# Patient Record
Sex: FEMALE | Race: OTHER | HISPANIC OR LATINO | URBAN - METROPOLITAN AREA
[De-identification: names, ages, dates, MRNs, and addresses within clinical notes are randomized per-mention and may not be internally consistent; named-entity substitution may affect disease eponyms.]

---

## 2018-08-31 RX ORDER — RITUXIMAB 10 MG/ML
1 INJECTION, SOLUTION INTRAVENOUS
Qty: 0 | Refills: 0 | COMMUNITY
Start: 2018-08-31

## 2018-12-11 ENCOUNTER — OUTPATIENT (OUTPATIENT)
Dept: OUTPATIENT SERVICES | Facility: HOSPITAL | Age: 53
LOS: 1 days | End: 2018-12-11
Payer: MEDICARE

## 2018-12-11 VITALS
TEMPERATURE: 98 F | SYSTOLIC BLOOD PRESSURE: 110 MMHG | OXYGEN SATURATION: 98 % | RESPIRATION RATE: 16 BRPM | WEIGHT: 139.99 LBS | HEART RATE: 63 BPM | HEIGHT: 59 IN | DIASTOLIC BLOOD PRESSURE: 70 MMHG

## 2018-12-11 DIAGNOSIS — Z01.818 ENCOUNTER FOR OTHER PREPROCEDURAL EXAMINATION: ICD-10-CM

## 2018-12-11 DIAGNOSIS — Z90.710 ACQUIRED ABSENCE OF BOTH CERVIX AND UTERUS: Chronic | ICD-10-CM

## 2018-12-11 DIAGNOSIS — Z98.890 OTHER SPECIFIED POSTPROCEDURAL STATES: Chronic | ICD-10-CM

## 2018-12-11 DIAGNOSIS — M20.12 HALLUX VALGUS (ACQUIRED), LEFT FOOT: ICD-10-CM

## 2018-12-11 DIAGNOSIS — M54.5 LOW BACK PAIN: Chronic | ICD-10-CM

## 2018-12-11 DIAGNOSIS — I67.1 CEREBRAL ANEURYSM, NONRUPTURED: Chronic | ICD-10-CM

## 2018-12-11 DIAGNOSIS — G47.33 OBSTRUCTIVE SLEEP APNEA (ADULT) (PEDIATRIC): ICD-10-CM

## 2018-12-11 PROCEDURE — 73630 X-RAY EXAM OF FOOT: CPT | Mod: 26,LT

## 2018-12-11 PROCEDURE — G0463: CPT

## 2018-12-11 PROCEDURE — 73630 X-RAY EXAM OF FOOT: CPT

## 2018-12-11 RX ORDER — SODIUM CHLORIDE 9 MG/ML
3 INJECTION INTRAMUSCULAR; INTRAVENOUS; SUBCUTANEOUS EVERY 8 HOURS
Qty: 0 | Refills: 0 | Status: DISCONTINUED | OUTPATIENT
Start: 2018-12-17 | End: 2018-12-25

## 2018-12-11 NOTE — H&P PST ADULT - MUSCULOSKELETAL COMMENTS
left foot, tenderness to palpation, pt has bunion left foot pain - hallux valgus acquired of left foot

## 2018-12-11 NOTE — H&P PST ADULT - RS GEN PE MLT RESP DETAILS PC
breath sounds equal/good air movement/airway patent/no wheezes/respirations non-labored/no rhonchi/normal/no rales/clear to auscultation bilaterally

## 2018-12-11 NOTE — H&P PST ADULT - HISTORY OF PRESENT ILLNESS
53 years old female presented to [facility] for evaluation before surgery, pt was diagnosed with hallux valgus left foot and is scheduled for Paulie Klaus Bunionectomy on 12/17/18.

## 2018-12-11 NOTE — H&P PST ADULT - NEGATIVE GENERAL GENITOURINARY SYMPTOMS
no dysuria/normal libido/no renal colic/no flank pain L/no hematuria/no flank pain R/no urinary hesitancy/no urine discoloration/no bladder infections/no nocturia/no gas in urine/normal urinary frequency/no incontinence

## 2018-12-11 NOTE — H&P PST ADULT - MUSCULOSKELETAL
detailed exam no joint erythema/no calf tenderness/no joint swelling/no joint warmth/decreased ROM due to pain details…

## 2018-12-11 NOTE — H&P PST ADULT - NEGATIVE NEUROLOGICAL SYMPTOMS
no focal seizures/no tremors/no hemiparesis/no facial palsy/no paresthesias/no transient paralysis/no weakness/no generalized seizures/no syncope/migraines/no confusion/no loss of sensation/no difficulty walking/no loss of consciousness

## 2018-12-11 NOTE — H&P PST ADULT - PMH
Asthma  last time used Ventolin in summer 2018, never intubated or hopsitalized, under [facility] , Dr Josh Parr care  Brain aneurysm  dx 2011, sx done  Depression    Fibroids    Fibromyalgia    GERD (gastroesophageal reflux disease)    Hallux valgus (acquired), left foot    HLD (hyperlipidemia)    HTN (hypertension)    Migraines    SHELIA (obstructive sleep apnea)  home test done by PCP in 2016, SHELIA pt was using mouth guard but it broken, not using it now, to notify Anesthesia  RA (rheumatoid arthritis)    Sarcoidosis, lung  dx 2016 with bx  Seasonal allergies    Vertigo

## 2018-12-11 NOTE — H&P PST ADULT - NEGATIVE ENMT SYMPTOMS
no ear pain/no nasal congestion/no throat pain/no dysphagia/no nasal obstruction/no post-nasal discharge/no abnormal taste sensation/no gum bleeding/no tinnitus/no vertigo/no nose bleeds/no recurrent cold sores/no dry mouth/no sinus symptoms/no nasal discharge/no hearing difficulty

## 2018-12-11 NOTE — H&P PST ADULT - NEGATIVE BREAST SYMPTOMS
no breast lump L/no nipple discharge R/no breast tenderness L/no breast tenderness R/no nipple discharge L/no breast lump R

## 2018-12-11 NOTE — H&P PST ADULT - GASTROINTESTINAL DETAILS
nontender/bowel sounds normal/normal/no distention/soft/no guarding/no masses palpable/no bruit/no rebound tenderness

## 2018-12-11 NOTE — H&P PST ADULT - NEGATIVE OPHTHALMOLOGIC SYMPTOMS
no lacrimation R/no photophobia/no diplopia/no lacrimation L/no blurred vision R/no discharge R/no blurred vision L/no pain L/no irritation R/no discharge L/no pain R/no irritation L

## 2018-12-11 NOTE — H&P PST ADULT - PSH
Brain aneurysm  coil sx - 2011  Chronic lower back pain  s/p lumbar discectomy -2013, s/p fusion with hardware- 2017  H/O: hysterectomy  2011  S/P arthroscopy  left knee and left foot=- 2016

## 2018-12-11 NOTE — H&P PST ADULT - NEGATIVE CARDIOVASCULAR SYMPTOMS
no chest pain/no dyspnea on exertion/no peripheral edema/no palpitations/no orthopnea/no paroxysmal nocturnal dyspnea/no claudication

## 2018-12-16 ENCOUNTER — TRANSCRIPTION ENCOUNTER (OUTPATIENT)
Age: 53
End: 2018-12-16

## 2018-12-17 ENCOUNTER — RESULT REVIEW (OUTPATIENT)
Age: 53
End: 2018-12-17

## 2018-12-17 ENCOUNTER — OUTPATIENT (OUTPATIENT)
Dept: OUTPATIENT SERVICES | Facility: HOSPITAL | Age: 53
LOS: 1 days | End: 2018-12-17
Payer: MEDICARE

## 2018-12-17 VITALS
SYSTOLIC BLOOD PRESSURE: 112 MMHG | WEIGHT: 139.99 LBS | OXYGEN SATURATION: 98 % | HEART RATE: 80 BPM | HEIGHT: 59 IN | RESPIRATION RATE: 16 BRPM | TEMPERATURE: 98 F | DIASTOLIC BLOOD PRESSURE: 84 MMHG

## 2018-12-17 VITALS
TEMPERATURE: 98 F | HEART RATE: 69 BPM | DIASTOLIC BLOOD PRESSURE: 70 MMHG | SYSTOLIC BLOOD PRESSURE: 110 MMHG | OXYGEN SATURATION: 99 % | RESPIRATION RATE: 16 BRPM

## 2018-12-17 DIAGNOSIS — M20.12 HALLUX VALGUS (ACQUIRED), LEFT FOOT: ICD-10-CM

## 2018-12-17 DIAGNOSIS — Z98.890 OTHER SPECIFIED POSTPROCEDURAL STATES: Chronic | ICD-10-CM

## 2018-12-17 DIAGNOSIS — Z90.710 ACQUIRED ABSENCE OF BOTH CERVIX AND UTERUS: Chronic | ICD-10-CM

## 2018-12-17 DIAGNOSIS — I67.1 CEREBRAL ANEURYSM, NONRUPTURED: Chronic | ICD-10-CM

## 2018-12-17 DIAGNOSIS — M54.5 LOW BACK PAIN: Chronic | ICD-10-CM

## 2018-12-17 PROCEDURE — 73630 X-RAY EXAM OF FOOT: CPT

## 2018-12-17 PROCEDURE — C1726: CPT

## 2018-12-17 PROCEDURE — C1713: CPT

## 2018-12-17 PROCEDURE — 73630 X-RAY EXAM OF FOOT: CPT | Mod: 26,LT

## 2018-12-17 PROCEDURE — 28299 COR HLX VLGS DOUBLE OSTEOT: CPT | Mod: LT

## 2018-12-17 PROCEDURE — 88300 SURGICAL PATH GROSS: CPT | Mod: 26

## 2018-12-17 PROCEDURE — 93005 ELECTROCARDIOGRAM TRACING: CPT

## 2018-12-17 PROCEDURE — C1769: CPT

## 2018-12-17 PROCEDURE — 88300 SURGICAL PATH GROSS: CPT

## 2018-12-17 PROCEDURE — C1889: CPT

## 2018-12-17 RX ORDER — IBUPROFEN 200 MG
1 TABLET ORAL
Qty: 21 | Refills: 0 | OUTPATIENT
Start: 2018-12-17 | End: 2018-12-23

## 2018-12-17 RX ORDER — SODIUM CHLORIDE 9 MG/ML
1000 INJECTION, SOLUTION INTRAVENOUS
Qty: 0 | Refills: 0 | Status: DISCONTINUED | OUTPATIENT
Start: 2018-12-17 | End: 2018-12-17

## 2018-12-17 RX ORDER — FENTANYL CITRATE 50 UG/ML
50 INJECTION INTRAVENOUS
Qty: 0 | Refills: 0 | Status: DISCONTINUED | OUTPATIENT
Start: 2018-12-17 | End: 2018-12-17

## 2018-12-17 RX ORDER — ONDANSETRON 8 MG/1
4 TABLET, FILM COATED ORAL ONCE
Qty: 0 | Refills: 0 | Status: DISCONTINUED | OUTPATIENT
Start: 2018-12-17 | End: 2018-12-17

## 2018-12-17 RX ORDER — AZITHROMYCIN 500 MG/1
1 TABLET, FILM COATED ORAL
Qty: 5 | Refills: 0 | OUTPATIENT
Start: 2018-12-17 | End: 2018-12-21

## 2018-12-17 RX ORDER — OXYCODONE AND ACETAMINOPHEN 5; 325 MG/1; MG/1
1 TABLET ORAL ONCE
Qty: 0 | Refills: 0 | Status: DISCONTINUED | OUTPATIENT
Start: 2018-12-17 | End: 2018-12-17

## 2018-12-17 RX ADMIN — FENTANYL CITRATE 50 MICROGRAM(S): 50 INJECTION INTRAVENOUS at 13:15

## 2018-12-17 RX ADMIN — SODIUM CHLORIDE 3 MILLILITER(S): 9 INJECTION INTRAMUSCULAR; INTRAVENOUS; SUBCUTANEOUS at 08:03

## 2018-12-17 RX ADMIN — FENTANYL CITRATE 50 MICROGRAM(S): 50 INJECTION INTRAVENOUS at 11:58

## 2018-12-17 NOTE — ASU DISCHARGE PLAN (ADULT/PEDIATRIC). - NOTIFY
Fever greater than 101/Persistent Nausea and Vomiting/Numbness, tingling/Bleeding that does not stop/Increased Irritability or Sluggishness/Pain not relieved by Medications/Excessive Diarrhea/Unable to Urinate/Numbness, color, or temperature change to extremity/Swelling that continues/Inability to Tolerate Liquids or Foods

## 2018-12-17 NOTE — ASU DISCHARGE PLAN (ADULT/PEDIATRIC). - MEDICATION SUMMARY - MEDICATIONS TO TAKE
I will START or STAY ON the medications listed below when I get home from the hospital:    sulfaSALAzine 500 mg oral delayed release tablet  -- 2 tab(s) by mouth once a day (at bedtime)  -- Indication: For per pcp    Percocet 5/325 oral tablet  -- 1 tab(s) by mouth 3 times a day MDD:4 per day  -- Caution federal law prohibits the transfer of this drug to any person other  than the person for whom it was prescribed.  May cause drowsiness.  Alcohol may intensify this effect.  Use care when operating dangerous machinery.  This prescription cannot be refilled.  This product contains acetaminophen.  Do not use  with any other product containing acetaminophen to prevent possible liver damage.  Using more of this medication than prescribed may cause serious breathing problems.    -- Indication: For for pain    ibuprofen 800 mg oral tablet  -- 1 tab(s) by mouth 3 times a day   -- Do not take this drug if you are pregnant.  It is very important that you take or use this exactly as directed.  Do not skip doses or discontinue unless directed by your doctor.  May cause drowsiness or dizziness.  Obtain medical advice before taking any non-prescription drugs as some may affect the action of this medication.  Take with food or milk.    -- Indication: For for pain    SUMAtriptan 100 mg oral tablet  -- 1 tab(s) by mouth once, As Needed  -- Indication: For per pcp    gabapentin 300 mg oral capsule  -- 1 cap(s) by mouth 3 times a day  -- Indication: For per pcp    DULoxetine 60 mg oral delayed release capsule  -- 1 cap(s) by mouth once a day (at bedtime)  -- Indication: For per pcp    mirtazapine 30 mg oral tablet  -- 1 tab(s) by mouth once a day (at bedtime)  -- Indication: For per pcp    meclizine 12.5 mg oral tablet  -- 1 tab(s) by mouth 3 times a day, As Needed  -- Indication: For per pcp    ondansetron 8 mg oral tablet  -- 1 tab(s) by mouth 3 times a day, As Needed  -- Indication: For per pcp    carvedilol 3.125 mg oral tablet  -- 1 tab(s) by mouth 2 times a day  -- Indication: For per pcp    Ventolin HFA 90 mcg/inh inhalation aerosol  -- 2 puff(s) inhaled 4 times a day, As Needed  -- Indication: For per pcp    Rituxan 10 mg/mL intravenous solution  -- 1 dose(s) intravenous every 4 months  -- Indication: For per pcp    raNITIdine 150 mg oral tablet  -- 1 tab(s) by mouth once a day (at bedtime)  -- Indication: For per pcp    Imuran 50 mg oral tablet  -- 1 tab(s) by mouth once a day (at bedtime)  -- Indication: For per pcp    Azithromycin 5 Day Dose Pack 250 mg oral tablet  -- 1 tab(s) by mouth once a day   -- Do not take dairy products, antacids, or iron preparations within one hour of this medication.  Finish all this medication unless otherwise directed by prescriber.    -- Indication: For Antibiotic    omeprazole 40 mg oral delayed release capsule  -- 1 cap(s) by mouth once a day  -- Indication: For per pcp

## 2018-12-17 NOTE — ASU PATIENT PROFILE, ADULT - PREOP PAIN SCORE
0 soft/nontender/bowel sounds normal/obese abdomen bowel sounds normal/nontender/no masses palpable/soft/no distention

## 2018-12-17 NOTE — BRIEF OPERATIVE NOTE - PROCEDURE
<<-----Click on this checkbox to enter Procedure Klaus bunionectomy of left great toe  12/17/2018    Active  RSTBRIONNA Apodaca bunionectomy of left great toe  12/17/2018    Active  RSTBRIONNA

## 2018-12-20 LAB — SURGICAL PATHOLOGY STUDY: SIGNIFICANT CHANGE UP

## 2019-01-23 ENCOUNTER — INPATIENT (INPATIENT)
Facility: HOSPITAL | Age: 54
LOS: 0 days | Discharge: HOME | DRG: 313 | End: 2019-01-24
Attending: INTERNAL MEDICINE | Admitting: INTERNAL MEDICINE
Payer: COMMERCIAL

## 2019-01-23 VITALS
TEMPERATURE: 98 F | WEIGHT: 164.91 LBS | DIASTOLIC BLOOD PRESSURE: 84 MMHG | OXYGEN SATURATION: 96 % | RESPIRATION RATE: 17 BRPM | HEART RATE: 116 BPM | SYSTOLIC BLOOD PRESSURE: 133 MMHG

## 2019-01-23 DIAGNOSIS — D86.0 SARCOIDOSIS OF LUNG: ICD-10-CM

## 2019-01-23 DIAGNOSIS — Z98.890 OTHER SPECIFIED POSTPROCEDURAL STATES: Chronic | ICD-10-CM

## 2019-01-23 DIAGNOSIS — R07.9 CHEST PAIN, UNSPECIFIED: ICD-10-CM

## 2019-01-23 DIAGNOSIS — Z29.9 ENCOUNTER FOR PROPHYLACTIC MEASURES, UNSPECIFIED: ICD-10-CM

## 2019-01-23 DIAGNOSIS — Z90.710 ACQUIRED ABSENCE OF BOTH CERVIX AND UTERUS: Chronic | ICD-10-CM

## 2019-01-23 DIAGNOSIS — M06.9 RHEUMATOID ARTHRITIS, UNSPECIFIED: ICD-10-CM

## 2019-01-23 DIAGNOSIS — I67.1 CEREBRAL ANEURYSM, NONRUPTURED: Chronic | ICD-10-CM

## 2019-01-23 DIAGNOSIS — F32.9 MAJOR DEPRESSIVE DISORDER, SINGLE EPISODE, UNSPECIFIED: ICD-10-CM

## 2019-01-23 DIAGNOSIS — M54.5 LOW BACK PAIN: Chronic | ICD-10-CM

## 2019-01-23 PROBLEM — G47.33 OBSTRUCTIVE SLEEP APNEA (ADULT) (PEDIATRIC): Chronic | Status: ACTIVE | Noted: 2018-12-11

## 2019-01-23 PROBLEM — D21.9 BENIGN NEOPLASM OF CONNECTIVE AND OTHER SOFT TISSUE, UNSPECIFIED: Chronic | Status: ACTIVE | Noted: 2018-12-11

## 2019-01-23 PROBLEM — J30.2 OTHER SEASONAL ALLERGIC RHINITIS: Chronic | Status: ACTIVE | Noted: 2018-12-11

## 2019-01-23 PROBLEM — I10 ESSENTIAL (PRIMARY) HYPERTENSION: Chronic | Status: ACTIVE | Noted: 2018-12-11

## 2019-01-23 PROBLEM — M20.12 HALLUX VALGUS (ACQUIRED), LEFT FOOT: Chronic | Status: ACTIVE | Noted: 2018-12-11

## 2019-01-23 PROBLEM — G43.909 MIGRAINE, UNSPECIFIED, NOT INTRACTABLE, WITHOUT STATUS MIGRAINOSUS: Chronic | Status: ACTIVE | Noted: 2018-12-11

## 2019-01-23 PROBLEM — E78.5 HYPERLIPIDEMIA, UNSPECIFIED: Chronic | Status: ACTIVE | Noted: 2018-12-11

## 2019-01-23 PROBLEM — J45.909 UNSPECIFIED ASTHMA, UNCOMPLICATED: Chronic | Status: ACTIVE | Noted: 2018-12-11

## 2019-01-23 PROBLEM — R42 DIZZINESS AND GIDDINESS: Chronic | Status: ACTIVE | Noted: 2018-12-11

## 2019-01-23 PROBLEM — K21.9 GASTRO-ESOPHAGEAL REFLUX DISEASE WITHOUT ESOPHAGITIS: Chronic | Status: ACTIVE | Noted: 2018-12-11

## 2019-01-23 PROBLEM — M79.7 FIBROMYALGIA: Chronic | Status: ACTIVE | Noted: 2018-12-11

## 2019-01-23 LAB
ALBUMIN SERPL ELPH-MCNC: 3.6 G/DL — SIGNIFICANT CHANGE UP (ref 3.5–5)
ALP SERPL-CCNC: 137 U/L — HIGH (ref 40–120)
ALT FLD-CCNC: 59 U/L DA — SIGNIFICANT CHANGE UP (ref 10–60)
ANION GAP SERPL CALC-SCNC: 9 MMOL/L — SIGNIFICANT CHANGE UP (ref 5–17)
APTT BLD: 29.4 SEC — SIGNIFICANT CHANGE UP (ref 27.5–36.3)
AST SERPL-CCNC: 44 U/L — HIGH (ref 10–40)
BILIRUB SERPL-MCNC: 0.5 MG/DL — SIGNIFICANT CHANGE UP (ref 0.2–1.2)
BUN SERPL-MCNC: 15 MG/DL — SIGNIFICANT CHANGE UP (ref 7–18)
CALCIUM SERPL-MCNC: 8.4 MG/DL — SIGNIFICANT CHANGE UP (ref 8.4–10.5)
CHLORIDE SERPL-SCNC: 109 MMOL/L — HIGH (ref 96–108)
CK MB BLD-MCNC: <1.7 % — SIGNIFICANT CHANGE UP (ref 0–3.5)
CK MB CFR SERPL CALC: <1 NG/ML — SIGNIFICANT CHANGE UP (ref 0–3.6)
CK SERPL-CCNC: 59 U/L — SIGNIFICANT CHANGE UP (ref 21–215)
CO2 SERPL-SCNC: 24 MMOL/L — SIGNIFICANT CHANGE UP (ref 22–31)
CREAT SERPL-MCNC: 0.69 MG/DL — SIGNIFICANT CHANGE UP (ref 0.5–1.3)
D DIMER BLD IA.RAPID-MCNC: <150 NG/ML DDU — SIGNIFICANT CHANGE UP
GLUCOSE SERPL-MCNC: 96 MG/DL — SIGNIFICANT CHANGE UP (ref 70–99)
HCG SERPL-ACNC: 3 MIU/ML — SIGNIFICANT CHANGE UP
HCT VFR BLD CALC: 38.4 % — SIGNIFICANT CHANGE UP (ref 34.5–45)
HGB BLD-MCNC: 12.6 G/DL — SIGNIFICANT CHANGE UP (ref 11.5–15.5)
INR BLD: 0.96 RATIO — SIGNIFICANT CHANGE UP (ref 0.88–1.16)
MCHC RBC-ENTMCNC: 30.4 PG — SIGNIFICANT CHANGE UP (ref 27–34)
MCHC RBC-ENTMCNC: 32.8 GM/DL — SIGNIFICANT CHANGE UP (ref 32–36)
MCV RBC AUTO: 92.7 FL — SIGNIFICANT CHANGE UP (ref 80–100)
PLATELET # BLD AUTO: 316 K/UL — SIGNIFICANT CHANGE UP (ref 150–400)
POTASSIUM SERPL-MCNC: 4.5 MMOL/L — SIGNIFICANT CHANGE UP (ref 3.5–5.3)
POTASSIUM SERPL-SCNC: 4.5 MMOL/L — SIGNIFICANT CHANGE UP (ref 3.5–5.3)
PROT SERPL-MCNC: 7.7 G/DL — SIGNIFICANT CHANGE UP (ref 6–8.3)
PROTHROM AB SERPL-ACNC: 10.6 SEC — SIGNIFICANT CHANGE UP (ref 10–12.9)
RBC # BLD: 4.15 M/UL — SIGNIFICANT CHANGE UP (ref 3.8–5.2)
RBC # FLD: 12.4 % — SIGNIFICANT CHANGE UP (ref 10.3–14.5)
SODIUM SERPL-SCNC: 142 MMOL/L — SIGNIFICANT CHANGE UP (ref 135–145)
TROPONIN I SERPL-MCNC: <0.015 NG/ML — SIGNIFICANT CHANGE UP (ref 0–0.04)
TROPONIN I SERPL-MCNC: <0.015 NG/ML — SIGNIFICANT CHANGE UP (ref 0–0.04)
WBC # BLD: 8 K/UL — SIGNIFICANT CHANGE UP (ref 3.8–10.5)
WBC # FLD AUTO: 8 K/UL — SIGNIFICANT CHANGE UP (ref 3.8–10.5)

## 2019-01-23 PROCEDURE — 71045 X-RAY EXAM CHEST 1 VIEW: CPT | Mod: 26

## 2019-01-23 PROCEDURE — 99285 EMERGENCY DEPT VISIT HI MDM: CPT

## 2019-01-23 RX ORDER — CARVEDILOL PHOSPHATE 80 MG/1
1 CAPSULE, EXTENDED RELEASE ORAL
Qty: 0 | Refills: 0 | COMMUNITY

## 2019-01-23 RX ORDER — PANTOPRAZOLE SODIUM 20 MG/1
40 TABLET, DELAYED RELEASE ORAL
Qty: 0 | Refills: 0 | Status: DISCONTINUED | OUTPATIENT
Start: 2019-01-23 | End: 2019-01-24

## 2019-01-23 RX ORDER — ASPIRIN/CALCIUM CARB/MAGNESIUM 324 MG
81 TABLET ORAL DAILY
Qty: 0 | Refills: 0 | Status: DISCONTINUED | OUTPATIENT
Start: 2019-01-23 | End: 2019-01-24

## 2019-01-23 RX ORDER — SULFASALAZINE 500 MG
1000 TABLET ORAL AT BEDTIME
Qty: 0 | Refills: 0 | Status: DISCONTINUED | OUTPATIENT
Start: 2019-01-23 | End: 2019-01-24

## 2019-01-23 RX ORDER — ATORVASTATIN CALCIUM 80 MG/1
40 TABLET, FILM COATED ORAL AT BEDTIME
Qty: 0 | Refills: 0 | Status: DISCONTINUED | OUTPATIENT
Start: 2019-01-23 | End: 2019-01-24

## 2019-01-23 RX ORDER — AZATHIOPRINE 100 MG/1
50 TABLET ORAL AT BEDTIME
Qty: 0 | Refills: 0 | Status: DISCONTINUED | OUTPATIENT
Start: 2019-01-23 | End: 2019-01-24

## 2019-01-23 RX ORDER — MECLIZINE HCL 12.5 MG
1 TABLET ORAL
Qty: 0 | Refills: 0 | COMMUNITY

## 2019-01-23 RX ORDER — FAMOTIDINE 10 MG/ML
20 INJECTION INTRAVENOUS EVERY 24 HOURS
Qty: 0 | Refills: 0 | Status: DISCONTINUED | OUTPATIENT
Start: 2019-01-23 | End: 2019-01-23

## 2019-01-23 RX ORDER — SODIUM CHLORIDE 9 MG/ML
1000 INJECTION INTRAMUSCULAR; INTRAVENOUS; SUBCUTANEOUS ONCE
Qty: 0 | Refills: 0 | Status: COMPLETED | OUTPATIENT
Start: 2019-01-23 | End: 2019-01-23

## 2019-01-23 RX ORDER — DULOXETINE HYDROCHLORIDE 30 MG/1
60 CAPSULE, DELAYED RELEASE ORAL DAILY
Qty: 0 | Refills: 0 | Status: DISCONTINUED | OUTPATIENT
Start: 2019-01-23 | End: 2019-01-24

## 2019-01-23 RX ORDER — GABAPENTIN 400 MG/1
300 CAPSULE ORAL THREE TIMES A DAY
Qty: 0 | Refills: 0 | Status: DISCONTINUED | OUTPATIENT
Start: 2019-01-23 | End: 2019-01-24

## 2019-01-23 RX ORDER — SUMATRIPTAN SUCCINATE 4 MG/.5ML
100 INJECTION, SOLUTION SUBCUTANEOUS ONCE
Qty: 0 | Refills: 0 | Status: COMPLETED | OUTPATIENT
Start: 2019-01-23 | End: 2019-01-23

## 2019-01-23 RX ORDER — MIRTAZAPINE 45 MG/1
15 TABLET, ORALLY DISINTEGRATING ORAL AT BEDTIME
Qty: 0 | Refills: 0 | Status: DISCONTINUED | OUTPATIENT
Start: 2019-01-23 | End: 2019-01-24

## 2019-01-23 RX ORDER — METOPROLOL TARTRATE 50 MG
25 TABLET ORAL
Qty: 0 | Refills: 0 | Status: DISCONTINUED | OUTPATIENT
Start: 2019-01-23 | End: 2019-01-24

## 2019-01-23 RX ORDER — MIRTAZAPINE 45 MG/1
1 TABLET, ORALLY DISINTEGRATING ORAL
Qty: 0 | Refills: 0 | COMMUNITY

## 2019-01-23 RX ORDER — ACETAMINOPHEN 500 MG
975 TABLET ORAL ONCE
Qty: 0 | Refills: 0 | Status: COMPLETED | OUTPATIENT
Start: 2019-01-23 | End: 2019-01-23

## 2019-01-23 RX ORDER — METOCLOPRAMIDE HCL 10 MG
10 TABLET ORAL ONCE
Qty: 0 | Refills: 0 | Status: COMPLETED | OUTPATIENT
Start: 2019-01-23 | End: 2019-01-23

## 2019-01-23 RX ADMIN — MIRTAZAPINE 15 MILLIGRAM(S): 45 TABLET, ORALLY DISINTEGRATING ORAL at 22:07

## 2019-01-23 RX ADMIN — SUMATRIPTAN SUCCINATE 100 MILLIGRAM(S): 4 INJECTION, SOLUTION SUBCUTANEOUS at 23:57

## 2019-01-23 RX ADMIN — Medication 25 MILLIGRAM(S): at 18:24

## 2019-01-23 RX ADMIN — GABAPENTIN 300 MILLIGRAM(S): 400 CAPSULE ORAL at 22:07

## 2019-01-23 RX ADMIN — Medication 10 MILLIGRAM(S): at 13:13

## 2019-01-23 RX ADMIN — AZATHIOPRINE 50 MILLIGRAM(S): 100 TABLET ORAL at 22:21

## 2019-01-23 RX ADMIN — Medication 975 MILLIGRAM(S): at 13:43

## 2019-01-23 RX ADMIN — Medication 975 MILLIGRAM(S): at 13:13

## 2019-01-23 RX ADMIN — ATORVASTATIN CALCIUM 40 MILLIGRAM(S): 80 TABLET, FILM COATED ORAL at 22:07

## 2019-01-23 RX ADMIN — SODIUM CHLORIDE 3000 MILLILITER(S): 9 INJECTION INTRAMUSCULAR; INTRAVENOUS; SUBCUTANEOUS at 16:25

## 2019-01-23 RX ADMIN — Medication 1000 MILLIGRAM(S): at 22:21

## 2019-01-23 NOTE — H&P ADULT - NSHPLABSRESULTS_GEN_ALL_CORE
Complete Blood Count (01.23.19 @ 12:56)    WBC Count: 8.0 K/uL    RBC Count: 4.15 M/uL    Hemoglobin: 12.6 g/dL    Hematocrit: 38.4 %    Mean Cell Volume: 92.7 fl    Mean Cell Hemoglobin: 30.4 pg    Mean Cell Hemoglobin Conc: 32.8 gm/dL    Red Cell Distrib Width: 12.4 %    Platelet Count - Automated: 316 K/uL  Comprehensive Metabolic Panel (01.23.19 @ 12:56)    Sodium, Serum: 142 mmol/L    Potassium, Serum: 4.5: Specimen slightly hemolyzed mmol/L    Chloride, Serum: 109 mmol/L    Carbon Dioxide, Serum: 24 mmol/L    Anion Gap, Serum: 9 mmol/L    Blood Urea Nitrogen, Serum: 15 mg/dL    Creatinine, Serum: 0.69 mg/dL    Glucose, Serum: 96 mg/dL    Calcium, Total Serum: 8.4 mg/dL    Protein Total, Serum: 7.7 g/dL    Albumin, Serum: 3.6 g/dL    Bilirubin Total, Serum: 0.5 mg/dL    Alkaline Phosphatase, Serum: 137 U/L    Aspartate Aminotransferase (AST/SGOT): 44: Specimen slightly hemolyzed U/L    Alanine Aminotransferase (ALT/SGPT): 59 U/L DA    eGFR if Non : 99: Interpretative comment  The units for eGFR are mL/min/1.73M2 (normalized body surface area). The  eGFR is calculated from a serum creatinine using the CKD-EPI equation.  Other variables required for calculation are race, age and sex. Among  patients with chronic kidney disease (CKD), the eGFR is useful in  determining the stage of disease according to KDOQI CKD classification.  All eGFR results are reported numerically with the following  interpretation.          GFR                    With                 Without     (ml/min/1.73 m2)    Kidney Damage       Kidney Damage        >= 90                    Stage 1                     Normal        60-89                    Stage 2                     Decreased GFR        30-59     Stage 3                     Stage 3        15-29                    Stage 4                     Stage 4        < 15                      Stage 5                     Stage 5  Each stage of CKD assumes that the associated GFR level has been in  effect for at least 3 months. Determination of stages one and two (with  eGFR > 59 ml/min/m2) requires estimation of kidney damage for at least 3  months as defined by structural or functional abnormalities.  Limitations: All estimates of GFR will be less accurate for patients at  extremes of muscle mass (including but not limited to frail elderly,  critically ill, or cancer patients), those with unusual diets, and those  with conditions associated with reduced secretion or extrarenal  elimination of creatinine. The eGFR equation is not recommended for use  in patients with unstable creatinine levels. mL/min/1.73M2    eGFR if African American: 114 mL/min/1.73M2      < from: Xray Chest 1 View AP/PA (01.23.19 @ 13:50) >    FINDINGS:        Lungs: The lungs are clear.  Heart: The heart is normal in size.  Mediastinum: The mediastinum is within normal limits.    IMPRESSION:    Clear lungs.    < end of copied text >

## 2019-01-23 NOTE — ED ADULT NURSE NOTE - OBJECTIVE STATEMENT
Patient c/o chest tightness and nausea x 4 days. EKG completed. No acute distress noted, no shortness of breath indicated. Safety maintained.

## 2019-01-23 NOTE — ED PROVIDER NOTE - PHYSICAL EXAMINATION
Gen: well appearing, of stated age, no acute distress; Head: NC, AT; ENT: MMM, no uvular deviation; Neck: supple with full ROM; Chest: CTAB, no retractions, rate normal, appears to breath comfortable; Heart: RRR S1S2 No JVD No peripheral edema b/l pulses 2+ in arms and legs; Abd: Soft non-tender, no rebound or guarding, no masses, no garcia sign, no mcburney tenderness; Back: No spinal deformity; Ext: Moving all 4 limbs without obvious impairment to ROM, no obvious weakness; Neuro: fluid speech, no focal deficits, oriented to person, place, situation; Psych: No anxiety, depression or pressured speech noted; Skin: no utricaria, no diffuse rash. -ncohen

## 2019-01-23 NOTE — H&P ADULT - ASSESSMENT
52 yo F with PMH of astham (never intubated), Migraine, sarcoidosis, HLD, HTN , RA and Chronic back pain, PSH of left Hallux valgus surgery ( bunionectomy on 12/17/18 ) presented to ED c/o sternal chest pain for 4 days, pressure likely  pain, slow onset, 5-6/10, non radiating, associated with GRUBBS and nausea, denies diaphoresis, palpitation, leg swelling. Pt denies fever, chills, abd pain, vomiting, diarrhea, constipation, dysuria or any other complains.     ED course, vitals wnl except , labs noted troponin negative x1 , D -dimer negative and EKG noted sinus tachycardia at 107, prior EKG noted 12/2018 noted sinus bradycardia at HR 59. Currently chest pain improved. 54 yo F with PMH of astham (never intubated), Migraine, depression,  sarcoidosis, HLD, HTN , RA and Chronic back pain, PSH of left Hallux valgus surgery ( bunionectomy on 12/17/18 ) presented to ED c/o sternal chest pain for 4 days, pressure likely  pain, slow onset, 5-6/10, non radiating, associated with GRUBBS and nausea, denies diaphoresis, palpitation, leg swelling, In ED, vitals wnl except , labs noted troponin negative x1 , D -dimer negative and EKG noted sinus tachycardia at 107. Will admit for ACS workup

## 2019-01-23 NOTE — H&P ADULT - PROBLEM SELECTOR PLAN 5
IMPROVE VTE Individual Risk Assessment    RISK                                                          Points  [] Previous VTE                                           3  [] Thrombophilia                                        2  [] Lower limb paralysis                              2   [] Current Cancer                                       2   [] Immobilization > 24 hrs                        1  [] ICU/CCU stay > 24 hours                       1  [] Age > 60                                                   1    IMPROVE VTE Score: 0  no need for dvt ppx  on protonix for gi ppx

## 2019-01-23 NOTE — H&P ADULT - PROBLEM SELECTOR PLAN 1
p/w substernal cp and GRUBBS   HEART score 3, will r/o ACS and SSS   EKG noted sinus tachycardia at 107, prior EKG noted 12/2018 noted sinus bradycardia at HR 59.   Troponin negative x1, d-dimer negative   on telemetry   started asa, lipitor and metoprolol   f/u cardiac enzyme and TTE   cardio DR. Monreal

## 2019-01-23 NOTE — ED PROVIDER NOTE - OBJECTIVE STATEMENT
53 female migraines, incidental finding of aneurism, treated, sarcoidosis on supression meds, 4 days sternal cp slow onset mod / exertional / sob. not pleuritic, no leg swelling, no hx blood clot. no diaphoresis. no vomiting. notes that she has a ha that is mod and sim to prior migraines but also has the nausea but not cw her migraines. no fevers.

## 2019-01-23 NOTE — H&P ADULT - NSHPPHYSICALEXAM_GEN_ALL_CORE
Vital Signs Last 24 Hrs  T(C): 36.7 (23 Jan 2019 15:54), Max: 36.7 (23 Jan 2019 15:54)  T(F): 98 (23 Jan 2019 15:54), Max: 98 (23 Jan 2019 15:54)  HR: 112 (23 Jan 2019 15:54) (112 - 116)  BP: 128/81 (23 Jan 2019 15:54) (128/81 - 133/84)  BP(mean): --  RR: 18 (23 Jan 2019 15:54) (17 - 18)  SpO2: 85% (23 Jan 2019 15:54) (85% - 96%)

## 2019-01-23 NOTE — ED PROVIDER NOTE - MEDICAL DECISION MAKING DETAILS
ro for pe, acs. dissection unlikely. do not suspect ich / ha likely benign. Symptomatic treatment. labs, ekg, xr chest.

## 2019-01-23 NOTE — H&P ADULT - HISTORY OF PRESENT ILLNESS
54 yo F with PMH of astham (never intubated), Migraine, sarcoidosis, HLD, HTN , RA and Chronic back pain, PSH of left Hallux valgus surgery ( bunionectomy on 12/17/18 ) presented to ED c/o sternal chest pain for 4 days, pressure likely  pain, slow onset, 5-6/10, non radiating, associated with GRUBBS and nausea, denies diaphoresis, palpitation, leg swelling. Pt denies fever, chills, abd pain, vomiting, diarrhea, constipation, dysuria or any other complains.     ED course, vitals wnl except , labs noted troponin negative x1 , D -dimer negative and EKG noted sinus tachycardia at 107, prior EKG noted 12/2018 noted sinus bradycardia at HR 59. Currently chest pain improved. 52 yo F with PMH of astham (never intubated), Migraine, depression, sarcoidosis, HLD, HTN , RA and Chronic back pain, PSH of left Hallux valgus surgery ( bunionectomy on 12/17/18 ) presented to ED c/o sternal chest pain for 4 days, pressure likely  pain, slow onset, 5-6/10, non radiating, associated with GRUBBS and nausea, denies diaphoresis, palpitation, leg swelling. Pt denies fever, chills, abd pain, vomiting, diarrhea, constipation, dysuria or any other complains.     ED course, vitals wnl except , labs noted troponin negative x1 , D -dimer negative and EKG noted sinus tachycardia at 107, prior EKG noted 12/2018 noted sinus bradycardia at HR 59. Currently chest pain improved.

## 2019-01-24 ENCOUNTER — TRANSCRIPTION ENCOUNTER (OUTPATIENT)
Age: 54
End: 2019-01-24

## 2019-01-24 VITALS
SYSTOLIC BLOOD PRESSURE: 103 MMHG | RESPIRATION RATE: 16 BRPM | OXYGEN SATURATION: 98 % | TEMPERATURE: 98 F | HEART RATE: 85 BPM | DIASTOLIC BLOOD PRESSURE: 63 MMHG

## 2019-01-24 LAB
ANION GAP SERPL CALC-SCNC: 8 MMOL/L — SIGNIFICANT CHANGE UP (ref 5–17)
BASOPHILS # BLD AUTO: 0 K/UL — SIGNIFICANT CHANGE UP (ref 0–0.2)
BASOPHILS NFR BLD AUTO: 0.5 % — SIGNIFICANT CHANGE UP (ref 0–2)
BUN SERPL-MCNC: 14 MG/DL — SIGNIFICANT CHANGE UP (ref 7–18)
CALCIUM SERPL-MCNC: 8.5 MG/DL — SIGNIFICANT CHANGE UP (ref 8.4–10.5)
CHLORIDE SERPL-SCNC: 108 MMOL/L — SIGNIFICANT CHANGE UP (ref 96–108)
CHOLEST SERPL-MCNC: 270 MG/DL — HIGH (ref 10–199)
CK MB BLD-MCNC: <1.6 % — SIGNIFICANT CHANGE UP (ref 0–3.5)
CK MB CFR SERPL CALC: <1 NG/ML — SIGNIFICANT CHANGE UP (ref 0–3.6)
CK SERPL-CCNC: 63 U/L — SIGNIFICANT CHANGE UP (ref 21–215)
CO2 SERPL-SCNC: 25 MMOL/L — SIGNIFICANT CHANGE UP (ref 22–31)
CREAT SERPL-MCNC: 0.66 MG/DL — SIGNIFICANT CHANGE UP (ref 0.5–1.3)
EOSINOPHIL # BLD AUTO: 0.1 K/UL — SIGNIFICANT CHANGE UP (ref 0–0.5)
EOSINOPHIL NFR BLD AUTO: 1.2 % — SIGNIFICANT CHANGE UP (ref 0–6)
FOLATE SERPL-MCNC: 11 NG/ML — SIGNIFICANT CHANGE UP
GLUCOSE SERPL-MCNC: 96 MG/DL — SIGNIFICANT CHANGE UP (ref 70–99)
HBA1C BLD-MCNC: 5.4 % — SIGNIFICANT CHANGE UP (ref 4–5.6)
HCT VFR BLD CALC: 37.6 % — SIGNIFICANT CHANGE UP (ref 34.5–45)
HCV AB S/CO SERPL IA: 0.07 S/CO — SIGNIFICANT CHANGE UP
HCV AB SERPL-IMP: SIGNIFICANT CHANGE UP
HDLC SERPL-MCNC: 47 MG/DL — LOW
HGB BLD-MCNC: 12.3 G/DL — SIGNIFICANT CHANGE UP (ref 11.5–15.5)
LIPID PNL WITH DIRECT LDL SERPL: 185 MG/DL — SIGNIFICANT CHANGE UP
LYMPHOCYTES # BLD AUTO: 1.9 K/UL — SIGNIFICANT CHANGE UP (ref 1–3.3)
LYMPHOCYTES # BLD AUTO: 32.5 % — SIGNIFICANT CHANGE UP (ref 13–44)
MCHC RBC-ENTMCNC: 30.5 PG — SIGNIFICANT CHANGE UP (ref 27–34)
MCHC RBC-ENTMCNC: 32.8 GM/DL — SIGNIFICANT CHANGE UP (ref 32–36)
MCV RBC AUTO: 93 FL — SIGNIFICANT CHANGE UP (ref 80–100)
MONOCYTES # BLD AUTO: 0.6 K/UL — SIGNIFICANT CHANGE UP (ref 0–0.9)
MONOCYTES NFR BLD AUTO: 9.8 % — SIGNIFICANT CHANGE UP (ref 2–14)
NEUTROPHILS # BLD AUTO: 3.3 K/UL — SIGNIFICANT CHANGE UP (ref 1.8–7.4)
NEUTROPHILS NFR BLD AUTO: 55.9 % — SIGNIFICANT CHANGE UP (ref 43–77)
PLATELET # BLD AUTO: 295 K/UL — SIGNIFICANT CHANGE UP (ref 150–400)
POTASSIUM SERPL-MCNC: 3.7 MMOL/L — SIGNIFICANT CHANGE UP (ref 3.5–5.3)
POTASSIUM SERPL-SCNC: 3.7 MMOL/L — SIGNIFICANT CHANGE UP (ref 3.5–5.3)
RBC # BLD: 4.04 M/UL — SIGNIFICANT CHANGE UP (ref 3.8–5.2)
RBC # FLD: 12.3 % — SIGNIFICANT CHANGE UP (ref 10.3–14.5)
SODIUM SERPL-SCNC: 141 MMOL/L — SIGNIFICANT CHANGE UP (ref 135–145)
TOTAL CHOLESTEROL/HDL RATIO MEASUREMENT: 5.7 RATIO — SIGNIFICANT CHANGE UP (ref 3.3–7.1)
TRIGL SERPL-MCNC: 192 MG/DL — HIGH (ref 10–149)
TROPONIN I SERPL-MCNC: <0.015 NG/ML — SIGNIFICANT CHANGE UP (ref 0–0.04)
TSH SERPL-MCNC: 1.62 UU/ML — SIGNIFICANT CHANGE UP (ref 0.34–4.82)
VIT B12 SERPL-MCNC: 476 PG/ML — SIGNIFICANT CHANGE UP (ref 232–1245)
WBC # BLD: 6 K/UL — SIGNIFICANT CHANGE UP (ref 3.8–10.5)
WBC # FLD AUTO: 6 K/UL — SIGNIFICANT CHANGE UP (ref 3.8–10.5)

## 2019-01-24 PROCEDURE — 36415 COLL VENOUS BLD VENIPUNCTURE: CPT

## 2019-01-24 PROCEDURE — 82607 VITAMIN B-12: CPT

## 2019-01-24 PROCEDURE — 85379 FIBRIN DEGRADATION QUANT: CPT

## 2019-01-24 PROCEDURE — 85730 THROMBOPLASTIN TIME PARTIAL: CPT

## 2019-01-24 PROCEDURE — 80061 LIPID PANEL: CPT

## 2019-01-24 PROCEDURE — 93306 TTE W/DOPPLER COMPLETE: CPT | Mod: 26

## 2019-01-24 PROCEDURE — 82746 ASSAY OF FOLIC ACID SERUM: CPT

## 2019-01-24 PROCEDURE — 85027 COMPLETE CBC AUTOMATED: CPT

## 2019-01-24 PROCEDURE — 99285 EMERGENCY DEPT VISIT HI MDM: CPT | Mod: 25

## 2019-01-24 PROCEDURE — 93306 TTE W/DOPPLER COMPLETE: CPT

## 2019-01-24 PROCEDURE — 83036 HEMOGLOBIN GLYCOSYLATED A1C: CPT

## 2019-01-24 PROCEDURE — 84443 ASSAY THYROID STIM HORMONE: CPT

## 2019-01-24 PROCEDURE — 93005 ELECTROCARDIOGRAM TRACING: CPT

## 2019-01-24 PROCEDURE — 80053 COMPREHEN METABOLIC PANEL: CPT

## 2019-01-24 PROCEDURE — 86803 HEPATITIS C AB TEST: CPT

## 2019-01-24 PROCEDURE — 85610 PROTHROMBIN TIME: CPT

## 2019-01-24 PROCEDURE — 84484 ASSAY OF TROPONIN QUANT: CPT

## 2019-01-24 PROCEDURE — 93010 ELECTROCARDIOGRAM REPORT: CPT

## 2019-01-24 PROCEDURE — 82553 CREATINE MB FRACTION: CPT

## 2019-01-24 PROCEDURE — 82550 ASSAY OF CK (CPK): CPT

## 2019-01-24 PROCEDURE — 96374 THER/PROPH/DIAG INJ IV PUSH: CPT

## 2019-01-24 PROCEDURE — A9502: CPT

## 2019-01-24 PROCEDURE — 93017 CV STRESS TEST TRACING ONLY: CPT

## 2019-01-24 PROCEDURE — 71045 X-RAY EXAM CHEST 1 VIEW: CPT

## 2019-01-24 PROCEDURE — 84702 CHORIONIC GONADOTROPIN TEST: CPT

## 2019-01-24 PROCEDURE — 78452 HT MUSCLE IMAGE SPECT MULT: CPT

## 2019-01-24 PROCEDURE — 80048 BASIC METABOLIC PNL TOTAL CA: CPT

## 2019-01-24 RX ORDER — MECLIZINE HCL 12.5 MG
12.5 TABLET ORAL ONCE
Qty: 0 | Refills: 0 | Status: COMPLETED | OUTPATIENT
Start: 2019-01-24 | End: 2019-01-24

## 2019-01-24 RX ORDER — ATORVASTATIN CALCIUM 80 MG/1
1 TABLET, FILM COATED ORAL
Qty: 30 | Refills: 0
Start: 2019-01-24 | End: 2019-02-22

## 2019-01-24 RX ADMIN — SUMATRIPTAN SUCCINATE 100 MILLIGRAM(S): 4 INJECTION, SOLUTION SUBCUTANEOUS at 02:48

## 2019-01-24 RX ADMIN — GABAPENTIN 300 MILLIGRAM(S): 400 CAPSULE ORAL at 13:00

## 2019-01-24 RX ADMIN — Medication 81 MILLIGRAM(S): at 13:00

## 2019-01-24 RX ADMIN — PANTOPRAZOLE SODIUM 40 MILLIGRAM(S): 20 TABLET, DELAYED RELEASE ORAL at 06:25

## 2019-01-24 RX ADMIN — Medication 25 MILLIGRAM(S): at 06:24

## 2019-01-24 RX ADMIN — GABAPENTIN 300 MILLIGRAM(S): 400 CAPSULE ORAL at 06:25

## 2019-01-24 RX ADMIN — Medication 12.5 MILLIGRAM(S): at 14:38

## 2019-01-24 RX ADMIN — DULOXETINE HYDROCHLORIDE 60 MILLIGRAM(S): 30 CAPSULE, DELAYED RELEASE ORAL at 13:00

## 2019-01-24 NOTE — DISCHARGE NOTE ADULT - PATIENT PORTAL LINK FT
You can access the [facility], offered by [facility], by registering with the following website: [email or web address]

## 2019-01-24 NOTE — DISCHARGE NOTE ADULT - CARE PLAN
Principal Discharge DX:	Chest pain, unspecified type  Goal:	To complete resolution of chest pain  Assessment and plan of treatment:	You were admitted to the hospital because of chest pain, you had cardiac work up which was negative for ischemia.  Secondary Diagnosis:	RA (rheumatoid arthritis)  Assessment and plan of treatment:	Continue with medication for Rheumatoid arthritis.  Secondary Diagnosis:	Sarcoidosis, lung  Assessment and plan of treatment:	Continue with your home medications for sarcoidosis. Please follow up with Dr. Parr for pulmonary function test as out patient.  Secondary Diagnosis:	Migraines  Assessment and plan of treatment:	Continue with your medication for migraines.  Secondary Diagnosis:	HTN (hypertension)  Assessment and plan of treatment:	Continue with blood pressure medication. Maintain a healthy diet that consist of low sugar, low fat, low sodium diet. Exercise frequently if possible.  Follow up with primary care physician in one week after discharge.  Secondary Diagnosis:	HLD (hyperlipidemia)  Assessment and plan of treatment:	Please take atorvastatin as advised and follow up with your primary care doctor in a week. Your total cholesterol was 270 and triglyceride was 192 on admission. Please follow up with your primary care doctor in a week.  Secondary Diagnosis:	Depression  Assessment and plan of treatment:	Continue with your home medication.

## 2019-01-24 NOTE — DISCHARGE NOTE ADULT - PLAN OF CARE
Continue with your medication for migraines. Continue with blood pressure medication. Maintain a healthy diet that consist of low sugar, low fat, low sodium diet. Exercise frequently if possible.  Follow up with primary care physician in one week after discharge. Please take atorvastatin as advised and follow up with your primary care doctor in a week. Your total cholesterol was 270 and triglyceride was 192 on admission. Please follow up with your primary care doctor in a week. Continue with your home medication. To complete resolution of chest pain You were admitted to the hospital because of chest pain, you had cardiac work up which was negative for ischemia. Continue with medication for Rheumatoid arthritis. Continue with your home medications for sarcoidosis. Please follow up with Dr. Parr for pulmonary function test as out patient.

## 2019-01-24 NOTE — PROVIDER CONTACT NOTE (OTHER) - ASSESSMENT
Vital signs were stable, pt denies SOB, palpitations and radiation.. pt also states that this is not a new pain she has felt it before. Pt also states that it may just be some reflux.

## 2019-01-24 NOTE — DISCHARGE NOTE ADULT - SECONDARY DIAGNOSIS.
HTN (hypertension) HLD (hyperlipidemia) Depression RA (rheumatoid arthritis) Sarcoidosis, lung Migraines

## 2019-01-24 NOTE — CONSULT NOTE ADULT - SUBJECTIVE AND OBJECTIVE BOX
CHIEF COMPLAINT:Patient is a 53y old  Female who presents with a chief complaint of CP.    HPI:  54 yo F with PMHX of asthma (never intubated), Migraine, depression, sarcoidosis, HLD, HTN , RA and Chronic back pain, PSH of left Hallux valgus surgery ( bunionectomy on 12/17/18 ) presented to ED c/o sternal chest pain for 4 days, pressure likely  pain, slow onset, 5-6/10, non radiating, associated with GRUBBS and nausea, denies diaphoresis, palpitation, leg swelling. Pt denies fever, chills, abd pain, vomiting, diarrhea, constipation, dysuria or any other complains.     ED course, vitals wnl except , labs noted troponin negative x1 , D -dimer negative and EKG noted sinus tachycardia at 107, prior EKG noted 12/2018 noted sinus bradycardia at HR 59. Currently chest pain improved. 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  Asthma: last time used Ventolin in summer 2018, never intubated or hopsitalized, under Pulmonolofy , Dr Josh Parr care  Chronic lower back pain: s/p lumbar discectomy -2013, s/p fusion with hardware- 2017  Brain aneurysm: coil sx - 2011  S/P arthroscopy: left knee and left foot=- 2016  H/O: hysterectomy: 2011      MEDICATIONS  (STANDING):  aspirin  chewable 81 milliGRAM(s) Oral daily  atorvastatin 40 milliGRAM(s) Oral at bedtime  azaTHIOprine 50 milliGRAM(s) Oral at bedtime  DULoxetine 60 milliGRAM(s) Oral daily  gabapentin 300 milliGRAM(s) Oral three times a day  metoprolol tartrate 25 milliGRAM(s) Oral two times a day  mirtazapine 15 milliGRAM(s) Oral at bedtime  pantoprazole    Tablet 40 milliGRAM(s) Oral before breakfast  sulfaSALAzine 1000 milliGRAM(s) Oral at bedtime    MEDICATIONS  (PRN):      FAMILY HISTORY:  Family history of hypertension (Mother)      SOCIAL HISTORY:    [x ] Non-smoker    [x ] Alcohol-denies    Allergies    Cipro (Vomiting)    Intolerances    	    REVIEW OF SYSTEMS:  CONSTITUTIONAL: No fever, weight loss, or fatigue  EYES: No eye pain, visual disturbances, or discharge  ENT:  No difficulty hearing, tinnitus, vertigo; No sinus or throat pain  NECK: No pain or stiffness  RESPIRATORY: No cough, wheezing, chills or hemoptysis; No Shortness of Breath  CARDIOVASCULAR: + chest pain, No palpitations, passing out, dizziness, or leg swelling  GASTROINTESTINAL: No abdominal or epigastric pain. No nausea, vomiting, or hematemesis; No diarrhea or constipation. No melena or hematochezia.  GENITOURINARY: No dysuria, frequency, hematuria, or incontinence  NEUROLOGICAL: No headaches, memory loss, loss of strength, numbness, or tremors  SKIN: No itching, burning, rashes, or lesions   LYMPH Nodes: No enlarged glands  ENDOCRINE: No heat or cold intolerance; No hair loss  MUSCULOSKELETAL: No joint pain or swelling; No muscle, back, or extremity pain  PSYCHIATRIC: No depression, anxiety, mood swings, or difficulty sleeping  HEME/LYMPH: No easy bruising, or bleeding gums  ALLERGY AND IMMUNOLOGIC: No hives or eczema	      PHYSICAL EXAM:  T(C): 36.6 (01-24-19 @ 05:31), Max: 36.8 (01-23-19 @ 22:05)  HR: 77 (01-24-19 @ 05:31) (72 - 116)  BP: 124/80 (01-24-19 @ 05:31) (117/73 - 135/78)  RR: 18 (01-24-19 @ 05:31) (16 - 18)  SpO2: 98% (01-24-19 @ 05:31) (85% - 98%)        Appearance: Normal	  HEENT:   Normal oral mucosa, PERRL, EOMI	  Lymphatic: No lymphadenopathy  Cardiovascular: Normal S1 S2, No JVD, No murmurs, No edema  Respiratory: Lungs clear to auscultation	  Psychiatry: A & O x 3, Mood & affect appropriate  Gastrointestinal:  Soft, Non-tender, + BS	  Skin: No rashes, No ecchymoses, No cyanosis	  Neurologic: Non-focal  Extremities: Normal range of motion, No clubbing, cyanosis or edema  Vascular: Peripheral pulses palpable 2+ bilaterally   	    ECG:  	nsr,nl axis    	  LABS:	 	    CARDIAC MARKERS:  CARDIAC MARKERS ( 24 Jan 2019 06:11 )  <0.015 ng/mL / x     / 63 U/L / x     / <1.0 ng/mL  CARDIAC MARKERS ( 23 Jan 2019 18:16 )  <0.015 ng/mL / x     / 59 U/L / x     / <1.0 ng/mL  CARDIAC MARKERS ( 23 Jan 2019 12:56 )  <0.015 ng/mL / x     / x     / x     / x                             12.3   6.0   )-----------( 295      ( 24 Jan 2019 06:11 )             37.6     01-24    141  |  108  |  14  ----------------------------<  96  3.7   |  25  |  0.66    Ca    8.5      24 Jan 2019 06:11    TPro  7.7  /  Alb  3.6  /  TBili  0.5  /  DBili  x   /  AST  44<H>  /  ALT  59  /  AlkPhos  137<H>  01-23      Lipid Profile: Cholesterol 270    HDL 47        TSH: Thyroid Stimulating Hormone, Serum: 1.62 uU/mL (01-24 @ 06:11)          EXAM:  XR CHEST AP OR PA 1V                            PROCEDURE DATE:  01/23/2019          INTERPRETATION:  PROCEDURE: AP view of the chest.    CLINICAL INFORMATION: Chest pain.    COMPARISON: 7/25/2016.    FINDINGS:        Lungs: The lungs are clear.  Heart: The heart is normal in size.  Mediastinum: The mediastinum is within normal limits.    IMPRESSION:    Clear lungs.

## 2019-01-24 NOTE — DISCHARGE NOTE ADULT - HOSPITAL COURSE
HPI:  52 yo F with PMH of astham (never intubated), Migraine, depression, sarcoidosis, HLD, HTN , RA and Chronic back pain, PSH of left Hallux valgus surgery ( bunionectomy on 12/17/18 ) presented to ED c/o sternal chest pain for 4 days, pressure likely  pain, slow onset, 5-6/10, non radiating, associated with GRUBBS and nausea, denies diaphoresis, palpitation, leg swelling. Pt denies fever, chills, abd pain, vomiting, diarrhea, constipation, dysuria or any other complains.     ED course, vitals wnl except , labs noted troponin negative x1 , D -dimer negative and EKG noted sinus tachycardia at 107, prior EKG noted 12/2018 noted sinus bradycardia at HR 59. Currently chest pain improved. (23 Jan 2019 17:16)    Pt had echo ef 69%, normal diastolic function and stress test which were negative for ischemia.   Troponins are neg, ekg sinus tacy to 107,   HLD cholesterol 270 with , pt was started on statin.   D/w Dr. Parr

## 2019-01-24 NOTE — CONSULT NOTE ADULT - PROBLEM SELECTOR RECOMMENDATION 9
r/o ACS vs SSS   telemetry   asa, lipitor and metoprolol   f/u cardiac enzyme and TTE r/o ACS vs SSS   telemetry   asa, lipitor and metoprolol   f/u cardiac enzyme and TTE  Cardio eval

## 2019-01-24 NOTE — CONSULT NOTE ADULT - PROBLEM SELECTOR PROBLEM 5
Fingertip Injuries and Amputations  Fingertip injuries are common and often get injured because they are last to escape when pulling your hand out of harm's way. You have amputated (cut off) part of your finger. How this turns out depends largely on how much was amputated. If just the tip is amputated, often the end of the finger will grow back and the finger may return to much the same as it was before the injury.   If more of the finger is missing, your caregiver has done the best with the tissue remaining to allow you to keep as much finger as is possible. Your caregiver after checking your injury has tried to leave you with a painless fingertip that has durable, feeling skin. If possible, your caregiver has tried to maintain the finger's length and appearance and preserve its fingernail.   Please read the instructions outlined below and refer to this sheet in the next few weeks. These instructions provide you with general information on caring for yourself. Your caregiver may also give you specific instructions. While your treatment has been done according to the most current medical practices available, unavoidable complications occasionally occur. If you have any problems or questions after discharge, please call your caregiver.  HOME CARE INSTRUCTIONS   · You may resume normal diet and activities as directed or allowed.  · Keep your hand elevated above the level of your heart. This helps decrease pain and swelling.  · Keep ice packs (or a bag of ice wrapped in a towel) on the injured area for 15-20 minutes, 3-4 times per day, for the first two days.  · Change dressings if necessary or as directed.  · Clean the wound daily or as directed.  · Only take over-the-counter or prescription medicines for pain, discomfort, or fever as directed by your caregiver.  · Keep appointments as directed.  SEEK IMMEDIATE MEDICAL CARE IF:  · You develop redness, swelling, numbness or increasing pain in the wound.  · There is pus  coming from the wound.  · You develop an unexplained oral temperature above 102° F (38.9° C) or as your caregiver suggests.  · There is a foul (bad) smell coming from the wound or dressing.  · There is a breaking open of the wound (edges not staying together) after sutures or staples have been removed.  MAKE SURE YOU:   · Understand these instructions.  · Will watch your condition.  · Will get help right away if you are not doing well or get worse.  Document Released: 11/08/2006 Document Revised: 03/11/2013 Document Reviewed: 10/07/2009  [employer]® Patient Information ©2014 [employer], [employer].     Need for prophylactic measure

## 2019-01-24 NOTE — DISCHARGE NOTE ADULT - CARE PROVIDER_API CALL
Josh Parr), Internal Medicine; Pulmonary Disease  [address] [place] [address]  Phone: [phone]  Fax: [phone]

## 2019-01-24 NOTE — CONSULT NOTE ADULT - ASSESSMENT
54 yo F with PMHX of asthma (never intubated), Migraine, depression, sarcoidosis, HLD, HTN , RA and Chronic back pain, PSH of left Hallux valgus surgery ( bunionectomy on 12/17/18 ) presented to ED c/o sternal chest pain for 4 days.  1.Tele monitoring.  2.Echocardiogram.  3.Stress test-r/o ischemia.  4.HTN-cont bp medication.  5.Lipid d/o-statin.  6.Sarcoidosis-cont current medication.  7.GI and DVT prophylaxis.

## 2019-01-24 NOTE — CONSULT NOTE ADULT - SUBJECTIVE AND OBJECTIVE BOX
Patient is a 53y old  Female who presents with a chief complaint of CP (24 Jan 2019 08:43)    Pt is alert, awake, from bed to chair, sitting comfortably, in NAD. She complains of grubbs. Also she has a h/o vertigo (for many years) and complains of dizziness. Denies cp, sob at this time.     History of Present Illness:  History of Present Illness: 	  52 yo F with PMH of astham (never intubated), Migraine, depression, sarcoidosis, HLD, HTN , RA and Chronic back pain, PSH of left Hallux valgus surgery ( bunionectomy on 12/17/18 ) presented to ED c/o sternal chest pain for 4 days, pressure likely  pain, slow onset, 5-6/10, non radiating, associated with GRUBBS and nausea, denies diaphoresis, palpitation, leg swelling. Pt denies fever, chills, abd pain, vomiting, diarrhea, constipation, dysuria or any other complains.     ED course, vitals wnl except , labs noted troponin negative x1 , D -dimer negative and EKG noted sinus tachycardia at 107, prior EKG noted 12/2018 noted sinus bradycardia at HR 59. Currently chest pain improved.        INTERVAL HPI/OVERNIGHT EVENTS:  T(C): 36.7 (01-24-19 @ 10:21), Max: 36.8 (01-23-19 @ 22:05)  HR: 82 (01-24-19 @ 10:21) (72 - 116)  BP: 109/69 (01-24-19 @ 10:21) (109/69 - 135/78)  RR: 16 (01-24-19 @ 10:21) (16 - 18)  SpO2: 98% (01-24-19 @ 10:21) (85% - 98%)  Wt(kg): --  I&O's Summary      PAST MEDICAL & SURGICAL HISTORY:  SHELIA (obstructive sleep apnea): home test done by PCP in 2016, SHELIA pt was using mouth guard but it broken, not using it now, to notify Anesthesia  Hallux valgus (acquired), left foot  Depression  Vertigo  Migraines  Brain aneurysm: dx 2011, sx done  Fibroids  Seasonal allergies  RA (rheumatoid arthritis)  Fibromyalgia  GERD (gastroesophageal reflux disease)  HTN (hypertension)  HLD (hyperlipidemia)  Sarcoidosis, lung: dx 2016 with bx  Asthma: last time used Ventolin in summer 2018, never intubated or hopsitalized, under Pulmonol[facility] , Dr Josh Parr care  Chronic lower back pain: s/p lumbar discectomy -2013, s/p fusion with hardware- 2017  Brain aneurysm: coil sx - 2011  S/P arthroscopy: left knee and left foot=- 2016  H/O: hysterectomy: 2011      SOCIAL HISTORY  Alcohol:  Tobacco:  Illicit substance use:      FAMILY HISTORY:      LABS:                        12.3   6.0   )-----------( 295      ( 24 Jan 2019 06:11 )             37.6     01-24    141  |  108  |  14  ----------------------------<  96  3.7   |  25  |  0.66    Ca    8.5      24 Jan 2019 06:11    TPro  7.7  /  Alb  3.6  /  TBili  0.5  /  DBili  x   /  AST  44<H>  /  ALT  59  /  AlkPhos  137<H>  01-23    PT/INR - ( 23 Jan 2019 12:56 )   PT: 10.6 sec;   INR: 0.96 ratio         PTT - ( 23 Jan 2019 12:56 )  PTT:29.4 sec    CAPILLARY BLOOD GLUCOSE                MEDICATIONS  (STANDING):  aspirin  chewable 81 milliGRAM(s) Oral daily  atorvastatin 40 milliGRAM(s) Oral at bedtime  azaTHIOprine 50 milliGRAM(s) Oral at bedtime  DULoxetine 60 milliGRAM(s) Oral daily  gabapentin 300 milliGRAM(s) Oral three times a day  metoprolol tartrate 25 milliGRAM(s) Oral two times a day  mirtazapine 15 milliGRAM(s) Oral at bedtime  pantoprazole    Tablet 40 milliGRAM(s) Oral before breakfast  sulfaSALAzine 1000 milliGRAM(s) Oral at bedtime    MEDICATIONS  (PRN):      REVIEW OF SYSTEMS:  CONSTITUTIONAL: No fever, weight loss, or fatigue  EYES: No eye pain, visual disturbances, or discharge  ENMT:  No difficulty hearing, tinnitus, vertigo; No sinus or throat pain  NECK: No pain or stiffness  RESPIRATORY: No cough, wheezing, chills or hemoptysis; No shortness of breath  CARDIOVASCULAR: No chest pain, palpitations + grubbs   GASTROINTESTINAL: No abdominal or epigastric pain. No nausea, vomiting, or hematemesis; No diarrhea or constipation. No melena or hematochezia.  GENITOURINARY: No dysuria, frequency, hematuria, or incontinence  NEUROLOGICAL: No headaches, memory loss, loss of strength, numbness, or tremors, + dizziness  SKIN: No itching, burning, rashes, or lesions   LYMPH NODES: No enlarged glands  ENDOCRINE: No heat or cold intolerance; No hair loss  MUSCULOSKELETAL: No joint pain or swelling; No muscle, back, or extremity pain  PSYCHIATRIC: No depression, anxiety, mood swings, or difficulty sleeping  HEME/LYMPH: No easy bruising, or bleeding gums  ALLERY AND IMMUNOLOGIC: No hives or eczema    PHYSICAL EXAM:  GENERAL: NAD, well-groomed, well-developed  HEAD:  Atraumatic, Normocephalic  EYES: EOMI, PERRLA, conjunctiva and sclera clear  ENMT: No tonsillar erythema, exudates, or enlargement; Moist mucous membranes, Good dentition, No lesions  NECK: Supple, No JVD, Normal thyroid  NERVOUS SYSTEM:  Alert & Oriented X3, Good concentration; Motor Strength 5/5 B/L upper and lower extremities; DTRs 2+ intact and symmetric  CHEST/LUNG: Clear to percussion bilaterally; No rales, rhonchi, wheezing, or rubs  HEART: Regular rate and rhythm; No murmurs, rubs, or gallops  ABDOMEN: Soft, Nontender, Nondistended; Bowel sounds present  EXTREMITIES:  2+ Peripheral Pulses, No clubbing, cyanosis, or edema  LYMPH: No lymphadenopathy noted  SKIN: No rashes or lesions    RADIOLOGY & ADDITIONAL TESTS:      < from: Xray Chest 1 View AP/PA (01.23.19 @ 13:50) >    IMPRESSION:  Clear lungs.  < end of copied text >          Imaging Personally Reviewed:  [x ] YES  [ ] NO    Consultant(s) Notes Reviewed:  [x ] YES  [ ] NO        Care Discussed with Consultants/Other Providers [ ] YES  [ ] NO Patient is a 53y old  Female who presents with a chief complaint of CP (24 Jan 2019 08:43)    Pt is alert, awake, from bed to chair, sitting comfortably, in NAD. She complains of grubbs. Also she has a h/o vertigo (for many years) and complains of dizziness. Denies cp, sob at this time.     History of Present Illness:  History of Present Illness: 	  54 yo F with PMH of astham (never intubated), Migraine, depression, sarcoidosis, HLD, HTN , RA and Chronic back pain, PSH of left Hallux valgus surgery ( bunionectomy on 12/17/18 ) presented to ED c/o sternal chest pain for 4 days, pressure likely  pain, slow onset, 5-6/10, non radiating, associated with GRUBBS and nausea, denies diaphoresis, palpitation, leg swelling. Pt denies fever, chills, abd pain, vomiting, diarrhea, constipation, dysuria or any other complains. Has long hx of dizziness as well.    ED course, vitals wnl except , labs noted troponin negative x1 , D -dimer negative and EKG noted sinus tachycardia at 107, prior EKG noted 12/2018 noted sinus bradycardia at HR 59. Currently chest pain improved. Awake, alert, comfortable in bed in NAD.      INTERVAL HPI/OVERNIGHT EVENTS:  T(C): 36.7 (01-24-19 @ 10:21), Max: 36.8 (01-23-19 @ 22:05)  HR: 82 (01-24-19 @ 10:21) (72 - 116)  BP: 109/69 (01-24-19 @ 10:21) (109/69 - 135/78)  RR: 16 (01-24-19 @ 10:21) (16 - 18)  SpO2: 98% (01-24-19 @ 10:21) (85% - 98%)  Wt(kg): --  I&O's Summary      PAST MEDICAL & SURGICAL HISTORY:  SHELIA (obstructive sleep apnea): home test done by PCP in 2016, SHELIA pt was using mouth guard but it broken, not using it now, to notify Anesthesia  Hallux valgus (acquired), left foot  Depression  Vertigo  Migraines  Brain aneurysm: dx 2011, sx done  Fibroids  Seasonal allergies  RA (rheumatoid arthritis)  Fibromyalgia  GERD (gastroesophageal reflux disease)  HTN (hypertension)  HLD (hyperlipidemia)  Sarcoidosis, lung: dx 2016 with bx  Asthma: last time used Ventolin in summer 2018, never intubated or hopsitalized, under Pulmonolofy , Dr Josh Parr care  Chronic lower back pain: s/p lumbar discectomy -2013, s/p fusion with hardware- 2017  Brain aneurysm: coil sx - 2011  S/P arthroscopy: left knee and left foot=- 2016  H/O: hysterectomy: 2011      SOCIAL HISTORY  Alcohol:  Tobacco:  Illicit substance use:      FAMILY HISTORY:      LABS:                        12.3   6.0   )-----------( 295      ( 24 Jan 2019 06:11 )             37.6     01-24    141  |  108  |  14  ----------------------------<  96  3.7   |  25  |  0.66    Ca    8.5      24 Jan 2019 06:11    TPro  7.7  /  Alb  3.6  /  TBili  0.5  /  DBili  x   /  AST  44<H>  /  ALT  59  /  AlkPhos  137<H>  01-23    PT/INR - ( 23 Jan 2019 12:56 )   PT: 10.6 sec;   INR: 0.96 ratio         PTT - ( 23 Jan 2019 12:56 )  PTT:29.4 sec    CAPILLARY BLOOD GLUCOSE                MEDICATIONS  (STANDING):  aspirin  chewable 81 milliGRAM(s) Oral daily  atorvastatin 40 milliGRAM(s) Oral at bedtime  azaTHIOprine 50 milliGRAM(s) Oral at bedtime  DULoxetine 60 milliGRAM(s) Oral daily  gabapentin 300 milliGRAM(s) Oral three times a day  metoprolol tartrate 25 milliGRAM(s) Oral two times a day  mirtazapine 15 milliGRAM(s) Oral at bedtime  pantoprazole    Tablet 40 milliGRAM(s) Oral before breakfast  sulfaSALAzine 1000 milliGRAM(s) Oral at bedtime    MEDICATIONS  (PRN):      REVIEW OF SYSTEMS:  CONSTITUTIONAL: No fever, weight loss, or fatigue  EYES: No eye pain, visual disturbances, or discharge  ENMT:  No difficulty hearing, tinnitus, vertigo; No sinus or throat pain  NECK: No pain or stiffness  RESPIRATORY: No cough, wheezing, chills or hemoptysis; No shortness of breath  CARDIOVASCULAR: No chest pain, palpitations + grubbs   GASTROINTESTINAL: No abdominal or epigastric pain. No nausea, vomiting, or hematemesis; No diarrhea or constipation. No melena or hematochezia.  GENITOURINARY: No dysuria, frequency, hematuria, or incontinence  NEUROLOGICAL: No headaches, memory loss, loss of strength, numbness, or tremors, + dizziness  SKIN: No itching, burning, rashes, or lesions   LYMPH NODES: No enlarged glands  ENDOCRINE: No heat or cold intolerance; No hair loss  MUSCULOSKELETAL: No joint pain or swelling; No muscle, back, or extremity pain  PSYCHIATRIC: No depression, anxiety, mood swings, or difficulty sleeping  HEME/LYMPH: No easy bruising, or bleeding gums  ALLERY AND IMMUNOLOGIC: No hives or eczema    PHYSICAL EXAM:  GENERAL: NAD, well-groomed, well-developed  HEAD:  Atraumatic, Normocephalic  EYES: EOMI, PERRLA, conjunctiva and sclera clear  ENMT: No tonsillar erythema, exudates, or enlargement; Moist mucous membranes, Good dentition, No lesions  NECK: Supple, No JVD, Normal thyroid  NERVOUS SYSTEM:  Alert & Oriented X3, Good concentration; Motor Strength 5/5 B/L upper and lower extremities; DTRs 2+ intact and symmetric  CHEST/LUNG: Clear to percussion bilaterally; No rales, rhonchi, wheezing, or rubs  HEART: Regular rate and rhythm; No murmurs, rubs, or gallops  ABDOMEN: Soft, Nontender, Nondistended; Bowel sounds present  EXTREMITIES:  2+ Peripheral Pulses, No clubbing, cyanosis, or edema  LYMPH: No lymphadenopathy noted  SKIN: No rashes or lesions    RADIOLOGY & ADDITIONAL TESTS:      < from: Xray Chest 1 View AP/PA (01.23.19 @ 13:50) >    IMPRESSION:  Clear lungs.  < end of copied text >          Imaging Personally Reviewed:  [x ] YES  [ ] NO    Consultant(s) Notes Reviewed:  [x ] YES  [ ] NO        Care Discussed with Consultants/Other Providers [x ] YES  [ ] NO

## 2019-01-24 NOTE — DISCHARGE NOTE ADULT - MEDICATION SUMMARY - MEDICATIONS TO TAKE
I will START or STAY ON the medications listed below when I get home from the hospital:    sulfaSALAzine 500 mg oral delayed release tablet  -- 2 tab(s) by mouth once a day (at bedtime)  -- Indication: For RA (rheumatoid arthritis)    SUMAtriptan 100 mg oral tablet  -- 1 tab(s) by mouth once, As Needed  -- Indication: For Migraine     gabapentin 300 mg oral capsule  -- 1 cap(s) by mouth 3 times a day  -- Indication: For Pain    DULoxetine 60 mg oral delayed release capsule  -- 1 cap(s) by mouth once a day (at bedtime)  -- Indication: For Depression    mirtazapine 15 mg oral tablet  -- 1 tab(s) by mouth once a day (at bedtime)  -- Indication: For Depression    meclizine 12.5 mg oral tablet  -- 1 tab(s) by mouth 3 times a day, As Needed for dizziness   -- Indication: For Dizziness     ondansetron 8 mg oral tablet  -- 1 tab(s) by mouth 3 times a day, As Needed  -- Indication: For Nausea     atorvastatin 40 mg oral tablet  -- 1 tab(s) by mouth once a day (at bedtime)  -- Indication: For HLD    Ventolin HFA 90 mcg/inh inhalation aerosol  -- 2 puff(s) inhaled 4 times a day, As Needed  -- Indication: For Asthma    Rituxan  -- 500 mg /50ml IV once every 4 month   -- Indication: For RA (rheumatoid arthritis)    raNITIdine 150 mg oral tablet  -- 1 tab(s) by mouth once a day (at bedtime)  -- Indication: For Antacid     Imuran 50 mg oral tablet  -- 1 tab(s) by mouth once a day (at bedtime)  -- Indication: For Sarcoidosis, lung    tiZANidine 4 mg oral tablet  -- 1 tab(s) by mouth once a day (at bedtime), As Needed  -- Indication: For Muscle relaxant     omeprazole 40 mg oral delayed release capsule  -- 1 cap(s) by mouth once a day  -- Indication: For Antacid

## 2019-02-14 ENCOUNTER — OUTPATIENT (OUTPATIENT)
Dept: OUTPATIENT SERVICES | Facility: HOSPITAL | Age: 54
LOS: 1 days | End: 2019-02-14
Payer: MEDICARE

## 2019-02-14 VITALS
RESPIRATION RATE: 16 BRPM | TEMPERATURE: 97 F | SYSTOLIC BLOOD PRESSURE: 118 MMHG | HEIGHT: 59 IN | DIASTOLIC BLOOD PRESSURE: 80 MMHG | OXYGEN SATURATION: 97 % | HEART RATE: 89 BPM | WEIGHT: 145.06 LBS

## 2019-02-14 DIAGNOSIS — K21.9 GASTRO-ESOPHAGEAL REFLUX DISEASE WITHOUT ESOPHAGITIS: ICD-10-CM

## 2019-02-14 DIAGNOSIS — M54.5 LOW BACK PAIN: Chronic | ICD-10-CM

## 2019-02-14 DIAGNOSIS — R06.09 OTHER FORMS OF DYSPNEA: ICD-10-CM

## 2019-02-14 DIAGNOSIS — M20.11 HALLUX VALGUS (ACQUIRED), RIGHT FOOT: ICD-10-CM

## 2019-02-14 DIAGNOSIS — I67.1 CEREBRAL ANEURYSM, NONRUPTURED: Chronic | ICD-10-CM

## 2019-02-14 DIAGNOSIS — Z01.818 ENCOUNTER FOR OTHER PREPROCEDURAL EXAMINATION: ICD-10-CM

## 2019-02-14 DIAGNOSIS — Z98.890 OTHER SPECIFIED POSTPROCEDURAL STATES: Chronic | ICD-10-CM

## 2019-02-14 DIAGNOSIS — D86.0 SARCOIDOSIS OF LUNG: ICD-10-CM

## 2019-02-14 DIAGNOSIS — I10 ESSENTIAL (PRIMARY) HYPERTENSION: ICD-10-CM

## 2019-02-14 DIAGNOSIS — Z90.710 ACQUIRED ABSENCE OF BOTH CERVIX AND UTERUS: Chronic | ICD-10-CM

## 2019-02-14 DIAGNOSIS — G47.33 OBSTRUCTIVE SLEEP APNEA (ADULT) (PEDIATRIC): ICD-10-CM

## 2019-02-14 PROCEDURE — 73630 X-RAY EXAM OF FOOT: CPT | Mod: 26,RT

## 2019-02-14 PROCEDURE — 73630 X-RAY EXAM OF FOOT: CPT

## 2019-02-14 PROCEDURE — G0463: CPT

## 2019-02-14 RX ORDER — ONDANSETRON 8 MG/1
1 TABLET, FILM COATED ORAL
Qty: 0 | Refills: 0 | COMMUNITY

## 2019-02-14 RX ORDER — SULFASALAZINE 500 MG
2 TABLET ORAL
Qty: 0 | Refills: 0 | COMMUNITY

## 2019-02-14 RX ORDER — RANITIDINE HYDROCHLORIDE 150 MG/1
1 TABLET, FILM COATED ORAL
Qty: 0 | Refills: 0 | COMMUNITY

## 2019-02-14 NOTE — H&P PST ADULT - RX
mouth guard , but it recently broken , so pt is not using it- to notify Anesthesia not using mouthpiece nor CPAP

## 2019-02-14 NOTE — H&P PST ADULT - HISTORY OF PRESENT ILLNESS
53 year old female with history of multiple chronic medical condition, reportedly controlled on medication presents to [facility] for evaluation before surgery. She was diagnosed with hallux valgus (acquired) right foot and is scheduled for right foot Paulie Bunionectomy on 2/28/2019.  She had left foot Paulie Bunionectomy 12/2018 53 year old female with history of multiple chronic medical conditions, reportedly controlled on medications presents to [facility] for evaluation before surgery. She was diagnosed with hallux valgus (acquired) right foot and is scheduled for right foot Paulie Bunionectomy on 2/28/2019.  She had left foot Paulie Bunionectomy 12/2018. Patient admitted on 5th Floor [facility] 1/23/2019 for 53 year old female with history of multiple chronic medical conditions, reportedly controlled on medications presents to [facility] for evaluation before surgery. She was diagnosed with hallux valgus (acquired) right foot and is scheduled for right foot Paulie Bunionectomy on 2/28/2019.  She had left foot Paulie Bunionectomy 12/2018. Patient discharged from [facility] 1/24/2019 where she was treated for chest pain. Patient has no further complaints this encounter

## 2019-02-14 NOTE — H&P PST ADULT - RS GEN PE MLT RESP DETAILS PC
normal/airway patent/breath sounds equal/clear to auscultation bilaterally/no rales/no chest wall tenderness/no subcutaneous emphysema/no rhonchi/no wheezes/no intercostal retractions/good air movement/respirations non-labored

## 2019-02-14 NOTE — H&P PST ADULT - CARDIOVASCULAR SYMPTOMS
chest pain/2 weeks ago - hospitalized then discharged from [facility] - see noted chest pain/2 weeks ago - hospitalized then discharged from [facility] - see noted/dyspnea on exertion

## 2019-02-14 NOTE — H&P PST ADULT - PROBLEM SELECTOR PLAN 1
Scheduled for right foot Paulie Bunionectomy on 2/28/2019. Preoperative instructions discussed and given to patient. Verbalized understanding of same

## 2019-02-14 NOTE — H&P PST ADULT - ASSESSMENT
53 year old female with history of SHELIA, sarcoidosis stable on medication (dx 2016), HTN, HLD, RA, fibromyalgia, vertigo, migraine headaches, asthma (controlled), GERD, depression, brain aneurysm (s/p coil 2011), diagnosed with hallux valgus (acquired) right foot scheduled for right foot Paulie Bunionectomy on 2/28/2019 53 year old female with history of SHELIA, sarcoidosis stable on medication (dx 2016), occasional GRUBBS, HTN, HLD, RA, fibromyalgia, vertigo, migraine headaches, asthma (controlled), GERD, depression, brain aneurysm (s/p coil 2011), diagnosed with hallux valgus (acquired) right foot scheduled for right foot Paulie Bunionectomy on 2/28/2019

## 2019-02-14 NOTE — H&P PST ADULT - RESPIRATORY AND THORAX COMMENTS
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

## 2019-02-14 NOTE — H&P PST ADULT - PSH
Brain aneurysm  coil sx - 2011  Chronic lower back pain  s/p lumbar discectomy -2013, s/p fusion with hardware- 2017  H/O: hysterectomy  2011  History of bunionectomy  left foot  S/P arthroscopy  left knee and left foot=- 2016

## 2019-02-14 NOTE — H&P PST ADULT - NEGATIVE GENERAL SYMPTOMS
no malaise/no weight loss/no fatigue/no fever/no chills/no sweating/no anorexia/no weight gain/no polyphagia/no polyuria/no polydipsia no polyuria/no malaise/no chills/no sweating/no anorexia/no fever/no weight loss/no weight gain/no polyphagia/no polydipsia

## 2019-02-27 ENCOUNTER — TRANSCRIPTION ENCOUNTER (OUTPATIENT)
Age: 54
End: 2019-02-27

## 2019-02-28 ENCOUNTER — OUTPATIENT (OUTPATIENT)
Dept: OUTPATIENT SERVICES | Facility: HOSPITAL | Age: 54
LOS: 1 days | End: 2019-02-28
Payer: MEDICARE

## 2019-02-28 ENCOUNTER — RESULT REVIEW (OUTPATIENT)
Age: 54
End: 2019-02-28

## 2019-02-28 VITALS
HEIGHT: 59 IN | DIASTOLIC BLOOD PRESSURE: 78 MMHG | HEART RATE: 81 BPM | TEMPERATURE: 99 F | RESPIRATION RATE: 16 BRPM | WEIGHT: 145.06 LBS | OXYGEN SATURATION: 98 % | SYSTOLIC BLOOD PRESSURE: 118 MMHG

## 2019-02-28 VITALS
DIASTOLIC BLOOD PRESSURE: 72 MMHG | TEMPERATURE: 97 F | RESPIRATION RATE: 15 BRPM | HEART RATE: 80 BPM | SYSTOLIC BLOOD PRESSURE: 118 MMHG | OXYGEN SATURATION: 97 %

## 2019-02-28 DIAGNOSIS — M20.11 HALLUX VALGUS (ACQUIRED), RIGHT FOOT: ICD-10-CM

## 2019-02-28 DIAGNOSIS — Z98.890 OTHER SPECIFIED POSTPROCEDURAL STATES: Chronic | ICD-10-CM

## 2019-02-28 DIAGNOSIS — M54.5 LOW BACK PAIN: Chronic | ICD-10-CM

## 2019-02-28 DIAGNOSIS — Z01.818 ENCOUNTER FOR OTHER PREPROCEDURAL EXAMINATION: ICD-10-CM

## 2019-02-28 DIAGNOSIS — I67.1 CEREBRAL ANEURYSM, NONRUPTURED: Chronic | ICD-10-CM

## 2019-02-28 DIAGNOSIS — Z90.710 ACQUIRED ABSENCE OF BOTH CERVIX AND UTERUS: Chronic | ICD-10-CM

## 2019-02-28 PROCEDURE — 88300 SURGICAL PATH GROSS: CPT

## 2019-02-28 PROCEDURE — 88300 SURGICAL PATH GROSS: CPT | Mod: 26

## 2019-02-28 PROCEDURE — 28296 COR HLX VLGS DSTL MTAR OSTEO: CPT | Mod: RT

## 2019-02-28 PROCEDURE — C1889: CPT

## 2019-02-28 PROCEDURE — 73620 X-RAY EXAM OF FOOT: CPT | Mod: 26,RT

## 2019-02-28 PROCEDURE — 73620 X-RAY EXAM OF FOOT: CPT

## 2019-02-28 PROCEDURE — C1713: CPT

## 2019-02-28 RX ORDER — FENTANYL CITRATE 50 UG/ML
25 INJECTION INTRAVENOUS
Qty: 0 | Refills: 0 | Status: DISCONTINUED | OUTPATIENT
Start: 2019-02-28 | End: 2019-03-01

## 2019-02-28 RX ORDER — SODIUM CHLORIDE 9 MG/ML
1000 INJECTION, SOLUTION INTRAVENOUS
Qty: 0 | Refills: 0 | Status: DISCONTINUED | OUTPATIENT
Start: 2019-02-28 | End: 2019-03-01

## 2019-02-28 RX ORDER — SODIUM CHLORIDE 9 MG/ML
3 INJECTION INTRAMUSCULAR; INTRAVENOUS; SUBCUTANEOUS EVERY 8 HOURS
Qty: 0 | Refills: 0 | Status: DISCONTINUED | OUTPATIENT
Start: 2019-02-28 | End: 2019-02-28

## 2019-02-28 RX ORDER — OXYCODONE AND ACETAMINOPHEN 5; 325 MG/1; MG/1
2 TABLET ORAL ONCE
Qty: 0 | Refills: 0 | Status: DISCONTINUED | OUTPATIENT
Start: 2019-02-28 | End: 2019-03-01

## 2019-02-28 RX ORDER — ONDANSETRON 8 MG/1
4 TABLET, FILM COATED ORAL ONCE
Qty: 0 | Refills: 0 | Status: DISCONTINUED | OUTPATIENT
Start: 2019-02-28 | End: 2019-03-01

## 2019-02-28 RX ORDER — AZITHROMYCIN 500 MG/1
1 TABLET, FILM COATED ORAL
Qty: 5 | Refills: 0
Start: 2019-02-28 | End: 2019-03-04

## 2019-02-28 RX ADMIN — SODIUM CHLORIDE 3 MILLILITER(S): 9 INJECTION INTRAMUSCULAR; INTRAVENOUS; SUBCUTANEOUS at 12:52

## 2019-02-28 NOTE — ASU DISCHARGE PLAN (ADULT/PEDIATRIC) - CALL YOUR DOCTOR IF YOU HAVE ANY OF THE FOLLOWING:
Fever greater than (need to indicate Fahrenheit or Celsius)/Wound/Surgical Site with redness, or foul smelling discharge or pus/Pain not relieved by Medications/Numbness, tingling, color or temperature change to extremity/Bleeding that does not stop/Swelling that gets worse

## 2019-02-28 NOTE — BRIEF OPERATIVE NOTE - OPERATION/FINDINGS
right bunionectomy with implantation of size 22 2.3 headless novastep screw. see dictated op report for more details.

## 2019-02-28 NOTE — BRIEF OPERATIVE NOTE - PROCEDURE
<<-----Click on this checkbox to enter Procedure Bunionectomy of right great toe  02/28/2019    Active  DROBLE

## 2019-03-04 LAB — SURGICAL PATHOLOGY STUDY: SIGNIFICANT CHANGE UP

## 2019-09-12 ENCOUNTER — INPATIENT (INPATIENT)
Facility: HOSPITAL | Age: 54
LOS: 2 days | Discharge: HOME | DRG: 603 | End: 2019-09-15
Attending: INTERNAL MEDICINE | Admitting: INTERNAL MEDICINE
Payer: COMMERCIAL

## 2019-09-12 VITALS
OXYGEN SATURATION: 98 % | SYSTOLIC BLOOD PRESSURE: 138 MMHG | DIASTOLIC BLOOD PRESSURE: 96 MMHG | HEART RATE: 116 BPM | RESPIRATION RATE: 16 BRPM | TEMPERATURE: 98 F | WEIGHT: 145.95 LBS

## 2019-09-12 DIAGNOSIS — Z98.890 OTHER SPECIFIED POSTPROCEDURAL STATES: Chronic | ICD-10-CM

## 2019-09-12 DIAGNOSIS — Z90.710 ACQUIRED ABSENCE OF BOTH CERVIX AND UTERUS: Chronic | ICD-10-CM

## 2019-09-12 DIAGNOSIS — I67.1 CEREBRAL ANEURYSM, NONRUPTURED: Chronic | ICD-10-CM

## 2019-09-12 DIAGNOSIS — M54.5 LOW BACK PAIN: Chronic | ICD-10-CM

## 2019-09-12 DIAGNOSIS — T14.8XXA OTHER INJURY OF UNSPECIFIED BODY REGION, INITIAL ENCOUNTER: ICD-10-CM

## 2019-09-12 LAB
ALBUMIN SERPL ELPH-MCNC: 3.4 G/DL — LOW (ref 3.5–5)
ALP SERPL-CCNC: 138 U/L — HIGH (ref 40–120)
ALT FLD-CCNC: 37 U/L DA — SIGNIFICANT CHANGE UP (ref 10–60)
ANION GAP SERPL CALC-SCNC: 6 MMOL/L — SIGNIFICANT CHANGE UP (ref 5–17)
AST SERPL-CCNC: 18 U/L — SIGNIFICANT CHANGE UP (ref 10–40)
BASOPHILS # BLD AUTO: 0.02 K/UL — SIGNIFICANT CHANGE UP (ref 0–0.2)
BASOPHILS NFR BLD AUTO: 0.2 % — SIGNIFICANT CHANGE UP (ref 0–2)
BILIRUB SERPL-MCNC: 0.2 MG/DL — SIGNIFICANT CHANGE UP (ref 0.2–1.2)
BUN SERPL-MCNC: 14 MG/DL — SIGNIFICANT CHANGE UP (ref 7–18)
CALCIUM SERPL-MCNC: 9 MG/DL — SIGNIFICANT CHANGE UP (ref 8.4–10.5)
CHLORIDE SERPL-SCNC: 108 MMOL/L — SIGNIFICANT CHANGE UP (ref 96–108)
CO2 SERPL-SCNC: 26 MMOL/L — SIGNIFICANT CHANGE UP (ref 22–31)
CREAT SERPL-MCNC: 0.63 MG/DL — SIGNIFICANT CHANGE UP (ref 0.5–1.3)
EOSINOPHIL # BLD AUTO: 0.06 K/UL — SIGNIFICANT CHANGE UP (ref 0–0.5)
EOSINOPHIL NFR BLD AUTO: 0.6 % — SIGNIFICANT CHANGE UP (ref 0–6)
ERYTHROCYTE [SEDIMENTATION RATE] IN BLOOD: 33 MM/HR — HIGH (ref 0–20)
GLUCOSE SERPL-MCNC: 112 MG/DL — HIGH (ref 70–99)
HCT VFR BLD CALC: 37.9 % — SIGNIFICANT CHANGE UP (ref 34.5–45)
HGB BLD-MCNC: 12.5 G/DL — SIGNIFICANT CHANGE UP (ref 11.5–15.5)
IMM GRANULOCYTES NFR BLD AUTO: 0.3 % — SIGNIFICANT CHANGE UP (ref 0–1.5)
LYMPHOCYTES # BLD AUTO: 1.7 K/UL — SIGNIFICANT CHANGE UP (ref 1–3.3)
LYMPHOCYTES # BLD AUTO: 17 % — SIGNIFICANT CHANGE UP (ref 13–44)
MCHC RBC-ENTMCNC: 30.7 PG — SIGNIFICANT CHANGE UP (ref 27–34)
MCHC RBC-ENTMCNC: 33 GM/DL — SIGNIFICANT CHANGE UP (ref 32–36)
MCV RBC AUTO: 93.1 FL — SIGNIFICANT CHANGE UP (ref 80–100)
MONOCYTES # BLD AUTO: 0.64 K/UL — SIGNIFICANT CHANGE UP (ref 0–0.9)
MONOCYTES NFR BLD AUTO: 6.4 % — SIGNIFICANT CHANGE UP (ref 2–14)
NEUTROPHILS # BLD AUTO: 7.55 K/UL — HIGH (ref 1.8–7.4)
NEUTROPHILS NFR BLD AUTO: 75.5 % — SIGNIFICANT CHANGE UP (ref 43–77)
NRBC # BLD: 0 /100 WBCS — SIGNIFICANT CHANGE UP (ref 0–0)
PLATELET # BLD AUTO: 372 K/UL — SIGNIFICANT CHANGE UP (ref 150–400)
POTASSIUM SERPL-MCNC: 3.2 MMOL/L — LOW (ref 3.5–5.3)
POTASSIUM SERPL-SCNC: 3.2 MMOL/L — LOW (ref 3.5–5.3)
PROT SERPL-MCNC: 7.3 G/DL — SIGNIFICANT CHANGE UP (ref 6–8.3)
RBC # BLD: 4.07 M/UL — SIGNIFICANT CHANGE UP (ref 3.8–5.2)
RBC # FLD: 13.4 % — SIGNIFICANT CHANGE UP (ref 10.3–14.5)
SODIUM SERPL-SCNC: 140 MMOL/L — SIGNIFICANT CHANGE UP (ref 135–145)
WBC # BLD: 10 K/UL — SIGNIFICANT CHANGE UP (ref 3.8–10.5)
WBC # FLD AUTO: 10 K/UL — SIGNIFICANT CHANGE UP (ref 3.8–10.5)

## 2019-09-12 PROCEDURE — 99285 EMERGENCY DEPT VISIT HI MDM: CPT

## 2019-09-12 RX ORDER — RABIES VACC, HUMAN DIPLOID/PF 2.5 UNIT
1 VIAL (EA) INTRAMUSCULAR ONCE
Refills: 0 | Status: COMPLETED | OUTPATIENT
Start: 2019-09-12 | End: 2019-09-12

## 2019-09-12 RX ORDER — KETOROLAC TROMETHAMINE 30 MG/ML
30 SYRINGE (ML) INJECTION ONCE
Refills: 0 | Status: DISCONTINUED | OUTPATIENT
Start: 2019-09-12 | End: 2019-09-12

## 2019-09-12 RX ORDER — HUMAN RABIES VIRUS IMMUNE GLOBULIN 150 [IU]/ML
1300 INJECTION, SOLUTION INTRAMUSCULAR ONCE
Refills: 0 | Status: DISCONTINUED | OUTPATIENT
Start: 2019-09-12 | End: 2019-09-12

## 2019-09-12 RX ORDER — LEVALBUTEROL 1.25 MG/.5ML
1.25 SOLUTION, CONCENTRATE RESPIRATORY (INHALATION) ONCE
Refills: 0 | Status: COMPLETED | OUTPATIENT
Start: 2019-09-12 | End: 2019-09-12

## 2019-09-12 RX ORDER — AMPICILLIN SODIUM AND SULBACTAM SODIUM 250; 125 MG/ML; MG/ML
3 INJECTION, POWDER, FOR SUSPENSION INTRAMUSCULAR; INTRAVENOUS ONCE
Refills: 0 | Status: COMPLETED | OUTPATIENT
Start: 2019-09-12 | End: 2019-09-12

## 2019-09-12 RX ORDER — TETANUS TOXOID, REDUCED DIPHTHERIA TOXOID AND ACELLULAR PERTUSSIS VACCINE, ADSORBED 5; 2.5; 8; 8; 2.5 [IU]/.5ML; [IU]/.5ML; UG/.5ML; UG/.5ML; UG/.5ML
0.5 SUSPENSION INTRAMUSCULAR ONCE
Refills: 0 | Status: COMPLETED | OUTPATIENT
Start: 2019-09-12 | End: 2019-09-12

## 2019-09-12 RX ORDER — HUMAN RABIES VIRUS IMMUNE GLOBULIN 150 [IU]/ML
1300 INJECTION, SOLUTION INTRAMUSCULAR ONCE
Refills: 0 | Status: COMPLETED | OUTPATIENT
Start: 2019-09-12 | End: 2019-09-12

## 2019-09-12 RX ORDER — POTASSIUM CHLORIDE 20 MEQ
20 PACKET (EA) ORAL ONCE
Refills: 0 | Status: COMPLETED | OUTPATIENT
Start: 2019-09-12 | End: 2019-09-12

## 2019-09-12 RX ADMIN — AMPICILLIN SODIUM AND SULBACTAM SODIUM 200 GRAM(S): 250; 125 INJECTION, POWDER, FOR SUSPENSION INTRAMUSCULAR; INTRAVENOUS at 17:35

## 2019-09-12 RX ADMIN — TETANUS TOXOID, REDUCED DIPHTHERIA TOXOID AND ACELLULAR PERTUSSIS VACCINE, ADSORBED 0.5 MILLILITER(S): 5; 2.5; 8; 8; 2.5 SUSPENSION INTRAMUSCULAR at 17:31

## 2019-09-12 RX ADMIN — Medication 30 MILLIGRAM(S): at 20:37

## 2019-09-12 RX ADMIN — Medication 30 MILLIGRAM(S): at 20:35

## 2019-09-12 RX ADMIN — HUMAN RABIES VIRUS IMMUNE GLOBULIN 1300 UNIT(S): 150 INJECTION, SOLUTION INTRAMUSCULAR at 20:36

## 2019-09-12 RX ADMIN — Medication 20 MILLIEQUIVALENT(S): at 20:35

## 2019-09-12 RX ADMIN — Medication 1 MILLILITER(S): at 18:54

## 2019-09-12 RX ADMIN — LEVALBUTEROL 1.25 MILLIGRAM(S): 1.25 SOLUTION, CONCENTRATE RESPIRATORY (INHALATION) at 19:08

## 2019-09-12 RX ADMIN — AMPICILLIN SODIUM AND SULBACTAM SODIUM 3 GRAM(S): 250; 125 INJECTION, POWDER, FOR SUSPENSION INTRAMUSCULAR; INTRAVENOUS at 20:38

## 2019-09-12 NOTE — H&P ADULT - PROBLEM SELECTOR PLAN 4
c/w Home meds except for Humira and Rituximab in setting of acute infection   - She was suppose to receive her Rituximab dose yesterday but her OP doctor held  it

## 2019-09-12 NOTE — H&P ADULT - PROBLEM SELECTOR PLAN 10
IMPROVE VTE Individual Risk Assessment  RISK                                                                Points  [  ] Previous VTE                                                  3  [  ] Thrombophilia                                               2  [  ] Lower limb paralysis                                      2        (unable to hold up >15 seconds)    [  ] Current Cancer                                              2         (within 6 months)  [x  ] Immobilization > 24 hrs                                1  [  ] ICU/CCU stay > 24 hours                              1  [  ] Age > 60                                                      1  IMPROVE VTE Score __1, lovenox for DVT proph
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
## 2019-09-12 NOTE — H&P ADULT - NSHPPHYSICALEXAM_GEN_ALL_CORE
Vital Signs Last 24 Hrs  T(C): 36.7 (13 Sep 2019 00:06), Max: 37.2 (12 Sep 2019 19:23)  T(F): 98 (13 Sep 2019 00:06), Max: 99 (12 Sep 2019 19:23)  HR: 106 (13 Sep 2019 00:06) (89 - 116)  BP: 128/86 (13 Sep 2019 00:06) (121/71 - 138/96)  RR: 18 (13 Sep 2019 00:06) (16 - 18)  SpO2: 99% (13 Sep 2019 00:06) (98% - 100%)    PHYSICAL EXAM:  GENERAL: NAD  HEENT: Normocephalic;  conjunctivae and sclerae clear; moist mucous membranes;   NECK : supple  CHEST/LUNG: Clear to auscultation bilaterally with good air entry   HEART: S1 S2  regular; no murmurs, gallops or rubs  ABDOMEN: Soft, Nontender, Nondistended; Bowel sounds present  EXTREMITIES: no cyanosis; no edema; no calf tenderness  SKIN: warm and dry; no rash  NERVOUS SYSTEM:  Awake and alert; Oriented  to place, person and time ; no new deficits Vital Signs Last 24 Hrs  T(C): 36.7 (13 Sep 2019 00:06), Max: 37.2 (12 Sep 2019 19:23)  T(F): 98 (13 Sep 2019 00:06), Max: 99 (12 Sep 2019 19:23)  HR: 106 (13 Sep 2019 00:06) (89 - 116)  BP: 128/86 (13 Sep 2019 00:06) (121/71 - 138/96)  RR: 18 (13 Sep 2019 00:06) (16 - 18)  SpO2: 99% (13 Sep 2019 00:06) (98% - 100%)    PHYSICAL EXAM:  GENERAL: NAD  HEENT: Normocephalic;  conjunctivae and sclerae clear; moist mucous membranes;   NECK : supple  CHEST/LUNG: Clear to auscultation bilaterally with good air entry   HEART: S1 S2  regular; no murmurs, gallops or rubs  ABDOMEN: Soft, Nontender, Nondistended; Bowel sounds present  EXTREMITIES: no cyanosis; no edema; no calf tenderness in b/l LE   Left fore arm: 2 pinhead bite merks present with surrounding erythema, warmth and mild swelling , area expending to 5-7 cm   NERVOUS SYSTEM:  Awake and alert; Oriented  to place, person and time ; no new deficits

## 2019-09-12 NOTE — H&P ADULT - NSICDXPASTSURGICALHX_GEN_ALL_CORE_FT
PAST SURGICAL HISTORY:  Brain aneurysm coil sx - 2011    Chronic lower back pain s/p lumbar discectomy -2013, s/p fusion with hardware- 2017    H/O: hysterectomy 2011    History of bunionectomy left foot    S/P arthroscopy left knee and left foot=- 2016

## 2019-09-12 NOTE — H&P ADULT - NSICDXPASTMEDICALHX_GEN_ALL_CORE_FT
PAST MEDICAL HISTORY:  Asthma last time used Ventolin in summer 2018, never intubated or hopsitalized, under Pulmonolof[facility] , Dr Josh Parr care    Brain aneurysm dx 2011, sx done    Depression     Fibroids     Fibromyalgia     GERD (gastroesophageal reflux disease)     Hallux valgus (acquired), left foot     HLD (hyperlipidemia)     HTN (hypertension)     Migraines     SHELIA (obstructive sleep apnea) home test done by PCP in 2016, SHELIA pt was using mouth guard but it broken, not using it now, to notify Anesthesia    RA (rheumatoid arthritis)     Sarcoidosis, lung dx 2016 with bx    Seasonal allergies     Vertigo

## 2019-09-12 NOTE — ED PROVIDER NOTE - RADIATION
[facility]    [address] [address]    Phone:  [phone]    Fax:  [phone]       Thank You for choosing us for your health care visit. We are glad to serve you and happy to provide you with this summary of your visit. Please help us to ensure we have accurate records. If you find anything that needs to be changed, please let our staff know as soon as possible.          Your Demographic Information     Patient Name Sex     Reyes Quintana, Javier J Male 2006       Ethnic Group Patient Race    / Origin White      Your Visit Details     Date & Time Provider Department    2017 6:25 PM Glenn Barcenas NP [facility]      Your Upcoming Appointment*(Max 10)     2017  4:00 PM CDT   Behavioral Health Follow-Up with Martin Blackburn LCSW   [facility] ([facility] [place][note 2])    [address]  [place] [address] [phone]            2017  2:00 PM CDT   Office Visit with Pipo Robert MD   [facility] ([facility] [place][note 2])    [address]  [place] [address] [phone]              Your To Do List     Follow-Up    Return in about 1 day (around 2017).      We Ordered or Performed the Following     LYME IGG & IGM AB SCREEN     STREP GROUP A CULTURE     STREP GROUP A SCREEN RAPID WITH REFLEX TO CULTURE     URINALYSIS WITH MICRO & CULTURE IF INDICATED       Conditions Discussed Today or Order-Related Diagnoses        Comments    Nausea and vomiting in child    -  Primary     Pharyngitis, unspecified etiology         Low grade fever           Your Vitals Were     BP Pulse Temp Height Weight SpO2    134/68 (>99 %/ 65 %)* 63 97.6 °F (36.4 °C) (Temporal Artery) 5' 1\" (1.549 m) (85 %, Z= 1.03)† 96 lb 1.9 oz (43.6 kg) (70 %, Z= 0.54)† 97%    BMI Smoking Status                18.16 kg/m2 (59 %,  Z= 0.23)† Passive Smoke Exposure - Never Smoker        *BP percentiles are based on NHBPEP's 4th Report    †Growth percentiles are based on CDC 2-20 Years data.      Medications Prescribed or Re-Ordered Today     ondansetron (ZOFRAN ODT) 4 MG disintegrating tablet    Sig - Route: Take 1 tablet by mouth every 8 hours as needed for Nausea. - Oral    Class: Eprescribe    Pharmacy: [facility] [address] [place], [place] [address] AT [address] [place] [address] & [place] Ph #: [phone]    azithromycin (ZITHROMAX) 200 MG/5ML suspension    Siml po first day then 5.5ml po daily x 4d    Class: Eprescribe    Pharmacy: [facility] [address] [place] [address] [address] AT [address] [place] [address] & [place] Ph #: [phone]      Your Current Medications Are        Disp Refills Start End    lisdexamfetamine (VYVANSE) 40 MG capsule 30 capsule 0 2017     Sig - Route: Take 1 capsule by mouth every morning. - Oral    ondansetron (ZOFRAN ODT) 4 MG disintegrating tablet 6 tablet 0 2017     Sig - Route: Take 1 tablet by mouth every 8 hours as needed for Nausea. - Oral    Class: Eprescribe    azithromycin (ZITHROMAX) 200 MG/5ML suspension 40 mL 0 2017     Siml po first day then 5.5ml po daily x 4d    Class: Eprescribe      Allergies     Keflex [Cephalexin] VOMITING      Immunizations History as of 2017     Name Date    DTaP 10/10/2011, 2007, 3/28/2006, 2006, 2005    HEPATITIS A CHILD 2016, 2015    HIB 2007, 2006, 2005    Hepatitis B Child 3/28/2006, 2006, 2005, 2005    Influenza 2011, 10/10/2011, 11/10/2009, 10/9/2009, 2008    Influenza Quadrivalent Live 2015    Influenza Quadrivalent Preservative Free 2016    MMR 10/9/2009, 2006    Pneumococcal Conjugate 7 Valent 2006, 3/28/2006, 2006, 2005    Polio, INACTIVATED 10/10/2011, 3/28/2006, 2006, 2005    Varicella 10/10/2011, 2006      Medications  Administered Today        Admin Date Action Route                ondansetron (ZOFRAN ODT) disintegrating tablet 4 mg 06/01/2017 Given Oral                 Results of Testing Done Today     URINALYSIS WITH MICRO & CULTURE IF INDICATED      Component Value Standard Range & Units    COLOR YELLOW YELLOW    APPEARANCE CLEAR     GLUCOSE(URINE) NEGATIVE NEGATIVE mg/dL    BILIRUBIN NEGATIVE NEGATIVE    KETONES NEGATIVE NEGATIVE mg/dL    SPECIFIC GRAVITY 1.015 1.005 - 1.030    BLOOD NEGATIVE NEGATIVE    pH 5.5 5.0 - 7.0 Units    PROTEIN(URINE) NEGATIVE NEGATIVE mg/dL    UROBILINOGEN 0.2 0.0 - 1.0 mg/dL    NITRITE NEGATIVE NEGATIVE    LEUKOCYTE ESTERASE NEGATIVE NEGATIVE    Squamous EPI'S NONE SEEN 0 - 5 /hpf    RBC NONE SEEN 0 - 3 /hpf    WBC NONE SEEN 0 - 5 /hpf    BACTERIA FEW NONE SEEN /hpf    Hyaline Casts NONE SEEN 0 - 5 /lpf    SPECIMEN TYPE URINE, CLEAN CATCH/MIDSTREAM     MUCOUS PRESENT                           Patient Instructions      Vomiting (Child)  Vomiting is a very common symptom in children. There are many possible causes. The most common cause is a viral infection. Other causes include gastroesophageal reflux (GERD) and common illnesses such as colds, UTIs, or ear infections.  Vomiting in young children can usually be treated at home using the steps listed below. The healthcare provider usually won’t prescribe medicines to prevent vomiting unless symptoms are severe. That’s because there is a greater risk of serious side effects when this type of medicine is used in young children.The main danger from vomiting is dehydration. This means that your child may lose too much water and minerals. To prevent dehydration, you will need to replace lost body fluids with oral rehydration solution. You can get this at drugstores and most grocery stores without a prescription.  Home care  The first step to treat vomiting and prevent dehydration is to give small amounts of fluids often. Follow the instructions you’re  given from your child’s healthcare provider. One method is described below:  · Start with oral rehydration solution. Give 1 to 2 teaspoons (5 to 10 ml) every 1 to 2 minutes. Even if your child vomits, keep feeding as directed. Your child will still absorb much of the fluid.  · As your child vomits less, give larger amounts of rehydration solution at longer intervals. Keep doing this until your child is making urine and is no longer thirsty (has no interest in drinking). Don’t give your child plain water, milk, formula, or other liquids until vomiting stops.  · If frequent vomiting goes on for more than 2 hours, call the healthcare provider.  Note: Your child may be thirsty and want to drink faster, but if vomiting, give fluids only at the prescribed rate. Too much fluid in the stomach will cause more vomiting.  Follow the guidelines below when continuing to care for your child:  · After 2 hours with no vomiting, give small amounts of full-strength formula, milk, ice chips, broth, or other fluids. Avoid sweetened juice, sodas, or sports drinks. Give more fluids as your child tolerates them.   · After 24 hours with no vomiting, restart solid foods. These include rice cereal, other cereals, oatmeal, bread, noodles, carrots, mashed bananas, mashed potatoes, rice, applesauce, dry toast, crackers, soups with rice or noodles, and cooked vegetables. Give as much fluid as your child wants. Gradually return to a normal diet.  Note: Some children may be sensitive to the lactose in milk or formula. Their symptoms may get worse. If that happens, use oral rehydration solution instead of milk or formula during this illness.  Follow-up care  Follow up with your child’s healthcare provider as directed. If testing was done, you will be told the results when they are ready. In some cases, additional treatment may be needed.  When to seek medical advice  Unless your child’s healthcare provider advises otherwise, call the provider right  away if your child:  · Is younger than 2 years of age and has a fever of 100.4°F (38°C) that lasts for more than 1 day.  · Is 2 years old or older and has a fever of 100.4°F (38°C) that lasts for more than 3 days.  · Is of any age and has repeated fevers above 104°F (40°C).  · Continues to vomit after the first 2 hours on fluids.  · Is vomiting for more than 24 hours.  · Has blood in the vomit or stool.  · Has a swollen belly or signs of belly pain.  · Has dark urine or no urine for 8 hours, no tears when crying, sunken eyes, or dry mouth.  · Won’t stop fussing or keeps crying and can’t be soothed.  · Develops a new rash.  · Has pain in belly region that continues or worsens.  Call 911  Call 911 right away if your child:  · Has trouble breathing.  · Is very confused.  · Is very drowsy or has trouble waking up.  · Faints or loses consciousness.  · Has an unusually fast heart rate.  · Has yellow or green-tinged vomit.  · Has large amounts of blood in the vomit or stool.  · Is vomiting forcefully (projectile vomiting).  · Is not passing stool.  · Has a seizure.  · Has a stiff neck.  © 2673-4618 [employer]. [address] [place] [address]. All rights reserved. This information is not intended as a substitute for professional medical care. Always follow your healthcare professional's instructions.        Diet: Vomiting, with or Without Diarrhea (Child)    The first step to treat vomiting and prevent dehydration is to give small amounts of fluids often.  · Start with oral rehydration solution. You can get this at drugstores and most groceries without a prescription. Give 1 to 2 teaspoons (5 ml to10 ml) every 1 to 2 minutes. Even if vomiting occurs, keep giving it as directed. Even while vomiting, your child will absorb most of the fluid.  · As your child vomits less, give larger amounts of rehydration solution at longer intervals. Do this until your child is making urine and is no longer thirsty  (has no interest in drinking). Don't give your child plain water, milk, formula, or other liquids until vomiting stops.  · If frequent vomiting continues for more than 2 hours despite the above method, call your child's health care provider.  Note: Your child may be thirsty and want to drink faster, but if vomiting, give fluids only as directed above. The idea is not to fill the stomach with each feeding. This can cause more vomiting.  The following guidelines will help you continue to care for your child:  · After 2 hours with no vomiting, start with small amounts of milk, full-strength formula, or other fluids your child likes. Give more as tolerated. Avoid sweetened juices and sodas.  · After 12 to 24 hours with no vomiting, resume solid foods. This includes rice cereal, other cereals, oatmeal, bread, noodles, mashed bananas, mashed potatoes, rice, applesauce, dry toast, crackers, soups with rice or noodles, and cooked vegetables. Give as much fluid as your child wants.  · After 24 hours with no vomiting, resume a normal diet.  © 8313-3991 [employer]. [address] [place] [address], [place] [address]. All rights reserved. This information is not intended as a substitute for professional medical care. Always follow your healthcare professional's instructions.      Ondansetron Oral disintegrating tablet  What is this medicine?  ONDANSETRON (on DAN se ivan) is used to treat nausea and vomiting caused by chemotherapy. It is also used to prevent or treat nausea and vomiting after surgery.  This medicine may be used for other purposes; ask your health care provider or pharmacist if you have questions.  What should I tell my health care provider before I take this medicine?  They need to know if you have any of these conditions:  · heart disease  · history of irregular heartbeat  · liver disease  · low levels of magnesium or potassium in the blood  · an unusual or allergic reaction to ondansetron, granisetron,  other medicines, foods, dyes, or preservatives  · pregnant or trying to get pregnant  · breast-feeding  How should I use this medicine?  These tablets are made to dissolve in the mouth. Do not try to push the tablet through the foil backing. With dry hands, peel away the foil backing and gently remove the tablet. Place the tablet in the mouth and allow it to dissolve, then swallow. While you may take these tablets with water, it is not necessary to do so.  Talk to your pediatrician regarding the use of this medicine in children. Special care may be needed.  Overdosage: If you think you have taken too much of this medicine contact a poison control center or emergency room at once.  NOTE: This medicine is only for you. Do not share this medicine with others.  What if I miss a dose?  If you miss a dose, take it as soon as you can. If it is almost time for your next dose, take only that dose. Do not take double or extra doses.  What may interact with this medicine?  Do not take this medicine with any of the following medications:  · apomorphine  · certain medicines for fungal infections like fluconazole, itraconazole, ketoconazole, posaconazole, voriconazole  · cisapride  · dofetilide  · dronedarone  · pimozide  · thioridazine  · ziprasidone  This medicine may also interact with the following medications:  · carbamazepine  · certain medicines for depression, anxiety, or psychotic disturbances  · fentanyl  · linezolid  · MAOIs like Carbex, Eldepryl, Marplan, Nardil, and Parnate  · methylene blue (injected into a vein)  · other medicines that prolong the QT interval (cause an abnormal heart rhythm)  · phenytoin  · rifampicin  · tramadol  This list may not describe all possible interactions. Give your health care provider a list of all the medicines, herbs, non-prescription drugs, or dietary supplements you use. Also tell them if you smoke, drink alcohol, or use illegal drugs. Some items may interact with your  medicine.  What should I watch for while using this medicine?  Check with your doctor or health care professional as soon as you can if you have any sign of an allergic reaction.  What side effects may I notice from receiving this medicine?  Side effects that you should report to your doctor or health care professional as soon as possible:  · allergic reactions like skin rash, itching or hives, swelling of the face, lips, or tongue  · breathing problems  · confusion  · dizziness  · fast or irregular heartbeat  · feeling faint or lightheaded, falls  · fever and chills  · loss of balance or coordination  · seizures  · sweating  · swelling of the hands and feet  · tightness in the chest  · tremors  · unusually weak or tired  Side effects that usually do not require medical attention (report to your doctor or health care professional if they continue or are bothersome):  · constipation or diarrhea  · headache  This list may not describe all possible side effects. Call your doctor for medical advice about side effects. You may report side effects to FDA at 6-868-FDA-6047.  Where should I keep my medicine?  Keep out of the reach of children.  Store between 2 and 30 degrees C (36 and 86 degrees F). Throw away any unused medicine after the expiration date.  NOTE:This sheet is a summary. It may not cover all possible information. If you have questions about this medicine, talk to your doctor, pharmacist, or health care provider. Copyright© 2016 [employer]    Azithromycin Oral suspension  What is this medicine?  AZITHROMYCIN (az ith steve MYE sin) is a macrolide antibiotic. It is used to treat or prevent certain kinds of bacterial infections. It will not work for colds, flu, or other viral infections.  This medicine may be used for other purposes; ask your health care provider or pharmacist if you have questions.  What should I tell my health care provider before I take this medicine?  They need to know if you have any of  these conditions:  · kidney disease  · liver disease  · irregular heartbeat or heart disease  · an unusual or allergic reaction to azithromycin, erythromycin, other macrolide antibiotics, foods, dyes, or preservatives  · pregnant or trying to get pregnant  · breast-feeding  How should I use this medicine?  Take this medicine by mouth. Follow the directions on the prescription label.  For the suspension already mixed by the pharmacist: Shake well before using. This medicine can be taken with food or on an empty stomach. If the medicine upsets your stomach, take it with food. Use a specially marked spoon, or container to measure the dose. Ask your pharmacist if you do not have one. Household spoons are not accurate. Take your medicine at regular intervals. Do not take your medicine more often than directed. Take all of your medicine as directed even if you think that you are better. Do not skip doses or stop your medicine early.  For the 1 gram single dose packet: This medicine can be taken with food or on an empty stomach. Empty the contents of a single dose packet into two ounces of water (about one quarter of a full glass). Mix and drink all the mixture at once. Add another two ounces of water to the glass, mix well and drink all of it, to make sure you take the full dose.  Talk to your pediatrician regarding the use of this medicine in children. Special care may be needed.  Overdosage: If you think you have taken too much of this medicine contact a poison control center or emergency room at once.  NOTE: This medicine is only for you. Do not share this medicine with others.  What if I miss a dose?  If you miss a dose, take it as soon as you can. If it is almost time for your next dose, take only that dose. Do not take double or extra doses.  What may interact with this medicine?  Do not take this medicine with any of the following medications:  · lincomycin  This medicine may also interact with the following  medications:  · amiodarone  · antacids  · birth control pills  · cyclosporine  · digoxin  · magnesium  · nelfinavir  · phenytoin  · warfarin  This list may not describe all possible interactions. Give your health care provider a list of all the medicines, herbs, non-prescription drugs, or dietary supplements you use. Also tell them if you smoke, drink alcohol, or use illegal drugs. Some items may interact with your medicine.  What should I watch for while using this medicine?  Tell your doctor or health care professional if your symptoms do not improve.  Do not treat diarrhea with over the counter products. Contact your doctor if you have diarrhea that lasts more than 2 days or if it is severe and watery.  This medicine can make you more sensitive to the sun. Keep out of the sun. If you cannot avoid being in the sun, wear protective clothing and use sunscreen. Do not use sun lamps or tanning beds/booths.  What side effects may I notice from receiving this medicine?  Side effects that you should report to your doctor or health care professional as soon as possible:  · allergic reactions like skin rash, itching or hives, swelling of the face, lips, or tongue  · confusion, nightmares or hallucinations  · dark urine  · difficulty breathing  · hearing loss  · irregular heartbeat or chest pain  · pain or difficulty passing urine  · redness, blistering, peeling or loosening of the skin, including inside the mouth  · white patches or sores in the mouth  · yellowing of the eyes or skin  Side effects that usually do not require medical attention (report to your doctor or health care professional if they continue or are bothersome):  · diarrhea  · dizziness, drowsiness  · headache  · stomach upset or vomiting  · tooth discoloration  · vaginal irritation  This list may not describe all possible side effects. Call your doctor for medical advice about side effects. You may report side effects to FDA at 2-200-BQM-2254.  Where  should I keep my medicine?  Keep out of the reach of children.  Store between 5 and 30 degrees C (41 and 86 degrees F) for up to 10 days. Throw away any unused medicine after the expiration date.  NOTE:This sheet is a summary. It may not cover all possible information. If you have questions about this medicine, talk to your doctor, pharmacist, or health care provider. Copyright© 2016 [employer]          Pharyngitis: Strep Presumed (Child)  Pharyngitis is a sore throat. Sore throat is a common condition in children. It can be caused by an infection with the bacterium streptococcus. This is commonly known as strep throat.  Strep throat starts suddenly. Symptoms include a red, swollen throat and swollen lymph nodes, which make it painful to swallow. Red spots may appear on the roof of the mouth. Some children will be flushed and have a fever. Young children may not show that they feel pain. But they may refuse to eat or drink or drool a lot.  Strep throat is diagnosed with a rapid test or a throat culture. If the rapid test results are unclear, a throat culture will be done. Results from the culture may take up to 2 days. This waiting period may be hard for you and your child. The doctor may prescribe medicines to treat fever and pain. Because strep throat is very contagious, your child must stay at home until the diagnosis is known.  If a strep infection is confirmed, treatment with antibiotic medicine will be prescribed. This may be given by injection or pills. Children with strep throat are contagious until they have been taking antibiotic medicine for 24 hours.    Home care  Follow these guidelines when caring for your child at home:  · If your child has pain or fever, you can give him or her medicine as advised by your child's health care provider. Don't give your child aspirin. Don't give your child any other medicine without first asking the provider.  · Keep your child home from school or  until the  provider tells you whether or not your child has strep throat. Strep throat is very contagious.   · If strep throat is confirmed, antibiotics will be prescribed. Follow all instructions for giving this medicine to your child. Make sure your child takes the medicine as directed until it is gone. You should not have any left over.  Your child can go back to school or  after taking the antibiotic for at least 24 hours. Tell people who may have had contact with your child about his or her illness. This may include school officials,  center workers, or others.  · Wash your hands with warm water and soap before and after caring for your child. This is to help prevent the spread of infection. Others should do the same.  · Give your child plenty of time to rest.  · Encourage your child to drink liquids. Some children may prefer ice chips, cold drinks, frozen desserts, or popsicles. Others may also like warm chicken soup or beverages with lemon and honey. Don’t force your child to eat. Avoid salty or spicy foods, which can irritate the throat.  · Have your child gargle with warm salt water to ease throat pain. The gargle should be spit out afterward, not swallowed.   Follow-up care  Follow up with your child’s healthcare provider, or as directed.  When to seek medical advice  Unless advised otherwise, call your child's healthcare provider if:  · Your child is 3 months old or younger and has a fever of 100.4°F (38°C) or higher. Your child may need to see a healthcare provider.  · Your child is of any age and has fevers higher than 104°F (40°C) that come back again and again.  Also call your child's provider right away if any of these occur:  · Symptoms don’t get better after taking prescribed medication or appear to be getting worse  · New or worsening ear pain, sinus pain, or headache  · Painful lumps in the back of neck  · Stiff neck  · Lymph nodes are getting larger   · Inability to swallow liquids, excessive  drooling, or inability to open mouth wide due to throat pain  · Signs of dehydration (very dark urine or no urine, sunken eyes, dizziness)  · Trouble breathing or noisy breathing  · Muffled voice  · New rash  © 4046-9518 [employer]. [address] [place] [address], [place] [address]. All rights reserved. This information is not intended as a substitute for professional medical care. Always follow your healthcare professional's instructions.              no radiation

## 2019-09-12 NOTE — H&P ADULT - HISTORY OF PRESENT ILLNESS
53 y/o with PMHx of RA, Sarcoidosis, Fibromyalgia, Asthma, Depression, Fibroids, GERD, Hallux Valgus, HLD, HTN, Migraines, Vertigo and PSHx of bunionectomy, hysterectomy, arthroscopy presenting to ED s/p animal bite to L forearm 2 days ago which has gradual developed redness and swelling to area, as well as fever to 102.2F. Pt was seen by PMD yesterday and was Rx amoxicillin and has started it. However, pt was advised to go to clinic to obtain rabies vaccination. Due to clinic being closed last night and pt presents to ED  today for immunization. Pt does not know what type of animal bite her because it was dark and she was bitten quickly by a small animal that ran out fo the shrubs. Pt does not know of last tetanus vaccination. In ED, pt is  also c/o mild HA, but reports eating well. Pt denies n/v or any other acute sxs. Pt is currently taking Rx Rituxan and Imuran, which have immunosuppressant properties. Social Hx negative. Allergy: Cipro [Rxn: Vomiting] 55 y/o with PMHx of RA, Sarcoidosis, Fibromyalgia, Asthma, Depression, Fibroids, GERD, Hallux Valgus, HLD, HTN, Migraines, Vertigo and PSHx of bunionectomy, hysterectomy, arthroscopy presenting to ED s/p animal bite to L forearm 2 days ago. While she was walking her dog in dark, she was suddenly bitten by a 4 legged animal, most likely cat or raccoon on the medical side of her left forearm, with minor bleeding. She washed the wound with water. The next day, she noticed redness, warmth, pain and mild swelling around the bite site with fever of 102.2. SHe went to her PCP was prescribed Augmentin and was advised to go to clinic to obtain rabies vaccination. Due to clinic being closed, pt presented to ED  for immunization. Pt does not know of last tetanus vaccination. Patient denies any photophobia, or difficulty in saliva swallowing. She took 2 doses of Augmentin prior to arrival. Off note, Pt is currently taking Rx Rituxan and Imuran, which have immunosuppressant properties.

## 2019-09-12 NOTE — ED PROVIDER NOTE - CARE PLAN
Principal Discharge DX:	Animal bite  Secondary Diagnosis:	Cellulitis of left upper extremity Principal Discharge DX:	Animal bite  Secondary Diagnosis:	Cellulitis of left upper extremity  Secondary Diagnosis:	Hypokalemia

## 2019-09-12 NOTE — H&P ADULT - PROBLEM SELECTOR PLAN 2
-Unknown animal bite   - s/p Tdap vacc, Rabies IG, Rabavert Vaccine in ED  - Post exposure Rabies vaccination : 2nd dose on  Sept 15, 3rd dose on Sept 19, 4th dose on Sept 26. ( Schedule : 0,3,7, 14)

## 2019-09-12 NOTE — H&P ADULT - NSHPREVIEWOFSYSTEMS_GEN_ALL_CORE
REVIEW OF SYSTEMS:  CONSTITUTIONAL: No fever,   EYES: no acute visual disturbances  NECK: No pain or stiffness  RESPIRATORY: No cough; No shortness of breath  CARDIOVASCULAR: No chest pain, no palpitations  GASTROINTESTINAL: No pain. No nausea or vomiting; No diarrhea   NEUROLOGICAL: No headache or numbness, no tremors  MUSCULOSKELETAL: No joint pain, no muscle pain  GENITOURINARY: no dysuria, no frequency, no hesitancy  PSYCHIATRY: no depression , no anxiety  ALL OTHER  ROS negative REVIEW OF SYSTEMS:  CONSTITUTIONAL: No weight loss,   EYES: no acute visual disturbances  NECK: No pain or stiffness  RESPIRATORY: No cough; No shortness of breath  CARDIOVASCULAR: No chest pain, no palpitations  GASTROINTESTINAL: No pain. No nausea or vomiting; No diarrhea   NEUROLOGICAL: No headache or numbness, no tremors  MUSCULOSKELETAL: No joint pain, no muscle pain  GENITOURINARY: no dysuria, no frequency, no hesitancy  PSYCHIATRY: no depression , no anxiety  ALL OTHER  ROS negative

## 2019-09-12 NOTE — H&P ADULT - PROBLEM SELECTOR PLAN 1
p/w cellulitis around animal bite site.   - s/p Unasyn in Ed   -c/w unasyn   - Blood culture testing p/w cellulitis around animal bite site. s/p Augmentin 2 doses in OP setting, with Fever of 102  at home   - WBC: WNL   - s/p Unasyn in Ed   -c/w unasyn   - Blood culture testing  -Dr Hayes consulted

## 2019-09-12 NOTE — ED PROVIDER NOTE - OBJECTIVE STATEMENT
55 y/o with PMHx of RA, Sarcoidosis, Fibromyalgia, Asthma, Depression, Fibroids, GERD, Hallux Valgus, HLD, HTN, Migraines, Vertigo and PSHx of bunionectomy, hysterectomy, arthroscopy presenting to ED s/p animal bite to L forearm 2 days ago which has gradual developed redness and swelling to area, as well as fever to 102.2F. Pt was seen by PMD yesterday and was Rx amoxicillin and has started it. However, pt was advised to go to clinic to obtain rabies vaccination. Due to clinic being closed last night and pt presents to ED  today for immunization. Pt does not know what type of animal bite her because it was dark and she was bitten quickly by a small animal that ran out fo the shrubs. Pt does not know of last tetanus vaccination. In ED, pt is  also c/o mild HA, but reports eating well. Pt denies n/v or any other acute sxs. Pt is currently taking Rx Rituxan and Imuran, which have immunosuppressant properties. Social Hx negative. Allergy: Cipro [Rxn: Vomiting].

## 2019-09-12 NOTE — ED PROVIDER NOTE - CLINICAL SUMMARY MEDICAL DECISION MAKING FREE TEXT BOX
53 y/o pt presenting to ED on immunosuppressant with animal bite and associated soft tissue infection. Will check labs with blood Cx, administer IV Abx, rabies immunoglobulin, rabies vaccine, tetanus vaccine and reassess.

## 2019-09-12 NOTE — ED PROVIDER NOTE - PHYSICAL EXAMINATION
L forearm ulnar aspect, area of erhtyema with slight swelling and tendxernss approximately 8cm x6cm. 2 tiny punture wounds visualized in the center. No drainage or discharge. No fluctuance or crepitius.

## 2019-09-13 ENCOUNTER — TRANSCRIPTION ENCOUNTER (OUTPATIENT)
Age: 54
End: 2019-09-13

## 2019-09-13 DIAGNOSIS — I10 ESSENTIAL (PRIMARY) HYPERTENSION: ICD-10-CM

## 2019-09-13 DIAGNOSIS — Z02.9 ENCOUNTER FOR ADMINISTRATIVE EXAMINATIONS, UNSPECIFIED: ICD-10-CM

## 2019-09-13 DIAGNOSIS — F32.9 MAJOR DEPRESSIVE DISORDER, SINGLE EPISODE, UNSPECIFIED: ICD-10-CM

## 2019-09-13 DIAGNOSIS — L03.114 CELLULITIS OF LEFT UPPER LIMB: ICD-10-CM

## 2019-09-13 DIAGNOSIS — J45.909 UNSPECIFIED ASTHMA, UNCOMPLICATED: ICD-10-CM

## 2019-09-13 DIAGNOSIS — G43.909 MIGRAINE, UNSPECIFIED, NOT INTRACTABLE, WITHOUT STATUS MIGRAINOSUS: ICD-10-CM

## 2019-09-13 DIAGNOSIS — M06.9 RHEUMATOID ARTHRITIS, UNSPECIFIED: ICD-10-CM

## 2019-09-13 DIAGNOSIS — Z29.9 ENCOUNTER FOR PROPHYLACTIC MEASURES, UNSPECIFIED: ICD-10-CM

## 2019-09-13 DIAGNOSIS — M79.7 FIBROMYALGIA: ICD-10-CM

## 2019-09-13 DIAGNOSIS — T14.8XXA OTHER INJURY OF UNSPECIFIED BODY REGION, INITIAL ENCOUNTER: ICD-10-CM

## 2019-09-13 DIAGNOSIS — Z71.89 OTHER SPECIFIED COUNSELING: ICD-10-CM

## 2019-09-13 DIAGNOSIS — D86.0 SARCOIDOSIS OF LUNG: ICD-10-CM

## 2019-09-13 DIAGNOSIS — G47.33 OBSTRUCTIVE SLEEP APNEA (ADULT) (PEDIATRIC): ICD-10-CM

## 2019-09-13 LAB
24R-OH-CALCIDIOL SERPL-MCNC: 22.9 NG/ML — LOW (ref 30–80)
ALBUMIN SERPL ELPH-MCNC: 3.1 G/DL — LOW (ref 3.5–5)
ALP SERPL-CCNC: 123 U/L — HIGH (ref 40–120)
ALT FLD-CCNC: 44 U/L DA — SIGNIFICANT CHANGE UP (ref 10–60)
ANION GAP SERPL CALC-SCNC: 7 MMOL/L — SIGNIFICANT CHANGE UP (ref 5–17)
APPEARANCE UR: CLEAR — SIGNIFICANT CHANGE UP
AST SERPL-CCNC: 27 U/L — SIGNIFICANT CHANGE UP (ref 10–40)
BASOPHILS # BLD AUTO: 0.02 K/UL — SIGNIFICANT CHANGE UP (ref 0–0.2)
BASOPHILS NFR BLD AUTO: 0.2 % — SIGNIFICANT CHANGE UP (ref 0–2)
BILIRUB SERPL-MCNC: 0.4 MG/DL — SIGNIFICANT CHANGE UP (ref 0.2–1.2)
BILIRUB UR-MCNC: NEGATIVE — SIGNIFICANT CHANGE UP
BUN SERPL-MCNC: 13 MG/DL — SIGNIFICANT CHANGE UP (ref 7–18)
CALCIUM SERPL-MCNC: 8.6 MG/DL — SIGNIFICANT CHANGE UP (ref 8.4–10.5)
CHLORIDE SERPL-SCNC: 107 MMOL/L — SIGNIFICANT CHANGE UP (ref 96–108)
CHOLEST SERPL-MCNC: 220 MG/DL — HIGH (ref 10–199)
CO2 SERPL-SCNC: 26 MMOL/L — SIGNIFICANT CHANGE UP (ref 22–31)
COLOR SPEC: YELLOW — SIGNIFICANT CHANGE UP
CREAT SERPL-MCNC: 0.62 MG/DL — SIGNIFICANT CHANGE UP (ref 0.5–1.3)
DIFF PNL FLD: NEGATIVE — SIGNIFICANT CHANGE UP
EOSINOPHIL # BLD AUTO: 0.07 K/UL — SIGNIFICANT CHANGE UP (ref 0–0.5)
EOSINOPHIL NFR BLD AUTO: 0.9 % — SIGNIFICANT CHANGE UP (ref 0–6)
FOLATE SERPL-MCNC: 14.5 NG/ML — SIGNIFICANT CHANGE UP
GLUCOSE SERPL-MCNC: 96 MG/DL — SIGNIFICANT CHANGE UP (ref 70–99)
GLUCOSE UR QL: NEGATIVE — SIGNIFICANT CHANGE UP
HBA1C BLD-MCNC: 5.5 % — SIGNIFICANT CHANGE UP (ref 4–5.6)
HCT VFR BLD CALC: 35.6 % — SIGNIFICANT CHANGE UP (ref 34.5–45)
HDLC SERPL-MCNC: 44 MG/DL — LOW
HGB BLD-MCNC: 11.3 G/DL — LOW (ref 11.5–15.5)
IMM GRANULOCYTES NFR BLD AUTO: 0.4 % — SIGNIFICANT CHANGE UP (ref 0–1.5)
KETONES UR-MCNC: NEGATIVE — SIGNIFICANT CHANGE UP
LEUKOCYTE ESTERASE UR-ACNC: ABNORMAL
LIPID PNL WITH DIRECT LDL SERPL: 144 MG/DL — SIGNIFICANT CHANGE UP
LYMPHOCYTES # BLD AUTO: 1.55 K/UL — SIGNIFICANT CHANGE UP (ref 1–3.3)
LYMPHOCYTES # BLD AUTO: 19.2 % — SIGNIFICANT CHANGE UP (ref 13–44)
MAGNESIUM SERPL-MCNC: 2.1 MG/DL — SIGNIFICANT CHANGE UP (ref 1.6–2.6)
MCHC RBC-ENTMCNC: 30.2 PG — SIGNIFICANT CHANGE UP (ref 27–34)
MCHC RBC-ENTMCNC: 31.7 GM/DL — LOW (ref 32–36)
MCV RBC AUTO: 95.2 FL — SIGNIFICANT CHANGE UP (ref 80–100)
MONOCYTES # BLD AUTO: 0.67 K/UL — SIGNIFICANT CHANGE UP (ref 0–0.9)
MONOCYTES NFR BLD AUTO: 8.3 % — SIGNIFICANT CHANGE UP (ref 2–14)
NEUTROPHILS # BLD AUTO: 5.75 K/UL — SIGNIFICANT CHANGE UP (ref 1.8–7.4)
NEUTROPHILS NFR BLD AUTO: 71 % — SIGNIFICANT CHANGE UP (ref 43–77)
NITRITE UR-MCNC: NEGATIVE — SIGNIFICANT CHANGE UP
NRBC # BLD: 0 /100 WBCS — SIGNIFICANT CHANGE UP (ref 0–0)
PH UR: 6 — SIGNIFICANT CHANGE UP (ref 5–8)
PHOSPHATE SERPL-MCNC: 3.7 MG/DL — SIGNIFICANT CHANGE UP (ref 2.5–4.5)
PLATELET # BLD AUTO: 329 K/UL — SIGNIFICANT CHANGE UP (ref 150–400)
POTASSIUM SERPL-MCNC: 3.5 MMOL/L — SIGNIFICANT CHANGE UP (ref 3.5–5.3)
POTASSIUM SERPL-SCNC: 3.5 MMOL/L — SIGNIFICANT CHANGE UP (ref 3.5–5.3)
PROT SERPL-MCNC: 6.4 G/DL — SIGNIFICANT CHANGE UP (ref 6–8.3)
PROT UR-MCNC: 15
RBC # BLD: 3.74 M/UL — LOW (ref 3.8–5.2)
RBC # FLD: 13.4 % — SIGNIFICANT CHANGE UP (ref 10.3–14.5)
SODIUM SERPL-SCNC: 140 MMOL/L — SIGNIFICANT CHANGE UP (ref 135–145)
SP GR SPEC: 1.02 — SIGNIFICANT CHANGE UP (ref 1.01–1.02)
TOTAL CHOLESTEROL/HDL RATIO MEASUREMENT: 5 RATIO — SIGNIFICANT CHANGE UP (ref 3.3–7.1)
TRIGL SERPL-MCNC: 162 MG/DL — HIGH (ref 10–149)
TSH SERPL-MCNC: 2.27 UU/ML — SIGNIFICANT CHANGE UP (ref 0.34–4.82)
UROBILINOGEN FLD QL: NEGATIVE — SIGNIFICANT CHANGE UP
VIT B12 SERPL-MCNC: 398 PG/ML — SIGNIFICANT CHANGE UP (ref 232–1245)
WBC # BLD: 8.09 K/UL — SIGNIFICANT CHANGE UP (ref 3.8–10.5)
WBC # FLD AUTO: 8.09 K/UL — SIGNIFICANT CHANGE UP (ref 3.8–10.5)

## 2019-09-13 RX ORDER — SENNA PLUS 8.6 MG/1
1 TABLET ORAL
Qty: 0 | Refills: 0 | DISCHARGE

## 2019-09-13 RX ORDER — BUDESONIDE AND FORMOTEROL FUMARATE DIHYDRATE 160; 4.5 UG/1; UG/1
2 AEROSOL RESPIRATORY (INHALATION)
Refills: 0 | Status: DISCONTINUED | OUTPATIENT
Start: 2019-09-13 | End: 2019-09-15

## 2019-09-13 RX ORDER — ENOXAPARIN SODIUM 100 MG/ML
40 INJECTION SUBCUTANEOUS DAILY
Refills: 0 | Status: DISCONTINUED | OUTPATIENT
Start: 2019-09-13 | End: 2019-09-15

## 2019-09-13 RX ORDER — AMPICILLIN SODIUM AND SULBACTAM SODIUM 250; 125 MG/ML; MG/ML
3 INJECTION, POWDER, FOR SUSPENSION INTRAMUSCULAR; INTRAVENOUS EVERY 6 HOURS
Refills: 0 | Status: DISCONTINUED | OUTPATIENT
Start: 2019-09-13 | End: 2019-09-15

## 2019-09-13 RX ORDER — MECLIZINE HCL 12.5 MG
12.5 TABLET ORAL THREE TIMES A DAY
Refills: 0 | Status: DISCONTINUED | OUTPATIENT
Start: 2019-09-13 | End: 2019-09-15

## 2019-09-13 RX ORDER — AZATHIOPRINE 100 MG/1
1 TABLET ORAL
Qty: 0 | Refills: 0 | DISCHARGE

## 2019-09-13 RX ORDER — DULOXETINE HYDROCHLORIDE 30 MG/1
60 CAPSULE, DELAYED RELEASE ORAL DAILY
Refills: 0 | Status: DISCONTINUED | OUTPATIENT
Start: 2019-09-13 | End: 2019-09-15

## 2019-09-13 RX ORDER — GABAPENTIN 400 MG/1
1 CAPSULE ORAL
Qty: 0 | Refills: 0 | DISCHARGE

## 2019-09-13 RX ORDER — KETOROLAC TROMETHAMINE 30 MG/ML
15 SYRINGE (ML) INJECTION EVERY 12 HOURS
Refills: 0 | Status: DISCONTINUED | OUTPATIENT
Start: 2019-09-13 | End: 2019-09-14

## 2019-09-13 RX ORDER — SULFASALAZINE 500 MG
500 TABLET ORAL DAILY
Refills: 0 | Status: DISCONTINUED | OUTPATIENT
Start: 2019-09-13 | End: 2019-09-15

## 2019-09-13 RX ORDER — TIZANIDINE 4 MG/1
1 TABLET ORAL
Qty: 0 | Refills: 0 | DISCHARGE

## 2019-09-13 RX ORDER — OMEPRAZOLE 10 MG/1
1 CAPSULE, DELAYED RELEASE ORAL
Qty: 0 | Refills: 0 | DISCHARGE

## 2019-09-13 RX ORDER — AMPICILLIN SODIUM AND SULBACTAM SODIUM 250; 125 MG/ML; MG/ML
INJECTION, POWDER, FOR SUSPENSION INTRAMUSCULAR; INTRAVENOUS
Refills: 0 | Status: DISCONTINUED | OUTPATIENT
Start: 2019-09-13 | End: 2019-09-15

## 2019-09-13 RX ORDER — GABAPENTIN 400 MG/1
800 CAPSULE ORAL THREE TIMES A DAY
Refills: 0 | Status: DISCONTINUED | OUTPATIENT
Start: 2019-09-13 | End: 2019-09-15

## 2019-09-13 RX ORDER — MIRTAZAPINE 45 MG/1
15 TABLET, ORALLY DISINTEGRATING ORAL AT BEDTIME
Refills: 0 | Status: DISCONTINUED | OUTPATIENT
Start: 2019-09-13 | End: 2019-09-15

## 2019-09-13 RX ORDER — AMPICILLIN SODIUM AND SULBACTAM SODIUM 250; 125 MG/ML; MG/ML
3 INJECTION, POWDER, FOR SUSPENSION INTRAMUSCULAR; INTRAVENOUS ONCE
Refills: 0 | Status: COMPLETED | OUTPATIENT
Start: 2019-09-13 | End: 2019-09-13

## 2019-09-13 RX ORDER — PANTOPRAZOLE SODIUM 20 MG/1
40 TABLET, DELAYED RELEASE ORAL
Refills: 0 | Status: DISCONTINUED | OUTPATIENT
Start: 2019-09-13 | End: 2019-09-15

## 2019-09-13 RX ORDER — ACETAMINOPHEN 500 MG
650 TABLET ORAL EVERY 6 HOURS
Refills: 0 | Status: DISCONTINUED | OUTPATIENT
Start: 2019-09-13 | End: 2019-09-15

## 2019-09-13 RX ORDER — CELECOXIB 200 MG/1
1 CAPSULE ORAL
Qty: 0 | Refills: 0 | DISCHARGE

## 2019-09-13 RX ORDER — ACETAMINOPHEN 500 MG
650 TABLET ORAL EVERY 6 HOURS
Refills: 0 | Status: DISCONTINUED | OUTPATIENT
Start: 2019-09-13 | End: 2019-09-13

## 2019-09-13 RX ORDER — SUMATRIPTAN SUCCINATE 4 MG/.5ML
100 INJECTION, SOLUTION SUBCUTANEOUS DAILY
Refills: 0 | Status: DISCONTINUED | OUTPATIENT
Start: 2019-09-13 | End: 2019-09-15

## 2019-09-13 RX ORDER — AZATHIOPRINE 100 MG/1
50 TABLET ORAL DAILY
Refills: 0 | Status: DISCONTINUED | OUTPATIENT
Start: 2019-09-13 | End: 2019-09-15

## 2019-09-13 RX ORDER — LEVALBUTEROL 1.25 MG/.5ML
0.63 SOLUTION, CONCENTRATE RESPIRATORY (INHALATION) EVERY 8 HOURS
Refills: 0 | Status: DISCONTINUED | OUTPATIENT
Start: 2019-09-13 | End: 2019-09-15

## 2019-09-13 RX ORDER — CARVEDILOL PHOSPHATE 80 MG/1
3.12 CAPSULE, EXTENDED RELEASE ORAL EVERY 12 HOURS
Refills: 0 | Status: DISCONTINUED | OUTPATIENT
Start: 2019-09-13 | End: 2019-09-15

## 2019-09-13 RX ADMIN — BUDESONIDE AND FORMOTEROL FUMARATE DIHYDRATE 2 PUFF(S): 160; 4.5 AEROSOL RESPIRATORY (INHALATION) at 21:19

## 2019-09-13 RX ADMIN — GABAPENTIN 800 MILLIGRAM(S): 400 CAPSULE ORAL at 21:19

## 2019-09-13 RX ADMIN — Medication 650 MILLIGRAM(S): at 01:59

## 2019-09-13 RX ADMIN — Medication 15 MILLIGRAM(S): at 17:03

## 2019-09-13 RX ADMIN — AMPICILLIN SODIUM AND SULBACTAM SODIUM 200 GRAM(S): 250; 125 INJECTION, POWDER, FOR SUSPENSION INTRAMUSCULAR; INTRAVENOUS at 11:56

## 2019-09-13 RX ADMIN — GABAPENTIN 800 MILLIGRAM(S): 400 CAPSULE ORAL at 05:46

## 2019-09-13 RX ADMIN — AMPICILLIN SODIUM AND SULBACTAM SODIUM 200 GRAM(S): 250; 125 INJECTION, POWDER, FOR SUSPENSION INTRAMUSCULAR; INTRAVENOUS at 23:31

## 2019-09-13 RX ADMIN — Medication 650 MILLIGRAM(S): at 11:59

## 2019-09-13 RX ADMIN — PANTOPRAZOLE SODIUM 40 MILLIGRAM(S): 20 TABLET, DELAYED RELEASE ORAL at 05:46

## 2019-09-13 RX ADMIN — BUDESONIDE AND FORMOTEROL FUMARATE DIHYDRATE 2 PUFF(S): 160; 4.5 AEROSOL RESPIRATORY (INHALATION) at 11:56

## 2019-09-13 RX ADMIN — Medication 650 MILLIGRAM(S): at 01:29

## 2019-09-13 RX ADMIN — GABAPENTIN 800 MILLIGRAM(S): 400 CAPSULE ORAL at 14:08

## 2019-09-13 RX ADMIN — LEVALBUTEROL 0.63 MILLIGRAM(S): 1.25 SOLUTION, CONCENTRATE RESPIRATORY (INHALATION) at 15:21

## 2019-09-13 RX ADMIN — AMPICILLIN SODIUM AND SULBACTAM SODIUM 200 GRAM(S): 250; 125 INJECTION, POWDER, FOR SUSPENSION INTRAMUSCULAR; INTRAVENOUS at 01:28

## 2019-09-13 RX ADMIN — LEVALBUTEROL 0.63 MILLIGRAM(S): 1.25 SOLUTION, CONCENTRATE RESPIRATORY (INHALATION) at 21:20

## 2019-09-13 RX ADMIN — SUMATRIPTAN SUCCINATE 100 MILLIGRAM(S): 4 INJECTION, SOLUTION SUBCUTANEOUS at 21:18

## 2019-09-13 RX ADMIN — CARVEDILOL PHOSPHATE 3.12 MILLIGRAM(S): 80 CAPSULE, EXTENDED RELEASE ORAL at 17:01

## 2019-09-13 RX ADMIN — MIRTAZAPINE 15 MILLIGRAM(S): 45 TABLET, ORALLY DISINTEGRATING ORAL at 21:19

## 2019-09-13 RX ADMIN — DULOXETINE HYDROCHLORIDE 60 MILLIGRAM(S): 30 CAPSULE, DELAYED RELEASE ORAL at 11:56

## 2019-09-13 RX ADMIN — Medication 15 MILLIGRAM(S): at 05:46

## 2019-09-13 RX ADMIN — SUMATRIPTAN SUCCINATE 100 MILLIGRAM(S): 4 INJECTION, SOLUTION SUBCUTANEOUS at 22:03

## 2019-09-13 RX ADMIN — CARVEDILOL PHOSPHATE 3.12 MILLIGRAM(S): 80 CAPSULE, EXTENDED RELEASE ORAL at 05:46

## 2019-09-13 RX ADMIN — AMPICILLIN SODIUM AND SULBACTAM SODIUM 200 GRAM(S): 250; 125 INJECTION, POWDER, FOR SUSPENSION INTRAMUSCULAR; INTRAVENOUS at 06:30

## 2019-09-13 RX ADMIN — Medication 650 MILLIGRAM(S): at 12:29

## 2019-09-13 RX ADMIN — Medication 15 MILLIGRAM(S): at 06:16

## 2019-09-13 RX ADMIN — Medication 500 MILLIGRAM(S): at 11:56

## 2019-09-13 RX ADMIN — AMPICILLIN SODIUM AND SULBACTAM SODIUM 200 GRAM(S): 250; 125 INJECTION, POWDER, FOR SUSPENSION INTRAMUSCULAR; INTRAVENOUS at 17:00

## 2019-09-13 RX ADMIN — AZATHIOPRINE 50 MILLIGRAM(S): 100 TABLET ORAL at 11:56

## 2019-09-13 RX ADMIN — LEVALBUTEROL 0.63 MILLIGRAM(S): 1.25 SOLUTION, CONCENTRATE RESPIRATORY (INHALATION) at 05:12

## 2019-09-13 NOTE — DISCHARGE NOTE PROVIDER - PROVIDER TOKENS
FREE:[LAST:[cuevas],FIRST:[harry],PHONE:[phone]],FAX:[(   )    -],ADDRESS:[[phone] [place] [address]],FOLLOWUP:[1-3 days]]

## 2019-09-13 NOTE — PROGRESS NOTE ADULT - SUBJECTIVE AND OBJECTIVE BOX
55 y/o with PMHx of RA, Sarcoidosis, Fibromyalgia, Asthma, Depression, Fibroids, GERD, Hallux Valgus, HLD, HTN, Migraines, Vertigo and PSHx of bunionectomy, hysterectomy, arthroscopy presenting to ED s/p animal bite to L forearm 2 days ago. While she was walking her dog in dark, she was suddenly bitten by a 4 legged animal, most likely cat or raccoon on the medical side of her left forearm, with minor bleeding. She washed the wound with water. The next day, she noticed redness, warmth, pain and mild swelling around the bite site with fever of 102.2. SHe went to her PCP was prescribed Augmentin and was advised to go to clinic to obtain rabies vaccination. Due to clinic being closed, pt presented to ED  for immunization. Pt does not know of last tetanus vaccination. Patient denies any photophobia, or difficulty in saliva swallowing. She took 2 doses of Augmentin prior to arrival. Off note, Pt is currently taking Rx Rituxan and Imuran, which have immunosuppressant properties.         Review of Systems:  Review of Systems: REVIEW OF SYSTEMS:  CONSTITUTIONAL: No weight loss,   EYES: no acute visual disturbances  NECK: No pain or stiffness  RESPIRATORY: No cough; No shortness of breath  CARDIOVASCULAR: No chest pain, no palpitations  GASTROINTESTINAL: No pain. No nausea or vomiting; No diarrhea   NEUROLOGICAL: No headache or numbness, no tremors  MUSCULOSKELETAL: No joint pain, no muscle pain  GENITOURINARY: no dysuria, no frequency, no hesitancy  PSYCHIATRY: no depression , no anxiety ALL OTHER  ROS negative	      pt seen in icu [  ], reg med floor [ x  ], bed [x  ], chair at bedside [   ], a+o x3 [x  ], lethargic [  ],  nad [ x ]      Allergies    Cipro (Vomiting)        Vitals    T(F): 97.3 (09-13-19 @ 05:27), Max: 99 (09-12-19 @ 19:23)  HR: 90 (09-13-19 @ 05:27) (89 - 116)  BP: 123/82 (09-13-19 @ 05:27) (121/71 - 138/96)  RR: 18 (09-13-19 @ 05:27) (16 - 18)  SpO2: 100% (09-13-19 @ 05:27) (98% - 100%)  Wt(kg): --  CAPILLARY BLOOD GLUCOSE          Labs                          11.3   8.09  )-----------( 329      ( 13 Sep 2019 06:40 )             35.6       09-13    140  |  107  |  13  ----------------------------<  96  3.5   |  26  |  0.62    Ca    8.6      13 Sep 2019 06:40  Phos  3.7     09-13  Mg     2.1     09-13    TPro  6.4  /  Alb  3.1<L>  /  TBili  0.4  /  DBili  x   /  AST  27  /  ALT  44  /  AlkPhos  123<H>  09-13                Radiology Results      Meds    MEDICATIONS  (STANDING):  ampicillin/sulbactam  IVPB 3 Gram(s) IV Intermittent every 6 hours  ampicillin/sulbactam  IVPB      azaTHIOprine 50 milliGRAM(s) Oral daily  buDESOnide  80 MICROgram(s)/formoterol 4.5 MICROgram(s) Inhaler 2 Puff(s) Inhalation two times a day  carvedilol 3.125 milliGRAM(s) Oral every 12 hours  DULoxetine 60 milliGRAM(s) Oral daily  enoxaparin Injectable 40 milliGRAM(s) SubCutaneous daily  gabapentin 800 milliGRAM(s) Oral three times a day  ketorolac   Injectable 15 milliGRAM(s) IV Push every 12 hours  levalbuterol Inhalation 0.63 milliGRAM(s) Inhalation every 8 hours  mirtazapine 15 milliGRAM(s) Oral at bedtime  pantoprazole    Tablet 40 milliGRAM(s) Oral before breakfast  sulfaSALAzine 500 milliGRAM(s) Oral daily      MEDICATIONS  (PRN):  acetaminophen   Tablet .. 650 milliGRAM(s) Oral every 6 hours PRN Temp greater or equal to 38C (100.4F), Mild Pain (1 - 3)  meclizine 12.5 milliGRAM(s) Oral three times a day PRN Dizziness  SUMAtriptan 100 milliGRAM(s) Oral daily PRN Migraine      Physical Exam    Neuro :  no focal deficits  Respiratory: CTA B/L  CV: RRR, S1S2, no murmurs,   Abdominal: Soft, NT, ND +BS,  Extremities: No le edema, + peripheral pulses, left medial proximal forearm swelling and mild erythema    ASSESSMENT    left forearm cellulitis 2nd to unknown animal bite  h/o SHELIA (obstructive sleep apnea),  Hallux valgus (acquired), left foot  Depression  Vertigo  Migraines  Brain aneurysm  Fibroids  Seasonal allergies  RA (rheumatoid arthritis)  Fibromyalgia  GERD (gastroesophageal reflux disease)  HTN (hypertension)  HLD (hyperlipidemia)  Sarcoidosis, lung  Asthma  History of bunionectomy  Chronic lower back pain  Brain aneurysm  S/P arthroscopy  H/O: hysterectomy      PLAN      cont unasyn  f/u blood cx  id cons  s/p Tdap vacc,   Rabies IG, Rabavert Vaccine in ED  f/u Post exposure Rabies vaccination : 2nd dose on  Sept 15, 3rd dose on Sept 19, 4th dose on Sept 26. ( Schedule : 0,3,7, 14).   pulm cons  pft outpt  cont current meds

## 2019-09-13 NOTE — DIETITIAN INITIAL EVALUATION ADULT. - OTHER INFO
Pt alerted, oriented, well-communicated, lives at home with daughter PTA; no reports of GI distress, nausea, vomiting, or diarrhea at present but reports intermittent nausea and vomiting at home with unknown cause. Pt reports good appetite, no chewing/swallowing difficulties. Unintentional wt loss 10 lbs 1 month ago, pt since regained wt. Food preferences obtained; no dairy; complained about abd bloating, gas, nausea, constipation, and pain, and was not adhering to any diet Rx. Pt interested in learning more about GERD trigger foods and fiber; education given, very receptive. Pt encouraged to follow a low sodium, heart healthy diet. Pt alerted, oriented, well-communicated, lives at home with daughter PTA; no reports of GI distress, vomiting, or diarrhea at present but reports intermittent nausea and vomiting at home. Pt reports good appetite, no chewing/swallowing difficulties. Unintentional wt loss 10 lbs 1 month ago, pt since regained wt. While in the hospital, pt states good intake, food preferences obtained; no dairy; complained about abd bloating, gas, nausea, constipation, and pain and was not adhering to any diet Rx PTA. Pt interested in learning more about GERD trigger foods and fiber; education given, very receptive. Pt encouraged to follow a low sodium, heart healthy DASH diet.

## 2019-09-13 NOTE — DISCHARGE NOTE PROVIDER - CARE PROVIDER_API CALL
harry cuevas  [phone] [place] [id number]  Phone: [phone]  Fax: (   )    -  Follow Up Time: 1-3 days

## 2019-09-13 NOTE — CONSULT NOTE ADULT - SUBJECTIVE AND OBJECTIVE BOX
HPI:  ID consult called to evaluate this patient for left forearm cellulitis due to animal bite 2 days prior DOA.    As per H&P:  53 y/o with PMHx of RA, Sarcoidosis, Fibromyalgia, Asthma, Depression, Fibroids, GERD, Hallux Valgus, HLD, HTN, Migraines, Vertigo and PSHx of bunionectomy, hysterectomy, arthroscopy presenting to ED s/p animal bite to L forearm 2 days ago. While she was walking her dog in dark, she was suddenly bitten by a 4 legged animal, most likely cat or raccoon on the medical side of her left forearm, with minor bleeding. She washed the wound with water. The next day, she noticed redness, warmth, pain and mild swelling around the bite site with fever of 102.2. SHe went to her PCP was prescribed Augmentin and was advised to go to clinic to obtain rabies vaccination. Due to clinic being closed, pt presented to ED  for immunization. Pt does not know of last tetanus vaccination. Patient denies any photophobia, or difficulty in saliva swallowing. She took 2 doses of Augmentin prior to arrival. Off note, Pt is currently taking Rx Rituxan and Imuran, which have immunosuppressant properties. (12 Sep 2019 22:16)    REVIEW OF SYSTEMS:  [  ] Not able to illicit  General:	  Chest:	  GI:	  :  Skin:	  Musculoskeletal:	  Neuro:    PAST MEDICAL & SURGICAL HISTORY:  SHELIA (obstructive sleep apnea): home test done by PCP in , SHELIA pt was using mouth guard but it broken, not using it now, to notify Anesthesia  Hallux valgus (acquired), left foot  Depression  Vertigo  Migraines  Brain aneurysm: dx , sx done  Fibroids  Seasonal allergies  RA (rheumatoid arthritis)  Fibromyalgia  GERD (gastroesophageal reflux disease)  HTN (hypertension)  HLD (hyperlipidemia)  Sarcoidosis, lung: dx  with bx  Asthma: last time used Ventolin in summer 2018, never intubated or hopsitalized, under [facility] , Dr Josh Parr care  History of bunionectomy: left foot  Chronic lower back pain: s/p lumbar discectomy -, s/p fusion with hardware-   Brain aneurysm: coil sx -   S/P arthroscopy: left knee and left foot=-   H/O: hysterectomy:     ALLERGIES: Cipro (Vomiting)    MEDS:  acetaminophen   Tablet .. 650 milliGRAM(s) Oral every 6 hours PRN  ampicillin/sulbactam  IVPB 3 Gram(s) IV Intermittent every 6 hours  ampicillin/sulbactam  IVPB      azaTHIOprine 50 milliGRAM(s) Oral daily  buDESOnide  80 MICROgram(s)/formoterol 4.5 MICROgram(s) Inhaler 2 Puff(s) Inhalation two times a day  carvedilol 3.125 milliGRAM(s) Oral every 12 hours  DULoxetine 60 milliGRAM(s) Oral daily  enoxaparin Injectable 40 milliGRAM(s) SubCutaneous daily  gabapentin 800 milliGRAM(s) Oral three times a day  ketorolac   Injectable 15 milliGRAM(s) IV Push every 12 hours  levalbuterol Inhalation 0.63 milliGRAM(s) Inhalation every 8 hours  meclizine 12.5 milliGRAM(s) Oral three times a day PRN  mirtazapine 15 milliGRAM(s) Oral at bedtime  pantoprazole    Tablet 40 milliGRAM(s) Oral before breakfast  sulfaSALAzine 500 milliGRAM(s) Oral daily  SUMAtriptan 100 milliGRAM(s) Oral daily PRN    SOCIAL HISTORY:  Smoker:      FAMILY HISTORY:  Family history of hypertension    VITALS:  Vital Signs Last 24 Hrs  T(C): 36.3 (13 Sep 2019 05:27), Max: 37.2 (12 Sep 2019 19:23)  T(F): 97.3 (13 Sep 2019 05:27), Max: 99 (12 Sep 2019 19:23)  HR: 90 (13 Sep 2019 05:27) (89 - 116)  BP: 123/82 (13 Sep 2019 05:27) (121/71 - 138/96)  BP(mean): --  RR: 18 (13 Sep 2019 05:27) (16 - 18)  SpO2: 100% (13 Sep 2019 05:27) (98% - 100%)      PHYSICAL EXAM:  Constitutional:  HEENT:  Neck:  Respiratory:  Cardiovascular:  Gastrointestinal:  Extremities:  Skin:  Ortho:  Neuro:      LABS/DIAGNOSTIC TESTS:                        11.3   8.09  )-----------( 329      ( 13 Sep 2019 06:40 )             35.6     WBC Count: 8.09 K/uL ( @ 06:40)  WBC Count: 10.00 K/uL ( @ 17:24)        140  |  107  |  13  ----------------------------<  96  3.5   |  26  |  0.62    Ca    8.6      13 Sep 2019 06:40  Phos  3.7       Mg     2.1         TPro  6.4  /  Alb  3.1<L>  /  TBili  0.4  /  DBili  x   /  AST  27  /  ALT  44  /  AlkPhos  123<H>      Urinalysis Basic - ( 13 Sep 2019 10:53 )    Color: Yellow / Appearance: Clear / S.020 / pH: x  Gluc: x / Ketone: Negative  / Bili: Negative / Urobili: Negative   Blood: x / Protein: 15 / Nitrite: Negative   Leuk Esterase: Trace / RBC: x / WBC x   Sq Epi: x / Non Sq Epi: x / Bacteria: x      LIVER FUNCTIONS - ( 13 Sep 2019 06:40 )  Alb: 3.1 g/dL / Pro: 6.4 g/dL / ALK PHOS: 123 U/L / ALT: 44 U/L DA / AST: 27 U/L / GGT: x               ABG -     CULTURES:       RADIOLOGY: HPI:  ID consult called to evaluate this patient for left forearm cellulitis due to animal bite. States she was bitten on left forearm 2 days prior to DOA by an animal (likely a cat) when she went to yank it away from attacking her dog. States she had fever of 102.2F at home, but no fevers while inhouse. Admits that arm redness and pain has improved today. Has already received rabies and tetanus vaccines in ED. [facility] are testing.    As per H&P:  53 y/o with PMHx of RA, Sarcoidosis, Fibromyalgia, Asthma, Depression, Fibroids, GERD, Hallux Valgus, HLD, HTN, Migraines, Vertigo and PSHx of bunionectomy, hysterectomy, arthroscopy presenting to ED s/p animal bite to L forearm 2 days ago. While she was walking her dog in dark, she was suddenly bitten by a 4 legged animal, most likely cat or raccoon on the medical side of her left forearm, with minor bleeding. She washed the wound with water. The next day, she noticed redness, warmth, pain and mild swelling around the bite site with fever of 102.2. SHe went to her PCP was prescribed Augmentin and was advised to go to clinic to obtain rabies vaccination. Due to clinic being closed, pt presented to ED  for immunization. Pt does not know of last tetanus vaccination. Patient denies any photophobia, or difficulty in saliva swallowing. She took 2 doses of Augmentin prior to arrival. Off note, Pt is currently taking Rx Rituxan and Imuran, which have immunosuppressant properties. (12 Sep 2019 22:16)    REVIEW OF SYSTEMS:  [  ] Not able to illicit  General: no fevers no malaise  Chest: no cough no sob  GI: no nvd  : no urinary sxs   Skin: no rashes  Musculoskeletal: no trauma no LBP +arm pain  Neuro: no ha's no dizziness     PAST MEDICAL & SURGICAL HISTORY:  SHELIA (obstructive sleep apnea): home test done by PCP in 2016, SHELIA pt was using mouth guard but it broken, not using it now, to notify Anesthesia  Hallux valgus (acquired), left foot  Depression  Vertigo  Migraines  Brain aneurysm: dx , sx done  Fibroids  Seasonal allergies  RA (rheumatoid arthritis)  Fibromyalgia  GERD (gastroesophageal reflux disease)  HTN (hypertension)  HLD (hyperlipidemia)  Sarcoidosis, lung: dx 2016 with bx  Asthma: last time used Ventolin in summer 2018, never intubated or hopsitalized, under [facility] , Dr Josh Parr care  History of bunionectomy: left foot  Chronic lower back pain: s/p lumbar discectomy -, s/p fusion with hardware-   Brain aneurysm: coil sx -   S/P arthroscopy: left knee and left foot=-   H/O: hysterectomy:     ALLERGIES: Cipro (Vomiting)    MEDS:  acetaminophen   Tablet .. 650 milliGRAM(s) Oral every 6 hours PRN  ampicillin/sulbactam  IVPB 3 Gram(s) IV Intermittent every 6 hours  ampicillin/sulbactam  IVPB      azaTHIOprine 50 milliGRAM(s) Oral daily  buDESOnide  80 MICROgram(s)/formoterol 4.5 MICROgram(s) Inhaler 2 Puff(s) Inhalation two times a day  carvedilol 3.125 milliGRAM(s) Oral every 12 hours  DULoxetine 60 milliGRAM(s) Oral daily  enoxaparin Injectable 40 milliGRAM(s) SubCutaneous daily  gabapentin 800 milliGRAM(s) Oral three times a day  ketorolac   Injectable 15 milliGRAM(s) IV Push every 12 hours  levalbuterol Inhalation 0.63 milliGRAM(s) Inhalation every 8 hours  meclizine 12.5 milliGRAM(s) Oral three times a day PRN  mirtazapine 15 milliGRAM(s) Oral at bedtime  pantoprazole    Tablet 40 milliGRAM(s) Oral before breakfast  sulfaSALAzine 500 milliGRAM(s) Oral daily  SUMAtriptan 100 milliGRAM(s) Oral daily PRN    SOCIAL HISTORY:  Smoker:  none    FAMILY HISTORY:  Family history of hypertension    VITALS:  Vital Signs Last 24 Hrs  T(C): 36.3 (13 Sep 2019 05:27), Max: 37.2 (12 Sep 2019 19:23)  T(F): 97.3 (13 Sep 2019 05:27), Max: 99 (12 Sep 2019 19:23)  HR: 90 (13 Sep 2019 05:27) (89 - 116)  BP: 123/82 (13 Sep 2019 05:27) (121/71 - 138/96)  BP(mean): --  RR: 18 (13 Sep 2019 05:27) (16 - 18)  SpO2: 100% (13 Sep 2019 05:27) (98% - 100%)      PHYSICAL EXAM:  Constitutional: middle-aged, well-developed male in NAD  HEENT: normocephalic with moist oral mucosa  Neck: supple no LN's no JVD  Respiratory: lungs clear no rales no rhonchi  Cardiovascular: S1 S2 reg no murmurs  Gastrointestinal: +BS with soft, nondistended abdomen; nontender  Extremities: no edema no cyanosis  Skin: left forearm redness has greatly improved, lingering mild tenderness by bite site; induration by bite region with no fluctuance or oozing  Ortho: no jt swelling  Neuro: AAO x 3      LABS/DIAGNOSTIC TESTS:                        11.3   8.09  )-----------( 329      ( 13 Sep 2019 06:40 )             35.6     WBC Count: 8.09 K/uL ( @ 06:40)  WBC Count: 10.00 K/uL ( @ 17:24)        140  |  107  |  13  ----------------------------<  96  3.5   |  26  |  0.62    Ca    8.6      13 Sep 2019 06:40  Phos  3.7       Mg     2.1         TPro  6.4  /  Alb  3.1<L>  /  TBili  0.4  /  DBili  x   /  AST  27  /  ALT  44  /  AlkPhos  123<H>      Urinalysis Basic - ( 13 Sep 2019 10:53 )    Color: Yellow / Appearance: Clear / S.020 / pH: x  Gluc: x / Ketone: Negative  / Bili: Negative / Urobili: Negative   Blood: x / Protein: 15 / Nitrite: Negative   Leuk Esterase: Trace / RBC: x / WBC x   Sq Epi: x / Non Sq Epi: x / Bacteria: x      LIVER FUNCTIONS - ( 13 Sep 2019 06:40 )  Alb: 3.1 g/dL / Pro: 6.4 g/dL / ALK PHOS: 123 U/L / ALT: 44 U/L DA / AST: 27 U/L / GGT: x           Sedimentation Rate, Erythrocyte: 33 mm/Hr (19 @ 17:24)        CULTURES:    BC - pending       RADIOLOGY:  No new studies HPI:  ID consult called to evaluate this patient for left forearm cellulitis due to animal bite. States she was bitten on left forearm 2 days prior to DOA by an animal (likely a cat) when she went to yank it away from attacking her dog. States she had fever of 102.2F at home, but no fevers while inhouse. Admits that arm redness and pain has improved today. Has already received rabies (1st dose, needs 2 more doses) and tetanus vaccines in ED. [facility] are testing.    As per H&P:  53 y/o with PMHx of RA, Sarcoidosis, Fibromyalgia, Asthma, Depression, Fibroids, GERD, Hallux Valgus, HLD, HTN, Migraines, Vertigo and PSHx of bunionectomy, hysterectomy, arthroscopy presenting to ED s/p animal bite to L forearm 2 days ago. While she was walking her dog in dark, she was suddenly bitten by a 4 legged animal, most likely cat or raccoon on the medical side of her left forearm, with minor bleeding. She washed the wound with water. The next day, she noticed redness, warmth, pain and mild swelling around the bite site with fever of 102.2. SHe went to her PCP was prescribed Augmentin and was advised to go to clinic to obtain rabies vaccination. Due to clinic being closed, pt presented to ED  for immunization. Pt does not know of last tetanus vaccination. Patient denies any photophobia, or difficulty in saliva swallowing. She took 2 doses of Augmentin prior to arrival. Off note, Pt is currently taking Rx Rituxan and Imuran, which have immunosuppressant properties. (12 Sep 2019 22:16)    REVIEW OF SYSTEMS:  [  ] Not able to illicit  General: no fevers no malaise  Chest: no cough no sob  GI: no nvd  : no urinary sxs   Skin: no rashes  Musculoskeletal: no trauma no LBP +arm pain  Neuro: no ha's no dizziness     PAST MEDICAL & SURGICAL HISTORY:  SHELIA (obstructive sleep apnea): home test done by PCP in 2016, SHELIA pt was using mouth guard but it broken, not using it now, to notify Anesthesia  Hallux valgus (acquired), left foot  Depression  Vertigo  Migraines  Brain aneurysm: dx , sx done  Fibroids  Seasonal allergies  RA (rheumatoid arthritis)  Fibromyalgia  GERD (gastroesophageal reflux disease)  HTN (hypertension)  HLD (hyperlipidemia)  Sarcoidosis, lung: dx 2016 with bx  Asthma: last time used Ventolin in summer , never intubated or hopsitalized, under [facility] , Dr Josh Parr care  History of bunionectomy: left foot  Chronic lower back pain: s/p lumbar discectomy -, s/p fusion with hardware-   Brain aneurysm: coil sx -   S/P arthroscopy: left knee and left foot=-   H/O: hysterectomy:     ALLERGIES: Cipro (Vomiting)    MEDS:  acetaminophen   Tablet .. 650 milliGRAM(s) Oral every 6 hours PRN  ampicillin/sulbactam  IVPB 3 Gram(s) IV Intermittent every 6 hours  ampicillin/sulbactam  IVPB      azaTHIOprine 50 milliGRAM(s) Oral daily  buDESOnide  80 MICROgram(s)/formoterol 4.5 MICROgram(s) Inhaler 2 Puff(s) Inhalation two times a day  carvedilol 3.125 milliGRAM(s) Oral every 12 hours  DULoxetine 60 milliGRAM(s) Oral daily  enoxaparin Injectable 40 milliGRAM(s) SubCutaneous daily  gabapentin 800 milliGRAM(s) Oral three times a day  ketorolac   Injectable 15 milliGRAM(s) IV Push every 12 hours  levalbuterol Inhalation 0.63 milliGRAM(s) Inhalation every 8 hours  meclizine 12.5 milliGRAM(s) Oral three times a day PRN  mirtazapine 15 milliGRAM(s) Oral at bedtime  pantoprazole    Tablet 40 milliGRAM(s) Oral before breakfast  sulfaSALAzine 500 milliGRAM(s) Oral daily  SUMAtriptan 100 milliGRAM(s) Oral daily PRN    SOCIAL HISTORY:  Smoker:  none    FAMILY HISTORY:  Family history of hypertension    VITALS:  Vital Signs Last 24 Hrs  T(C): 36.3 (13 Sep 2019 05:27), Max: 37.2 (12 Sep 2019 19:23)  T(F): 97.3 (13 Sep 2019 05:27), Max: 99 (12 Sep 2019 19:23)  HR: 90 (13 Sep 2019 05:27) (89 - 116)  BP: 123/82 (13 Sep 2019 05:27) (121/71 - 138/96)  BP(mean): --  RR: 18 (13 Sep 2019 05:27) (16 - 18)  SpO2: 100% (13 Sep 2019 05:27) (98% - 100%)      PHYSICAL EXAM:  Constitutional: middle-aged, well-developed male in NAD  HEENT: normocephalic with moist oral mucosa  Neck: supple no LN's no JVD  Respiratory: lungs clear no rales no rhonchi  Cardiovascular: S1 S2 reg no murmurs  Gastrointestinal: +BS with soft, nondistended abdomen; nontender  Extremities: no edema no cyanosis  Skin: left forearm redness has greatly improved, lingering mild tenderness by bite site; induration by bite region with no fluctuance or oozing, bite marks appear of those like a cats canine's  Ortho: no jt swelling  Neuro: AAO x 3      LABS/DIAGNOSTIC TESTS:                        11.3   8.09  )-----------( 329      ( 13 Sep 2019 06:40 )             35.6     WBC Count: 8.09 K/uL ( @ 06:40)  WBC Count: 10.00 K/uL ( @ 17:24)        140  |  107  |  13  ----------------------------<  96  3.5   |  26  |  0.62    Ca    8.6      13 Sep 2019 06:40  Phos  3.7       Mg     2.1         TPro  6.4  /  Alb  3.1<L>  /  TBili  0.4  /  DBili  x   /  AST  27  /  ALT  44  /  AlkPhos  123<H>      Urinalysis Basic - ( 13 Sep 2019 10:53 )    Color: Yellow / Appearance: Clear / S.020 / pH: x  Gluc: x / Ketone: Negative  / Bili: Negative / Urobili: Negative   Blood: x / Protein: 15 / Nitrite: Negative   Leuk Esterase: Trace / RBC: x / WBC x   Sq Epi: x / Non Sq Epi: x / Bacteria: x      LIVER FUNCTIONS - ( 13 Sep 2019 06:40 )  Alb: 3.1 g/dL / Pro: 6.4 g/dL / ALK PHOS: 123 U/L / ALT: 44 U/L DA / AST: 27 U/L / GGT: x           Sedimentation Rate, Erythrocyte: 33 mm/Hr (19 @ 17:24)        CULTURES:    BC - pending       RADIOLOGY:  No new studies

## 2019-09-13 NOTE — CONSULT NOTE ADULT - SUBJECTIVE AND OBJECTIVE BOX
PULMONARY CONSULT NOTE      VIOLETA BOLAÑOS  MRN-[id number]    Patient is a 54y old  Female who presents with a chief complaint of animal bite (13 Sep 2019 08:48)       History of Present Illness:  Reason for Admission: animal bite	  History of Present Illness: 	  53 y/o with PMHx of RA, Sarcoidosis, Fibromyalgia, Asthma, Depression, Fibroids, GERD, Hallux Valgus, HLD, HTN, Migraines, Vertigo and PSHx of bunionectomy, hysterectomy, arthroscopy presenting to ED s/p animal bite to L forearm 2 days ago. While she was walking her dog in dark, she was suddenly bitten by a 4 legged animal, most likely cat or raccoon on the medical side of her left forearm, with minor bleeding. She washed the wound with water. The next day, she noticed redness, warmth, pain and mild swelling around the bite site with fever of 102.2. SHe went to her PCP was prescribed Augmentin and was advised to go to clinic to obtain rabies vaccination. Due to clinic being closed, pt presented to ED  for immunization. Pt does not know of last tetanus vaccination. Patient denies any photophobia, or difficulty in saliva swallowing. She took 2 doses of Augmentin prior to arrival. Off note, Pt is currently taking Rx Rituxan and Imuran, which have immunosuppressant properties.       HISTORY OF PRESENT ILLNESS: As above , awake , alert , lying in bed in NAD.     MEDICATIONS  (STANDING):  ampicillin/sulbactam  IVPB 3 Gram(s) IV Intermittent every 6 hours  ampicillin/sulbactam  IVPB      azaTHIOprine 50 milliGRAM(s) Oral daily  buDESOnide  80 MICROgram(s)/formoterol 4.5 MICROgram(s) Inhaler 2 Puff(s) Inhalation two times a day  carvedilol 3.125 milliGRAM(s) Oral every 12 hours  DULoxetine 60 milliGRAM(s) Oral daily  enoxaparin Injectable 40 milliGRAM(s) SubCutaneous daily  gabapentin 800 milliGRAM(s) Oral three times a day  ketorolac   Injectable 15 milliGRAM(s) IV Push every 12 hours  levalbuterol Inhalation 0.63 milliGRAM(s) Inhalation every 8 hours  mirtazapine 15 milliGRAM(s) Oral at bedtime  pantoprazole    Tablet 40 milliGRAM(s) Oral before breakfast  sulfaSALAzine 500 milliGRAM(s) Oral daily      MEDICATIONS  (PRN):  acetaminophen   Tablet .. 650 milliGRAM(s) Oral every 6 hours PRN Temp greater or equal to 38C (100.4F), Mild Pain (1 - 3)  meclizine 12.5 milliGRAM(s) Oral three times a day PRN Dizziness  SUMAtriptan 100 milliGRAM(s) Oral daily PRN Migraine      Allergies    Cipro (Vomiting)    Intolerances        PAST MEDICAL & SURGICAL HISTORY:  SHELIA (obstructive sleep apnea): home test done by PCP in 2016, SHELIA pt was using mouth guard but it broken, not using it now, to notify Anesthesia  Hallux valgus (acquired), left foot  Depression  Vertigo  Migraines  Brain aneurysm: dx 2011, sx done  Fibroids  Seasonal allergies  RA (rheumatoid arthritis)  Fibromyalgia  GERD (gastroesophageal reflux disease)  HTN (hypertension)  HLD (hyperlipidemia)  Sarcoidosis, 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 ENT:  No earache, sore throat or runny nose.    CARDIOVASCULAR:  No pressure, squeezing, tightness, or heaviness about the chest; no palpitations.    RESPIRATORY:  Per HPI    GASTROINTESTINAL:  No abdominal pain, nausea, vomiting or diarrhea.    GENITOURINARY:  No dysuria, frequency or urgency.    NEUROLOGIC:  No paresthesias, fasciculations, seizures or weakness.    PSYCHIATRIC:  No disorder of thought or mood.    Vital Signs Last 24 Hrs  T(C): 36.3 (13 Sep 2019 05:27), Max: 37.2 (12 Sep 2019 19:23)  T(F): 97.3 (13 Sep 2019 05:27), Max: 99 (12 Sep 2019 19:23)  HR: 90 (13 Sep 2019 05:27) (89 - 116)  BP: 123/82 (13 Sep 2019 05:27) (121/71 - 138/96)  BP(mean): --  RR: 18 (13 Sep 2019 05:27) (16 - 18)  SpO2: 100% (13 Sep 2019 05:27) (98% - 100%)  I&O's Detail      PHYSICAL EXAMINATION:    GENERAL: The patient is a well-developed, well-nourished _____in no apparent distress.     HEENT: Head is normocephalic and atraumatic. Extraocular muscles are intact. Mucous membranes are moist.     NECK: Supple.     LUNGS: Clear to auscultation without wheezing, rales, or rhonchi. Respirations unlabored    HEART: Regular rate and rhythm without murmur.    ABDOMEN: Soft, nontender, and nondistended.  No hepatosplenomegaly is noted.    EXTREMITIES: Without any cyanosis, clubbing, rash, lesions or edema.    NEUROLOGIC: Grossly intact.      LABS:                        11.3   8.09  )-----------( 329      ( 13 Sep 2019 06:40 )             35.6     09-13    140  |  107  |  13  ----------------------------<  96  3.5   |  26  |  0.62    Ca    8.6      13 Sep 2019 06:40  Phos  3.7     09-13  Mg     2.1     09-13    TPro  6.4  /  Alb  3.1<L>  /  TBili  0.4  /  DBili  x   /  AST  27  /  ALT  44  /  AlkPhos  123<H>  09-13                        MICROBIOLOGY:    RADIOLOGY & ADDITIONAL STUDIES:    CXR:    Ct scan chest:    ekg;    echo: PULMONARY CONSULT NOTE      VIOLETA BOLAÑOS  MRN-[id number]    Patient is a 54y old  Female who presents with a chief complaint of animal bite (13 Sep 2019 08:48)       History of Present Illness:  Reason for Admission: animal bite	  History of Present Illness: 	  53 y/o with PMHx of RA, Sarcoidosis, Fibromyalgia, Asthma, Depression, Fibroids, GERD, Hallux Valgus, HLD, HTN, Migraines, Vertigo and PSHx of bunionectomy, hysterectomy, arthroscopy presenting to ED s/p animal bite to L forearm 2 days ago. While she was walking her dog in dark, she was suddenly bitten by a 4 legged animal, most likely cat or raccoon on the medical side of her left forearm, with minor bleeding. She washed the wound with water. The next day, she noticed redness, warmth, pain and mild swelling around the bite site with fever of 102.2. SHe went to her PCP was prescribed Augmentin and was advised to go to clinic to obtain rabies vaccination. Due to clinic being closed, pt presented to ED  for immunization. Pt does not know of last tetanus vaccination. Patient denies any photophobia, or difficulty in saliva swallowing. She took 2 doses of Augmentin prior to arrival. Off note, Pt is currently taking Rx Rituxan and Imuran, which have immunosuppressant properties.       HISTORY OF PRESENT ILLNESS: As above , awake , alert , lying in bed in NAD. Left forearm still with swelling and discomfort. Had PFTs and PSGs within last 3-4 yrs. Noncompliant with CPAP.     MEDICATIONS  (STANDING):  ampicillin/sulbactam  IVPB 3 Gram(s) IV Intermittent every 6 hours  ampicillin/sulbactam  IVPB      azaTHIOprine 50 milliGRAM(s) Oral daily  buDESOnide  80 MICROgram(s)/formoterol 4.5 MICROgram(s) Inhaler 2 Puff(s) Inhalation two times a day  carvedilol 3.125 milliGRAM(s) Oral every 12 hours  DULoxetine 60 milliGRAM(s) Oral daily  enoxaparin Injectable 40 milliGRAM(s) SubCutaneous daily  gabapentin 800 milliGRAM(s) Oral three times a day  ketorolac   Injectable 15 milliGRAM(s) IV Push every 12 hours  levalbuterol Inhalation 0.63 milliGRAM(s) Inhalation every 8 hours  mirtazapine 15 milliGRAM(s) Oral at bedtime  pantoprazole    Tablet 40 milliGRAM(s) Oral before breakfast  sulfaSALAzine 500 milliGRAM(s) Oral daily      MEDICATIONS  (PRN):  acetaminophen   Tablet .. 650 milliGRAM(s) Oral every 6 hours PRN Temp greater or equal to 38C (100.4F), Mild Pain (1 - 3)  meclizine 12.5 milliGRAM(s) Oral three times a day PRN Dizziness  SUMAtriptan 100 milliGRAM(s) Oral daily PRN Migraine      Allergies    Cipro (Vomiting)    Intolerances        PAST MEDICAL & SURGICAL HISTORY:  SHELIA (obstructive sleep apnea): home test done by PCP in 2016, SHELIA pt was using mouth guard but it broken, not using it now, to notify Anesthesia  Hallux valgus (acquired), left foot  Depression  Vertigo  Migraines  Brain aneurysm: dx 2011, sx done  Fibroids  Seasonal allergies  RA (rheumatoid arthritis)  Fibromyalgia  GERD (gastroesophageal reflux disease)  HTN (hypertension)  HLD (hyperlipidemia)  Sarcoidosis, 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 ENT:  No earache, sore throat or runny nose.    CARDIOVASCULAR:  No pressure, squeezing, tightness, or heaviness about the chest; no palpitations.    RESPIRATORY:  Per HPI    GASTROINTESTINAL:  No abdominal pain, nausea, vomiting or diarrhea.    GENITOURINARY:  No dysuria, frequency or urgency.    NEUROLOGIC:  No paresthesias, fasciculations, seizures or weakness.    PSYCHIATRIC:  No disorder of thought or mood.    Vital Signs Last 24 Hrs  T(C): 36.3 (13 Sep 2019 05:27), Max: 37.2 (12 Sep 2019 19:23)  T(F): 97.3 (13 Sep 2019 05:27), Max: 99 (12 Sep 2019 19:23)  HR: 90 (13 Sep 2019 05:27) (89 - 116)  BP: 123/82 (13 Sep 2019 05:27) (121/71 - 138/96)  BP(mean): --  RR: 18 (13 Sep 2019 05:27) (16 - 18)  SpO2: 100% (13 Sep 2019 05:27) (98% - 100%)  I&O's Detail      PHYSICAL EXAMINATION:    GENERAL: The patient is a well-developed, well-nourished no apparent distress.     HEENT: Head is normocephalic and atraumatic. Extraocular muscles are intact. Mucous membranes are moist.     NECK: Supple.     LUNGS: Clear to auscultation without wheezing, rales, or rhonchi. Respirations unlabored    HEART: Regular rate and rhythm without murmur.    ABDOMEN: Soft, nontender, and nondistended.  No hepatosplenomegaly is noted.    EXTREMITIES: Without any cyanosis, clubbing, rash, lesions or edema.    NEUROLOGIC: Grossly intact.      LABS:                        11.3   8.09  )-----------( 329      ( 13 Sep 2019 06:40 )             35.6     09-13    140  |  107  |  13  ----------------------------<  96  3.5   |  26  |  0.62    Ca    8.6      13 Sep 2019 06:40  Phos  3.7     09-13  Mg     2.1     09-13    TPro  6.4  /  Alb  3.1<L>  /  TBili  0.4  /  DBili  x   /  AST  27  /  ALT  44  /  AlkPhos  123<H>  09-13      MICROBIOLOGY:    RADIOLOGY & ADDITIONAL STUDIES:    CXR:    Ct scan chest:    ekg;    echo:

## 2019-09-13 NOTE — DISCHARGE NOTE PROVIDER - HOSPITAL COURSE
55 y/o with PMHx of RA, Sarcoidosis, Fibromyalgia, Asthma, Depression, Fibroids, GERD, Hallux Valgus, HLD, HTN, Migraines, Vertigo and PSHx of bunionectomy, hysterectomy, arthroscopy presenting to ED s/p animal bite to L forearm 2 days ago. While she was walking her dog in dark, she was suddenly bitten by a 4 legged animal, most likely cat or raccoon on the medical side of her left forearm, with minor bleeding. She washed the wound with water. The next day, she noticed redness, warmth, pain and mild swelling around the bite site with fever of 102.2. SHe went to her PCP was prescribed Augmentin and was advised to go to clinic to obtain rabies vaccination. Due to clinic being closed, pt presented to ED  for immunization. Pt does not know of last tetanus vaccination. Patient denies any photophobia, or difficulty in saliva swallowing. She took 2 doses of Augmentin prior to arrival. Off note, Pt is currently taking Rx Rituxan and Imuran, which have immunosuppressant properties.         Admitted to medicine for cellulitis of TI with fever, Afebrile since admission, tolerating IV antibiotic well, cellulits improved,  VSs . upon dc, can switch to augmentin 875mg PO BID x 9 days per ID .     Pt is medically stable to discharge. Pt was advised to follow up with PMD to get rabies vaccination. 53 y/o with PMHx of RA, Sarcoidosis, Fibromyalgia, Asthma, Depression, Fibroids, GERD, Hallux Valgus, HLD, HTN, Migraines, Vertigo and PSHx of bunionectomy, hysterectomy, arthroscopy presenting to ED s/p animal bite to L forearm 2 days ago. While she was walking her dog in dark, she was suddenly bitten by a 4 legged animal, most likely cat or raccoon on the medical side of her left forearm, with minor bleeding. She washed the wound with water. The next day, she noticed redness, warmth, pain and mild swelling around the bite site with fever of 102.2. SHe went to her PCP was prescribed Augmentin and was advised to go to clinic to obtain rabies vaccination. Due to clinic being closed, pt presented to ED  for immunization. Pt does not know of last tetanus vaccination. Patient denies any photophobia, or difficulty in saliva swallowing. She took 2 doses of Augmentin prior to arrival. Off note, Pt is currently taking Rx Rituxan and Imuran, which have immunosuppressant properties.         Admitted to medicine for cellulitis of TI with fever, Afebrile since admission, tolerating IV antibiotic well, cellulits improved,  VSs . Medically stable to discharge. Pt was advised to follow up with PMD to get post exposure rabies vaccination day 7 on 9/19/19 and day 14 on 9/26/19

## 2019-09-13 NOTE — DIETITIAN INITIAL EVALUATION ADULT. - ADD RECOMMEND
Pt given verbal and written education on DASH diet, encouraged fiber intake and proper hydration, as well as foods to avoid for GERD symptoms.

## 2019-09-13 NOTE — PROGRESS NOTE ADULT - PROBLEM SELECTOR PLAN 1
failed on Augmentin 2 doses in OP setting  c/w unasyn for now will f/u on ID recommendations --Dr Hayes consulted.   - Blood culture testing

## 2019-09-13 NOTE — DIETITIAN INITIAL EVALUATION ADULT. - PERTINENT LABORATORY DATA
Cholesterol, serum = 220  Triglycerides, serum = 162  HDL Cholesterol, serum = 44  Potassium, serum = 3.5

## 2019-09-13 NOTE — PATIENT PROFILE ADULT - HAS THE PATIENT EXPERIENCED ANY OF THE FOLLOWING WITHIN THE WEEK PRIOR TO ADMISSION?
I have reviewed and confirmed nurses' notes for patient's medications, allergies, medical history, and surgical history. no

## 2019-09-13 NOTE — CONSULT NOTE ADULT - ASSESSMENT
1.	Left forearm cellulitis 2/2 animal bite - improving  2.	Fever at home - none in hospital  ·	dc planning tomorrow  ·	cont unasyn 3gm IV q6h  D1  ·	upon dc, can switch to augmentin 875mg PO BID x 9 days  ·	already received rabies and tetanus vaccines  ·	reconsult prn 1.	Left forearm cellulitis 2/2 animal bite - improving  2.	Fever at home - none in hospital  ·	dc planning tomorrow  ·	cont unasyn 3gm IV q6h  D1  ·	upon dc, can switch to augmentin 875mg PO BID x 9 days( if blood culture negative)  ·	already received rabies and tetanus vaccines  ·	reconsult prn

## 2019-09-13 NOTE — CONSULT NOTE ADULT - PROBLEM SELECTOR RECOMMENDATION 2
Not in  exacerbation   Bronchodilators PRN  PFTs as OP Not in exacerbation   Bronchodilators PRN  PFTs as OP

## 2019-09-13 NOTE — DISCHARGE NOTE PROVIDER - NSDCCPCAREPLAN_GEN_ALL_CORE_FT
PRINCIPAL DISCHARGE DIAGNOSIS  Diagnosis: Cellulitis of left upper extremity  Assessment and Plan of Treatment: Take all of your antibiotics as ordered - augmentin 875mg twice daily x 9days   Call your Health Care Provider within two days of arriving home to make a follow up appointment within one week.  If the affected cellulitic area increases in redness, warmth, pain or swelling call your Health Care Provider.  If you develop fever, chills, and/or malaise, call your Health Care Provider.        SECONDARY DISCHARGE DIAGNOSES  Diagnosis: Animal bite  Assessment and Plan of Treatment: s/p Tdap vacc, Rabies IG, Rabavert Vaccine in ED  - Post exposure Rabies vaccination : 2nd dose on  Sept 15, 3rd dose on Sept 19, 4th dose on Sept 26. ( Schedule : 0,3,7, 14).   please follow up wiht your primary care provider PRINCIPAL DISCHARGE DIAGNOSIS  Diagnosis: Cellulitis of left upper extremity  Assessment and Plan of Treatment: Take all of your antibiotics as ordered, complete the course until 9/22/2019   Call your Health Care Provider within two days of arriving home to make a follow up appointment within one week.  If the affected cellulitic area increases in redness, warmth, pain or swelling call your Health Care Provider.  If you develop fever, chills, and/or malaise, call your Health Care Provider.        SECONDARY DISCHARGE DIAGNOSES  Diagnosis: Animal bite  Assessment and Plan of Treatment: s/p Tdap vacc, Rabies IG, Rabavert Vaccine in ED  - Post exposure Rabies vaccination :  3rd dose on Sept 19, 4th dose on Sept 26. ( Schedule : 0,3,7, 14).   please follow up wiht your primary care provider

## 2019-09-13 NOTE — DIETITIAN INITIAL EVALUATION ADULT. - PROBLEM SELECTOR PLAN 1
p/w cellulitis around animal bite site. s/p Augmentin 2 doses in OP setting, with Fever of 102  at home   - WBC: WNL   - s/p Unasyn in Ed   -c/w unasyn   - Blood culture testing  -Dr Hayes consulted

## 2019-09-13 NOTE — DIETITIAN INITIAL EVALUATION ADULT. - NS FNS WEIGHT USED FOR CALC
ideal/Ht = 4'11"   Current Wt = 145.9 lb   IBW = 98 lb + 10% = 107.8 ideal/Ht = 4'11" (confirmed with pt)  Current Wt = 145.9 lb   IBW = 98 lb + 10% = 107.8

## 2019-09-13 NOTE — PROGRESS NOTE ADULT - SUBJECTIVE AND OBJECTIVE BOX
NP Note discussed with  Primary Attending    Patient is a 54y old  Female who presents with a chief complaint of animal bite (13 Sep 2019 11:05)    53 y/o with PMHx of RA, Sarcoidosis, Fibromyalgia, Asthma, Depression, Fibroids, GERD, Hallux Valgus, HLD, HTN, Migraines, Vertigo and PSHx of bunionectomy, hysterectomy, arthroscopy presenting to ED s/p animal bite to L forearm 2 days ago. While she was walking her dog in dark, she was suddenly bitten by a 4 legged animal, most likely cat or raccoon on the medical side of her left forearm, with minor bleeding. She washed the wound with water. The next day, she noticed redness, warmth, pain and mild swelling around the bite site with fever of 102.2. SHe went to her PCP was prescribed Augmentin and was advised to go to clinic to obtain rabies vaccination. Due to clinic being closed, pt presented to ED  for immunization. Pt does not know of last tetanus vaccination. Patient denies any photophobia, or difficulty in saliva swallowing. She took 2 doses of Augmentin prior to arrival. Off note, Pt is currently taking Rx Rituxan and Imuran, which have immunosuppressant properties.     Admitted to medicine for cellulitis of TI. Tolerating IV antibiotic well, slight swelling,  afebrile, VSs.     INTERVAL HPI/OVERNIGHT EVENTS: no new complaints    MEDICATIONS  (STANDING):  ampicillin/sulbactam  IVPB 3 Gram(s) IV Intermittent every 6 hours  ampicillin/sulbactam  IVPB      azaTHIOprine 50 milliGRAM(s) Oral daily  buDESOnide  80 MICROgram(s)/formoterol 4.5 MICROgram(s) Inhaler 2 Puff(s) Inhalation two times a day  carvedilol 3.125 milliGRAM(s) Oral every 12 hours  DULoxetine 60 milliGRAM(s) Oral daily  enoxaparin Injectable 40 milliGRAM(s) SubCutaneous daily  gabapentin 800 milliGRAM(s) Oral three times a day  ketorolac   Injectable 15 milliGRAM(s) IV Push every 12 hours  levalbuterol Inhalation 0.63 milliGRAM(s) Inhalation every 8 hours  mirtazapine 15 milliGRAM(s) Oral at bedtime  pantoprazole    Tablet 40 milliGRAM(s) Oral before breakfast  sulfaSALAzine 500 milliGRAM(s) Oral daily    MEDICATIONS  (PRN):  acetaminophen   Tablet .. 650 milliGRAM(s) Oral every 6 hours PRN Temp greater or equal to 38C (100.4F), Mild Pain (1 - 3)  meclizine 12.5 milliGRAM(s) Oral three times a day PRN Dizziness  SUMAtriptan 100 milliGRAM(s) Oral daily PRN Migraine      __________________________________________________  REVIEW OF SYSTEMS:    CONSTITUTIONAL: No fever,   EYES: no acute visual disturbances  NECK: No pain or stiffness  RESPIRATORY: No cough; No shortness of breath  CARDIOVASCULAR: No chest pain, no palpitations  GASTROINTESTINAL: No pain. No nausea or vomiting; No diarrhea   NEUROLOGICAL: No headache or numbness, no tremors  MUSCULOSKELETAL: No joint pain, no muscle pain  GENITOURINARY: no dysuria, no frequency, no hesitancy  PSYCHIATRY: no depression , no anxiety  ALL OTHER  ROS negative        Vital Signs Last 24 Hrs  T(C): 36.6 (13 Sep 2019 14:13), Max: 37.2 (12 Sep 2019 19:23)  T(F): 97.9 (13 Sep 2019 14:13), Max: 99 (12 Sep 2019 19:23)  HR: 87 (13 Sep 2019 14:13) (87 - 106)  BP: 123/76 (13 Sep 2019 14:13) (121/71 - 128/86)  BP(mean): --  RR: 18 (13 Sep 2019 14:13) (18 - 18)  SpO2: 98% (13 Sep 2019 14:13) (98% - 100%)    ________________________________________________  PHYSICAL EXAM:  GENERAL: NAD  HEENT: Normocephalic;  conjunctivae and sclerae clear; moist mucous membranes;   NECK : supple  CHEST/LUNG: Clear to auscultation bilaterally with good air entry   HEART: S1 S2  regular; no murmurs, gallops or rubs  ABDOMEN: Soft, Nontender, Nondistended; Bowel sounds present  EXTREMITIES: no cyanosis; no edema; no calf tenderness  SKIN: warm and dry; no rash  NERVOUS SYSTEM:  Awake and alert; Oriented  to place, person and time ; no new deficits    _________________________________________________  LABS:                        11.3   8.09  )-----------( 329      ( 13 Sep 2019 06:40 )             35.6         140  |  107  |  13  ----------------------------<  96  3.5   |  26  |  0.62    Ca    8.6      13 Sep 2019 06:40  Phos  3.7       Mg     2.1         TPro  6.4  /  Alb  3.1<L>  /  TBili  0.4  /  DBili  x   /  AST  27  /  ALT  44  /  AlkPhos  123<H>        Urinalysis Basic - ( 13 Sep 2019 10:53 )    Color: Yellow / Appearance: Clear / S.020 / pH: x  Gluc: x / Ketone: Negative  / Bili: Negative / Urobili: Negative   Blood: x / Protein: 15 / Nitrite: Negative   Leuk Esterase: Trace / RBC: 0-2 /HPF / WBC 6-10 /HPF   Sq Epi: x / Non Sq Epi: Many /HPF / Bacteria: Many /HPF      CAPILLARY BLOOD GLUCOSE            RADIOLOGY & ADDITIONAL TESTS:    Imaging Personally Reviewed:  YES/NO    Consultant(s) Notes Reviewed:   YES/ No    Care Discussed with Consultants :     Plan of care was discussed with patient and /or primary care giver; all questions and concerns were addressed and care was aligned with patient's wishes.

## 2019-09-14 LAB
ANION GAP SERPL CALC-SCNC: 5 MMOL/L — SIGNIFICANT CHANGE UP (ref 5–17)
BUN SERPL-MCNC: 14 MG/DL — SIGNIFICANT CHANGE UP (ref 7–18)
CALCIUM SERPL-MCNC: 9.1 MG/DL — SIGNIFICANT CHANGE UP (ref 8.4–10.5)
CHLORIDE SERPL-SCNC: 108 MMOL/L — SIGNIFICANT CHANGE UP (ref 96–108)
CO2 SERPL-SCNC: 29 MMOL/L — SIGNIFICANT CHANGE UP (ref 22–31)
CREAT SERPL-MCNC: 0.63 MG/DL — SIGNIFICANT CHANGE UP (ref 0.5–1.3)
GLUCOSE SERPL-MCNC: 95 MG/DL — SIGNIFICANT CHANGE UP (ref 70–99)
HCT VFR BLD CALC: 36.9 % — SIGNIFICANT CHANGE UP (ref 34.5–45)
HGB BLD-MCNC: 11.8 G/DL — SIGNIFICANT CHANGE UP (ref 11.5–15.5)
MCHC RBC-ENTMCNC: 31 PG — SIGNIFICANT CHANGE UP (ref 27–34)
MCHC RBC-ENTMCNC: 32 GM/DL — SIGNIFICANT CHANGE UP (ref 32–36)
MCV RBC AUTO: 96.9 FL — SIGNIFICANT CHANGE UP (ref 80–100)
NRBC # BLD: 0 /100 WBCS — SIGNIFICANT CHANGE UP (ref 0–0)
PLATELET # BLD AUTO: 337 K/UL — SIGNIFICANT CHANGE UP (ref 150–400)
POTASSIUM SERPL-MCNC: 3.8 MMOL/L — SIGNIFICANT CHANGE UP (ref 3.5–5.3)
POTASSIUM SERPL-SCNC: 3.8 MMOL/L — SIGNIFICANT CHANGE UP (ref 3.5–5.3)
RBC # BLD: 3.81 M/UL — SIGNIFICANT CHANGE UP (ref 3.8–5.2)
RBC # FLD: 13.3 % — SIGNIFICANT CHANGE UP (ref 10.3–14.5)
SODIUM SERPL-SCNC: 142 MMOL/L — SIGNIFICANT CHANGE UP (ref 135–145)
WBC # BLD: 7.78 K/UL — SIGNIFICANT CHANGE UP (ref 3.8–10.5)
WBC # FLD AUTO: 7.78 K/UL — SIGNIFICANT CHANGE UP (ref 3.8–10.5)

## 2019-09-14 RX ORDER — INFLUENZA VIRUS VACCINE 15; 15; 15; 15 UG/.5ML; UG/.5ML; UG/.5ML; UG/.5ML
0.5 SUSPENSION INTRAMUSCULAR ONCE
Refills: 0 | Status: COMPLETED | OUTPATIENT
Start: 2019-09-14 | End: 2019-09-15

## 2019-09-14 RX ADMIN — AMPICILLIN SODIUM AND SULBACTAM SODIUM 200 GRAM(S): 250; 125 INJECTION, POWDER, FOR SUSPENSION INTRAMUSCULAR; INTRAVENOUS at 06:54

## 2019-09-14 RX ADMIN — GABAPENTIN 800 MILLIGRAM(S): 400 CAPSULE ORAL at 06:53

## 2019-09-14 RX ADMIN — Medication 15 MILLIGRAM(S): at 07:26

## 2019-09-14 RX ADMIN — Medication 15 MILLIGRAM(S): at 06:56

## 2019-09-14 RX ADMIN — AMPICILLIN SODIUM AND SULBACTAM SODIUM 200 GRAM(S): 250; 125 INJECTION, POWDER, FOR SUSPENSION INTRAMUSCULAR; INTRAVENOUS at 23:04

## 2019-09-14 RX ADMIN — Medication 12.5 MILLIGRAM(S): at 17:30

## 2019-09-14 RX ADMIN — CARVEDILOL PHOSPHATE 3.12 MILLIGRAM(S): 80 CAPSULE, EXTENDED RELEASE ORAL at 17:30

## 2019-09-14 RX ADMIN — Medication 15 MILLIGRAM(S): at 17:30

## 2019-09-14 RX ADMIN — Medication 12.5 MILLIGRAM(S): at 09:17

## 2019-09-14 RX ADMIN — Medication 15 MILLIGRAM(S): at 19:00

## 2019-09-14 RX ADMIN — LEVALBUTEROL 0.63 MILLIGRAM(S): 1.25 SOLUTION, CONCENTRATE RESPIRATORY (INHALATION) at 21:12

## 2019-09-14 RX ADMIN — AMPICILLIN SODIUM AND SULBACTAM SODIUM 200 GRAM(S): 250; 125 INJECTION, POWDER, FOR SUSPENSION INTRAMUSCULAR; INTRAVENOUS at 17:30

## 2019-09-14 RX ADMIN — LEVALBUTEROL 0.63 MILLIGRAM(S): 1.25 SOLUTION, CONCENTRATE RESPIRATORY (INHALATION) at 15:28

## 2019-09-14 RX ADMIN — AZATHIOPRINE 50 MILLIGRAM(S): 100 TABLET ORAL at 11:04

## 2019-09-14 RX ADMIN — DULOXETINE HYDROCHLORIDE 60 MILLIGRAM(S): 30 CAPSULE, DELAYED RELEASE ORAL at 11:04

## 2019-09-14 RX ADMIN — PANTOPRAZOLE SODIUM 40 MILLIGRAM(S): 20 TABLET, DELAYED RELEASE ORAL at 06:53

## 2019-09-14 RX ADMIN — ENOXAPARIN SODIUM 40 MILLIGRAM(S): 100 INJECTION SUBCUTANEOUS at 11:04

## 2019-09-14 RX ADMIN — CARVEDILOL PHOSPHATE 3.12 MILLIGRAM(S): 80 CAPSULE, EXTENDED RELEASE ORAL at 06:53

## 2019-09-14 RX ADMIN — GABAPENTIN 800 MILLIGRAM(S): 400 CAPSULE ORAL at 21:34

## 2019-09-14 RX ADMIN — AMPICILLIN SODIUM AND SULBACTAM SODIUM 200 GRAM(S): 250; 125 INJECTION, POWDER, FOR SUSPENSION INTRAMUSCULAR; INTRAVENOUS at 11:05

## 2019-09-14 RX ADMIN — MIRTAZAPINE 15 MILLIGRAM(S): 45 TABLET, ORALLY DISINTEGRATING ORAL at 21:34

## 2019-09-14 RX ADMIN — GABAPENTIN 800 MILLIGRAM(S): 400 CAPSULE ORAL at 13:06

## 2019-09-14 RX ADMIN — Medication 500 MILLIGRAM(S): at 11:04

## 2019-09-14 RX ADMIN — LEVALBUTEROL 0.63 MILLIGRAM(S): 1.25 SOLUTION, CONCENTRATE RESPIRATORY (INHALATION) at 05:44

## 2019-09-14 NOTE — PROGRESS NOTE ADULT - SUBJECTIVE AND OBJECTIVE BOX
Patient is a 54y old  Female who presents with a chief complaint of animal bite (13 Sep 2019 18:19)    pt seen in icu [  ], reg med floor [ x  ], bed [x  ], chair at bedside [   ], a+o x3 [x  ], lethargic [  ],  nad [ x ]        Allergies    Cipro (Vomiting)        Vitals    T(F): 97.4 (09-14-19 @ 04:42), Max: 98.7 (09-13-19 @ 20:32)  HR: 80 (09-14-19 @ 04:42) (80 - 101)  BP: 127/88 (09-14-19 @ 04:42) (123/76 - 128/80)  RR: 18 (09-14-19 @ 04:42) (17 - 18)  SpO2: 96% (09-14-19 @ 04:42) (96% - 98%)  Wt(kg): --  CAPILLARY BLOOD GLUCOSE          Labs                          11.8   7.78  )-----------( 337      ( 14 Sep 2019 06:24 )             36.9       09-14    142  |  108  |  14  ----------------------------<  95  3.8   |  29  |  0.63    Ca    9.1      14 Sep 2019 06:24  Phos  3.7     09-13  Mg     2.1     09-13    TPro  6.4  /  Alb  3.1<L>  /  TBili  0.4  /  DBili  x   /  AST  27  /  ALT  44  /  AlkPhos  123<H>  09-13            .Blood  09-13 @ 02:27   No growth to date.  --  --          Radiology Results      Meds    MEDICATIONS  (STANDING):  ampicillin/sulbactam  IVPB 3 Gram(s) IV Intermittent every 6 hours  ampicillin/sulbactam  IVPB      azaTHIOprine 50 milliGRAM(s) Oral daily  buDESOnide  80 MICROgram(s)/formoterol 4.5 MICROgram(s) Inhaler 2 Puff(s) Inhalation two times a day  carvedilol 3.125 milliGRAM(s) Oral every 12 hours  DULoxetine 60 milliGRAM(s) Oral daily  enoxaparin Injectable 40 milliGRAM(s) SubCutaneous daily  gabapentin 800 milliGRAM(s) Oral three times a day  ketorolac   Injectable 15 milliGRAM(s) IV Push every 12 hours  levalbuterol Inhalation 0.63 milliGRAM(s) Inhalation every 8 hours  mirtazapine 15 milliGRAM(s) Oral at bedtime  pantoprazole    Tablet 40 milliGRAM(s) Oral before breakfast  sulfaSALAzine 500 milliGRAM(s) Oral daily      MEDICATIONS  (PRN):  acetaminophen   Tablet .. 650 milliGRAM(s) Oral every 6 hours PRN Temp greater or equal to 38C (100.4F), Mild Pain (1 - 3)  meclizine 12.5 milliGRAM(s) Oral three times a day PRN Dizziness  SUMAtriptan 100 milliGRAM(s) Oral daily PRN Migraine        Physical Exam    Neuro :  no focal deficits  Respiratory: CTA B/L  CV: RRR, S1S2, no murmurs,   Abdominal: Soft, NT, ND +BS,  Extremities: No le edema, + peripheral pulses, left medial proximal forearm swelling and mild erythema      ASSESSMENT    left forearm cellulitis 2nd to unknown animal bite  h/o SHELIA (obstructive sleep apnea),  Hallux valgus (acquired), left foot  Depression  Vertigo  Migraines  Brain aneurysm  Fibroids  Seasonal allergies  RA (rheumatoid arthritis)  Fibromyalgia  GERD (gastroesophageal reflux disease)  HTN (hypertension)  HLD (hyperlipidemia)  Sarcoidosis, lung  Asthma  History of bunionectomy  Chronic lower back pain  Brain aneurysm  S/P arthroscopy  H/O: hysterectomy      PLAN      cont unasyn  blood cx no growth to date  id f/u   recs po augmentin 875/125 bid for 9 days on dc  s/p Tdap vacc,   Rabies IG, Rabavert Vaccine in ED  f/u Post exposure Rabies vaccination : 2nd dose on  Sept 15, 3rd dose on Sept 19, 4th dose on Sept 26. ( Schedule : 0,3,7, 14).   pulm cons  pft outpt  cont current meds  d/c plan if cleared by ID

## 2019-09-14 NOTE — PROGRESS NOTE ADULT - SUBJECTIVE AND OBJECTIVE BOX
Patient is a 54y old  Female who presents with a chief complaint of animal bite (14 Sep 2019 10:49)    Awake , alert , lying in bed in NAD. Left forearm still with swelling and discomfort.Noncompliant with CPAP.  Denies cough or sob at this moment.     INTERVAL HPI/OVERNIGHT EVENTS:      VITAL SIGNS:  T(F): 97.4 (19 @ 04:42)  HR: 80 (19 @ 04:42)  BP: 127/88 (19 @ 04:42)  RR: 18 (19 @ 04:42)  SpO2: 96% (19 @ 04:42)  Wt(kg): --  I&O's Detail          REVIEW OF SYSTEMS:    CONSTITUTIONAL:  No fevers, chills, sweats    HEENT:  Eyes:  No diplopia or blurred vision. ENT:  No earache, sore throat or runny nose.    CARDIOVASCULAR:  No pressure, squeezing, tightness, or heaviness about the chest; no palpitations.    RESPIRATORY:  Per HPI    GASTROINTESTINAL:  No abdominal pain, nausea, vomiting or diarrhea.    GENITOURINARY:  No dysuria, frequency or urgency.    NEUROLOGIC:  No paresthesias, fasciculations, seizures or weakness.    PSYCHIATRIC:  No disorder of thought or mood.      PHYSICAL EXAM:    Constitutional: Well developed and nourished  Eyes:Perrla  ENMT: normal  Neck:supple  Respiratory: good air entry  Cardiovascular: S1 S2 regular  Gastrointestinal: Soft, Non tender  Extremities: No edema  Vascular:normal  Neurological:Awake, alert,Ox3  Musculoskeletal:Normal      MEDICATIONS  (STANDING):  ampicillin/sulbactam  IVPB 3 Gram(s) IV Intermittent every 6 hours  ampicillin/sulbactam  IVPB      azaTHIOprine 50 milliGRAM(s) Oral daily  buDESOnide  80 MICROgram(s)/formoterol 4.5 MICROgram(s) Inhaler 2 Puff(s) Inhalation two times a day  carvedilol 3.125 milliGRAM(s) Oral every 12 hours  DULoxetine 60 milliGRAM(s) Oral daily  enoxaparin Injectable 40 milliGRAM(s) SubCutaneous daily  gabapentin 800 milliGRAM(s) Oral three times a day  ketorolac   Injectable 15 milliGRAM(s) IV Push every 12 hours  levalbuterol Inhalation 0.63 milliGRAM(s) Inhalation every 8 hours  mirtazapine 15 milliGRAM(s) Oral at bedtime  pantoprazole    Tablet 40 milliGRAM(s) Oral before breakfast  sulfaSALAzine 500 milliGRAM(s) Oral daily    MEDICATIONS  (PRN):  acetaminophen   Tablet .. 650 milliGRAM(s) Oral every 6 hours PRN Temp greater or equal to 38C (100.4F), Mild Pain (1 - 3)  meclizine 12.5 milliGRAM(s) Oral three times a day PRN Dizziness  SUMAtriptan 100 milliGRAM(s) Oral daily PRN Migraine      Allergies    Cipro (Vomiting)    Intolerances        LABS:                        11.8   7.78  )-----------( 337      ( 14 Sep 2019 06:24 )             36.9     -    142  |  108  |  14  ----------------------------<  95  3.8   |  29  |  0.63    Ca    9.1      14 Sep 2019 06:24  Phos  3.7       Mg     2.1         TPro  6.4  /  Alb  3.1<L>  /  TBili  0.4  /  DBili  x   /  AST  27  /  ALT  44  /  AlkPhos  123<H>        Urinalysis Basic - ( 13 Sep 2019 10:53 )    Color: Yellow / Appearance: Clear / S.020 / pH: x  Gluc: x / Ketone: Negative  / Bili: Negative / Urobili: Negative   Blood: x / Protein: 15 / Nitrite: Negative   Leuk Esterase: Trace / RBC: 0-2 /HPF / WBC 6-10 /HPF   Sq Epi: x / Non Sq Epi: Many /HPF / Bacteria: Many /HPF            CAPILLARY BLOOD GLUCOSE            RADIOLOGY & ADDITIONAL TESTS:    CXR:    Ct scan chest:    ekg;    echo: Patient is a 54y old  Female who presents with a chief complaint of animal bite (14 Sep 2019 10:49)    Awake , alert , lying in bed in NAD. Left forearm still with swelling and discomfort. Noncompliant with CPAP.  Denies cough or sob at this moment.     INTERVAL HPI/OVERNIGHT EVENTS:      VITAL SIGNS:  T(F): 97.4 (19 @ 04:42)  HR: 80 (19 @ 04:42)  BP: 127/88 (19 @ 04:42)  RR: 18 (19 @ 04:42)  SpO2: 96% (19 @ 04:42)  Wt(kg): --  I&O's Detail          REVIEW OF SYSTEMS:    CONSTITUTIONAL:  No fevers, chills, sweats    HEENT:  Eyes:  No diplopia or blurred vision. ENT:  No earache, sore throat or runny nose.    CARDIOVASCULAR:  No pressure, squeezing, tightness, or heaviness about the chest; no palpitations.    RESPIRATORY:  Per HPI    GASTROINTESTINAL:  No abdominal pain, nausea, vomiting or diarrhea.    GENITOURINARY:  No dysuria, frequency or urgency.    NEUROLOGIC:  No paresthesias, fasciculations, seizures or weakness.    PSYCHIATRIC:  No disorder of thought or mood.      PHYSICAL EXAM:    Constitutional: Well developed and nourished  Eyes:Perrla  ENMT: normal  Neck:supple  Respiratory: good air entry  Cardiovascular: S1 S2 regular  Gastrointestinal: Soft, Non tender  Extremities: No edema  Vascular:normal  Neurological:Awake, alert,Ox3  Musculoskeletal:Normal      MEDICATIONS  (STANDING):  ampicillin/sulbactam  IVPB 3 Gram(s) IV Intermittent every 6 hours  ampicillin/sulbactam  IVPB      azaTHIOprine 50 milliGRAM(s) Oral daily  buDESOnide  80 MICROgram(s)/formoterol 4.5 MICROgram(s) Inhaler 2 Puff(s) Inhalation two times a day  carvedilol 3.125 milliGRAM(s) Oral every 12 hours  DULoxetine 60 milliGRAM(s) Oral daily  enoxaparin Injectable 40 milliGRAM(s) SubCutaneous daily  gabapentin 800 milliGRAM(s) Oral three times a day  ketorolac   Injectable 15 milliGRAM(s) IV Push every 12 hours  levalbuterol Inhalation 0.63 milliGRAM(s) Inhalation every 8 hours  mirtazapine 15 milliGRAM(s) Oral at bedtime  pantoprazole    Tablet 40 milliGRAM(s) Oral before breakfast  sulfaSALAzine 500 milliGRAM(s) Oral daily    MEDICATIONS  (PRN):  acetaminophen   Tablet .. 650 milliGRAM(s) Oral every 6 hours PRN Temp greater or equal to 38C (100.4F), Mild Pain (1 - 3)  meclizine 12.5 milliGRAM(s) Oral three times a day PRN Dizziness  SUMAtriptan 100 milliGRAM(s) Oral daily PRN Migraine      Allergies    Cipro (Vomiting)    Intolerances        LABS:                        11.8   7.78  )-----------( 337      ( 14 Sep 2019 06:24 )             36.9     -    142  |  108  |  14  ----------------------------<  95  3.8   |  29  |  0.63    Ca    9.1      14 Sep 2019 06:24  Phos  3.7       Mg     2.1         TPro  6.4  /  Alb  3.1<L>  /  TBili  0.4  /  DBili  x   /  AST  27  /  ALT  44  /  AlkPhos  123<H>        Urinalysis Basic - ( 13 Sep 2019 10:53 )    Color: Yellow / Appearance: Clear / S.020 / pH: x  Gluc: x / Ketone: Negative  / Bili: Negative / Urobili: Negative   Blood: x / Protein: 15 / Nitrite: Negative   Leuk Esterase: Trace / RBC: 0-2 /HPF / WBC 6-10 /HPF   Sq Epi: x / Non Sq Epi: Many /HPF / Bacteria: Many /HPF            CAPILLARY BLOOD GLUCOSE            RADIOLOGY & ADDITIONAL TESTS:    CXR:    Ct scan chest:    ekg;    echo:

## 2019-09-15 ENCOUNTER — TRANSCRIPTION ENCOUNTER (OUTPATIENT)
Age: 54
End: 2019-09-15

## 2019-09-15 VITALS
OXYGEN SATURATION: 97 % | TEMPERATURE: 98 F | SYSTOLIC BLOOD PRESSURE: 127 MMHG | RESPIRATION RATE: 16 BRPM | HEART RATE: 95 BPM | DIASTOLIC BLOOD PRESSURE: 83 MMHG

## 2019-09-15 LAB
ANION GAP SERPL CALC-SCNC: 6 MMOL/L — SIGNIFICANT CHANGE UP (ref 5–17)
BUN SERPL-MCNC: 11 MG/DL — SIGNIFICANT CHANGE UP (ref 7–18)
CALCIUM SERPL-MCNC: 8.8 MG/DL — SIGNIFICANT CHANGE UP (ref 8.4–10.5)
CHLORIDE SERPL-SCNC: 107 MMOL/L — SIGNIFICANT CHANGE UP (ref 96–108)
CO2 SERPL-SCNC: 28 MMOL/L — SIGNIFICANT CHANGE UP (ref 22–31)
CREAT SERPL-MCNC: 0.61 MG/DL — SIGNIFICANT CHANGE UP (ref 0.5–1.3)
GLUCOSE SERPL-MCNC: 88 MG/DL — SIGNIFICANT CHANGE UP (ref 70–99)
HCT VFR BLD CALC: 36.9 % — SIGNIFICANT CHANGE UP (ref 34.5–45)
HGB BLD-MCNC: 11.8 G/DL — SIGNIFICANT CHANGE UP (ref 11.5–15.5)
MCHC RBC-ENTMCNC: 30.8 PG — SIGNIFICANT CHANGE UP (ref 27–34)
MCHC RBC-ENTMCNC: 32 GM/DL — SIGNIFICANT CHANGE UP (ref 32–36)
MCV RBC AUTO: 96.3 FL — SIGNIFICANT CHANGE UP (ref 80–100)
NRBC # BLD: 0 /100 WBCS — SIGNIFICANT CHANGE UP (ref 0–0)
PLATELET # BLD AUTO: 331 K/UL — SIGNIFICANT CHANGE UP (ref 150–400)
POTASSIUM SERPL-MCNC: 3.4 MMOL/L — LOW (ref 3.5–5.3)
POTASSIUM SERPL-SCNC: 3.4 MMOL/L — LOW (ref 3.5–5.3)
RBC # BLD: 3.83 M/UL — SIGNIFICANT CHANGE UP (ref 3.8–5.2)
RBC # FLD: 13.2 % — SIGNIFICANT CHANGE UP (ref 10.3–14.5)
SODIUM SERPL-SCNC: 141 MMOL/L — SIGNIFICANT CHANGE UP (ref 135–145)
WBC # BLD: 7.58 K/UL — SIGNIFICANT CHANGE UP (ref 3.8–10.5)
WBC # FLD AUTO: 7.58 K/UL — SIGNIFICANT CHANGE UP (ref 3.8–10.5)

## 2019-09-15 RX ORDER — RABIES VACC, HUMAN DIPLOID/PF 2.5 UNIT
1 VIAL (EA) INTRAMUSCULAR ONCE
Refills: 0 | Status: COMPLETED | OUTPATIENT
Start: 2019-09-15 | End: 2019-09-15

## 2019-09-15 RX ORDER — POTASSIUM CHLORIDE 20 MEQ
40 PACKET (EA) ORAL ONCE
Refills: 0 | Status: COMPLETED | OUTPATIENT
Start: 2019-09-15 | End: 2019-09-15

## 2019-09-15 RX ADMIN — PANTOPRAZOLE SODIUM 40 MILLIGRAM(S): 20 TABLET, DELAYED RELEASE ORAL at 05:35

## 2019-09-15 RX ADMIN — CARVEDILOL PHOSPHATE 3.12 MILLIGRAM(S): 80 CAPSULE, EXTENDED RELEASE ORAL at 05:35

## 2019-09-15 RX ADMIN — ENOXAPARIN SODIUM 40 MILLIGRAM(S): 100 INJECTION SUBCUTANEOUS at 12:06

## 2019-09-15 RX ADMIN — Medication 12.5 MILLIGRAM(S): at 03:29

## 2019-09-15 RX ADMIN — Medication 500 MILLIGRAM(S): at 12:06

## 2019-09-15 RX ADMIN — SUMATRIPTAN SUCCINATE 100 MILLIGRAM(S): 4 INJECTION, SOLUTION SUBCUTANEOUS at 04:30

## 2019-09-15 RX ADMIN — SUMATRIPTAN SUCCINATE 100 MILLIGRAM(S): 4 INJECTION, SOLUTION SUBCUTANEOUS at 03:27

## 2019-09-15 RX ADMIN — Medication 40 MILLIEQUIVALENT(S): at 12:05

## 2019-09-15 RX ADMIN — DULOXETINE HYDROCHLORIDE 60 MILLIGRAM(S): 30 CAPSULE, DELAYED RELEASE ORAL at 12:06

## 2019-09-15 RX ADMIN — AMPICILLIN SODIUM AND SULBACTAM SODIUM 200 GRAM(S): 250; 125 INJECTION, POWDER, FOR SUSPENSION INTRAMUSCULAR; INTRAVENOUS at 12:06

## 2019-09-15 RX ADMIN — GABAPENTIN 800 MILLIGRAM(S): 400 CAPSULE ORAL at 05:35

## 2019-09-15 RX ADMIN — AZATHIOPRINE 50 MILLIGRAM(S): 100 TABLET ORAL at 12:06

## 2019-09-15 RX ADMIN — Medication 1 MILLILITER(S): at 12:58

## 2019-09-15 RX ADMIN — Medication 1 TABLET(S): at 13:22

## 2019-09-15 RX ADMIN — INFLUENZA VIRUS VACCINE 0.5 MILLILITER(S): 15; 15; 15; 15 SUSPENSION INTRAMUSCULAR at 12:21

## 2019-09-15 RX ADMIN — LEVALBUTEROL 0.63 MILLIGRAM(S): 1.25 SOLUTION, CONCENTRATE RESPIRATORY (INHALATION) at 05:16

## 2019-09-15 RX ADMIN — AMPICILLIN SODIUM AND SULBACTAM SODIUM 200 GRAM(S): 250; 125 INJECTION, POWDER, FOR SUSPENSION INTRAMUSCULAR; INTRAVENOUS at 05:35

## 2019-09-15 NOTE — PROGRESS NOTE ADULT - PROBLEM SELECTOR PROBLEM 1
Cellulitis of left upper extremity

## 2019-09-15 NOTE — DISCHARGE NOTE NURSING/CASE MANAGEMENT/SOCIAL WORK - NSDCVIVACCINE_GEN_ALL_CORE_FT
Influenza , 2019/9/15 12:21 , Mar Solis (RN)  Rabies , 2019/9/12 18:54 , Epi Interiano (RN)  Rabies , 2019/9/15 12:58 , Mar Solis (RN)  Tdap , 2019/9/12 17:31 , Epi Interiano (RN)

## 2019-09-15 NOTE — DISCHARGE NOTE NURSING/CASE MANAGEMENT/SOCIAL WORK - PATIENT PORTAL LINK FT
You can access the FollowMyHealth Patient Portal offered by [facility] by registering at the following website: [email or web address]. By joining [employer]’s FollowMyHealth portal, you will also be able to view your health information using other applications (apps) compatible with our system.

## 2019-09-15 NOTE — PROGRESS NOTE ADULT - SUBJECTIVE AND OBJECTIVE BOX
Patient is a 54y old  Female who presents with a chief complaint of animal bite (15 Sep 2019 11:14)     pt seen in icu [  ], reg med floor [ x  ], bed [x  ], chair at bedside [   ], a+o x3 [x  ], lethargic [  ],  nad [ x ]          Allergies    Cipro (Vomiting)        Vitals    T(F): 98 (09-15-19 @ 05:27), Max: 98.4 (09-14-19 @ 14:23)  HR: 85 (09-15-19 @ 05:27) (85 - 104)  BP: 132/91 (09-15-19 @ 05:27) (120/79 - 132/91)  RR: 17 (09-15-19 @ 05:27) (17 - 17)  SpO2: 95% (09-15-19 @ 05:27) (95% - 98%)  Wt(kg): --  CAPILLARY BLOOD GLUCOSE          Labs                          11.8   7.58  )-----------( 331      ( 15 Sep 2019 10:27 )             36.9       09-15    141  |  107  |  11  ----------------------------<  88  3.4<L>   |  28  |  0.61    Ca    8.8      15 Sep 2019 10:28              .Blood  09-13 @ 02:27   No growth to date.  --  --          Radiology Results      Meds    MEDICATIONS  (STANDING):  amoxicillin  875 milliGRAM(s)/clavulanate 1 Tablet(s) Oral every 12 hours  azaTHIOprine 50 milliGRAM(s) Oral daily  buDESOnide  80 MICROgram(s)/formoterol 4.5 MICROgram(s) Inhaler 2 Puff(s) Inhalation two times a day  carvedilol 3.125 milliGRAM(s) Oral every 12 hours  DULoxetine 60 milliGRAM(s) Oral daily  enoxaparin Injectable 40 milliGRAM(s) SubCutaneous daily  gabapentin 800 milliGRAM(s) Oral three times a day  levalbuterol Inhalation 0.63 milliGRAM(s) Inhalation every 8 hours  mirtazapine 15 milliGRAM(s) Oral at bedtime  pantoprazole    Tablet 40 milliGRAM(s) Oral before breakfast  sulfaSALAzine 500 milliGRAM(s) Oral daily      MEDICATIONS  (PRN):  acetaminophen   Tablet .. 650 milliGRAM(s) Oral every 6 hours PRN Temp greater or equal to 38C (100.4F), Mild Pain (1 - 3)  meclizine 12.5 milliGRAM(s) Oral three times a day PRN Dizziness  SUMAtriptan 100 milliGRAM(s) Oral daily PRN Migraine        Physical Exam    Neuro :  no focal deficits  Respiratory: CTA B/L  CV: RRR, S1S2, no murmurs,   Abdominal: Soft, NT, ND +BS,  Extremities: No le edema, + peripheral pulses, left medial proximal forearm swelling and mild erythema      ASSESSMENT    left forearm cellulitis 2nd to unknown animal bite  h/o SHELIA (obstructive sleep apnea),  Hallux valgus (acquired), left foot  Depression  Vertigo  Migraines  Brain aneurysm  Fibroids  Seasonal allergies  RA (rheumatoid arthritis)  Fibromyalgia  GERD (gastroesophageal reflux disease)  HTN (hypertension)  HLD (hyperlipidemia)  Sarcoidosis, lung  Asthma  History of bunionectomy  Chronic lower back pain  Brain aneurysm  S/P arthroscopy  H/O: hysterectomy      PLAN      cont unasyn  blood cx no growth to date  id f/u   s/p Tdap vacc,   Rabies IG, Rabavert Vaccine in ED  f/u Post exposure Rabies vaccination : 2nd dose today  Sept 15, 3rd dose on Sept 19, 4th dose on Sept 26. ( Schedule : 0,3,7, 14).   pulm f/u   pft outpt  cont current meds  d/c planning on PO augmentin 875/125 bid until 09/22/19

## 2019-09-15 NOTE — PROGRESS NOTE ADULT - PROBLEM SELECTOR PLAN 2
-Unknown animal bite   - s/p Tdap vacc, Rabies IG, Rabavert Vaccine in ED  - Post exposure Rabies vaccination : 2nd dose on  Sept 15, 3rd dose on Sept 19, 4th dose on Sept 26. ( Schedule : 0,3,7, 14).
Not in exacerbation   Bronchodilators PRN  PFTs as OP.
Not in exacerbation   Bronchodilators PRN  PFTs as OP.

## 2019-09-15 NOTE — PROGRESS NOTE ADULT - PROBLEM SELECTOR PLAN 7
Pain management   Rheumatologist follow up as OP.
Pain management   Rheumatologist follow up as OP.
c/w lovenox for DVT ppx

## 2019-09-15 NOTE — PROGRESS NOTE ADULT - PROBLEM SELECTOR PLAN 5
S/p Tdap vacc, rabies IG, Rabavert vacc  Cont schedule.
c/w home meds
S/p Tdap vacc, rabies IG, Rabavert vacc  Cont schedule.

## 2019-09-15 NOTE — PROGRESS NOTE ADULT - PROBLEM SELECTOR PLAN 1
Check pending cultures  Antibiotics   ID follow up. Blood culture neg so far  Antibiotics   ID follow up.

## 2019-09-15 NOTE — PROGRESS NOTE ADULT - PROBLEM SELECTOR PLAN 4
Cont home meds.
c/w Home meds except for Humira and Rituximab in setting of acute infection
Cont home meds.

## 2019-09-15 NOTE — PROGRESS NOTE ADULT - PROBLEM SELECTOR PLAN 3
C-PAP machine at night   Sleep study as OP.
s/p pulmo consultation - outpt sleep study
C-PAP machine at night   Sleep study as OP.

## 2019-09-18 LAB
CULTURE RESULTS: SIGNIFICANT CHANGE UP
CULTURE RESULTS: SIGNIFICANT CHANGE UP
SPECIMEN SOURCE: SIGNIFICANT CHANGE UP
SPECIMEN SOURCE: SIGNIFICANT CHANGE UP

## 2019-09-19 ENCOUNTER — EMERGENCY (EMERGENCY)
Facility: HOSPITAL | Age: 54
LOS: 1 days | Discharge: HOME | End: 2019-09-19
Attending: EMERGENCY MEDICINE
Payer: COMMERCIAL

## 2019-09-19 VITALS
HEIGHT: 59 IN | RESPIRATION RATE: 20 BRPM | DIASTOLIC BLOOD PRESSURE: 80 MMHG | WEIGHT: 145.95 LBS | HEART RATE: 95 BPM | TEMPERATURE: 98 F | SYSTOLIC BLOOD PRESSURE: 117 MMHG | OXYGEN SATURATION: 98 %

## 2019-09-19 DIAGNOSIS — I67.1 CEREBRAL ANEURYSM, NONRUPTURED: Chronic | ICD-10-CM

## 2019-09-19 DIAGNOSIS — M54.5 LOW BACK PAIN: Chronic | ICD-10-CM

## 2019-09-19 DIAGNOSIS — Z98.890 OTHER SPECIFIED POSTPROCEDURAL STATES: Chronic | ICD-10-CM

## 2019-09-19 DIAGNOSIS — Z90.710 ACQUIRED ABSENCE OF BOTH CERVIX AND UTERUS: Chronic | ICD-10-CM

## 2019-09-19 PROCEDURE — 99281 EMR DPT VST MAYX REQ PHY/QHP: CPT | Mod: 25

## 2019-09-19 PROCEDURE — 90675 RABIES VACCINE IM: CPT

## 2019-09-19 PROCEDURE — 90471 IMMUNIZATION ADMIN: CPT

## 2019-09-19 PROCEDURE — 99281 EMR DPT VST MAYX REQ PHY/QHP: CPT

## 2019-09-19 RX ORDER — RABIES VACC, HUMAN DIPLOID/PF 2.5 UNIT
1 VIAL (EA) INTRAMUSCULAR ONCE
Refills: 0 | Status: COMPLETED | OUTPATIENT
Start: 2019-09-19 | End: 2019-09-19

## 2019-09-19 RX ADMIN — Medication 1 MILLILITER(S): at 12:35

## 2019-09-19 NOTE — ED PROVIDER NOTE - PHYSICAL EXAMINATION
L forearm with minimal swelling with hardening of tissue. No erythema, streaking or induration. No tenderness to palpation.

## 2019-09-19 NOTE — ED PROVIDER NOTE - PATIENT PORTAL LINK FT
You can access the FollowMyHealth Patient Portal offered by [facility] by registering at the following website: [email or web address]. By joining [employer]’s FollowMyHealth portal, you will also be able to view your health information using other applications (apps) compatible with our system.

## 2019-09-19 NOTE — ED PROVIDER NOTE - OBJECTIVE STATEMENT
54 year-old female, presents for the 3rd rabies vaccine. Reports that was admitted for IV antibiotics for an infected wound of forearm after being bitten by an unknown animal about 10 days ago. Reports feeling much better. Denies any other complaints.

## 2019-09-19 NOTE — ED PROVIDER NOTE - PROGRESS NOTE DETAILS
Last  rabies vaccine in 1 week. On exam, resolving infection. Feels much better. Pt is well appearing walking with steady gait, stable for discharge and follow up without fail with medical doctor. I had a detailed discussion with the patient and/or guardian regarding the historical points, exam findings, and any diagnostic results supporting the discharge diagnosis. Pt educated on care and need for follow up. Strict return instructions and red flag signs and symptoms discussed with patient. Questions answered. Pt shows understanding of discharge information and agrees to follow.

## 2019-09-26 ENCOUNTER — EMERGENCY (EMERGENCY)
Facility: HOSPITAL | Age: 54
LOS: 1 days | Discharge: HOME | End: 2019-09-26
Attending: EMERGENCY MEDICINE
Payer: COMMERCIAL

## 2019-09-26 VITALS
DIASTOLIC BLOOD PRESSURE: 94 MMHG | RESPIRATION RATE: 20 BRPM | TEMPERATURE: 99 F | HEART RATE: 109 BPM | OXYGEN SATURATION: 96 % | HEIGHT: 59 IN | SYSTOLIC BLOOD PRESSURE: 141 MMHG | WEIGHT: 145.95 LBS

## 2019-09-26 VITALS
HEART RATE: 96 BPM | OXYGEN SATURATION: 99 % | RESPIRATION RATE: 18 BRPM | SYSTOLIC BLOOD PRESSURE: 133 MMHG | DIASTOLIC BLOOD PRESSURE: 88 MMHG | TEMPERATURE: 98 F

## 2019-09-26 DIAGNOSIS — M54.5 LOW BACK PAIN: Chronic | ICD-10-CM

## 2019-09-26 DIAGNOSIS — Z90.710 ACQUIRED ABSENCE OF BOTH CERVIX AND UTERUS: Chronic | ICD-10-CM

## 2019-09-26 DIAGNOSIS — Z98.890 OTHER SPECIFIED POSTPROCEDURAL STATES: Chronic | ICD-10-CM

## 2019-09-26 DIAGNOSIS — I67.1 CEREBRAL ANEURYSM, NONRUPTURED: Chronic | ICD-10-CM

## 2019-09-26 PROCEDURE — 90471 IMMUNIZATION ADMIN: CPT

## 2019-09-26 PROCEDURE — 99283 EMERGENCY DEPT VISIT LOW MDM: CPT

## 2019-09-26 PROCEDURE — 90675 RABIES VACCINE IM: CPT

## 2019-09-26 PROCEDURE — 99281 EMR DPT VST MAYX REQ PHY/QHP: CPT | Mod: 25

## 2019-09-26 RX ORDER — RABIES VACC, HUMAN DIPLOID/PF 2.5 UNIT
1 VIAL (EA) INTRAMUSCULAR ONCE
Refills: 0 | Status: COMPLETED | OUTPATIENT
Start: 2019-09-26 | End: 2019-09-26

## 2019-09-26 RX ADMIN — Medication 1 MILLILITER(S): at 11:41

## 2019-09-26 NOTE — ED PROVIDER NOTE - CHPI ED SYMPTOMS NEG
Spinal Block    Patient location during procedure: OR  Start time: 5/23/2019 8:10 AM  End time: 5/23/2019 8:20 AM  Reason for block: primary anesthetic  Staffing  Anesthesiologist: Justin Devi MD  Resident/CRNA: JOHNY Mendes CRNA  Performed: anesthesiologist and resident/CRNA   Preanesthetic Checklist  Completed: patient identified, site marked, surgical consent, pre-op evaluation, timeout performed, IV checked, risks and benefits discussed, monitors and equipment checked, anesthesia consent given, oxygen available and patient being monitored  Spinal Block  Patient position: sitting  Prep: ChloraPrep  Patient monitoring: cardiac monitor, continuous pulse ox, continuous capnometry and frequent blood pressure checks  Approach: midline  Location: L4/L5  Provider prep: mask and sterile gloves  Local infiltration: lidocaine  Agent: bupivacaine  Dose: 1.6  Dose: 1.6  Needle  Needle type: Pencan   Needle gauge: 24 G  Needle length: 4 in  Assessment  Sensory level: T6 no bleeding at site/no deformity/no red streaks

## 2019-09-26 NOTE — ED PROVIDER NOTE - PATIENT PORTAL LINK FT
You can access the FollowMyHealth Patient Portal offered by [facility] by registering at the following website: [email or web address]. By joining [employer]’s FollowMyHealth portal, you will also be able to view your health information using other applications (apps) compatible with our system.

## 2019-10-13 PROCEDURE — 94640 AIRWAY INHALATION TREATMENT: CPT

## 2019-10-13 PROCEDURE — 85652 RBC SED RATE AUTOMATED: CPT

## 2019-10-13 PROCEDURE — 82607 VITAMIN B-12: CPT

## 2019-10-13 PROCEDURE — 90375 RABIES IG IM/SC: CPT

## 2019-10-13 PROCEDURE — 36415 COLL VENOUS BLD VENIPUNCTURE: CPT

## 2019-10-13 PROCEDURE — 85027 COMPLETE CBC AUTOMATED: CPT

## 2019-10-13 PROCEDURE — 83735 ASSAY OF MAGNESIUM: CPT

## 2019-10-13 PROCEDURE — 90715 TDAP VACCINE 7 YRS/> IM: CPT

## 2019-10-13 PROCEDURE — 90686 IIV4 VACC NO PRSV 0.5 ML IM: CPT

## 2019-10-13 PROCEDURE — 99285 EMERGENCY DEPT VISIT HI MDM: CPT | Mod: 25

## 2019-10-13 PROCEDURE — 90675 RABIES VACCINE IM: CPT

## 2019-10-13 PROCEDURE — 80053 COMPREHEN METABOLIC PANEL: CPT

## 2019-10-13 PROCEDURE — 81001 URINALYSIS AUTO W/SCOPE: CPT

## 2019-10-13 PROCEDURE — 82746 ASSAY OF FOLIC ACID SERUM: CPT

## 2019-10-13 PROCEDURE — 90471 IMMUNIZATION ADMIN: CPT

## 2019-10-13 PROCEDURE — 87040 BLOOD CULTURE FOR BACTERIA: CPT

## 2019-10-13 PROCEDURE — 80061 LIPID PANEL: CPT

## 2019-10-13 PROCEDURE — 84443 ASSAY THYROID STIM HORMONE: CPT

## 2019-10-13 PROCEDURE — 84100 ASSAY OF PHOSPHORUS: CPT

## 2019-10-13 PROCEDURE — 82306 VITAMIN D 25 HYDROXY: CPT

## 2019-10-13 PROCEDURE — 80048 BASIC METABOLIC PNL TOTAL CA: CPT

## 2019-10-13 PROCEDURE — 83036 HEMOGLOBIN GLYCOSYLATED A1C: CPT

## 2020-07-27 ENCOUNTER — EMERGENCY (EMERGENCY)
Facility: HOSPITAL | Age: 55
LOS: 1 days | Discharge: HOME | End: 2020-07-27
Attending: EMERGENCY MEDICINE
Payer: MEDICARE

## 2020-07-27 VITALS
WEIGHT: 145.51 LBS | TEMPERATURE: 97 F | OXYGEN SATURATION: 98 % | RESPIRATION RATE: 16 BRPM | HEIGHT: 60 IN | SYSTOLIC BLOOD PRESSURE: 116 MMHG | DIASTOLIC BLOOD PRESSURE: 83 MMHG | HEART RATE: 80 BPM

## 2020-07-27 VITALS
RESPIRATION RATE: 18 BRPM | SYSTOLIC BLOOD PRESSURE: 127 MMHG | HEART RATE: 77 BPM | OXYGEN SATURATION: 98 % | DIASTOLIC BLOOD PRESSURE: 76 MMHG | TEMPERATURE: 97 F

## 2020-07-27 DIAGNOSIS — M54.5 LOW BACK PAIN: Chronic | ICD-10-CM

## 2020-07-27 DIAGNOSIS — Z98.890 OTHER SPECIFIED POSTPROCEDURAL STATES: Chronic | ICD-10-CM

## 2020-07-27 DIAGNOSIS — Z90.710 ACQUIRED ABSENCE OF BOTH CERVIX AND UTERUS: Chronic | ICD-10-CM

## 2020-07-27 DIAGNOSIS — I67.1 CEREBRAL ANEURYSM, NONRUPTURED: Chronic | ICD-10-CM

## 2020-07-27 LAB
ALBUMIN SERPL ELPH-MCNC: 3.9 G/DL — SIGNIFICANT CHANGE UP (ref 3.5–5)
ALP SERPL-CCNC: 129 U/L — HIGH (ref 40–120)
ALT FLD-CCNC: 49 U/L DA — SIGNIFICANT CHANGE UP (ref 10–60)
ANION GAP SERPL CALC-SCNC: 10 MMOL/L — SIGNIFICANT CHANGE UP (ref 5–17)
APPEARANCE UR: CLEAR — SIGNIFICANT CHANGE UP
AST SERPL-CCNC: 52 U/L — HIGH (ref 10–40)
BACTERIA # UR AUTO: ABNORMAL /HPF
BASOPHILS # BLD AUTO: 0.03 K/UL — SIGNIFICANT CHANGE UP (ref 0–0.2)
BASOPHILS NFR BLD AUTO: 0.2 % — SIGNIFICANT CHANGE UP (ref 0–2)
BILIRUB SERPL-MCNC: 0.6 MG/DL — SIGNIFICANT CHANGE UP (ref 0.2–1.2)
BILIRUB UR-MCNC: NEGATIVE — SIGNIFICANT CHANGE UP
BUN SERPL-MCNC: 16 MG/DL — SIGNIFICANT CHANGE UP (ref 7–18)
CALCIUM SERPL-MCNC: 9.6 MG/DL — SIGNIFICANT CHANGE UP (ref 8.4–10.5)
CHLORIDE SERPL-SCNC: 106 MMOL/L — SIGNIFICANT CHANGE UP (ref 96–108)
CO2 SERPL-SCNC: 22 MMOL/L — SIGNIFICANT CHANGE UP (ref 22–31)
COLOR SPEC: YELLOW — SIGNIFICANT CHANGE UP
CREAT SERPL-MCNC: 0.76 MG/DL — SIGNIFICANT CHANGE UP (ref 0.5–1.3)
DIFF PNL FLD: ABNORMAL
EOSINOPHIL # BLD AUTO: 0.04 K/UL — SIGNIFICANT CHANGE UP (ref 0–0.5)
EOSINOPHIL NFR BLD AUTO: 0.3 % — SIGNIFICANT CHANGE UP (ref 0–6)
EPI CELLS # UR: SIGNIFICANT CHANGE UP /HPF
GLUCOSE SERPL-MCNC: 90 MG/DL — SIGNIFICANT CHANGE UP (ref 70–99)
GLUCOSE UR QL: NEGATIVE — SIGNIFICANT CHANGE UP
HCG UR QL: NEGATIVE — SIGNIFICANT CHANGE UP
HCT VFR BLD CALC: 44 % — SIGNIFICANT CHANGE UP (ref 34.5–45)
HGB BLD-MCNC: 14.5 G/DL — SIGNIFICANT CHANGE UP (ref 11.5–15.5)
HYALINE CASTS # UR AUTO: ABNORMAL /LPF
IMM GRANULOCYTES NFR BLD AUTO: 0.4 % — SIGNIFICANT CHANGE UP (ref 0–1.5)
KETONES UR-MCNC: ABNORMAL
LEUKOCYTE ESTERASE UR-ACNC: ABNORMAL
LIDOCAIN IGE QN: 156 U/L — SIGNIFICANT CHANGE UP (ref 73–393)
LYMPHOCYTES # BLD AUTO: 18.1 % — SIGNIFICANT CHANGE UP (ref 13–44)
LYMPHOCYTES # BLD AUTO: 2.58 K/UL — SIGNIFICANT CHANGE UP (ref 1–3.3)
MCHC RBC-ENTMCNC: 30.9 PG — SIGNIFICANT CHANGE UP (ref 27–34)
MCHC RBC-ENTMCNC: 33 GM/DL — SIGNIFICANT CHANGE UP (ref 32–36)
MCV RBC AUTO: 93.8 FL — SIGNIFICANT CHANGE UP (ref 80–100)
MONOCYTES # BLD AUTO: 1.06 K/UL — HIGH (ref 0–0.9)
MONOCYTES NFR BLD AUTO: 7.4 % — SIGNIFICANT CHANGE UP (ref 2–14)
NEUTROPHILS # BLD AUTO: 10.51 K/UL — HIGH (ref 1.8–7.4)
NEUTROPHILS NFR BLD AUTO: 73.6 % — SIGNIFICANT CHANGE UP (ref 43–77)
NITRITE UR-MCNC: NEGATIVE — SIGNIFICANT CHANGE UP
NRBC # BLD: 0 /100 WBCS — SIGNIFICANT CHANGE UP (ref 0–0)
PH UR: 6.5 — SIGNIFICANT CHANGE UP (ref 5–8)
PLATELET # BLD AUTO: 362 K/UL — SIGNIFICANT CHANGE UP (ref 150–400)
POTASSIUM SERPL-MCNC: 4 MMOL/L — SIGNIFICANT CHANGE UP (ref 3.5–5.3)
POTASSIUM SERPL-SCNC: 4 MMOL/L — SIGNIFICANT CHANGE UP (ref 3.5–5.3)
PROT SERPL-MCNC: 8.2 G/DL — SIGNIFICANT CHANGE UP (ref 6–8.3)
PROT UR-MCNC: 15
RBC # BLD: 4.69 M/UL — SIGNIFICANT CHANGE UP (ref 3.8–5.2)
RBC # FLD: 12.7 % — SIGNIFICANT CHANGE UP (ref 10.3–14.5)
RBC CASTS # UR COMP ASSIST: ABNORMAL /HPF (ref 0–2)
SODIUM SERPL-SCNC: 138 MMOL/L — SIGNIFICANT CHANGE UP (ref 135–145)
SP GR SPEC: 1.02 — SIGNIFICANT CHANGE UP (ref 1.01–1.02)
UROBILINOGEN FLD QL: NEGATIVE — SIGNIFICANT CHANGE UP
WBC # BLD: 14.28 K/UL — HIGH (ref 3.8–10.5)
WBC # FLD AUTO: 14.28 K/UL — HIGH (ref 3.8–10.5)
WBC UR QL: ABNORMAL /HPF (ref 0–5)

## 2020-07-27 PROCEDURE — 81025 URINE PREGNANCY TEST: CPT

## 2020-07-27 PROCEDURE — 81001 URINALYSIS AUTO W/SCOPE: CPT

## 2020-07-27 PROCEDURE — 76705 ECHO EXAM OF ABDOMEN: CPT

## 2020-07-27 PROCEDURE — 85027 COMPLETE CBC AUTOMATED: CPT

## 2020-07-27 PROCEDURE — 74177 CT ABD & PELVIS W/CONTRAST: CPT

## 2020-07-27 PROCEDURE — 74177 CT ABD & PELVIS W/CONTRAST: CPT | Mod: 26

## 2020-07-27 PROCEDURE — 99284 EMERGENCY DEPT VISIT MOD MDM: CPT | Mod: 25

## 2020-07-27 PROCEDURE — 83690 ASSAY OF LIPASE: CPT

## 2020-07-27 PROCEDURE — 76705 ECHO EXAM OF ABDOMEN: CPT | Mod: 26

## 2020-07-27 PROCEDURE — 99285 EMERGENCY DEPT VISIT HI MDM: CPT

## 2020-07-27 PROCEDURE — 96375 TX/PRO/DX INJ NEW DRUG ADDON: CPT

## 2020-07-27 PROCEDURE — 80053 COMPREHEN METABOLIC PANEL: CPT

## 2020-07-27 PROCEDURE — 96374 THER/PROPH/DIAG INJ IV PUSH: CPT

## 2020-07-27 PROCEDURE — 36415 COLL VENOUS BLD VENIPUNCTURE: CPT

## 2020-07-27 RX ORDER — MORPHINE SULFATE 50 MG/1
4 CAPSULE, EXTENDED RELEASE ORAL ONCE
Refills: 0 | Status: DISCONTINUED | OUTPATIENT
Start: 2020-07-27 | End: 2020-07-27

## 2020-07-27 RX ORDER — SODIUM CHLORIDE 9 MG/ML
3 INJECTION INTRAMUSCULAR; INTRAVENOUS; SUBCUTANEOUS ONCE
Refills: 0 | Status: COMPLETED | OUTPATIENT
Start: 2020-07-27 | End: 2020-07-27

## 2020-07-27 RX ORDER — IOHEXOL 300 MG/ML
30 INJECTION, SOLUTION INTRAVENOUS ONCE
Refills: 0 | Status: COMPLETED | OUTPATIENT
Start: 2020-07-27 | End: 2020-07-27

## 2020-07-27 RX ORDER — ONDANSETRON 8 MG/1
4 TABLET, FILM COATED ORAL ONCE
Refills: 0 | Status: COMPLETED | OUTPATIENT
Start: 2020-07-27 | End: 2020-07-27

## 2020-07-27 RX ADMIN — MORPHINE SULFATE 4 MILLIGRAM(S): 50 CAPSULE, EXTENDED RELEASE ORAL at 18:22

## 2020-07-27 RX ADMIN — MORPHINE SULFATE 4 MILLIGRAM(S): 50 CAPSULE, EXTENDED RELEASE ORAL at 14:50

## 2020-07-27 RX ADMIN — SODIUM CHLORIDE 3 MILLILITER(S): 9 INJECTION INTRAMUSCULAR; INTRAVENOUS; SUBCUTANEOUS at 14:55

## 2020-07-27 RX ADMIN — ONDANSETRON 4 MILLIGRAM(S): 8 TABLET, FILM COATED ORAL at 14:55

## 2020-07-27 RX ADMIN — IOHEXOL 30 MILLILITER(S): 300 INJECTION, SOLUTION INTRAVENOUS at 16:36

## 2020-07-27 NOTE — ED PROVIDER NOTE - CLINICAL SUMMARY MEDICAL DECISION MAKING FREE TEXT BOX
Will check CBC, CMP and lipase. Will obtain ultrasound of the abdomen and give morphine for pain. Will reevaluate. Will check CBC, CMP and lipase. Will obtain ultrasound of the abdomen and give morphine for pain. Will reevaluate.sono shows a fatty liver  ct scan is negative

## 2020-07-27 NOTE — ED PROVIDER NOTE - PATIENT PORTAL LINK FT
You can access the FollowMyHealth Patient Portal offered by [facility] by registering at the following website: [email or web address]. By joining [employer]’s FollowMyHealth portal, you will also be able to view your health information using other applications (apps) compatible with our system.

## 2020-07-27 NOTE — ED PROVIDER NOTE - OBJECTIVE STATEMENT
55 year old G1, P1 female with PMHx of asthma, brain aneurysm, depression, fibroids, fibromyalgia, GERD, hallux valgus of the left foot, HLD, HTN, migraines, sleep apnea. rheumatoid arthritis, sarcoidosis, seasonal allergies, and vertigo and PSHx of hysterectomy, coil surgery for brain aneurysm, lumbar discectomy, and left knee and foot arthroscopy presents to the ED with complaints of one week of right upper quadrant pain. Patient states that the pain has been worsening over the past two days. Patient reports numerous associated episodes of nausea and vomiting. Patient otherwise denies any fevers, chills, constipation, diarrhea, and all other acute complaints. Patient notes that she is not currently on any medications.   Allergies: Cipro (vomiting)

## 2020-12-04 NOTE — ED ADULT NURSE NOTE - NS ED NURSE LEVEL OF CONSCIOUSNESS AFFECT
Nutrition Chart Note.  Pt seen for: nutrition follow-up    Source: EMR, Team    Chart reviewed, events noted. Per chart: 66F with advanced ALS s/p trach/PEG and HLD p/w tachycardia and decreased UOP concern for sepsis 2/2 UTI, admitted to MICU for further workup and vent management. Required IV pressors which has been terminated, transferred to RCU 11/28. Trach changed to #7 distal XLT shiley 2/2 air leak on 11/28.      Diet : NPO with Tube Feeds via PEG  Enteral Nutrition: Jevity 1.5 @ 70mL/hr x 13hrs (20:00-9:00). Provides 910mL formula, 1365kcals, 58g protein, 692ml free water  - RD observed Jevity 1.5; currently not infusing at this time as per orders (runs between hrs of 20:00-9:00)    Per flowsheets, pt has received 94% EN provisions in the last 4 days (11/30-12/3). Multivitamin now ordered per prior RD recommendastions 11/30.    Nutrition Events:   - 11/30: Potassium (5.4) <H> -  Lokelma x 1 given in setting of rising potassium over 48 hrs  - Home EN regimen of Jevity 1.2 @ 70 ml/hr x 13 hours overnight (8pm-9am)    GI: c fecal incontinence, last BM 12/2 (x3) - bowel regimen ordered (miralax, senna). Noted receiving abx    Current Weight: Will continue to monitor/trend weight status   Weight (in kg): 52.3 (12-02), 52.6 (11-27), 53.8 (11-22; dosing)    Pertinent Medications: MEDICATIONS  (STANDING):  albuterol/ipratropium for Nebulization 3 milliLiter(s) Nebulizer every 6 hours  artificial  tears Solution 1 Drop(s) Both EYES every 6 hours  chlorhexidine 0.12% Liquid 15 milliLiter(s) Oral Mucosa every 12 hours  chlorhexidine 4% Liquid 1 Application(s) Topical <User Schedule>  diazepam   Solution 1.5 milliGRAM(s) Oral daily  diazepam   Solution 2.5 milliGRAM(s) Oral daily  erythromycin   Ointment 1 Application(s) Both EYES <User Schedule>  heparin   Injectable 5000 Unit(s) SubCutaneous every 12 hours  influenza   Vaccine 0.5 milliLiter(s) IntraMuscular once  meropenem  IVPB 1000 milliGRAM(s) IV Intermittent every 8 hours  multivitamin 1 Tablet(s) Oral daily  ofloxacin 0.3% Solution 1 Drop(s) Both EYES four times a day  petrolatum Ophthalmic Ointment 1 Application(s) Both EYES <User Schedule>  polyethylene glycol 3350 17 Gram(s) Oral two times a day  riluzole 50 milliGRAM(s) Oral two times a day  senna 1 Tablet(s) Oral daily  sertraline 50 milliGRAM(s) Oral daily    MEDICATIONS  (PRN):    Pertinent Labs:  12-04 Na139 mmol/L Glu 110 mg/dL<H> K+ 4.3 mmol/L Cr  <0.30 mg/dL<L> BUN 16 mg/dL 12-04 Phos 2.6 mg/dL 11-28 Alb 3.5 g/dL    Skin per nursing documentation: no pressure injuries noted  Edema: 1+ generalized    Estimated Needs:   [x ] no change since previous assessment  Based on dosing weight: 118.6 lb/53.8 kg  Energy (25-30 kcals/kg): 1532-0518  Protein (1.0-1.2 g pro/kg): 53.8-64.56    Previous Nutrition Diagnosis: Swallowing difficulty  Nutrition Diagnosis is [x ] ongoing  - addressed with EN        New Nutrition Diagnosis: N/A      Interventions:   1. Continue Jevity 1.5 via PEG @ 70 ml/hr x 13 hours (20:00-9:00) to provide 910 ml formula, 1365 calories (25 neftali/kg), 58 grams protein (1.1 gm/kg), 692ml free water, based on dosing wt 53.8kg.  2. Continue multivitamin/minerals via PEG to meet 100% RDI's  3. Monitor electrolytes       Monitoring and Evaluation:   Continue to monitor Nutritional intake, Tolerance to diet prescription, weights, labs, skin integrity  RD remains available upon request and will follow up per protocol    Beatris Bonilla, MS, RD, CDN  pager #[phone] [place]

## 2021-01-24 NOTE — H&P PST ADULT - PAIN, FACTORS THAT RELIEVE, PROFILE
[address] [address]                            LABOR AND DELIVERY NOTE    PATIENT NAME: Owne RIVERA                  :        1993  MED REC NO:   [id number]                          ROOM:       9298  ACCOUNT NO:   [de-identified]                           ADMIT DATE: 2021  PROVIDER:     Vipul Loza MD    DATE OF PROCEDURE:  2021    DELIVERY SUMMARY    The patient is a 26-year-old G2, P1-0-0-1 at 38 weeks and 3 days, who  presented with leakage of fluid and contractions. She was admitted for  SROM and active phase labor, noted first gush at 1 a.m.  GBS was  positive, noted to be COVID negative on 2021. The patient was  started on penicillin for GBS prophylaxis. She was 4-cm, 70% effaced,  and -2 station on admission and positive pooling on speculum exam to  confirm rupture. She was admitted, requested and received an epidural  and then became complete and started to push, after pushing two  contractions, fetal head was at the perineum and she had a spontaneous  vaginal delivery of a baby girl in OA position and the rest of the  infant delivered atraumatically and placed on mother's abdomen. The  cord was cut and clamped after 1 minute of delayed cord clamping. Delivery of the placenta was spontaneous. Perineum and vagina explored. A second-degree laceration was repaired in a standard fashion. Mom and  baby both doing well at the end of this dictation. EBL was 200 mL. Her  bladder was drained for 200 mL of clear urine.         Charlotta Cogan, MD    D: 2021 7:15:22       T: 2021 11:59:20     [email or web address]  Job#: [id number]     Doc#: [id number]    CC: medications

## 2021-03-04 NOTE — ED PROVIDER NOTE - SKIN [+], MLM
Detail Level: Detailed Lesion Type: Abscess Method: 11 blade Curette: No Anesthesia Type: 1% Xylocaine without epinephrine Size Of Lesion In Cm (Optional But May Be Required For Some Insurances): 0 Drainage Type?: purulent Wound Care: Polysporin ointment Dressing: dry sterile dressing Epidermal Sutures: 4-0 Ethilon Epidermal Closure: simple interrupted Suture Text: The incision was partially closed with Preparation Text: The area was prepped in the usual clean fashion. Curette Text (Optional): After the contents were expressed a curette was used to partially remove the cyst wall. Consent was obtained and risks were reviewed including but not limited to delayed wound healing, infection, need for multiple I and D's, and pain. Post-Care Instructions: I reviewed with the patient in detail post-care instructions. Patient should keep wound covered and call the office should any redness, pain, swelling or worsening occur. + animal bite

## 2021-08-02 NOTE — ED ADULT NURSE NOTE - CAS EDP DISCH TYPE
Nephrology ([facility]) Progress Note      Patient:  Nabeel Juarez  YOB: 1932  Date of Service: 8/2/2021  MRN: [id number]   Acct: [id number]   Primary Care Physician: Provider, No Known  Advance Directive:   There are no questions and answers to display.     Admit Date: 7/24/2021       Hospital Day: 0  Referring Provider: Chance Wiggins MD      Patient personally seen and examined.  Complete chart including Consults, Notes, Operative Reports, Labs, Cardiology, and Radiology studies reviewed as able.    Chief complaint: Abnormal labs.    Subjective:  Nabeel Juarez is a 89 y.o. male  whom we were consulted for chronic kidney disease.  Patient is a transfer from [facility] after a hospital admission for fluid overload and cellulitis of the lower extremities.  Patient has a history of lymphedema of the lower extremities.  He has chronic venous stasis ulcerations and was also having cellulitic changes with drainage.  He was managed with diuretics and antibiotics.  He was transferred to [facility] to continue diuretics and physical therapy.  His medical history is positive for congestive heart failure, chronic kidney disease stage IIIb, recurrent urine tract infection, hypertension and lymphedema of the lower extremities.  He has seen nephrology in the past.     This morning he has pulled his IV and nurses were unable to put any access on him.  This morning he is sitting up and eating lunch.  He feels well.  He has chronic lymphedema of lower extremities for the last several years.        Allergies:  Penicillins    Home Meds:  No medications prior to admission.       Medicines:    Past Medical History:  No past medical history on file.    Past Surgical History:  No past surgical history on file.    Family History  No family history on file.    Social History  Social History     Socioeconomic History   • Marital status:      Spouse name: Not on  file   • Number of children: Not on file   • Years of education: Not on file   • Highest education level: Not on file         Review of Systems:  History obtained from chart review and the patient  General ROS: No fever or chills  Respiratory ROS: No cough, shortness of breath, wheezing  Cardiovascular ROS: No chest pain or palpitations  Gastrointestinal ROS: No abdominal pain or melena  Genito-Urinary ROS: No dysuria or hematuria    Objective:  Patient Vitals for the past 24 hrs:   Weight   08/02/21 0500 (!) 138 kg (304 lb 11.2 oz)   Blood pressure: 111/47 mmHg  Heart rate is 70 bpm  O2 saturation 96%.    General: awake/alert   HEENT: Normocephalic atraumatic head  Neck: Supple with no JVD or carotid bruits.  Chest:  clear to auscultation bilaterally without respiratory distress  CVS: regular rate and rhythm  Abdominal: soft, nontender, positive bowel sounds  Extremities: Bilateral 3+ edema with multiple skin ulcers.  Skin: warm/dry      Labs:  Results from last 7 days   Lab Units 08/02/21  0525 07/29/21  0501 07/27/21  0509   WBC 10*3/mm3 12.18* 12.52* 11.56*   HEMOGLOBIN g/dL 12.5* 11.8* 11.5*   HEMATOCRIT % 38.7 36.6* 35.7*   PLATELETS 10*3/mm3 275 271 279         Results from last 7 days   Lab Units 08/02/21  0525 08/01/21  0423 07/31/21  0427   SODIUM mmol/L 136 137 138   POTASSIUM mmol/L 3.8 4.0 3.7   CHLORIDE mmol/L 94* 96* 96*   CO2 mmol/L 25.0 29.0 29.0   BUN mg/dL 55* 52* 50*   CREATININE mg/dL 2.26* 2.01* 2.01*   CALCIUM mg/dL 9.1 9.3 9.0   GLUCOSE mg/dL 145* 144* 157*       Radiology:   Imaging Results (Last 72 Hours)     ** No results found for the last 72 hours. **          Culture:  No results found for: BLOODCX, URINECX, WOUNDCX, MRSACX, RESPCX, STOOLCX      Assessment   1.  Stage IIIb chronic kidney disease baseline.  2.  Hypertensive renal disease.  3.  Severe abdominal obesity.  4.  Bilateral lower extremity lymphedema.  5.  Anemia of chronic kidney disease.  6.  Benign essential  hypertension.      Plan:  1.  Change to p.o. Bumex.  2.  P.o. Zaroxolyn.  3.  Monitor renal functions.      Perez Loya MD  8/2/2021  12:28 CDT   Home

## 2021-08-24 NOTE — ASU PREOP CHECKLIST - PATIENT SENT AT
2 YEAR WELL CHILD EXAM   THE [facility]    2 YEAR WELL CHILD EXAM    Rosamaria is a 2 y.o. 7 m.o. female     History given by Mother    CONCERNS/QUESTIONS: Yes   Wants to see allergist - eliminated dairy with some improvement.   Does not seem to have seasonal problems, but instead year run runny nose, mouth breathing, snoring.   When outside playing  or camping, she develops hives.  They are not sure if certain foods trigger.   Benadryl helped a little in past help relieve hives but more recently it has not helped as much.     Lately, she has had increased aggression. Does well in organized environment but at home if things don't go her way; head bangs, bites her thumbs, scratches and bites mom.  If presented with choice, she gets upset/shuts down.  Will have tamper tantrums, throws things, hits herself.   Behavior changes developed about 7 months ago.  No major changes at home or with dad.    Father and maternal aunt w/ ADHD.   Restarted school about 2 weeks ago but teachers not voiced concerns about behavior concerns.   In past, had speech delay.   IMMUNIZATION: up to date and documented      NUTRITION, ELIMINATION, SLEEP, SOCIAL    Eats a good variety of foods; occasionally picky with yogurt/apple sauce textures.     Additional Nutrition Questions:  Meats? Yes  Vegetarian or Vegan? No    MULTIVITAMIN: Yes; MVI     ELIMINATION:   Toilet trained? Yes with urine; still has issues issues with having BM, especially at school (hides when she needs to have stool)    Has good urine output and has soft BM's?  Good UOP; occasional constipation      SLEEP PATTERN:   Sleeps through the night? Yes  Sleeps in bed? Yes  Sleeps with parent? No    SOCIAL HISTORY:   Bio mom and dad are . The patient lives at home with patient, mother, grandmother, uncle, grandma's , and does attend day care. With dad, it's just father. Has 0 siblings.  Is the child exposed to smoke? Yes - father smokes but only  outside    HISTORY     Patient's medications, allergies, past medical, surgical, social and family histories were reviewed and updated as appropriate.    History reviewed. No pertinent past medical history.  Patient Active Problem List    Diagnosis Date Noted   • Allergic rhinitis 04/02/2020   • Impetigo 01/02/2020   • Hearing impairment 05/22/2019   • Urticaria 04/11/2019   • Acute bilateral otitis media 2018   • Hand, foot and mouth disease 2018   • Acute conjunctivitis 2018   • Murmur 2018   • Rash 2018   • Nasal congestion 2018   • Other general symptoms and signs 2018   • Weight loss 2018     No past surgical history on file.  History reviewed. No pertinent family history.  No current outpatient medications on file.     No current facility-administered medications for this visit.     No Known Allergies    REVIEW OF SYSTEMS     Constitutional: Afebrile, good appetite, alert.  HENT: No abnormal head shape, no congestion, no nasal drainage. Denies any headaches or sore throat.   Eyes: Vision appears to be normal.  No crossed eyes.   Respiratory: Negative for any difficulty breathing or chest pain.   Cardiovascular: Negative for changes in color/activity.   Gastrointestinal: Negative for any vomiting, constipation or blood in stool.  Genitourinary: Ample urination.  Musculoskeletal: Negative for any pain or discomfort with movement of extremities.   Skin: Negative for rash or skin infection.  Neurological: Negative for any weakness or decrease in strength.     Psychiatric/Behavioral: Appropriate for age.     DEVELOPMENTAL SURVEILLANCE :      Engage in imaginative play? Yes  Play in cooperation and share? Yes  Eat independently? Yes   Put on shirt or jacket by herself? No  Tells you a story from a book or TV? No; goes on tangent   Pedal a tricycle? No  Jump off a couch or a chair? Yes  Jump forwards? Yes  Draw a single [place]? Yes  Cut with child scissors? No  Throws  "ball overhand? Yes  Use of 3 word sentences? Yes  Speech is understandable 75% of the time to strangers? Yes   Kicks a ball? Yes  Knows one body part? Yes  Knows if boy/girl? No  Simple tasks around the house? Yes    SCREENINGS     Visual acuity: Fail  No exam data present: Abnormal, already wears glasses   Spot Vision Screen  Lab Results   Component Value Date    ODSPHEREQ 0.75 08/24/2021    ODSPHERE 1.75 08/24/2021    ODCYCLINDR -2.00 08/24/2021    ODAXIS @175 08/24/2021    OSSPHEREQ 0.75 08/24/2021    OSSPHERE 1.25 08/24/2021    OSCYCLINDR -1.50 08/24/2021    OSAXIS @3 08/24/2021    SPTVSNRSLT refer 08/24/2021     ORAL HEALTH:   Primary water source is deficient in fluoride?  Yes  Oral Fluoride Supplementation recommended? Yes   Cleaning teeth twice a day, daily oral fluoride? Yes  Established dental home? Yes    SELECTIVE SCREENINGS INDICATED WITH SPECIFIC RISK CONDITIONS:     ANEMIA RISK: No  (Strict Vegetarian diet? Poverty? Limited food access?)      LEAD RISK:    Does your child live in or visit a home or  facility with an identified  lead hazard or a home built before 1960 that is in poor repair or was  renovated in the past 6 months? Mom is not sure.  No    TB RISK ASSESMENT:   Has child been diagnosed with AIDS? No  Has family member had a positive TB test? No  Travel to high risk country? No     OBJECTIVE      PHYSICAL EXAM:   Reviewed vital signs and growth parameters in EMR.     BP 96/64   Pulse 88   Temp 36.7 °C (98 °F)   Resp 28   Ht 1.003 m (3' 3.5\")   Wt 16.2 kg (35 lb 12.8 oz)   SpO2 97%   BMI 16.13 kg/m²     Blood pressure percentiles are 69 % systolic and 90 % diastolic based on the 2017 AAP Clinical Practice Guideline. This reading is in the elevated blood pressure range (BP >= 90th percentile).    Height - 72 %ile (Z= 0.57) based on CDC (Girls, 2-20 Years) Stature-for-age data based on Stature recorded on 8/24/2021.  Weight - 73 %ile (Z= 0.62) based on CDC (Girls, 2-20 Years) " weight-for-age data using vitals from 8/24/2021.  BMI - 70 %ile (Z= 0.52) based on CDC (Girls, 2-20 Years) BMI-for-age based on BMI available as of 8/24/2021.    General: This is an alert, active child in no distress.   HEAD: Normocephalic, atraumatic.   EYES: PERRL. No conjunctival infection or discharge.   EARS: TM’s are transparent with good landmarks. Canals are patent.  NOSE: Nares are patent and free of congestion.  MOUTH: Dentition within normal limits.  THROAT: Oropharynx has no lesions, moist mucus membranes, without erythema, tonsils normal.   NECK: Supple, no lymphadenopathy or masses.   HEART: Regular rate and rhythm without murmur. Pulses are 2+ and equal.    LUNGS: Clear bilaterally to auscultation, no wheezes or rhonchi. No retractions or distress noted.  ABDOMEN: Normal bowel sounds, soft and non-tender without hepatomegaly or splenomegaly or masses.   GENITALIA: Normal female genitalia. normal external genitalia, no erythema, no discharge.  Rolando Stage I.  MUSCULOSKELETAL: Spine is straight. Extremities are without abnormalities. Moves all extremities well with full range of motion.    NEURO: Active, alert, oriented per age.    SKIN: Intact without significant rash or birthmarks. Skin is warm, dry, and pink.     ASSESSMENT AND PLAN     1. Encounter for well child check  1. Well Child Exam:  Healthy 2 y.o. 7 m.o. old with good growth and development.   2. BMI in *normal range at 70%.    1. Anticipatory guidance was reviewed as well as healthy lifestyle, including diet and exercise discussed and appropriate.  Bright Futures handout provided.  2. Return to clinic for 3 year well child exam or as needed.  3. Immunizations given today: None.    4. Vaccine Information statements given for each vaccine if administered. Discussed benefits and side effects of each vaccine with patient and family. Answered all questions of family/patient.   5. Multivitamin with 400iu of Vitamin D po qd.  6. Dental exams  twice yearly at established dental home.  - Pediatric Multivitamins-Fl (MULTI VIT/FL) 0.25 MG Chew Tab; Chew 1 Tablet every day.  Dispense: 90 Tablet; Refill: 3    2. Encounter for routine eye and vision examination  Failed - wears glasses     5. Urticaria and other atopic dermatitis  Currently well controlled but breaks out in rash when outside   - REFERRAL TO PEDIATRIC ALLERGY      7. Non-seasonal allergic rhinitis, unspecified trigger  - REFERRAL TO PEDIATRIC ALLERGY  - fluticasone (VERAMYST) 27.5 MCG/SPRAY nasal spray; Administer 1 Spray into affected nostril(S) every day for 30 days.  Dispense: 9 mL; Refill: 3  Pt refuses to take PO liquid or chewable tablets     8. Temper tantrum  - REFERRAL TO BEHAVIORAL HEALTH  Given meditative coloring sheets   Given Tracie for mom and teacher        28-Feb-2019 12:55 28-Feb-2019 13:06

## 2021-09-23 ENCOUNTER — TRANSCRIPTION ENCOUNTER (OUTPATIENT)
Age: 56
End: 2021-09-23

## 2021-09-23 NOTE — ED ADULT NURSE NOTE - EXTENSIONS OF SELF_ADULT
Maria Ines Sue is a 20 y.o. female.    Chief Complaint   Patient presents with   • Diabetes     Follow up on Diabetes   • Anxiety     Follow up on Anxiety   • Migraine     Follow up on migraine   • Plantar Warts     Right foot       HPI   Patient has had diabetes for the past few years. She has been compliant with the medications and denies any side effects from it. She has been monitoring fingersticks.  her fingerstick range is between 130-140.  She has not had hypoglycemic symptoms. She has been following a diabetic diet and has been active.  her last eye exam was less than a year ago .     Patient has anxiety and depression.  She reports seeing a counselor at Counts include 234 beds at the Levine Children's CMS/[facility]) - Amarilis Paul DO   None

## 2021-09-24 ENCOUNTER — RESULT REVIEW (OUTPATIENT)
Age: 56
End: 2021-09-24

## 2021-09-24 ENCOUNTER — OUTPATIENT (OUTPATIENT)
Dept: OUTPATIENT SERVICES | Facility: HOSPITAL | Age: 56
LOS: 1 days | Discharge: HOME | End: 2021-09-24
Payer: OTHER MISCELLANEOUS

## 2021-09-24 DIAGNOSIS — Z98.890 OTHER SPECIFIED POSTPROCEDURAL STATES: Chronic | ICD-10-CM

## 2021-09-24 DIAGNOSIS — Z90.710 ACQUIRED ABSENCE OF BOTH CERVIX AND UTERUS: Chronic | ICD-10-CM

## 2021-09-24 DIAGNOSIS — I67.1 CEREBRAL ANEURYSM, NONRUPTURED: Chronic | ICD-10-CM

## 2021-09-24 DIAGNOSIS — M54.5 LOW BACK PAIN: Chronic | ICD-10-CM

## 2021-09-24 PROCEDURE — 88300 SURGICAL PATH GROSS: CPT | Mod: 26

## 2021-10-13 LAB — SURGICAL PATHOLOGY STUDY: SIGNIFICANT CHANGE UP

## 2022-06-07 NOTE — H&P PST ADULT - MAMMOGRAM, LAST, PROFILE
2017 High Dose Vitamin A Counseling: Side effects reviewed, pt to contact office should one occur. 2018 2018:

## 2023-02-03 NOTE — H&P PST ADULT - LAST PROSTATE EXAM
Medication changes made today:  None      Tests Ordered:  None      Understanding your instructions:  If you feel that things may have been explained in a way that you do not understand or did not receive easy to understand instruction, please contact me at [phone] and I would be more than happy to review your plan of care with you. Your healthcare is important to us and we want to make sure you understand your directions.    Our Electrophysiology Team will continue to collaborate and work very closely together to best meet your needs.    Thank you for choosing us to take care of your electrophysiology needs. It is a pleasure to work with you, and again, please contact us for any questions or concerns anytime.      n/a

## 2023-02-06 NOTE — ASU PATIENT PROFILE, ADULT - PAIN CHRONIC, PROFILE
STROKE NEUROLOGY  CONSULTATION NOTE    CHIEF COMPLAINT  Left sided numbness, tingling, weakness, and dizziness    ASSESSMENT AND PLAN:   Sarah Tamez is a 30 year old right-handed female with PMHx of left sided numbness, tingling, weakness, and dizziness. Patient originally presented to [facility] on 2/6/2023 for the same symptoms.    She woke up at 1am to go to the bathroom and was normal. She went back to bed. She woke up at 5:40 with dizziness (sense of room spinning) which was intermittent, improved with sitting still without head movements. Around 9am she noticed left head/face, left arm, left proximal leg numbness, tingling, weakness which was also intermittent.  denies any facial droop, dysarthria, aphasia. Denies any stress.     LKN: 01:00 on 02/06/23. NIHSS in ED was 2. CTH and CTA were negative. Thrombolytics not given as patient was outside 4.5 hr window.     Exam was remarkable for left sided numbness, mild left sided weakness and numbness in arm and leg. Farnsworth hallpike and HINTS were negative.     Test Date Results   CTH wo 02/06/2023 Negative   Vessel imaging (CTA/MRA) 02/06/2023 Negative   MRI brain  Pending   EKG 02/06/2023 NSR (sinus tachy in past)   TTE 11/14/2021 LVEF 57%   A1C 02/06/2023 Pending   Lipid panel 02/06/2023 Pending     Impression:   1) Left sided numbness, tingling, weakness, and dizziness - Etiology unclear. Possible stroke (posterior circulation?) vs transient neurologic deficit vs atypical migraine vs functional vs unlikely seizure. Dizziness could be posterior circulation stroke (despite intermittent and negative HINTS) vs peripheral (BPPV but chacha hallpike negative; labyrinthitis but not persistent, not severe, no nystagmus) vs dehydration vs other    Plan:  -Agree with MRI brain w/wo contrast  -Follow up A1C and lipid panel  -Neurology to follow      HISTORY OF PRESENT ILLNESS:   Sarah Tamez is a 30 year old right-handed female with no significant PMHx who  is being seen in neurologic consultation on 2/6/2023 for left sided numbness, tingling, weakness, and dizziness.  Patient originally presented to [facility] on 2/6/2023 for the same symptoms. History was provided by patient and spouse. Patient declined , preferring  to help translate as needed.     She presented to the ED with complaints of dizziness, unsteady gait, and left arm and face numbness since 9am.     She woke up between 12 and 1am to go to the bathroom. She was her normal self at that time. She woke up at 5:40. She woke up with dizziness. She describes mild dizziness as room spinning. The dizziness came and went. The dizziness was absent when she was sitting still, not moving her head. The dizziness would occur while walking or if she turned her head right/left (constant). She would have dizziness despite standing for a longer period of time.     She did have one fall. She stood up from the couch and had dizziness. She is unsure what happened, but possibly left leg weakness caused her to fall back to a seated position into the couch.     Around 8-9am, she began to notice the pins and needles. The numbness comes and goes. It involves the left side of the head, the face, entire left arm, and proximal left leg. She also noticed left arm and leg weakness. She called her  to come home because she was not feeling well. He came home and denies any facial droop, dysarthria, aphasia.     Last known normal: 1am on 02/06/23  Initial NIHSS: 2  Baseline modified Collette Scale: 0 = No symptoms at all; no limitations and no symptoms.    Antiplatelet/anticoagulant use prior to admission: None  Date/time of last anticoagulant use:  N/A  Statin use prior to admission (name/dose): N/A    If IV Alteplase/tPA not administered, the patient is not eligible for acute reperfusion therapy because:  [x] Outside of 4.5 hour treatment window  [] NIHSS=0, mild severity with no disabling deficit  and/or rapidly improving symptoms  [] INR greater than 1.7 and/or current anticoagulant use (for 4.5 hour window)  [] Etiology of symptoms not related to stroke  [] Patient/Family refusal  [] Other:    HISTORIES:  Patient Active Problem List    Diagnosis Date Noted   • Influenza A 05/28/2022     Priority: Low   • Pneumonia of both lungs due to infectious organism 05/03/2022     Priority: Low   • Fever 05/03/2022   • Cough 05/03/2022   • Tachycardia 05/03/2022       Past Medical History:   Diagnosis Date   • Pneumonia        History reviewed. No pertinent surgical history.    No family history on file.    Social History     Tobacco Use   • Smoking status: Never   • Smokeless tobacco: Never   Substance Use Topics   • Alcohol use: Never   • Drug use: Never       INPATIENT MEDICATIONS:  Current Facility-Administered Medications   Medication Dose Route Frequency Provider Last Rate Last Admin   • sodium chloride 0.9 % flush bag 25 mL  25 mL Intravenous PRN Zita Maria MD       • sodium chloride (PF) 0.9 % injection 2 mL  2 mL Intracatheter 2 times per day Zita Maria MD         No current outpatient medications on file.        HOME MEDICATIONS:  (Not in a hospital admission)      ALLERGIES:  Allergies as of 02/06/2023   • (No Known Allergies)        REVIEW OF SYSTEMS:  Constitutional: Negative.    HENT: Negative.    Eyes: Negative.    Respiratory: Negative.    Cardiovascular: Negative.    Gastrointestinal: Negative.    Endocrine: Negative.    Genitourinary: Negative.    Musculoskeletal: Negative.    Skin: Negative.    Allergic/Immunologic: Negative.    Neurological:        See HPI   Hematological: Negative.    Psychiatric/Behavioral:        See HPI     PHYSICAL EXAM:   /86 (BP Location: LUE - Left upper extremity, Patient Position: Semi-Lamb's)   Pulse 77   Temp 98.6 °F (37 °C) (Tympanic)   Resp 16   Wt 82.8 kg (182 lb 8.7 oz)   LMP 01/09/2023   BMI 32.34 kg/m²   BSA 1.86 m²   General Appearance: In no  apparent distress, comfortable    Mental Status Exam:    Level of Alertness: alert  Orientation: oriented to person, place, time, and general circumstances  Memory: normal  Fund of Knowledge: normal  Attention/Concentration: normal  Language: normal    Cranial Nerves:    CN I: Not tested.  CN II: Visual acuity grossly intact. Visual fields full to confrontational testing. Pupils equal and reactive (CN II/III); Funduscopic Exam: Deferred  CN III, IV, VI: Extraocular muscles are intact without nystagmus or gaze paresis. There is no ptosis. Reports diplopia with faster downwards eye movements  CN V: Facial sensation diminished to pin V1-V3 bilaterally.  Muscles of mastication normal.  CN VII: Facial movement full and symmetric.  CN VIII: Hearing intact to a speaking voice.  CN IX, X: Palate elevates in the midline, normal gag, swallow, and phonation.  CN XI: Sternocleidomastoid and trapezius strength is symmetric and full  CN XII: Tongue protrudes midline without atrophy or fasciculations.    Eun Hallpike: Negative bilaterally  HINTS: Head impulse negative; No nystagmus; Test of skew negative    Motor Exam:  Muscle Right Left   Deltoid 5 5-   Arm flex (biceps) 5 5-   Arm ext (triceps) 5 5-   Wrist extension 5 5-   Finger extension 5 5-   ADM 5 5-   FDI 5 5-   Hip flex 5 4+   Hip abd 5 4   Hip add 5 4   Knee flex 5 4+   Knee ext 5 4+   Dorsiflex 5 4   Plantarflex 5 4   Toe ext (EHL) 5 4   Comments: unclear if poor effort for left side    Tone: Normal  Abnormal movements: None     Reflexes:  Reflex Right Left   Biceps reflex (C5-C6): 2+ 2+   Brachioradialis reflex 2+ 2+   Knee reflex: (L2-L4): 2+ 2+   Ankle reflex: (S1): 2+ 2+     Cutler: Negative bilaterally  Babinski: Mute bilaterally    Sensory:   Pinprick: Diminished - on left face, arm, and leg  Light Touch: Diminished - on left face, arm, and leg    Coordination:  Finger-to-nose, heel-to-shin are normal bilaterally without evidence of dysmetria.    Gait:    Sit to  Stand: Normal  Casual: Abnormal: almost antalgic gait. Patient states she can't put pressure on her left leg. Requires holding on to bed to ambulate  Toe: Abnormal: states she is unable to with left leg  Heel: Abnormal: states she is unable to with left leg  Tandem: Deferred  Romberg: Negative    NIH Stroke Scale:      1a.  Level of consciousness: 0=alert; keenly responsive   1b.  LOC questions: 0=Answers both questions correctly   1c.  LOC commands: 0=Performs both task correctly   2.    Best Gaze: 0=normal   3.    Visual: 0=No visual loss   4.    Facial Palsy: 0=Normal symmetric movement   5a.  Motor left arm: 0=No drift, limb holds 90 (or 45) degrees for full 10 seconds   5b.  Motor right arm: 0=No drift, limb holds 90 (or 45) degrees for full 10 seconds   6a.  Motor left le=Drift, limb holds 90 (or 45) degrees but drifts down before full 5 seconds, does not hit bed   6b.  Motor right le=No drift, limb holds 90 or (45) degrees for full 5 seconds   7.    Limb Ataxia: 0=Absent   8.    Sensory: 1=Mild to moderate sensory loss; patient feels pinprick is less sharp or is dull on the affected side; there is a loss of a superficial pain with pinprick but patient is aware She is being touched   9.    Best Language: 0=No aphasia, normal  10.   Dysarthria: 0=Normal  11.   Extinction and Inattention: 0=No abnormality    Total NIHSS score:  2      Additional Examination Elements and Findings:   EXAM:    Psych:  Mood euthymic, congruent affect, good eye contact, speech RRR   Head:  Normocephalic, atraumatic   Eyes:  Conjunctive without erythema or injection, sclera anicteric   Oropharynx:  Moist and pink mucous membranes, absent tongue atrophy or fasciculations   Neck: normal active ROM, supple, absent lymphadenopathy   Lungs:  normal work of breathing, absent audible wheezes   Cardiac:  Pulse RRR, rapid capillary refill   Abdomen: soft, non-tender   Extremities:  Absent pitting edema, absent atrophy or fasciculations    Integument: normal texture and turgor, absent tenting        DATA:   LABORATORY  I have reviewed the pertinent laboratory tests:  Recent Labs   Lab 02/06/23  1252   WBC 8.9   RBC 4.62   HGB 13.7   HCT 40.5        Recent Labs   Lab 02/06/23  1252   SODIUM 136   POTASSIUM 3.7   CHLORIDE 104   CO2 30   BUN 15   CREATININE 0.65   CALCIUM 9.4   ALBUMIN 3.7   BILIRUBIN 0.3   ALKPT 66   GPT 23   AST 11   GLUCOSE 94     Recent Labs   Lab 02/06/23  1252   PTT 29   PT 10.5   INR 1.0     No results found  No results found for: HGBA1C]    IMAGING/OTHER TESTING:  I have reviewed the pertinent imaging study reports.      CTA HEAD AND NECK W CONTRAST LEVEL 1   Final Result   Normal CTA of the head and neck.  (Dr. Vinita Best was notified   by perfect serve texting at 1:40 PM.  A phone extension was not provided   for this study).      Electronically Signed by: ROSS COPE M.D.    Signed on: 2/6/2023 1:43 PM          CT HEAD LEVEL 1   Final Result   No abnormalities. (Dr. Vinita Best was notified by phone at   1:19 PM).      Electronically Signed by: ROSS COPE M.D.    Signed on: 2/6/2023 1:20 PM          MRI BRAIN WO CONTRAST    (Results Pending)     I personally reviewed the CTH, CTA head and neck. CTH unremarkable. CTA head and neck with patent anterior and posterior circulation.     TTE/SRIRAM results (11/13/2021):   STUDY CONCLUSIONS  SUMMARY:  1. Left ventricle: The cavity size is normal. Wall thickness is normal. Systolic function is normal. The ejection fraction was measured by biplane method of disks. Wall motion is normal; there are no regional wall motion abnormalities. Left ventricular diastolic function parameters are normal. The ejection fraction is 57%.  2. Aortic valve: Transvalvular velocity is within the normal range. There is no stenosis. No significant regurgitation.  3. Mitral valve: Transvalvular velocity is within the normal range. There is no evidence for stenosis. No significant  regurgitation.  4. Right ventricle: Systolic function is normal.  5. Pericardium, extracardiac: There is no pericardial effusion.      Hari Fox MD (available via Outsmart)  [facility] - Neurology Attending  Date: 2/6/2023     yes

## 2023-03-22 NOTE — H&P ADULT - ASSESSMENT
Preventive Care Visit  [facility]  Mar 22, 2023    Assessment & Plan   12 year old 0 month old, here for preventive care.    Marc was seen today for well child.    Diagnoses and all orders for this visit:    Encounter for routine child health examination without abnormal findings    BMI (body mass index), pediatric, > 99% for age  -     Lipid Profile (Chol, Trig, HDL, LDL calc); Future  -     Comprehensive metabolic panel (BMP + Alb, Alk Phos, ALT, AST, Total. Bili, TP); Future  -     Lipid Profile (Chol, Trig, HDL, LDL calc)  -     Comprehensive metabolic panel (BMP + Alb, Alk Phos, ALT, AST, Total. Bili, TP)    Encounter for routine child health examination w/o abnormal findings  -     BEHAVIORAL/EMOTIONAL ASSESSMENT (23686)  -     SCREENING TEST, PURE TONE, AIR ONLY  -     SCREENING, VISUAL ACUITY, QUANTITATIVE, BILAT  -     MENINGOCOCCAL (MENQUADFI ) (2 YRS - 55 YRS)  -     HPV, IM (9-26 YRS) - Gardasil 9  -     TDAP 10-64Y (ADACEL,BOOSTRIX)  -     PRIMARY CARE FOLLOW-UP SCHEDULING; Future      Patient has been advised of split billing requirements and indicates understanding: Yes  Growth      Height: Normal , Weight: Severe Obesity (BMI > 99%)  Pediatric Healthy Lifestyle Action Plan         Exercise and nutrition counseling performed  Declines referral at this time    Immunizations   Appropriate vaccinations were ordered.    Anticipatory Guidance    Reviewed age appropriate anticipatory guidance.   SOCIAL/ FAMILY:    Peer pressure    Bullying    Increased responsibility    Parent/ teen communication    Social media    TV/ media    School/ homework  NUTRITION:    Healthy food choices    Family meals    Weight management  HEALTH/ SAFETY:    Adequate sleep/ exercise    Dental care    Body image    Seat belts    Contact sports    Bike/ sport helmets    Cleared for sports:  Not addressed    Referrals/Ongoing Specialty Care  None  Verbal Dental  Referral: Verbal dental referral was given  Dental Fluoride Varnish:   No, parent/guardian declines fluoride varnish.  Reason for decline: Recent/Upcoming dental appointment    Dyslipidemia Follow Up:  Discussed nutrition, Provided weight counseling and Ordered Lipid testing    Subjective     Additional Questions 3/22/2023   Questions for today's visit No   Surgery, major illness, or injury since last physical No     Social 3/22/2023   Lives with (s), Grandparent(s)   Recent potential stressors (!) RECENT MOVE, (!) PARENTAL SEPARATION, (!) DIFFICULTIES BETWEEN PARENTS   History of trauma (!) YES   Family Hx of mental health challenges (!) YES   Lack of transportation has limited access to appts/meds Yes   Difficulty paying mortgage/rent on time Yes   Lack of steady place to sleep/has slept in a shelter Yes   (!) HOUSING CONCERN PRESENT (!) TRANSPORTATION CONCERN PRESENT  Health Risks/Safety 3/22/2023   Does your adolescent always wear a seat belt? Yes   Helmet use? (!) NO        TB Screening: Consider immunosuppression as a risk factor for TB 3/22/2023   Recent TB infection or positive TB test in family/close contacts No   Recent travel outside [place] (child/family/close contacts) No   Recent residence in high-risk group setting (correctional facility/health care facility/homeless shelter/refugee camp) No      Dyslipidemia 3/22/2023   FH: premature cardiovascular disease (!) GRANDPARENT   FH: hyperlipidemia No   Personal risk factors for heart disease (!) SMOKES CIGARETTES     No results for input(s): CHOL, HDL, LDL, TRIG, CHOLHDLRATIO in the last [id number] hours.    Sudden Cardiac Arrest and Sudden Cardiac Death Screening 3/22/2023   History of syncope/seizure No   History of exercise-related chest pain or shortness of breath (!) YES   FH: premature death (sudden/unexpected or other) attributable to heart diseases No   FH: cardiomyopathy, ion channelopothy, Marfan syndrome, or arrhythmia No     Dental  Screening 3/22/2023   Has your adolescent seen a dentist? (!) NO   Has your adolescent had cavities in the last 3 years? Unknown   Has your adolescent s parent(s), caregiver, or sibling(s) had any cavities in the last 2 years?  Unknown     Diet 3/22/2023   Do you have questions about your adolescent's eating?  No   Do you have questions about your adolescent's height or weight? (!) YES   Please specify: weight   What does your adolescent regularly drink? Water, (!) JUICE, (!) SPORTS DRINKS   How often does your family eat meals together? Most days   Servings of fruits/vegetables per day (!) 1-2   At least 3 servings of food or beverages that have calcium each day? Yes   In past 12 months, concerned food might run out Patient refused   In past 12 months, food has run out/couldn't afford more Patient refused     (!) FOOD SECURITY CONCERN PRESENT  Activity 3/22/2023   Days per week of moderate/strenuous exercise (!) 4 DAYS   On average, how many minutes does your adolescent engage in exercise at this level? (!) 20 MINUTES   What does your adolescent do for exercise?  participats in gym at school /swims 2x week   What activities is your adolescent involved with?  none/at this time     Media Use 3/22/2023   Hours per day of screen time (for entertainment) 5-6   Screen in bedroom (!) YES     Sleep 3/22/2023   Does your adolescent have any trouble with sleep? (!) NOT GETTING ENOUGH SLEEP (LESS THAN 8 HOURS), (!) DIFFICULTY FALLING ASLEEP   Daytime sleepiness/naps No     School 3/22/2023   School concerns (!) READING, (!) MATH, (!) LEARNING DISABILITY   Grade in school 7th Grade   Current school [employer]   School absences (>2 days/mo) No     Vision/Hearing 3/22/2023   Vision or hearing concerns No concerns     Development / Social-Emotional Screen 3/22/2023   Developmental concerns (!) INDIVIDUAL EDUCATIONAL PROGRAM (IEP)     Psycho-Social/Depression - PSC-17 required for C&TC through age 18  General screening:   "Electronic PSC   PSC SCORES 3/22/2023   Inattentive / Hyperactive Symptoms Subtotal 4   Externalizing Symptoms Subtotal 2   Internalizing Symptoms Subtotal 0   PSC - 17 Total Score 6       Follow up:  no follow up necessary   Teen Screen    Teen Screen completed, reviewed and scanned document within chart         Objective     Exam  /78 (BP Location: Left arm, Cuff Size: Adult Regular)   Pulse (!) 133   Resp 19   Ht 1.622 m (5' 3.86\")   Wt 90.2 kg (198 lb 12.8 oz)   SpO2 96%   BMI 34.28 kg/m    78 %ile (Z= 0.76) based on CDC (Boys, 2-20 Years) Stature-for-age data based on Stature recorded on 3/22/2023.  >99 %ile (Z= 2.75) based on CDC (Boys, 2-20 Years) weight-for-age data using vitals from 3/22/2023.  >99 %ile (Z= 2.44) based on [facility] (Boys, 2-20 Years) BMI-for-age based on BMI available as of 3/22/2023.  Blood pressure percentiles are 97 % systolic and 94 % diastolic based on the 2017 AAP Clinical Practice Guideline. This reading is in the Stage 1 hypertension range (BP >= 130/80).    Vision Screen  Vision Screen Details  Does the patient have corrective lenses (glasses/contacts)?: No  Vision Acuity Screen  Vision Acuity Tool: [place]  RIGHT EYE: 10/12.5 (20/25)  LEFT EYE: 10/10 (20/20)  Is there a two line difference?: No  Vision Screen Results: Pass    Hearing Screen  Hearing Screen Not Completed  Reason Hearing Screen was not completed: Parent declined - No concerns    Physical Exam  GENERAL: Active, alert, in no acute distress.  SKIN: Clear. No significant rash, abnormal pigmentation or lesions  EYES: Pupils equal, round, reactive, Extraocular muscles intact. Normal conjunctivae.  EARS: Normal canals. Tympanic membranes are normal; gray and translucent.  NOSE: Normal without discharge.  MOUTH/THROAT: Clear. No oral lesions.  NECK: Supple, no masses.  No thyromegaly.  LYMPH NODES: No adenopathy  LUNGS: Clear. No rales, rhonchi, wheezing or retractions  HEART: Regular rhythm. Normal S1/S2. No murmurs. " Normal pulses.  ABDOMEN: Soft, non-tender, not distended, no masses or hepatosplenomegaly. Bowel sounds normal.   NEUROLOGIC: No focal findings. Cranial nerves grossly intact: DTR's normal. Normal gait, strength and tone  BACK: Spine is straight, no scoliosis.  EXTREMITIES: Full range of motion, no deformities  : Exam declined by parent/patient. Reason for decline: Patient/Parental preference    Prior to immunization administration, verified patients identity using patient s name and date of birth. Please see Immunization Activity for additional information.     Screening Questionnaire for Pediatric Immunization    Is the child sick today?   No   Does the child have allergies to medications, food, a vaccine component, or latex?   No   Has the child had a serious reaction to a vaccine in the past?   No   Does the child have a long-term health problem with lung, heart, kidney or metabolic disease (e.g., diabetes), asthma, a blood disorder, no spleen, complement component deficiency, a cochlear implant, or a spinal fluid leak?  Is he/she on long-term aspirin therapy?   No   If the child to be vaccinated is 2 through 4 years of age, has a healthcare provider told you that the child had wheezing or asthma in the  past 12 months?   No   If your child is a baby, have you ever been told he or she has had intussusception?   No   Has the child, sibling or parent had a seizure, has the child had brain or other nervous system problems?   No   Does the child have cancer, leukemia, AIDS, or any immune system         problem?   No   Does the child have a parent, brother, or sister with an immune system problem?   No   In the past 3 months, has the child taken medications that affect the immune system such as prednisone, other steroids, or anticancer drugs; drugs for the treatment of rheumatoid arthritis, Crohn s disease, or psoriasis; or had radiation treatments?   No   In the past year, has the child received a transfusion of  blood or blood products, or been given immune (gamma) globulin or an antiviral drug?   No   Is the child/teen pregnant or is there a chance that she could become       pregnant during the next month?   No   Has the child received any vaccinations in the past 4 weeks?   No               Immunization questionnaire answers were all negative.      Injection of HPV, Meningitis and TDAP given by MA. Patient instructed to remain in clinic for 15 minutes afterwards, and to report any adverse reactions.     Screening performed by Dominick Orozco MD on 3/24/2023 at 9:57 AM.    Dominick Orozco MD  [facility]     53 yo female presented with animal bite and cellulitis on LUE.

## 2023-08-14 NOTE — ASU DISCHARGE PLAN (ADULT/PEDIATRIC) - A. DRIVE A CAR, OPERATE POWER TOOLS OR MACHINERY
Chief Complaint   Patient presents with    Follow-up     Left elbow fx DOI-5/15/23 WC   This patient who has sustained a nondisplaced fracture left radial head on the 5/15/2023 is seen in follow-up. Patient does have occasional pain in the elbow. Occasionally also she feels some crackling sensation in the elbow. If she lifts something very heavy then she might be aware of some pain in the elbow. The patient has returned to work long time ago. 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 Examination: She still lacks about 5 degrees of full extension. All other motions are free and full and on moving the elbow today I could not reproduce any crepitation which she says it is intermittent anyway. X-rays: Further x-rays today show the fracture is healed and in good alignment. Diagnosis: Healed fracture left radial head. Treatment: Continue normal activities and we will see her as needed. I did tell her that occasionally she might have some pain in future.
Statement Selected

## 2023-11-17 ENCOUNTER — EMERGENCY (EMERGENCY)
Facility: HOSPITAL | Age: 58
LOS: 1 days | Discharge: HOME | End: 2023-11-17
Attending: EMERGENCY MEDICINE
Payer: COMMERCIAL

## 2023-11-17 VITALS
HEIGHT: 59 IN | DIASTOLIC BLOOD PRESSURE: 101 MMHG | SYSTOLIC BLOOD PRESSURE: 151 MMHG | TEMPERATURE: 98 F | RESPIRATION RATE: 22 BRPM | HEART RATE: 88 BPM | OXYGEN SATURATION: 98 % | WEIGHT: 149.91 LBS

## 2023-11-17 DIAGNOSIS — M54.5 LOW BACK PAIN: Chronic | ICD-10-CM

## 2023-11-17 DIAGNOSIS — Z98.890 OTHER SPECIFIED POSTPROCEDURAL STATES: Chronic | ICD-10-CM

## 2023-11-17 DIAGNOSIS — Z90.710 ACQUIRED ABSENCE OF BOTH CERVIX AND UTERUS: Chronic | ICD-10-CM

## 2023-11-17 DIAGNOSIS — I67.1 CEREBRAL ANEURYSM, NONRUPTURED: Chronic | ICD-10-CM

## 2023-11-17 LAB
ALBUMIN SERPL ELPH-MCNC: 3.3 G/DL — LOW (ref 3.5–5)
ALBUMIN SERPL ELPH-MCNC: 3.3 G/DL — LOW (ref 3.5–5)
ALP SERPL-CCNC: 98 U/L — SIGNIFICANT CHANGE UP (ref 40–120)
ALP SERPL-CCNC: 98 U/L — SIGNIFICANT CHANGE UP (ref 40–120)
ALT FLD-CCNC: 36 U/L DA — SIGNIFICANT CHANGE UP (ref 10–60)
ALT FLD-CCNC: 36 U/L DA — SIGNIFICANT CHANGE UP (ref 10–60)
ANION GAP SERPL CALC-SCNC: 6 MMOL/L — SIGNIFICANT CHANGE UP (ref 5–17)
ANION GAP SERPL CALC-SCNC: 6 MMOL/L — SIGNIFICANT CHANGE UP (ref 5–17)
AST SERPL-CCNC: 20 U/L — SIGNIFICANT CHANGE UP (ref 10–40)
AST SERPL-CCNC: 20 U/L — SIGNIFICANT CHANGE UP (ref 10–40)
BASOPHILS # BLD AUTO: 0.04 K/UL — SIGNIFICANT CHANGE UP (ref 0–0.2)
BASOPHILS # BLD AUTO: 0.04 K/UL — SIGNIFICANT CHANGE UP (ref 0–0.2)
BASOPHILS NFR BLD AUTO: 0.4 % — SIGNIFICANT CHANGE UP (ref 0–2)
BASOPHILS NFR BLD AUTO: 0.4 % — SIGNIFICANT CHANGE UP (ref 0–2)
BILIRUB SERPL-MCNC: 0.2 MG/DL — SIGNIFICANT CHANGE UP (ref 0.2–1.2)
BILIRUB SERPL-MCNC: 0.2 MG/DL — SIGNIFICANT CHANGE UP (ref 0.2–1.2)
BUN SERPL-MCNC: 24 MG/DL — HIGH (ref 7–18)
BUN SERPL-MCNC: 24 MG/DL — HIGH (ref 7–18)
CALCIUM SERPL-MCNC: 8.8 MG/DL — SIGNIFICANT CHANGE UP (ref 8.4–10.5)
CALCIUM SERPL-MCNC: 8.8 MG/DL — SIGNIFICANT CHANGE UP (ref 8.4–10.5)
CHLORIDE SERPL-SCNC: 110 MMOL/L — HIGH (ref 96–108)
CHLORIDE SERPL-SCNC: 110 MMOL/L — HIGH (ref 96–108)
CO2 SERPL-SCNC: 23 MMOL/L — SIGNIFICANT CHANGE UP (ref 22–31)
CO2 SERPL-SCNC: 23 MMOL/L — SIGNIFICANT CHANGE UP (ref 22–31)
CREAT SERPL-MCNC: 0.91 MG/DL — SIGNIFICANT CHANGE UP (ref 0.5–1.3)
CREAT SERPL-MCNC: 0.91 MG/DL — SIGNIFICANT CHANGE UP (ref 0.5–1.3)
EGFR: 73 ML/MIN/1.73M2 — SIGNIFICANT CHANGE UP
EGFR: 73 ML/MIN/1.73M2 — SIGNIFICANT CHANGE UP
EOSINOPHIL # BLD AUTO: 0.12 K/UL — SIGNIFICANT CHANGE UP (ref 0–0.5)
EOSINOPHIL # BLD AUTO: 0.12 K/UL — SIGNIFICANT CHANGE UP (ref 0–0.5)
EOSINOPHIL NFR BLD AUTO: 1.1 % — SIGNIFICANT CHANGE UP (ref 0–6)
EOSINOPHIL NFR BLD AUTO: 1.1 % — SIGNIFICANT CHANGE UP (ref 0–6)
FLUAV AG NPH QL: SIGNIFICANT CHANGE UP
FLUAV AG NPH QL: SIGNIFICANT CHANGE UP
FLUBV AG NPH QL: SIGNIFICANT CHANGE UP
FLUBV AG NPH QL: SIGNIFICANT CHANGE UP
GLUCOSE SERPL-MCNC: 108 MG/DL — HIGH (ref 70–99)
GLUCOSE SERPL-MCNC: 108 MG/DL — HIGH (ref 70–99)
HCT VFR BLD CALC: 35.6 % — SIGNIFICANT CHANGE UP (ref 34.5–45)
HCT VFR BLD CALC: 35.6 % — SIGNIFICANT CHANGE UP (ref 34.5–45)
HGB BLD-MCNC: 12 G/DL — SIGNIFICANT CHANGE UP (ref 11.5–15.5)
HGB BLD-MCNC: 12 G/DL — SIGNIFICANT CHANGE UP (ref 11.5–15.5)
IMM GRANULOCYTES NFR BLD AUTO: 0.3 % — SIGNIFICANT CHANGE UP (ref 0–0.9)
IMM GRANULOCYTES NFR BLD AUTO: 0.3 % — SIGNIFICANT CHANGE UP (ref 0–0.9)
LYMPHOCYTES # BLD AUTO: 3.28 K/UL — SIGNIFICANT CHANGE UP (ref 1–3.3)
LYMPHOCYTES # BLD AUTO: 3.28 K/UL — SIGNIFICANT CHANGE UP (ref 1–3.3)
LYMPHOCYTES # BLD AUTO: 30.4 % — SIGNIFICANT CHANGE UP (ref 13–44)
LYMPHOCYTES # BLD AUTO: 30.4 % — SIGNIFICANT CHANGE UP (ref 13–44)
MCHC RBC-ENTMCNC: 30.9 PG — SIGNIFICANT CHANGE UP (ref 27–34)
MCHC RBC-ENTMCNC: 30.9 PG — SIGNIFICANT CHANGE UP (ref 27–34)
MCHC RBC-ENTMCNC: 33.7 GM/DL — SIGNIFICANT CHANGE UP (ref 32–36)
MCHC RBC-ENTMCNC: 33.7 GM/DL — SIGNIFICANT CHANGE UP (ref 32–36)
MCV RBC AUTO: 91.8 FL — SIGNIFICANT CHANGE UP (ref 80–100)
MCV RBC AUTO: 91.8 FL — SIGNIFICANT CHANGE UP (ref 80–100)
MONOCYTES # BLD AUTO: 0.68 K/UL — SIGNIFICANT CHANGE UP (ref 0–0.9)
MONOCYTES # BLD AUTO: 0.68 K/UL — SIGNIFICANT CHANGE UP (ref 0–0.9)
MONOCYTES NFR BLD AUTO: 6.3 % — SIGNIFICANT CHANGE UP (ref 2–14)
MONOCYTES NFR BLD AUTO: 6.3 % — SIGNIFICANT CHANGE UP (ref 2–14)
NEUTROPHILS # BLD AUTO: 6.63 K/UL — SIGNIFICANT CHANGE UP (ref 1.8–7.4)
NEUTROPHILS # BLD AUTO: 6.63 K/UL — SIGNIFICANT CHANGE UP (ref 1.8–7.4)
NEUTROPHILS NFR BLD AUTO: 61.5 % — SIGNIFICANT CHANGE UP (ref 43–77)
NEUTROPHILS NFR BLD AUTO: 61.5 % — SIGNIFICANT CHANGE UP (ref 43–77)
NRBC # BLD: 0 /100 WBCS — SIGNIFICANT CHANGE UP (ref 0–0)
NRBC # BLD: 0 /100 WBCS — SIGNIFICANT CHANGE UP (ref 0–0)
NT-PROBNP SERPL-SCNC: 35 PG/ML — SIGNIFICANT CHANGE UP (ref 0–125)
NT-PROBNP SERPL-SCNC: 35 PG/ML — SIGNIFICANT CHANGE UP (ref 0–125)
PLATELET # BLD AUTO: 321 K/UL — SIGNIFICANT CHANGE UP (ref 150–400)
PLATELET # BLD AUTO: 321 K/UL — SIGNIFICANT CHANGE UP (ref 150–400)
POTASSIUM SERPL-MCNC: 3.5 MMOL/L — SIGNIFICANT CHANGE UP (ref 3.5–5.3)
POTASSIUM SERPL-MCNC: 3.5 MMOL/L — SIGNIFICANT CHANGE UP (ref 3.5–5.3)
POTASSIUM SERPL-SCNC: 3.5 MMOL/L — SIGNIFICANT CHANGE UP (ref 3.5–5.3)
POTASSIUM SERPL-SCNC: 3.5 MMOL/L — SIGNIFICANT CHANGE UP (ref 3.5–5.3)
PROT SERPL-MCNC: 7.3 G/DL — SIGNIFICANT CHANGE UP (ref 6–8.3)
PROT SERPL-MCNC: 7.3 G/DL — SIGNIFICANT CHANGE UP (ref 6–8.3)
RBC # BLD: 3.88 M/UL — SIGNIFICANT CHANGE UP (ref 3.8–5.2)
RBC # BLD: 3.88 M/UL — SIGNIFICANT CHANGE UP (ref 3.8–5.2)
RBC # FLD: 13.2 % — SIGNIFICANT CHANGE UP (ref 10.3–14.5)
RBC # FLD: 13.2 % — SIGNIFICANT CHANGE UP (ref 10.3–14.5)
SARS-COV-2 RNA SPEC QL NAA+PROBE: SIGNIFICANT CHANGE UP
SARS-COV-2 RNA SPEC QL NAA+PROBE: SIGNIFICANT CHANGE UP
SODIUM SERPL-SCNC: 139 MMOL/L — SIGNIFICANT CHANGE UP (ref 135–145)
SODIUM SERPL-SCNC: 139 MMOL/L — SIGNIFICANT CHANGE UP (ref 135–145)
TROPONIN I, HIGH SENSITIVITY RESULT: 4.7 NG/L — SIGNIFICANT CHANGE UP
TROPONIN I, HIGH SENSITIVITY RESULT: 4.7 NG/L — SIGNIFICANT CHANGE UP
WBC # BLD: 10.78 K/UL — HIGH (ref 3.8–10.5)
WBC # BLD: 10.78 K/UL — HIGH (ref 3.8–10.5)
WBC # FLD AUTO: 10.78 K/UL — HIGH (ref 3.8–10.5)
WBC # FLD AUTO: 10.78 K/UL — HIGH (ref 3.8–10.5)

## 2023-11-17 PROCEDURE — 87637 SARSCOV2&INF A&B&RSV AMP PRB: CPT

## 2023-11-17 PROCEDURE — 99285 EMERGENCY DEPT VISIT HI MDM: CPT | Mod: 25

## 2023-11-17 PROCEDURE — 85025 COMPLETE CBC W/AUTO DIFF WBC: CPT

## 2023-11-17 PROCEDURE — 96374 THER/PROPH/DIAG INJ IV PUSH: CPT

## 2023-11-17 PROCEDURE — 71046 X-RAY EXAM CHEST 2 VIEWS: CPT | Mod: 26

## 2023-11-17 PROCEDURE — 36415 COLL VENOUS BLD VENIPUNCTURE: CPT

## 2023-11-17 PROCEDURE — 99285 EMERGENCY DEPT VISIT HI MDM: CPT

## 2023-11-17 PROCEDURE — 83880 ASSAY OF NATRIURETIC PEPTIDE: CPT

## 2023-11-17 PROCEDURE — 84484 ASSAY OF TROPONIN QUANT: CPT

## 2023-11-17 PROCEDURE — 80053 COMPREHEN METABOLIC PANEL: CPT

## 2023-11-17 PROCEDURE — 93005 ELECTROCARDIOGRAM TRACING: CPT

## 2023-11-17 PROCEDURE — 71046 X-RAY EXAM CHEST 2 VIEWS: CPT

## 2023-11-17 RX ORDER — LABETALOL HCL 100 MG
10 TABLET ORAL ONCE
Refills: 0 | Status: COMPLETED | OUTPATIENT
Start: 2023-11-17 | End: 2023-11-17

## 2023-11-17 RX ADMIN — Medication 10 MILLIGRAM(S): at 17:50

## 2023-11-17 NOTE — ED ADULT NURSE NOTE - OBJECTIVE STATEMENT
pt reports that for the past three months she has been having chest tightness/SOB and headache. Pt reports she had one episode of vomiting this morning.

## 2023-11-17 NOTE — ED ADULT NURSE NOTE - CAS EDP DISCH TYPE
Pre-application: Motor, sensory, and vascular responses intact in the injured extremity./The patient/caregiver verbalized understanding of how to care for the injured extremity with splint/Post-application: Motor, sensory, and vascular responses intact in the injured extremity.
Home

## 2023-11-17 NOTE — ED PROVIDER NOTE - CLINICAL SUMMARY MEDICAL DECISION MAKING FREE TEXT BOX
58-year-old female history of hypertension, hyperlipidemia, asthma, sarcoid presents ED with elevated blood pressure and shortness of breath.  Patient denies chest pain.  Blood pressure upon arrival to ED was 151/100.  EKG is normal sinus rhythm.  Lungs clear on exam.  Will check labs, blood pressure control, reassess.

## 2023-11-17 NOTE — ED PROVIDER NOTE - OBJECTIVE STATEMENT
58-year-old female history of hypertension, hyperlipidemia, asthma, sarcoidosis, GERD, fibromyalgia presents to ED with elevated blood pressure and intermittent episodes of shortness of breath.  As per patient the symptoms been going on for months but she finally had a chance to come to the hospital today.  As per patient blood pressure at home this morning was 190/60 and this afternoon was 156/1 100s.  No chest pain no nausea no vomiting no diarrhea no abdominal pain and no cough.

## 2023-11-17 NOTE — ED PROVIDER NOTE - PROGRESS NOTE DETAILS
labs unremarkable.  cxr with no acute findings.  All results discussed with patient who was given copy of all results.  lungs clear on reassessment.  Will d/c

## 2023-11-17 NOTE — ED PROVIDER NOTE - PATIENT PORTAL LINK FT
You can access the FollowMyHealth Patient Portal offered by [facility] by registering at the following website: [email or web address]. By joining [email or web address]’s FollowMyHealth portal, you will also be able to view your health information using other applications (apps) compatible with our system.

## 2023-12-21 ENCOUNTER — EMERGENCY (EMERGENCY)
Facility: HOSPITAL | Age: 58
LOS: 1 days | Discharge: HOME | End: 2023-12-21
Attending: STUDENT IN AN ORGANIZED HEALTH CARE EDUCATION/TRAINING PROGRAM
Payer: COMMERCIAL

## 2023-12-21 VITALS
DIASTOLIC BLOOD PRESSURE: 91 MMHG | TEMPERATURE: 98 F | OXYGEN SATURATION: 96 % | SYSTOLIC BLOOD PRESSURE: 149 MMHG | RESPIRATION RATE: 20 BRPM | HEIGHT: 59 IN | HEART RATE: 99 BPM | WEIGHT: 152.12 LBS

## 2023-12-21 VITALS
SYSTOLIC BLOOD PRESSURE: 134 MMHG | OXYGEN SATURATION: 99 % | HEART RATE: 82 BPM | TEMPERATURE: 99 F | DIASTOLIC BLOOD PRESSURE: 84 MMHG | RESPIRATION RATE: 19 BRPM

## 2023-12-21 DIAGNOSIS — Z98.890 OTHER SPECIFIED POSTPROCEDURAL STATES: Chronic | ICD-10-CM

## 2023-12-21 DIAGNOSIS — M54.5 LOW BACK PAIN: Chronic | ICD-10-CM

## 2023-12-21 DIAGNOSIS — Z90.710 ACQUIRED ABSENCE OF BOTH CERVIX AND UTERUS: Chronic | ICD-10-CM

## 2023-12-21 DIAGNOSIS — I67.1 CEREBRAL ANEURYSM, NONRUPTURED: Chronic | ICD-10-CM

## 2023-12-21 PROCEDURE — 99284 EMERGENCY DEPT VISIT MOD MDM: CPT

## 2023-12-21 PROCEDURE — 99283 EMERGENCY DEPT VISIT LOW MDM: CPT

## 2023-12-21 RX ORDER — LIDOCAINE 4 G/100G
1 CREAM TOPICAL ONCE
Refills: 0 | Status: COMPLETED | OUTPATIENT
Start: 2023-12-21 | End: 2023-12-21

## 2023-12-21 RX ORDER — ACETAMINOPHEN 500 MG
975 TABLET ORAL ONCE
Refills: 0 | Status: COMPLETED | OUTPATIENT
Start: 2023-12-21 | End: 2023-12-21

## 2023-12-21 RX ORDER — METHOCARBAMOL 500 MG/1
1500 TABLET, FILM COATED ORAL ONCE
Refills: 0 | Status: COMPLETED | OUTPATIENT
Start: 2023-12-21 | End: 2023-12-21

## 2023-12-21 RX ORDER — IBUPROFEN 200 MG
600 TABLET ORAL ONCE
Refills: 0 | Status: COMPLETED | OUTPATIENT
Start: 2023-12-21 | End: 2023-12-21

## 2023-12-21 RX ADMIN — LIDOCAINE 1 PATCH: 4 CREAM TOPICAL at 17:13

## 2023-12-21 RX ADMIN — Medication 600 MILLIGRAM(S): at 17:42

## 2023-12-21 RX ADMIN — Medication 975 MILLIGRAM(S): at 17:42

## 2023-12-21 RX ADMIN — Medication 975 MILLIGRAM(S): at 17:12

## 2023-12-21 RX ADMIN — METHOCARBAMOL 1500 MILLIGRAM(S): 500 TABLET, FILM COATED ORAL at 17:12

## 2023-12-21 RX ADMIN — Medication 600 MILLIGRAM(S): at 17:12

## 2023-12-21 NOTE — ED ADULT NURSE NOTE - NS ED NURSE RECORD ANOTHER HT AND WT
Subjective   History was provided by the mother.  Mindy Macias is a 12 y.o. female who is here for this well child visit.  Immunization History   Administered Date(s) Administered    DTaP 2010, 2010, 2010, 06/09/2011    DTaP / HiB / IPV 2010, 2010, 2010    DTaP, 5 pertussis antigens 03/05/2015    HPV 9-Valent 05/04/2021, 03/31/2022    Hep A, ped/adol, 2 dose 03/03/2011, 09/08/2011    Hep B, Adolescent or Pediatric 2010, 2010, 2010    Hib (PRP-T) 03/03/2011    IPV 2010, 2010, 03/05/2015    Influenza Whole 11/01/2011, 10/29/2012    Influenza, injectable, quadrivalent, preservative free 08/29/2018, 10/31/2019, 10/19/2020, 09/15/2021, 10/14/2022    Influenza, live, intranasal 10/10/2014    Influenza, seasonal, injectable 09/26/2013, 10/14/2016, 10/30/2019    Influenza, seasonal, injectable, preservative free 10/16/2015    MMR 06/09/2011    MMRV 03/05/2015    Meningococcal MCV4O 05/04/2021    Pfizer SARS-CoV-2 10 mcg/0.2mL 11/26/2021, 12/17/2021    Pfizer Sars-cov-2 Bivalent 30 mcg/0.3 mL 11/23/2022    Pneumococcal Conjugate PCV 13 03/19/2013    Pneumococcal Conjugate PCV 7 2010, 2010, 03/03/2011    Pneumococcal Polysaccharide PPSV23 2010    Rotavirus Pentavalent 2010, 2010    Rotavirus, Unspecified 2010    Tdap 05/04/2021    Varicella 06/09/2011     History of previous adverse reactions to immunizations? no  The following portions of the patient's history were reviewed by a provider in this encounter and updated as appropriate:       Well Child Assessment:  History was provided by the mother. Interval problems do not include caregiver depression or caregiver stress.   Dental  The patient has a dental home. The patient brushes teeth regularly. The patient does not floss regularly. Last dental exam was less than 6 months ago.   Elimination  Elimination problems do not include constipation or diarrhea.  "  Behavioral  Behavioral issues do not include hitting or lying frequently. Disciplinary methods include consistency among caregivers.   Sleep  The patient does not snore. There are no sleep problems.   Safety  There is no smoking in the home. Home has working smoke alarms? yes. Home has working carbon monoxide alarms? yes. There is no gun in home.   School  Current grade level is 8th. Current school [place] is [employer]. There are no signs of learning disabilities. Child is doing well in school.   Social  The caregiver does not enjoy the child. After school activity: home with grandmother. Sibling interactions are good. The child spends 3 hours in front of a screen (tv or computer) per day.       Objective   Vitals:    07/11/23 1509   BP: 107/57   Pulse: 88   Resp: 18   Temp: 36.4 °C (97.6 °F)   SpO2: 98%   Weight: 59 kg   Height: 1.689 m (5' 6.5\")     Growth parameters are noted and are appropriate for age.  Physical Exam  Constitutional:       Appearance: Normal appearance.   HENT:      Head: Normocephalic and atraumatic.      Right Ear: Tympanic membrane normal.      Left Ear: Tympanic membrane normal.      Nose: Nose normal.      Mouth/Throat:      Mouth: Mucous membranes are moist.      Pharynx: Oropharynx is clear.   Eyes:      Conjunctiva/sclera: Conjunctivae normal.      Pupils: Pupils are equal, round, and reactive to light.   Cardiovascular:      Rate and Rhythm: Normal rate and regular rhythm.   Pulmonary:      Effort: Pulmonary effort is normal.      Breath sounds: Normal breath sounds.   Abdominal:      General: Abdomen is flat. Bowel sounds are normal.      Palpations: Abdomen is soft.   Musculoskeletal:      Comments: Full range of motion per preparticipation physical guidelines   Skin:     General: Skin is warm and dry.   Neurological:      Mental Status: She is alert.   Psychiatric:         Mood and Affect: Mood normal.         Behavior: Behavior normal.         Assessment/Plan   Well adolescent.  1. " Weight management:  The patient was counseled regarding nutrition and physical activity.  3. Development: appropriate for age  4. No orders of the defined types were placed in this encounter.    5. Follow-up visit in 1 year for next well child visit, or sooner as needed.   Yes

## 2023-12-21 NOTE — ED ADULT NURSE NOTE - ISOLATION TYPE:
[FreeTextEntry1] : Ms. 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 who has had exposure to PFAS chemicals while living in [facility] for over 10 years.\par \par PFAS exposure has been established to result in increased risk of the following health effects:\par ~ Hepatocellular injury \par ~ Alterations in cholesterol metabolism \par ~ Alterations in thyroid function \par ~ Cancers-- with the kidney and the testes being best studied but evidence existing for other cancers, as well.\par ~ Alterations in androgen levels \par ~ Alterations of uric acid \par ~ Fertility and gestational issues \par ~ Alterations in antibody levels\par \par \par PFAS exposure, and its health effects, are further compounded by the fact that PFHxS has a half-life in the body is between five to 36 years, PFOS three to 27 years, while PFOA has a half-life of two to 10 years.\par \par \par Given the long half-life she is still at risk for the non-cancer outcomes and the elevated risk of related cancers will remain throughout her life.\par \par \par I had extensive discussion with pt about all of the above. All questions answered. I advised her that vigilance about her health, rather than anxiety or panic, was warranted. \par \par She understood and agreed with the plan. \par RTC 1 y unless clinical or exposure hx changes.\par  None

## 2023-12-21 NOTE — ED PROVIDER NOTE - PATIENT PORTAL LINK FT
You can access the FollowMyHealth Patient Portal offered by [facility] by registering at the following website: [email or web address]. By joining [employer]’s FollowMyHealth portal, you will also be able to view your health information using other applications (apps) compatible with our system. You can access the FollowMyHealth Patient Portal offered by [facility] by registering at the following website: [email or web address]. By joining [email or web address]’s FollowMyHealth portal, you will also be able to view your health information using other applications (apps) compatible with our system.

## 2023-12-21 NOTE — ED PROVIDER NOTE - CLINICAL SUMMARY MEDICAL DECISION MAKING FREE TEXT BOX
58-year-old female, history of fibromyalgia, lumbar spinal surgery in 2017 s/p spinal cord stimulator placement, presented with 2 days onset of bilateral low back pain, radiating down to bilateral thighs.  Denies fall, trauma, injury to the affected area.  Reports that she was mopping the day prior.  Reports symptom is 10 out of 10 pain.  She takes cyclobenzaprine, duloxetine, ibuprofen 800 mg earlier today.  Reports no changes in bowel habit, urinary habit, fever, numbness. Reports generalized b/l LE weakness. Vital signs within normal limits on arrival.  Physical exam as noted above.  Patient is well-appearing, not in acute distress, normal respiratory effort, clear lung exam bilaterally, abdomen is soft, nontender, no leg swelling noted, mild lumbar spinal midline tenderness appreciated palpation, patient ambulating independently although she is slightly limping, strength is 5 out of 5 in all 4 extremities, no focal sensorimotor deficit noted.  Differentials include but not limited to chronic low back pain secondary to fibromyalgia versus radiculopathy.  Will manage symptoms with Robaxin, NSAID, lidocaine patch, reassess. 58-year-old female, history of fibromyalgia, lumbar spinal surgery in 2017 s/p spinal cord stimulator placement, presented with 2 days onset of bilateral low back pain, radiating down to bilateral thighs.  Denies fall, trauma, injury to the affected area.  Reports that she was mopping the day prior.  Reports symptom is 10 out of 10 pain.  She takes cyclobenzaprine, duloxetine, ibuprofen 800 mg earlier today.  Reports no changes in bowel habit, urinary habit, fever, numbness. Reports generalized b/l LE weakness. Vital signs within normal limits on arrival.  Physical exam as noted above.  Patient is well-appearing, not in acute distress, normal respiratory effort, clear lung exam bilaterally, abdomen is soft, nontender, no leg swelling noted, mild lumbar spinal midline tenderness appreciated palpation, patient ambulating independently although she is slightly limping, strength is 5 out of 5 in all 4 extremities, no focal sensorimotor deficit noted.  Differentials include but not limited to chronic low back pain secondary to fibromyalgia versus radiculopathy.  Will manage symptoms with Robaxin, NSAID, lidocaine patch, reassess.    Attending Nello: 57 y/o f w/ PMH of lumar stenosis, fibromyalgia, depression, HLD, HTN, depression, s/p spinal cord stimulator presenting w/ lower back pain. Agree w/ above documented by resident. Pain in lower back, sharp, radiating down thighs. No falls or recent injury. Does endorse doing some mopping the day prior to pain starting. No numbness in waist. No change in bowel/bladder control. Took motrin earlier this morning w/ minimal relief. Pt overall no acute distress. Exam notable for diffuse lower lumbar tenderness, str 5/5 x4 extremities, sensation intact x4 extremities. Suspect acute on chronic exacerbation of back pain. Do not suspect spinal cord pathology. Possible radiculopathy vs. strain vs. sprain vs. spasm. Will treat symptomatically. no role for imaging at tis time. Will reassess the need for additional interventions as clinically warranted. Refer to any progress notes for updates on clinical course and as a continuation of this MDM.

## 2023-12-21 NOTE — ED ADULT TRIAGE NOTE - CHIEF COMPLAINT QUOTE
Pt woke up with lower back pain radiating to B/L LE X yesterday, last took ibuprofen 800mg 1000AM today, denies injury

## 2023-12-21 NOTE — ED ADULT NURSE NOTE - NSFALLUNIVINTERV_ED_ALL_ED
Bed/Stretcher in lowest position, wheels locked, appropriate side rails in place/Call bell, personal items and telephone in reach/Instruct patient to call for assistance before getting out of bed/chair/stretcher/Non-slip footwear applied when patient is off stretcher/[place] to call system/Physically safe environment - no spills, clutter or unnecessary equipment/Purposeful proactive rounding/Room/bathroom lighting operational, light cord in reach Bed/Stretcher in lowest position, wheels locked, appropriate side rails in place/Call bell, personal items and telephone in reach/Instruct patient to call for assistance before getting out of bed/chair/stretcher/Non-slip footwear applied when patient is off stretcher/[place] to call system/Physically safe environment - no spills, clutter or unnecessary equipment/Purposeful proactive rounding/Room/bathroom lighting operational, light cord in reach

## 2023-12-21 NOTE — ED PROVIDER NOTE - ATTENDING CONTRIBUTION TO CARE
Attending Edi: I performed a history and physical exam of the patient and discussed their management with the resident/fellow/student. I have reviewed the resident/fellow/student note and agree with the documented findings and plan of care, except as noted. I have personally performed a substantive portion of the visit including all aspects of the medical decision making. My medical decision making and observations are found above. Please refer to any progress notes for updates on clinical course. My notes supersedes the above resident/fellow/student note in case of discrepancy

## 2023-12-21 NOTE — ED PROVIDER NOTE - NSFOLLOWUPINSTRUCTIONS_ED_ALL_ED_FT
Robaxin    You came to the emergency room because of back pain.     Your symptoms likely due to fibromyalgia or radiculopathy.    Please continue all home medication as prescribed by your doctor.      Please follow-up with your pain management doctor in the clinic within the next 7 days for further evaluation and monitoring.      Please come back to the emergency room if your symptoms get worse. You came to the emergency room because of back pain.     Your symptoms likely due to fibromyalgia or radiculopathy.    Please continue all home medication as prescribed by your doctor.      You may take Tylenol 1000 mg every 8 hours as needed for pain.     Please follow-up with your pain management doctor in the clinic within the next 7 days for further evaluation and monitoring.      Please come back to the emergency room if your symptoms get worse.

## 2023-12-21 NOTE — ED PROVIDER NOTE - NS ED ROS FT
Constitutional:  (-) fever, (-) chills, (-) lethargy  Eyes:  (-) eye pain (-) visual changes  ENMT: (-) nasal discharge, (-) sore throat. (-) neck pain or stiffness  Cardiac: (-) chest pain (-) palpitations  Respiratory:  (-) cough (-) respiratory distress.   GI:  (-) nausea (-) vomiting (-) diarrhea (-) abdominal pain.  :  (-) dysuria (-) frequency (-) burning.  MS:  (+) back pain (-) joint pain (+) b/l thigh pain   Neuro:  (-) headache (-) numbness (-) tingling (-) focal weakness  Skin:  (-) rash  Except as documented in the HPI,  all other systems are negative

## 2023-12-21 NOTE — ED PROVIDER NOTE - OBJECTIVE STATEMENT
58-year-old female, history of fibromyalgia, lumbar spinal surgery in 2017 s/p spinal cord stimulator placement, presented with 2 days onset of bilateral low back pain, radiating down to bilateral thighs.  Denies fall, trauma, injury to the affected area.  Reports that she was mopping the day prior.  Reports symptom is 10 out of 10 pain.  She takes cyclobenzaprine, duloxetine, ibuprofen 800 mg earlier today.  Reports no changes in bowel habit, urinary habit, fever, numbness. Reports generalized b/l LE weakness.

## 2023-12-21 NOTE — ED PROVIDER NOTE - PHYSICAL EXAMINATION
Gen: Patient is well-appearing, NAD, AAOx3, able to ambulate without assistance although patient is limping   HEENT: NCAT, normal conjunctiva, tongue midline, oral mucosa moist  Lung: CTAB, no respiratory distress, no wheezes/rhonchi/rales B/L, speaking in full sentences  CV: RRR, no murmurs, rubs or gallops, distal pulses 2+ b/l  Abd: soft, NT, ND, no guarding, no rigidity, no rebound tenderness, no CVA tenderness   MSK: no visible deformities, ROM normal in UE/LE, lumbar spinal tenderness appreciated   Neuro: No focal sensory or motor deficits  Skin: Warm, well perfused, no rash, no leg swelling  Psych: normal affect, calm

## 2023-12-21 NOTE — ED ADULT NURSE NOTE - OBJECTIVE STATEMENT
59 yo female sitting on a chair c/o lower back pain radiating to bilateral thighs that started 2 days ago.

## 2024-01-01 NOTE — H&P ADULT - EXTREMITIES
"Chief Complaint   Patient presents with    Well Child     2wk       Subjective     Alexandria Canales is a 14 days female    Well child visit 2 week old    The following portions of the patient's history were reviewed and updated as appropriate: allergies, current medications, past family history, past medical history, past social history, past surgical history and problem list.    Review of Systems    Current Issues:  Current concerns include gassiness and back arching. Father reports concern for reflux and difficulty breathing at times. Denies color changes or apnea episodes. Did have a minor choking episode after a bottle feed.     Review of Nutrition:  Current diet: breast milk  Current feeding pattern: takes 2-3 oz every 2-3 hours  Difficulties with feeding? yes - see above  Current stooling frequency: 3-4 times a day    Social Screening:  Current child-care arrangements: in home: primary caregiver is father and mother  Sibling relations: only child  Secondhand smoke exposure? yes -      Car Seat (backwards, back seat) yes  Sleeps on back:  yes  Smoke Detectors: yes    Objective   Temp 99 °F (37.2 °C)   Ht 50.8 cm (20\")   Wt 3204 g (7 lb 1 oz)   HC 34.9 cm (13.75\")   BMI 12.41 kg/m²   Physical Exam  Vitals and nursing note reviewed.   Constitutional:       General: She is active. She has a strong cry. She is not in acute distress.     Appearance: Normal appearance. She is well-developed.   HENT:      Head: Normocephalic. Anterior fontanelle is flat.      Nose: Nose normal.      Mouth/Throat:      Mouth: Mucous membranes are moist.      Pharynx: Oropharynx is clear.      Comments: White film on tongue easily scraped off with tongue blade   Eyes:      General:         Right eye: No discharge.         Left eye: No discharge.      Conjunctiva/sclera: Conjunctivae normal.      Pupils: Pupils are equal, round, and reactive to light.   Cardiovascular:      Rate and Rhythm: Normal rate and regular rhythm.      " Heart sounds: Normal heart sounds.   Pulmonary:      Effort: Pulmonary effort is normal.      Breath sounds: Normal breath sounds.   Abdominal:      General: Bowel sounds are normal.      Palpations: Abdomen is soft.   Genitourinary:     Rectum: Normal.   Musculoskeletal:         General: Normal range of motion.      Cervical back: Normal range of motion and neck supple.      Right hip: Negative right Ortolani and negative right Brunson.      Left hip: Negative left Ortolani and negative left Brunson.   Skin:     General: Skin is warm and dry.      Turgor: Normal.      Coloration: Skin is jaundiced (mild facial jaundice that is improved from previous exam).   Neurological:      Mental Status: She is alert.      Primitive Reflexes: Suck normal. Symmetric Ashley.         Assessment & Plan     Diagnoses and all orders for this visit:    1. WCC (well child check),  8-28 days old (Primary)    2. Breast feeding problem in       Weight up 39 gm/day since last visit.   Reassurance given regarding dads concerns. Baby looks great today     Anticipatory guidance discussed: Specific topics reviewed: avoid putting to bed with bottle, never leave unattended except in crib, risk of falling once learns to roll, safe sleep furniture, and sleep face up to decrease the chances of SIDS.    Always place your baby to sleep on a firm mattress on their back in a crib or bassinet without any crib bumpers, blankets, quilts, pillows, or plush toys. Avoid overheating by keeping the room temperature comfortable. Don't smoke or use e-cigarettes. Always put your baby in a rear-facingcar seat in the back seat. Never leave your baby alone in a car. While your baby is awake, don't leave your little one unattended, especially on high surfaces or in the bath. Never shake your baby -- it can cause bleeding in the brain and even death. If you are ever worried that you will hurt your baby, put your baby in the crib or bassinet for a few  minutes. Call a friend, relative, or your health care provider for help. Avoid sun exposure by keeping your baby covered and in the shade when possible. Sunscreens are not recommended for infants younger than 6 months. However, you may use a small amount of sunscreen on an infant younger than 6 months if shade and clothing don't offer enough protection.    Development: appropriate for age    Immunizations: discussed risk/benefits to vaccination, reviewed components of the vaccine, discussed VIS, discussed informed consent and informed consent obtained. Patient was allowed to accept or refuse vaccine. Questions answered to satisfactory state of patient. We reviewed typical age appropriate and seasonally appropriate vaccinations. Reviewed immunization history and updated state vaccination form as needed.    Return in about 2 weeks (around 2024).        detailed exam

## 2024-01-31 ENCOUNTER — APPOINTMENT (OUTPATIENT)
Dept: ORTHOPEDIC SURGERY | Facility: CLINIC | Age: 59
End: 2024-01-31
Payer: OTHER MISCELLANEOUS

## 2024-01-31 DIAGNOSIS — Z86.79 PERSONAL HISTORY OF OTHER DISEASES OF THE CIRCULATORY SYSTEM: ICD-10-CM

## 2024-01-31 DIAGNOSIS — Z86.39 PERSONAL HISTORY OF OTHER ENDOCRINE, NUTRITIONAL AND METABOLIC DISEASE: ICD-10-CM

## 2024-01-31 PROCEDURE — 99204 OFFICE O/P NEW MOD 45 MIN: CPT

## 2024-01-31 PROCEDURE — 72110 X-RAY EXAM L-2 SPINE 4/>VWS: CPT

## 2024-02-05 NOTE — ASSESSMENT
[FreeTextEntry1] : 58 year old female presents with acute exacerbation of chronic low back pain.  The pain radiates to her legs right worse than left. She has numbness in her right leg.  She denies recent illness, fevers, weakness, balance problems, saddle anesthesia, urinary retention or fecal incontinence.  She had two work injuries as a .  The injuries were on 4/4/2005 and 7/11/2006. Both involved heavy lifting.  She had L4-S1 ALIF and PSIF by Dr. Jayant Cobian in 2017.  Her symptoms have progressively worsened. I independently reviewed her lumbar MRI which shows severe stenosis L3-L4.  She has tried extensive conservative treatment including PT, prescription medications, spinal injections and spinal cord stimulator without relief. She is interested in surgery. Surgery would be L3-L4 decompression and extension of fusion: L3-L4 TLIF.  Surgery would require over 50% removal of facet joints requiring fusion. In addition fusion is required due to adjacent fusion.  We discussed the risks and benefits of surgery. The risks include anesthesia, blood loss, need for blood transfusion, clots, stroke, myocardial infarction, durotomy, infection, nonunion, hardware failure, damage to neurovascular structures and need for reoperation. The patient understands the risks.  She asked several great questions all of which were answered. She elects to proceed with surgery. She will be sent for CT lumbar for preop planning.   We discussed red flag symptoms that would require emergent evaluation. She knows to call with any questions or concerns or if her symptoms acutely worsen.

## 2024-02-05 NOTE — HISTORY OF PRESENT ILLNESS
[FreeTextEntry1] : 58 year old female presents with acute exacerbation of chronic low back pain.  The pain radiates to her legs right worse than left. She has numbness in her right leg.  She denies recent illness, fevers, weakness, balance problems, saddle anesthesia, urinary retention or fecal incontinence.  She had two work injuries as a .  The injuries were on 4/4/2005 and 7/11/2006. Both involved heavy lifting.  She had L4-S1 ALIF and PSIF by Dr. Jayant Cobian in 2017.  Her symptoms have progressively worsened. She has recent MRI.   She has tried extensive conservative treatment including PT, prescription medications, spinal injections and spinal cord stimulator without relief. She is interested in surgery.

## 2024-02-05 NOTE — PHYSICAL EXAM
[de-identified] : General: No acute distress, conversant, well-nourished. Head: Normocephalic, atraumatic Neck: trachea midline, FROM Heart: normotensive and normal rate and rhythm Lungs: No labored breathing Skin: No abrasions, no rashes, no edema Psych: Alert and oriented to person, place and time Extremities: no peripheral edema or digital cyanosis Gait: Normal gait. Can perform tandem gait.   Vascular: warm and well perfused distally, palpable distal pulses MSK: Lumbar spine: incisions well healed  NEURO EXAM: Sensation  Left L2  -  2/2             Left L3  -  2/2 Left L4  -  2/2 Left L5  -  2/2 Left S1  -  2/2  Right L2  -  2/2             Right L3  -  2/2 Right L4  -  1/2 Right L5  -  1/2 Right S1  -  1/2  Motor:  Left L2 (hip flexion)                            5/5                 Left L3 (knee extension)                   5/5                 Left L4 (ankle dorsiflexion)                 5/5                 Left L5 (long toe extensor)                5/5                 Left S1 (ankle plantar flexion)           5/5  Right L2 (hip flexion)                            5/5                 Right L3 (knee extension)                   5/5                 Right L4 (ankle dorsiflexion)                 5/5                 Right L5 (long toe extensor)                5/5                 Right S1 (ankle plantar flexion)           5/5  Reflexes: Normal and symmetric Negative clonus.  Down-going Babinski.    [de-identified] : I ordered radiographs to evaluate the patient's symptoms. Lumbar 4 view radiographs taken in the office today show no dislocation or fracture.  Lumbar spondylosis.  s/p L4-S1 instrumented fusion with interbody with hardware in appropriate position and alignment.    I independently reviewed her lumbar MRI which shows severe stenosis L3-L4.

## 2024-02-06 NOTE — ED ADULT NURSE NOTE - NS ED NURSE LEVEL OF CONSCIOUSNESS ORIENTATION
Tip: Hydropeel Tip, Teal Number Of Passes Step 5: 0 Solution Override Solution: GlySal 15% Oriented - self; Oriented - place; Oriented - time Vacuum Pressure High Setting (Will Not Render If Set To 0): 16 Number Of Passes Step 4: 1 Tip: Hydropeel Tip, Clear Solution: Activ-4 Price (Use Numbers Only, No Special Characters Or $): 199 Number Of Passes Step 1: 2 Procedure: Extend and Protect Solution Override: LED light therapy Consent: Written consent obtained, risks reviewed including but not limited to crusting, scabbing, blistering, scarring, darker or lighter pigmentary change, bruising, and/or incomplete response. Solution: Beta-HD Post-Care Instructions: I reviewed with the patient in detail post-care instructions. Patient should stay away from the sun and wear sun protection until treated areas are fully healed. Indication: anti-aging Procedure: Fusion Tip Override Procedure: Peel Solution: Antiox-6 Procedure: Extraction Vacuum Pressure High Setting (Will Not Render If Set To 0): 14 Tip: Hydropeel Tip, Blue Vacuum Pressure High Setting (Will Not Render If Set To 0): 23 Procedure: Boost Procedure: Exfoliation Location: face

## 2024-02-12 NOTE — ED PROVIDER NOTE - MUSCULOSKELETAL, MLM
thinking of grandfather
Spine appears normal, range of motion is not limited, no muscle or joint tenderness

## 2024-02-27 ENCOUNTER — INPATIENT (INPATIENT)
Facility: HOSPITAL | Age: 59
LOS: 2 days | Discharge: HOME | DRG: 660 | End: 2024-03-01
Attending: INTERNAL MEDICINE | Admitting: INTERNAL MEDICINE
Payer: COMMERCIAL

## 2024-02-27 ENCOUNTER — TRANSCRIPTION ENCOUNTER (OUTPATIENT)
Age: 59
End: 2024-02-27

## 2024-02-27 VITALS
TEMPERATURE: 97 F | SYSTOLIC BLOOD PRESSURE: 91 MMHG | WEIGHT: 149.91 LBS | DIASTOLIC BLOOD PRESSURE: 55 MMHG | HEART RATE: 106 BPM | RESPIRATION RATE: 18 BRPM | OXYGEN SATURATION: 96 % | HEIGHT: 59 IN

## 2024-02-27 DIAGNOSIS — N23 UNSPECIFIED RENAL COLIC: ICD-10-CM

## 2024-02-27 DIAGNOSIS — I67.1 CEREBRAL ANEURYSM, NONRUPTURED: Chronic | ICD-10-CM

## 2024-02-27 DIAGNOSIS — Z98.890 OTHER SPECIFIED POSTPROCEDURAL STATES: Chronic | ICD-10-CM

## 2024-02-27 DIAGNOSIS — M54.5 LOW BACK PAIN: Chronic | ICD-10-CM

## 2024-02-27 DIAGNOSIS — Z90.710 ACQUIRED ABSENCE OF BOTH CERVIX AND UTERUS: Chronic | ICD-10-CM

## 2024-02-27 LAB
ALBUMIN SERPL ELPH-MCNC: 2.6 G/DL — LOW (ref 3.5–5)
ALP SERPL-CCNC: 138 U/L — HIGH (ref 40–120)
ALT FLD-CCNC: 103 U/L DA — HIGH (ref 10–60)
ANION GAP SERPL CALC-SCNC: 8 MMOL/L — SIGNIFICANT CHANGE UP (ref 5–17)
APPEARANCE UR: ABNORMAL
AST SERPL-CCNC: 96 U/L — HIGH (ref 10–40)
BASOPHILS # BLD AUTO: 0 K/UL — SIGNIFICANT CHANGE UP (ref 0–0.2)
BASOPHILS NFR BLD AUTO: 0 % — SIGNIFICANT CHANGE UP (ref 0–2)
BILIRUB SERPL-MCNC: 1.6 MG/DL — HIGH (ref 0.2–1.2)
BILIRUB UR-MCNC: ABNORMAL
BUN SERPL-MCNC: 18 MG/DL — SIGNIFICANT CHANGE UP (ref 7–18)
CALCIUM SERPL-MCNC: 7.3 MG/DL — LOW (ref 8.4–10.5)
CHLORIDE SERPL-SCNC: 113 MMOL/L — HIGH (ref 96–108)
CO2 SERPL-SCNC: 20 MMOL/L — LOW (ref 22–31)
COLOR SPEC: SIGNIFICANT CHANGE UP
CREAT SERPL-MCNC: 1.25 MG/DL — SIGNIFICANT CHANGE UP (ref 0.5–1.3)
DIFF PNL FLD: ABNORMAL
EGFR: 49 ML/MIN/1.73M2 — LOW
EOSINOPHIL # BLD AUTO: 0 K/UL — SIGNIFICANT CHANGE UP (ref 0–0.5)
EOSINOPHIL NFR BLD AUTO: 0 % — SIGNIFICANT CHANGE UP (ref 0–6)
FLUAV AG NPH QL: SIGNIFICANT CHANGE UP
FLUBV AG NPH QL: SIGNIFICANT CHANGE UP
GLUCOSE SERPL-MCNC: 125 MG/DL — HIGH (ref 70–99)
GLUCOSE UR QL: NEGATIVE MG/DL — SIGNIFICANT CHANGE UP
HCT VFR BLD CALC: 34.4 % — LOW (ref 34.5–45)
HGB BLD-MCNC: 11 G/DL — LOW (ref 11.5–15.5)
KETONES UR-MCNC: NEGATIVE MG/DL — SIGNIFICANT CHANGE UP
LACTATE SERPL-SCNC: 3.2 MMOL/L — HIGH (ref 0.7–2)
LEUKOCYTE ESTERASE UR-ACNC: ABNORMAL
LIDOCAIN IGE QN: 16 U/L — SIGNIFICANT CHANGE UP (ref 13–75)
LYMPHOCYTES # BLD AUTO: 0.99 K/UL — LOW (ref 1–3.3)
LYMPHOCYTES # BLD AUTO: 12 % — LOW (ref 13–44)
MAGNESIUM SERPL-MCNC: 1.6 MG/DL — SIGNIFICANT CHANGE UP (ref 1.6–2.6)
MCHC RBC-ENTMCNC: 30.7 PG — SIGNIFICANT CHANGE UP (ref 27–34)
MCHC RBC-ENTMCNC: 32 GM/DL — SIGNIFICANT CHANGE UP (ref 32–36)
MCV RBC AUTO: 96.1 FL — SIGNIFICANT CHANGE UP (ref 80–100)
MONOCYTES # BLD AUTO: 0.41 K/UL — SIGNIFICANT CHANGE UP (ref 0–0.9)
MONOCYTES NFR BLD AUTO: 5 % — SIGNIFICANT CHANGE UP (ref 2–14)
NEUTROPHILS # BLD AUTO: 6.86 K/UL — SIGNIFICANT CHANGE UP (ref 1.8–7.4)
NEUTROPHILS NFR BLD AUTO: 83 % — HIGH (ref 43–77)
NITRITE UR-MCNC: POSITIVE
PH UR: 8.5 (ref 5–8)
PLATELET # BLD AUTO: 165 K/UL — SIGNIFICANT CHANGE UP (ref 150–400)
POTASSIUM SERPL-MCNC: 2.9 MMOL/L — CRITICAL LOW (ref 3.5–5.3)
POTASSIUM SERPL-SCNC: 2.9 MMOL/L — CRITICAL LOW (ref 3.5–5.3)
PROT SERPL-MCNC: 6.2 G/DL — SIGNIFICANT CHANGE UP (ref 6–8.3)
PROT UR-MCNC: 100 MG/DL
RBC # BLD: 3.58 M/UL — LOW (ref 3.8–5.2)
RBC # FLD: 13 % — SIGNIFICANT CHANGE UP (ref 10.3–14.5)
SARS-COV-2 RNA SPEC QL NAA+PROBE: SIGNIFICANT CHANGE UP
SODIUM SERPL-SCNC: 141 MMOL/L — SIGNIFICANT CHANGE UP (ref 135–145)
SP GR SPEC: 1.02 — SIGNIFICANT CHANGE UP (ref 1–1.03)
TROPONIN I, HIGH SENSITIVITY RESULT: 7.9 NG/L — SIGNIFICANT CHANGE UP
UROBILINOGEN FLD QL: 1 MG/DL — SIGNIFICANT CHANGE UP (ref 0.2–1)
WBC # BLD: 8.26 K/UL — SIGNIFICANT CHANGE UP (ref 3.8–10.5)
WBC # FLD AUTO: 8.26 K/UL — SIGNIFICANT CHANGE UP (ref 3.8–10.5)

## 2024-02-27 PROCEDURE — 99292 CRITICAL CARE ADDL 30 MIN: CPT

## 2024-02-27 PROCEDURE — 99291 CRITICAL CARE FIRST HOUR: CPT

## 2024-02-27 PROCEDURE — 74177 CT ABD & PELVIS W/CONTRAST: CPT | Mod: 26,MC

## 2024-02-27 PROCEDURE — 99223 1ST HOSP IP/OBS HIGH 75: CPT

## 2024-02-27 RX ORDER — POTASSIUM CHLORIDE 20 MEQ
20 PACKET (EA) ORAL
Refills: 0 | Status: COMPLETED | OUTPATIENT
Start: 2024-02-27 | End: 2024-02-27

## 2024-02-27 RX ORDER — RITUXIMAB 10 MG/ML
0 INJECTION, SOLUTION INTRAVENOUS
Qty: 0 | Refills: 0 | DISCHARGE

## 2024-02-27 RX ORDER — MECLIZINE HCL 12.5 MG
1 TABLET ORAL
Qty: 0 | Refills: 0 | DISCHARGE

## 2024-02-27 RX ORDER — POTASSIUM CHLORIDE 20 MEQ
10 PACKET (EA) ORAL ONCE
Refills: 0 | Status: DISCONTINUED | OUTPATIENT
Start: 2024-02-27 | End: 2024-02-27

## 2024-02-27 RX ORDER — CYCLOBENZAPRINE HYDROCHLORIDE 10 MG/1
10 TABLET, FILM COATED ORAL AT BEDTIME
Refills: 0 | Status: DISCONTINUED | OUTPATIENT
Start: 2024-02-27 | End: 2024-03-01

## 2024-02-27 RX ORDER — POTASSIUM CHLORIDE 20 MEQ
10 PACKET (EA) ORAL
Refills: 0 | Status: COMPLETED | OUTPATIENT
Start: 2024-02-27 | End: 2024-02-27

## 2024-02-27 RX ORDER — SODIUM CHLORIDE 9 MG/ML
1000 INJECTION, SOLUTION INTRAVENOUS ONCE
Refills: 0 | Status: COMPLETED | OUTPATIENT
Start: 2024-02-27 | End: 2024-02-27

## 2024-02-27 RX ORDER — ONDANSETRON 8 MG/1
4 TABLET, FILM COATED ORAL ONCE
Refills: 0 | Status: COMPLETED | OUTPATIENT
Start: 2024-02-27 | End: 2024-02-27

## 2024-02-27 RX ORDER — LANOLIN ALCOHOL/MO/W.PET/CERES
3 CREAM (GRAM) TOPICAL AT BEDTIME
Refills: 0 | Status: DISCONTINUED | OUTPATIENT
Start: 2024-02-27 | End: 2024-02-28

## 2024-02-27 RX ORDER — ACETAMINOPHEN 500 MG
650 TABLET ORAL ONCE
Refills: 0 | Status: COMPLETED | OUTPATIENT
Start: 2024-02-27 | End: 2024-02-27

## 2024-02-27 RX ORDER — PANTOPRAZOLE SODIUM 20 MG/1
40 TABLET, DELAYED RELEASE ORAL
Refills: 0 | Status: DISCONTINUED | OUTPATIENT
Start: 2024-02-27 | End: 2024-03-01

## 2024-02-27 RX ORDER — CEFTRIAXONE 500 MG/1
2000 INJECTION, POWDER, FOR SOLUTION INTRAMUSCULAR; INTRAVENOUS EVERY 24 HOURS
Refills: 0 | Status: DISCONTINUED | OUTPATIENT
Start: 2024-02-27 | End: 2024-03-01

## 2024-02-27 RX ORDER — SODIUM CHLORIDE 9 MG/ML
1000 INJECTION INTRAMUSCULAR; INTRAVENOUS; SUBCUTANEOUS ONCE
Refills: 0 | Status: COMPLETED | OUTPATIENT
Start: 2024-02-27 | End: 2024-02-27

## 2024-02-27 RX ORDER — DEXTROSE MONOHYDRATE, SODIUM CHLORIDE, AND POTASSIUM CHLORIDE 50; .745; 4.5 G/1000ML; G/1000ML; G/1000ML
1000 INJECTION, SOLUTION INTRAVENOUS
Refills: 0 | Status: DISCONTINUED | OUTPATIENT
Start: 2024-02-27 | End: 2024-02-27

## 2024-02-27 RX ORDER — DULOXETINE HYDROCHLORIDE 30 MG/1
1 CAPSULE, DELAYED RELEASE ORAL
Qty: 0 | Refills: 0 | DISCHARGE

## 2024-02-27 RX ORDER — AZATHIOPRINE 100 MG/1
1 TABLET ORAL
Qty: 0 | Refills: 0 | DISCHARGE

## 2024-02-27 RX ORDER — ACETAMINOPHEN 500 MG
650 TABLET ORAL EVERY 6 HOURS
Refills: 0 | Status: DISCONTINUED | OUTPATIENT
Start: 2024-02-27 | End: 2024-03-01

## 2024-02-27 RX ORDER — GABAPENTIN 400 MG/1
800 CAPSULE ORAL THREE TIMES A DAY
Refills: 0 | Status: DISCONTINUED | OUTPATIENT
Start: 2024-02-27 | End: 2024-03-01

## 2024-02-27 RX ORDER — ACETAMINOPHEN 500 MG
1000 TABLET ORAL ONCE
Refills: 0 | Status: COMPLETED | OUTPATIENT
Start: 2024-02-27 | End: 2024-02-27

## 2024-02-27 RX ORDER — SUMATRIPTAN SUCCINATE 4 MG/.5ML
100 INJECTION, SOLUTION SUBCUTANEOUS ONCE
Refills: 0 | Status: COMPLETED | OUTPATIENT
Start: 2024-02-27 | End: 2024-02-27

## 2024-02-27 RX ORDER — RANITIDINE HYDROCHLORIDE 150 MG/1
1 TABLET, FILM COATED ORAL
Qty: 0 | Refills: 0 | DISCHARGE

## 2024-02-27 RX ORDER — CARVEDILOL PHOSPHATE 80 MG/1
1 CAPSULE, EXTENDED RELEASE ORAL
Qty: 0 | Refills: 0 | DISCHARGE

## 2024-02-27 RX ORDER — ADALIMUMAB 40MG/0.8ML
0 KIT SUBCUTANEOUS
Qty: 0 | Refills: 0 | DISCHARGE

## 2024-02-27 RX ORDER — MIRTAZAPINE 45 MG/1
1 TABLET, ORALLY DISINTEGRATING ORAL
Qty: 0 | Refills: 0 | DISCHARGE

## 2024-02-27 RX ORDER — SODIUM CHLORIDE 9 MG/ML
1000 INJECTION, SOLUTION INTRAVENOUS
Refills: 0 | Status: DISCONTINUED | OUTPATIENT
Start: 2024-02-27 | End: 2024-03-01

## 2024-02-27 RX ORDER — FAMOTIDINE 10 MG/ML
20 INJECTION INTRAVENOUS ONCE
Refills: 0 | Status: COMPLETED | OUTPATIENT
Start: 2024-02-27 | End: 2024-02-27

## 2024-02-27 RX ORDER — SULFASALAZINE 500 MG
1000 TABLET ORAL THREE TIMES A DAY
Refills: 0 | Status: DISCONTINUED | OUTPATIENT
Start: 2024-02-27 | End: 2024-03-01

## 2024-02-27 RX ORDER — CEFTRIAXONE 500 MG/1
1000 INJECTION, POWDER, FOR SOLUTION INTRAMUSCULAR; INTRAVENOUS ONCE
Refills: 0 | Status: COMPLETED | OUTPATIENT
Start: 2024-02-27 | End: 2024-02-27

## 2024-02-27 RX ORDER — SULFASALAZINE 500 MG
1 TABLET ORAL
Qty: 0 | Refills: 0 | DISCHARGE

## 2024-02-27 RX ORDER — POTASSIUM CHLORIDE 20 MEQ
40 PACKET (EA) ORAL ONCE
Refills: 0 | Status: COMPLETED | OUTPATIENT
Start: 2024-02-27 | End: 2024-02-27

## 2024-02-27 RX ORDER — CARVEDILOL PHOSPHATE 80 MG/1
6.25 CAPSULE, EXTENDED RELEASE ORAL EVERY 12 HOURS
Refills: 0 | Status: DISCONTINUED | OUTPATIENT
Start: 2024-02-27 | End: 2024-03-01

## 2024-02-27 RX ORDER — ONDANSETRON 8 MG/1
4 TABLET, FILM COATED ORAL EVERY 8 HOURS
Refills: 0 | Status: DISCONTINUED | OUTPATIENT
Start: 2024-02-27 | End: 2024-03-01

## 2024-02-27 RX ORDER — ALBUTEROL 90 UG/1
2 AEROSOL, METERED ORAL
Qty: 0 | Refills: 0 | DISCHARGE

## 2024-02-27 RX ORDER — BUDESONIDE AND FORMOTEROL FUMARATE DIHYDRATE 160; 4.5 UG/1; UG/1
2 AEROSOL RESPIRATORY (INHALATION)
Qty: 0 | Refills: 0 | DISCHARGE

## 2024-02-27 RX ORDER — HEPARIN SODIUM 5000 [USP'U]/ML
5000 INJECTION INTRAVENOUS; SUBCUTANEOUS ONCE
Refills: 0 | Status: DISCONTINUED | OUTPATIENT
Start: 2024-02-27 | End: 2024-03-01

## 2024-02-27 RX ORDER — GABAPENTIN 400 MG/1
1 CAPSULE ORAL
Qty: 0 | Refills: 0 | DISCHARGE

## 2024-02-27 RX ADMIN — Medication 400 MILLIGRAM(S): at 13:53

## 2024-02-27 RX ADMIN — ONDANSETRON 4 MILLIGRAM(S): 8 TABLET, FILM COATED ORAL at 13:54

## 2024-02-27 RX ADMIN — Medication 650 MILLIGRAM(S): at 21:18

## 2024-02-27 RX ADMIN — SODIUM CHLORIDE 100 MILLILITER(S): 9 INJECTION, SOLUTION INTRAVENOUS at 23:48

## 2024-02-27 RX ADMIN — Medication 20 MILLIEQUIVALENT(S): at 16:33

## 2024-02-27 RX ADMIN — Medication 20 MILLIEQUIVALENT(S): at 16:30

## 2024-02-27 RX ADMIN — SODIUM CHLORIDE 1000 MILLILITER(S): 9 INJECTION, SOLUTION INTRAVENOUS at 16:28

## 2024-02-27 RX ADMIN — SUMATRIPTAN SUCCINATE 100 MILLIGRAM(S): 4 INJECTION, SOLUTION SUBCUTANEOUS at 23:54

## 2024-02-27 RX ADMIN — SUMATRIPTAN SUCCINATE 100 MILLIGRAM(S): 4 INJECTION, SOLUTION SUBCUTANEOUS at 22:44

## 2024-02-27 RX ADMIN — FAMOTIDINE 20 MILLIGRAM(S): 10 INJECTION INTRAVENOUS at 13:54

## 2024-02-27 RX ADMIN — Medication 40 MILLIEQUIVALENT(S): at 23:47

## 2024-02-27 RX ADMIN — Medication 1000 MILLIGRAM(S): at 14:23

## 2024-02-27 RX ADMIN — Medication 100 MILLIEQUIVALENT(S): at 16:30

## 2024-02-27 RX ADMIN — SODIUM CHLORIDE 1000 MILLILITER(S): 9 INJECTION INTRAMUSCULAR; INTRAVENOUS; SUBCUTANEOUS at 13:53

## 2024-02-27 RX ADMIN — Medication 650 MILLIGRAM(S): at 20:48

## 2024-02-27 RX ADMIN — CEFTRIAXONE 100 MILLIGRAM(S): 500 INJECTION, POWDER, FOR SOLUTION INTRAMUSCULAR; INTRAVENOUS at 16:29

## 2024-02-27 NOTE — ED PROVIDER NOTE - PROGRESS NOTE DETAILS
Labs reviewed, patient with LFT elevation, as well as positive UA.  Will provide ceftriaxone.  Patient will require admission due to hypotension along with a positive UA.  Still waiting CT abdomen pelvis to better elucidate etiology of LFT elevations.

## 2024-02-27 NOTE — H&P ADULT - HISTORY OF PRESENT ILLNESS
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 States sharp pain started suddenly in the L flank radiating to the L groin. She did not come to the hospital initially, but today blood pressure was really low, below the 60s systolic she felt like she passed out which prompted the visit to the ED. States she has not been able to tolerate PO fluids. Minimal appetite. Pt is able to void, states urine is murky appearing.     She Denies dysuria, hematuria, difficulty urinating. No other urinary complaints. Pt has never had a ureteral stone in the past.    Pt last ate fish and 8oz juice 30 mins - 1 hour ago.    In the ED, CT showed IMPRESSION: Mild left hydroureteronephrosis secondary to a 4 mm stone in the mid left  ureter. 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 States sharp pain started suddenly in the L flank radiating to the L groin. She did not come to the hospital initially, but today blood pressure was really low, below the 60s systolic she felt like she passed out which prompted the visit to the ED. States she has not been able to tolerate PO fluids. Minimal appetite. Pt is able to void, states urine is murky appearing.     She Denies dysuria, hematuria, difficulty urinating. No other urinary complaints. Pt has never had a ureteral stone in the past.    In the ED, CT showed IMPRESSION: Mild left hydroureteronephrosis secondary to a 4 mm stone in the mid left  ureter. K was 2.9 lactate elevated, she received 1 LR bolus and 1NS bolus

## 2024-02-27 NOTE — ED PROVIDER NOTE - PLAN OF CARE
59-year-old female past medical history migraine hypertension in ED with nausea, vomiting, diarrhea as well as fever and bodyaches.  Differential diagnose includes but not limited to influenza, gastroenteritis, colitis, biliary pathology, UTI.  Will check labs including CBC for infection or anemia, CMP for electrolyte derangements and renal dysfunction or hepatic dysfunction.  Will check UA for UTI will obtain CT abdomen pelvis to look for colitis versus biliary pathology.  Point-of-care ultrasound gallbladder appears unremarkable.  Will provide IV fluids, pain control, antipyretics and antiemetics.

## 2024-02-27 NOTE — ED PROVIDER NOTE - CARE PLAN
Principal Discharge DX:	Diarrheal disease  Assessment and plan of treatment:	59-year-old female past medical history migraine hypertension in ED with nausea, vomiting, diarrhea as well as fever and bodyaches.  Differential diagnose includes but not limited to influenza, gastroenteritis, colitis, biliary pathology, UTI.  Will check labs including CBC for infection or anemia, CMP for electrolyte derangements and renal dysfunction or hepatic dysfunction.  Will check UA for UTI will obtain CT abdomen pelvis to look for colitis versus biliary pathology.  Point-of-care ultrasound gallbladder appears unremarkable.  Will provide IV fluids, pain control, antipyretics and antiemetics.   1 Principal Discharge DX:	Renal colic on left side  Assessment and plan of treatment:	59-year-old female past medical history migraine hypertension in ED with nausea, vomiting, diarrhea as well as fever and bodyaches.  Differential diagnose includes but not limited to influenza, gastroenteritis, colitis, biliary pathology, UTI.  Will check labs including CBC for infection or anemia, CMP for electrolyte derangements and renal dysfunction or hepatic dysfunction.  Will check UA for UTI will obtain CT abdomen pelvis to look for colitis versus biliary pathology.  Point-of-care ultrasound gallbladder appears unremarkable.  Will provide IV fluids, pain control, antipyretics and antiemetics.  Secondary Diagnosis:	Hypokalemia

## 2024-02-27 NOTE — ED PROVIDER NOTE - NSICDXFAMILYHX_GEN_ALL_CORE_FT
FAMILY HISTORY:  Family history of hypertension    Sibling  Still living? Yes, Estimated age: Age Unknown  Family history of kidney stones, Age at diagnosis: Age Unknown

## 2024-02-27 NOTE — H&P ADULT - NSHPPHYSICALEXAM_GEN_ALL_CORE
GENERAL: NAD, well groomed  HEAD:  Atraumatic, Normocephalic  NECK: Supple, normal appearance  NERVOUS SYSTEM:  Alert & Oriented X3,  Moves all extremities spontaneously  CHEST/LUNG: Lungs clear to auscultation bilaterally, No rales, rhonchi, wheezing   HEART: Regular rate and rhythm; No murmurs, rubs, or gallops  ABDOMEN: Soft, Nontender, Nondistended; Bowel sounds present  EXTREMITIES: No clubbing, cyanosis, or edema  SKIN: No rashes or lesions;  Good capillary refill

## 2024-02-27 NOTE — H&P ADULT - ASSESSMENT
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 States sharp pain started suddenly in the L flank radiating to the L groin.  CT showed : Mild left hydroureteronephrosis secondary to a 4 mm stone in the mid left  ureter being admitted for renal colic pending possible stent with urology in AM    Revised Cardiac Risk Index for Pre-Operative Risk from [employer]  on 2/28/2024  ** All calculations should be rechecked by clinician prior to use **    RESULT SUMMARY:  0 points  Class I Risk    3.9 %  30-day risk of death, MI, or cardiac arrest    From [employer] 2017. These numbers are higher than those from the original study ([employer] 1999). See Evidence for details.      INPUTS:  Elevated-risk surgery —> 0 = No  History of ischemic heart disease —> 0 = No  History of congestive heart failure —> 0 = No  History of cerebrovascular disease —> 0 = No  Pre-operative treatment with insulin —> 0 = No  Pre-operative creatinine >2 mg/dL / 176.8 µmol/L —> 0 = No

## 2024-02-27 NOTE — ED PROVIDER NOTE - OBJECTIVE STATEMENT
59-year-old female past medical history migraine, hypertension and the ER complaining of nausea, vomiting, diarrhea and fever for the last 3 days along with bodyaches and weakness.  No chest pain, no congestion, no cough or shortness of breath.  No leg swelling, no rash

## 2024-02-27 NOTE — H&P ADULT - NSHPREVIEWOFSYSTEMS_GEN_ALL_CORE
CONSTITUTIONAL: No fever  RESPIRATORY:  No shortness of breath  CARDIOVASCULAR: No chest pain  GASTROINTESTINAL: No abdominal pain.  GENITOURINARY: No dysuria   NEUROLOGICAL: Headaches,  ENDOCRINE: No polyuria  musculoskeletal No muscle aches  HEME: no easy bruisability  SKIN: No itching, burning, rashes, or lesions .

## 2024-02-27 NOTE — ED ADULT NURSE NOTE - ED STAT RN HANDOFF DETAILS
Pt AAOx3 and ambulatory in steady gait. No distress noted. Telemetry monitoring in progress. Pt endorsed to holding DONOVAN Dugan.

## 2024-02-27 NOTE — ED ADULT NURSE NOTE - OBJECTIVE STATEMENT
per patients she was having abd pain, low back pain with fevers, N&V, diarrhea on and off since saturday.   pt came in the with hypotension and dizziness.

## 2024-02-27 NOTE — CONSULT NOTE ADULT - ASSESSMENT
60 y/o female with with L 4mm mid ureteral stone w/ L hydro   tachycardic, afebrile in ED, UA+      Plan   - medical admission   - poss add on for cysto stent in AM   - keep NPO at midnight  - IV Ceftriaxone  - Aggressive hydration  - Flomax 0.4mg qHS X30 days  - pain / nausea control prn   - consider Bowel Regimen; senna, colace, miralex  - obtain preop labs  - trend CBC / BMP  - Obtain Ucx  - document medical clearance  - uro to follow  - remainder of care as per primary team   58 y/o female with with L 4mm mid ureteral stone w/ L hydro   tachycardic, afebrile in ED, UA+      Plan   - medical admission   - poss add on for cysto stent in AM   - keep NPO at midnight  - IV Ceftriaxone  - Aggressive hydration  - Flomax 0.4mg qHS X30 days  - pain / nausea control prn   - consider Bowel Regimen; senna, colace, miralex  - obtain preop labs  - trend CBC / BMP  - Obtain Ucx  - document medical clearance / medical optimization   - uro to follow  - remainder of care as per primary team

## 2024-02-27 NOTE — ED ADULT NURSE NOTE - NSFALLRISKINTERV_ED_ALL_ED

## 2024-02-27 NOTE — H&P ADULT - PROBLEM SELECTOR PLAN 2
4 Hx of   - no history CAD or CHF, no asthma exacerbation many years  - scheduled for Ureteral stent  - patient has no risk factors for coronary artery disease  - Good functional capacity (METS ), no exertional chest pain or shortness of breath  - EKG findings pending  - RCRI score:  0 points  Class I Risk  3.9 %  30-day risk of death, MI, or cardiac arrest  - medically optimized for the procedure

## 2024-02-27 NOTE — CONSULT NOTE ADULT - SUBJECTIVE AND OBJECTIVE BOX
UROLOGY CONSULT  60 y/o female with multiple comorbidities including HTN, fibromyalgia, rheumatoid arthritis, sarcoidosis, fatty liver, vertigo and past surgical history of hysterectomy presents to ED with L flank pain x 3-4 days with associated fever, chills, vomiting at home. Pt admits to documented 103 fever at home. States sharp pain started suddenly in the L flank radiating to the L groin. States she has not been able to tolerate PO fluids. Minimal appetite. Pt is able to void, states urine is murky appearing. Denies dysuria, hematuria, difficulty urinating. No other urinary complaints. Pt has never had a ureteral stone in the past. Denies prior urologic procedures / interventions. Denies use of anticoagulation. Nondiabetic.     Pt last ate fish and 8oz juice 30 mins - 1 hour ago. Not able to undergo anesthesia tonight.       PMH:  Sarcoidosis, lung  dx  with bx  HLD (hyperlipidemia)  HTN (hypertension)  GERD (gastroesophageal reflux disease)  Fibromyalgia  RA (rheumatoid arthritis)  Seasonal allergies  Fibroids  Brain aneurysm  dx , sx done  Migraines  Vertigo  Depression  Hallux valgus (acquired), left foot  SHELIA (obstructive sleep apnea)  home test done by PCP in 2016, SHELIA pt was using mouth guard but it broken, not using it now, to notify Anesthesia    H/O: hysterectomy    S/P arthroscopy  left knee and left foot=-   Brain aneurysm  coil sx -   Chronic lower back pain  s/p lumbar discectomy -, s/p fusion with hardware-   History of bunionectomy  left foot    MEDICATIONS  (STANDING):    MEDICATIONS  (PRN):      Allergies    Cipro (Vomiting)    Intolerances        Vital Signs Last 24 Hrs  T(C): 36.8 (2024 17:08), Max: 36.8 (2024 17:08)  T(F): 98.2 (2024 17:08), Max: 98.2 (2024 17:08)  HR: 105 (2024 17:08) (105 - 106)  BP: 109/76 (2024 17:08) (91/55 - 109/76)  BP(mean): --  RR: 18 (2024 17:08) (18 - 18)  SpO2: 97% (2024 17:08) (96% - 97%)    Parameters below as of 2024 17:08  Patient On (Oxygen Delivery Method): room air        Physical:  Gen: A&Ox3. NAD. Nontoxic appearing.  Resp: Unlabored breathing. Equal chest rise bilaterally.   Abd: Soft ND, NT to palpation diffusely.   Skin: Warm and dry.   : +L CVAT to palpation. Negative on the R side. No suprapubic tenderness. Voiding freely. \    vitals taken at bedside:  98.7 F, 109 HR, 100/ 68 BP        I&O's Detail      LABS:                        11.0   8.26  )-----------( 165      ( 2024 13:40 )             34.4                  141  |  113<H>  |  18  ----------------------------<  125<H>  2.9<LL>   |  20<L>  |  1.25    Ca    7.3<L>      2024 13:40  Mg     1.6         TPro  6.2  /  Alb  2.6<L>  /  TBili  1.6<H>  /  DBili  x   /  AST  96<H>  /  ALT  103<H>  /  AlkPhos  138<H>                Urinalysis Basic - ( 2024 13:40 )    Color: Dark Yellow / Appearance: Cloudy / S.019 / pH: x  Gluc: 125 mg/dL / Ketone: Negative mg/dL  / Bili: Small / Urobili: 1.0 mg/dL   Blood: x / Protein: 100 mg/dL / Nitrite: Positive   Leuk Esterase: Large / RBC: 8 /HPF / WBC 15 /HPF   Sq Epi: x / Non Sq Epi: x / Bacteria: Many /HPF        RADIOLOGY & ADDITIONAL STUDIES:  < from: CT Abdomen and Pelvis w/ IV Cont (24 @ 15:55) >    ACC: [id number] EXAM:  CT ABDOMEN AND PELVIS IC   ORDERED BY: JUAREZ RENAE     PROCEDURE DATE:  2024          INTERPRETATION:  CLINICAL INFORMATION: Nausea, vomiting, diarrhea and   abdominal pain.    COMPARISON: 2020 CT scan of the abdomen and pelvis    CONTRAST/COMPLICATIONS:  IV Contrast: Omnipaque 350  90 cc administered   10 cc discarded  Oral Contrast: NONE  Complications: None reported at time of study completion    PROCEDURE:  CT of the Abdomen and Pelvis was performed.  Sagittal and coronal reformats were performed.    FINDINGS:  LOWER CHEST: Scattered atelectasis at the lung bases most prominent in   the lingula.    LIVER: Within normal limits.  BILE DUCTS: Normal caliber.  GALLBLADDER: Mild nonspecific fluid interposed between the gallbladder   and liver.  SPLEEN: Within normal limits.  PANCREAS: Within normal limits.  ADRENALS: Within normal limits.  KIDNEYS/URETERS: Subcentimeter hypodensities right kidney, too small to   characterize. 4 mm nonobstructing right lower pole renal calculus. There   is mild left hydroureteronephrosis secondary to a 4 mm stone in the left   mid ureter.    BLADDER: Within normal limits.  REPRODUCTIVE ORGANS: Hysterectomy.    BOWEL: No bowel obstruction. Tiny hiatal hernia.  PERITONEUM: Trace amount of perisplenic fluid.  VESSELS: Within normal limits.  RETROPERITONEUM/LYMPH NODES: No lymphadenopathy.  ABDOMINAL WALL: Within normal limits.  BONES: There are intervertebral disc spacers and spinal fusion hardware   anteriorly and posteriorly L4-S1. There is a neurostimulator implanted in   subcutaneous tissues with wire coursing into the spinal canal.    IMPRESSION:  Mild left hydroureteronephrosis secondary to a 4 mm stone in the mid left   ureter.        --- End of Report ---            TOMAS MARADIAGA MD; Attending Radiologist  This document has been electronically signed. 2024  4:07PM    < end of copied text >   UROLOGY CONSULT  58 y/o female with multiple comorbidities including HTN, fibromyalgia, rheumatoid arthritis, sarcoidosis, fatty liver, vertigo and past surgical history of hysterectomy presents to ED with L flank pain x 3-4 days with associated fever, chills, vomiting at home. Pt admits to documented 103 fever at home. States sharp pain started suddenly in the L flank radiating to the L groin. States she has not been able to tolerate PO fluids. Minimal appetite. Pt is able to void, states urine is murky appearing. Denies dysuria, hematuria, difficulty urinating. No other urinary complaints. Pt has never had a ureteral stone in the past. Denies prior urologic procedures / interventions. Denies use of anticoagulation. Nondiabetic.     Pt last ate fish and 8oz juice 30 mins - 1 hour ago.      PMH:  Sarcoidosis, lung  dx  with bx  HLD (hyperlipidemia)  HTN (hypertension)  GERD (gastroesophageal reflux disease)  Fibromyalgia  RA (rheumatoid arthritis)  Seasonal allergies  Fibroids  Brain aneurysm  dx , sx done  Migraines  Vertigo  Depression  Hallux valgus (acquired), left foot  SHELIA (obstructive sleep apnea)  home test done by PCP in , SHELIA pt was using mouth guard but it broken, not using it now, to notify Anesthesia    H/O: hysterectomy    S/P arthroscopy  left knee and left foot=-   Brain aneurysm  coil sx -   Chronic lower back pain  s/p lumbar discectomy -, s/p fusion with hardware-   History of bunionectomy  left foot    MEDICATIONS  (STANDING):    MEDICATIONS  (PRN):      Allergies    Cipro (Vomiting)    Intolerances        Vital Signs Last 24 Hrs  T(C): 36.8 (2024 17:08), Max: 36.8 (2024 17:08)  T(F): 98.2 (2024 17:08), Max: 98.2 (2024 17:08)  HR: 105 (2024 17:08) (105 - 106)  BP: 109/76 (2024 17:08) (91/55 - 109/76)  BP(mean): --  RR: 18 (2024 17:08) (18 - 18)  SpO2: 97% (2024 17:08) (96% - 97%)    Parameters below as of 2024 17:08  Patient On (Oxygen Delivery Method): room air        Physical:  Gen: A&Ox3. NAD. Nontoxic appearing.  Resp: Unlabored breathing. Equal chest rise bilaterally.   Abd: Soft ND, NT to palpation diffusely.   Skin: Warm and dry.   : +L CVAT to palpation. Negative on the R side. No suprapubic tenderness. Voiding freely. \    vitals taken at bedside:  98.7 F, 109 HR, 100/ 68 BP        I&O's Detail      LABS:                        11.0   8.26  )-----------( 165      ( 2024 13:40 )             34.4                  141  |  113<H>  |  18  ----------------------------<  125<H>  2.9<LL>   |  20<L>  |  1.25    Ca    7.3<L>      2024 13:40  Mg     1.6         TPro  6.2  /  Alb  2.6<L>  /  TBili  1.6<H>  /  DBili  x   /  AST  96<H>  /  ALT  103<H>  /  AlkPhos  138<H>                Urinalysis Basic - ( 2024 13:40 )    Color: Dark Yellow / Appearance: Cloudy / S.019 / pH: x  Gluc: 125 mg/dL / Ketone: Negative mg/dL  / Bili: Small / Urobili: 1.0 mg/dL   Blood: x / Protein: 100 mg/dL / Nitrite: Positive   Leuk Esterase: Large / RBC: 8 /HPF / WBC 15 /HPF   Sq Epi: x / Non Sq Epi: x / Bacteria: Many /HPF        RADIOLOGY & ADDITIONAL STUDIES:  < from: CT Abdomen and Pelvis w/ IV Cont (24 @ 15:55) >    ACC: [id number] EXAM:  CT ABDOMEN AND PELVIS IC   ORDERED BY: JUAREZ RENAE     PROCEDURE DATE:  2024          INTERPRETATION:  CLINICAL INFORMATION: Nausea, vomiting, diarrhea and   abdominal pain.    COMPARISON: 2020 CT scan of the abdomen and pelvis    CONTRAST/COMPLICATIONS:  IV Contrast: Omnipaque 350  90 cc administered   10 cc discarded  Oral Contrast: NONE  Complications: None reported at time of study completion    PROCEDURE:  CT of the Abdomen and Pelvis was performed.  Sagittal and coronal reformats were performed.    FINDINGS:  LOWER CHEST: Scattered atelectasis at the lung bases most prominent in   the lingula.    LIVER: Within normal limits.  BILE DUCTS: Normal caliber.  GALLBLADDER: Mild nonspecific fluid interposed between the gallbladder   and liver.  SPLEEN: Within normal limits.  PANCREAS: Within normal limits.  ADRENALS: Within normal limits.  KIDNEYS/URETERS: Subcentimeter hypodensities right kidney, too small to   characterize. 4 mm nonobstructing right lower pole renal calculus. There   is mild left hydroureteronephrosis secondary to a 4 mm stone in the left   mid ureter.    BLADDER: Within normal limits.  REPRODUCTIVE ORGANS: Hysterectomy.    BOWEL: No bowel obstruction. Tiny hiatal hernia.  PERITONEUM: Trace amount of perisplenic fluid.  VESSELS: Within normal limits.  RETROPERITONEUM/LYMPH NODES: No lymphadenopathy.  ABDOMINAL WALL: Within normal limits.  BONES: There are intervertebral disc spacers and spinal fusion hardware   anteriorly and posteriorly L4-S1. There is a neurostimulator implanted in   subcutaneous tissues with wire coursing into the spinal canal.    IMPRESSION:  Mild left hydroureteronephrosis secondary to a 4 mm stone in the mid left   ureter.        --- End of Report ---            TOMAS MARADIAGA MD; Attending Radiologist  This document has been electronically signed. 2024  4:07PM    < end of copied text >

## 2024-02-27 NOTE — CONSULT NOTE ADULT - NS ATTEND AMEND GEN_ALL_CORE FT
Pt seen and examined. Agree with note as written above    - Plan as above  - OR for urgent ureteral stent placement

## 2024-02-27 NOTE — H&P ADULT - PROBLEM SELECTOR PLAN 1
presents with L flank pain since 2/24 with associated fever 103F, chills, vomiting at home.   States sharp pain started suddenly in the L flank radiating to the L groin.    CT showed : Mild left hydroureteronephrosis secondary to a 4 mm stone in the mid left  ureter being admitted for renal colic pending   Urology following  possible stent with urology in AM  NPO after midnight  PT/INR/T/S in AM

## 2024-02-27 NOTE — ED PROVIDER NOTE - ATTENDING CONTRIBUTION TO CARE
Patient in the ED with urosepsis complicated potentially by viral illness as well as a diarrheal illness with LFT elevations.  Pending CT abdomen/pelvis.  Will require admission.

## 2024-02-27 NOTE — ED PROVIDER NOTE - PHYSICAL EXAMINATION
CONSTITUTIONAL: Well-developed; well-nourished; in no acute distress.   SKIN: warm, dry  HEAD: Normocephalic; atraumatic.  EYES: PERRL, EOMI, no conjunctival erythema  CARD: S1, S2 normal; no murmurs, gallops, or rubs. Regular rate and rhythm.   RESP: No wheezes, rales or rhonchi.  ABD: soft diffusely tender   EXT: Normal ROM.  NEURO: Alert, oriented, no focal motor or sensory deficit  PSYCH: Cooperative, appropriate.

## 2024-02-28 DIAGNOSIS — N20.0 CALCULUS OF KIDNEY: ICD-10-CM

## 2024-02-28 DIAGNOSIS — Z02.9 ENCOUNTER FOR ADMINISTRATIVE EXAMINATIONS, UNSPECIFIED: ICD-10-CM

## 2024-02-28 DIAGNOSIS — Z29.9 ENCOUNTER FOR PROPHYLACTIC MEASURES, UNSPECIFIED: ICD-10-CM

## 2024-02-28 DIAGNOSIS — Z01.818 ENCOUNTER FOR OTHER PREPROCEDURAL EXAMINATION: ICD-10-CM

## 2024-02-28 DIAGNOSIS — J45.909 UNSPECIFIED ASTHMA, UNCOMPLICATED: ICD-10-CM

## 2024-02-28 DIAGNOSIS — I10 ESSENTIAL (PRIMARY) HYPERTENSION: ICD-10-CM

## 2024-02-28 DIAGNOSIS — D86.9 SARCOIDOSIS, UNSPECIFIED: ICD-10-CM

## 2024-02-28 DIAGNOSIS — N13.30 UNSPECIFIED HYDRONEPHROSIS: ICD-10-CM

## 2024-02-28 DIAGNOSIS — M79.7 FIBROMYALGIA: ICD-10-CM

## 2024-02-28 DIAGNOSIS — N23 UNSPECIFIED RENAL COLIC: ICD-10-CM

## 2024-02-28 DIAGNOSIS — R78.81 BACTEREMIA: ICD-10-CM

## 2024-02-28 DIAGNOSIS — E87.6 HYPOKALEMIA: ICD-10-CM

## 2024-02-28 LAB
ALBUMIN SERPL ELPH-MCNC: 2.6 G/DL — LOW (ref 3.5–5)
ALP SERPL-CCNC: 139 U/L — HIGH (ref 40–120)
ALT FLD-CCNC: 115 U/L DA — HIGH (ref 10–60)
ANION GAP SERPL CALC-SCNC: 8 MMOL/L — SIGNIFICANT CHANGE UP (ref 5–17)
ANION GAP SERPL CALC-SCNC: 9 MMOL/L — SIGNIFICANT CHANGE UP (ref 5–17)
AST SERPL-CCNC: 94 U/L — HIGH (ref 10–40)
BASOPHILS # BLD AUTO: 0.04 K/UL — SIGNIFICANT CHANGE UP (ref 0–0.2)
BASOPHILS NFR BLD AUTO: 0.4 % — SIGNIFICANT CHANGE UP (ref 0–2)
BILIRUB SERPL-MCNC: 2 MG/DL — HIGH (ref 0.2–1.2)
BLD GP AB SCN SERPL QL: SIGNIFICANT CHANGE UP
BUN SERPL-MCNC: 10 MG/DL — SIGNIFICANT CHANGE UP (ref 7–18)
BUN SERPL-MCNC: 13 MG/DL — SIGNIFICANT CHANGE UP (ref 7–18)
CALCIUM SERPL-MCNC: 7.9 MG/DL — LOW (ref 8.4–10.5)
CALCIUM SERPL-MCNC: 8.2 MG/DL — LOW (ref 8.4–10.5)
CHLORIDE SERPL-SCNC: 115 MMOL/L — HIGH (ref 96–108)
CHLORIDE SERPL-SCNC: 117 MMOL/L — HIGH (ref 96–108)
CO2 SERPL-SCNC: 14 MMOL/L — LOW (ref 22–31)
CO2 SERPL-SCNC: 19 MMOL/L — LOW (ref 22–31)
CREAT SERPL-MCNC: 0.78 MG/DL — SIGNIFICANT CHANGE UP (ref 0.5–1.3)
CREAT SERPL-MCNC: 0.86 MG/DL — SIGNIFICANT CHANGE UP (ref 0.5–1.3)
EGFR: 77 ML/MIN/1.73M2 — SIGNIFICANT CHANGE UP
EGFR: 87 ML/MIN/1.73M2 — SIGNIFICANT CHANGE UP
EOSINOPHIL # BLD AUTO: 0.07 K/UL — SIGNIFICANT CHANGE UP (ref 0–0.5)
EOSINOPHIL NFR BLD AUTO: 0.7 % — SIGNIFICANT CHANGE UP (ref 0–6)
GLUCOSE BLDC GLUCOMTR-MCNC: 150 MG/DL — HIGH (ref 70–99)
GLUCOSE SERPL-MCNC: 109 MG/DL — HIGH (ref 70–99)
GLUCOSE SERPL-MCNC: 150 MG/DL — HIGH (ref 70–99)
GRAM STN FLD: ABNORMAL
HCT VFR BLD CALC: 32 % — LOW (ref 34.5–45)
HGB BLD-MCNC: 10.2 G/DL — LOW (ref 11.5–15.5)
IMM GRANULOCYTES NFR BLD AUTO: 1.3 % — HIGH (ref 0–0.9)
INR BLD: 1.25 RATIO — HIGH (ref 0.85–1.18)
LACTATE SERPL-SCNC: 1.4 MMOL/L — SIGNIFICANT CHANGE UP (ref 0.7–2)
LYMPHOCYTES # BLD AUTO: 0.91 K/UL — LOW (ref 1–3.3)
LYMPHOCYTES # BLD AUTO: 8.6 % — LOW (ref 13–44)
MCHC RBC-ENTMCNC: 30.8 PG — SIGNIFICANT CHANGE UP (ref 27–34)
MCHC RBC-ENTMCNC: 31.9 GM/DL — LOW (ref 32–36)
MCV RBC AUTO: 96.7 FL — SIGNIFICANT CHANGE UP (ref 80–100)
METHOD TYPE: SIGNIFICANT CHANGE UP
MONOCYTES # BLD AUTO: 0.39 K/UL — SIGNIFICANT CHANGE UP (ref 0–0.9)
MONOCYTES NFR BLD AUTO: 3.7 % — SIGNIFICANT CHANGE UP (ref 2–14)
NEUTROPHILS # BLD AUTO: 9.06 K/UL — HIGH (ref 1.8–7.4)
NEUTROPHILS NFR BLD AUTO: 85.3 % — HIGH (ref 43–77)
NRBC # BLD: 0 /100 WBCS — SIGNIFICANT CHANGE UP (ref 0–0)
PLATELET # BLD AUTO: 145 K/UL — LOW (ref 150–400)
POTASSIUM SERPL-MCNC: 4 MMOL/L — SIGNIFICANT CHANGE UP (ref 3.5–5.3)
POTASSIUM SERPL-MCNC: 4.2 MMOL/L — SIGNIFICANT CHANGE UP (ref 3.5–5.3)
POTASSIUM SERPL-SCNC: 4 MMOL/L — SIGNIFICANT CHANGE UP (ref 3.5–5.3)
POTASSIUM SERPL-SCNC: 4.2 MMOL/L — SIGNIFICANT CHANGE UP (ref 3.5–5.3)
PROT SERPL-MCNC: 6.5 G/DL — SIGNIFICANT CHANGE UP (ref 6–8.3)
PROTEUS SP DNA BLD POS QL NAA+NON-PROBE: SIGNIFICANT CHANGE UP
PROTHROM AB SERPL-ACNC: 14.2 SEC — HIGH (ref 9.5–13)
RBC # BLD: 3.31 M/UL — LOW (ref 3.8–5.2)
RBC # FLD: 13.1 % — SIGNIFICANT CHANGE UP (ref 10.3–14.5)
SODIUM SERPL-SCNC: 140 MMOL/L — SIGNIFICANT CHANGE UP (ref 135–145)
SODIUM SERPL-SCNC: 142 MMOL/L — SIGNIFICANT CHANGE UP (ref 135–145)
SPECIMEN SOURCE: SIGNIFICANT CHANGE UP
SPECIMEN SOURCE: SIGNIFICANT CHANGE UP
WBC # BLD: 10.61 K/UL — HIGH (ref 3.8–10.5)
WBC # FLD AUTO: 10.61 K/UL — HIGH (ref 3.8–10.5)

## 2024-02-28 PROCEDURE — 52332 CYSTOSCOPY AND TREATMENT: CPT | Mod: LT

## 2024-02-28 DEVICE — URETERAL STENT PERCUFLEX PLUS 6FR 24CM: Type: IMPLANTABLE DEVICE | Site: LEFT | Status: FUNCTIONAL

## 2024-02-28 DEVICE — URETERAL CATH OPEN END 5FR 70CM: Type: IMPLANTABLE DEVICE | Site: LEFT | Status: FUNCTIONAL

## 2024-02-28 DEVICE — GUIDEWIRE SENSOR DUAL-FLEX NITINOL STRAIGHT .035" X 150CM: Type: IMPLANTABLE DEVICE | Site: LEFT | Status: FUNCTIONAL

## 2024-02-28 DEVICE — URETERAL STENT PERCUFLEX PLUS 6FR 26CM: Type: IMPLANTABLE DEVICE | Site: LEFT | Status: FUNCTIONAL

## 2024-02-28 DEVICE — STENT URETHRAL PIGTL DBL 6FRX22CM: Type: IMPLANTABLE DEVICE | Site: LEFT | Status: FUNCTIONAL

## 2024-02-28 RX ORDER — FENTANYL CITRATE 50 UG/ML
50 INJECTION INTRAVENOUS
Refills: 0 | Status: DISCONTINUED | OUTPATIENT
Start: 2024-02-28 | End: 2024-02-28

## 2024-02-28 RX ORDER — ACETAMINOPHEN 500 MG
1000 TABLET ORAL ONCE
Refills: 0 | Status: COMPLETED | OUTPATIENT
Start: 2024-02-28 | End: 2024-02-28

## 2024-02-28 RX ORDER — CHLORHEXIDINE GLUCONATE 213 G/1000ML
1 SOLUTION TOPICAL DAILY
Refills: 0 | Status: DISCONTINUED | OUTPATIENT
Start: 2024-02-28 | End: 2024-03-01

## 2024-02-28 RX ORDER — TAMSULOSIN HYDROCHLORIDE 0.4 MG/1
0.4 CAPSULE ORAL AT BEDTIME
Refills: 0 | Status: DISCONTINUED | OUTPATIENT
Start: 2024-02-28 | End: 2024-03-01

## 2024-02-28 RX ORDER — FENTANYL CITRATE 50 UG/ML
25 INJECTION INTRAVENOUS
Refills: 0 | Status: DISCONTINUED | OUTPATIENT
Start: 2024-02-28 | End: 2024-02-28

## 2024-02-28 RX ORDER — SODIUM CHLORIDE 9 MG/ML
1000 INJECTION, SOLUTION INTRAVENOUS
Refills: 0 | Status: DISCONTINUED | OUTPATIENT
Start: 2024-02-28 | End: 2024-02-28

## 2024-02-28 RX ADMIN — Medication 1000 MILLIGRAM(S): at 19:08

## 2024-02-28 RX ADMIN — PANTOPRAZOLE SODIUM 40 MILLIGRAM(S): 20 TABLET, DELAYED RELEASE ORAL at 05:33

## 2024-02-28 RX ADMIN — CHLORHEXIDINE GLUCONATE 1 APPLICATION(S): 213 SOLUTION TOPICAL at 09:03

## 2024-02-28 RX ADMIN — GABAPENTIN 800 MILLIGRAM(S): 400 CAPSULE ORAL at 21:13

## 2024-02-28 RX ADMIN — Medication 1000 MILLIGRAM(S): at 09:04

## 2024-02-28 RX ADMIN — Medication 400 MILLIGRAM(S): at 09:28

## 2024-02-28 RX ADMIN — TAMSULOSIN HYDROCHLORIDE 0.4 MILLIGRAM(S): 0.4 CAPSULE ORAL at 21:13

## 2024-02-28 RX ADMIN — Medication 650 MILLIGRAM(S): at 03:39

## 2024-02-28 RX ADMIN — CEFTRIAXONE 100 MILLIGRAM(S): 500 INJECTION, POWDER, FOR SOLUTION INTRAMUSCULAR; INTRAVENOUS at 19:09

## 2024-02-28 RX ADMIN — GABAPENTIN 800 MILLIGRAM(S): 400 CAPSULE ORAL at 14:35

## 2024-02-28 RX ADMIN — Medication 10 MILLIGRAM(S): at 05:33

## 2024-02-28 RX ADMIN — GABAPENTIN 800 MILLIGRAM(S): 400 CAPSULE ORAL at 05:33

## 2024-02-28 RX ADMIN — CARVEDILOL PHOSPHATE 6.25 MILLIGRAM(S): 80 CAPSULE, EXTENDED RELEASE ORAL at 05:33

## 2024-02-28 RX ADMIN — CARVEDILOL PHOSPHATE 6.25 MILLIGRAM(S): 80 CAPSULE, EXTENDED RELEASE ORAL at 19:08

## 2024-02-28 NOTE — CONSULT NOTE ADULT - SUBJECTIVE AND OBJECTIVE BOX
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 States sharp pain started suddenly in the L flank radiating to the L groin. She did not come to the hospital initially, but today blood pressure was really low, below the 60s systolic she felt like she passed out which prompted the visit to the ED. States she has not been able to tolerate PO fluids. Minimal appetite. Pt is able to void, states urine is murky appearing.     She Denies dysuria, hematuria, difficulty urinating. No other urinary complaints. Pt has never had a ureteral stone in the past.    In the ED, CT showed IMPRESSION: Mild left hydroureteronephrosis secondary to a 4 mm stone in the mid left  ureter. 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      HLD (hyperlipidemia)      HTN (hypertension)      GERD (gastroesophageal reflux disease)      Fibromyalgia      RA (rheumatoid arthritis)      Seasonal allergies      Fibroids      Brain aneurysm  dx 2011, sx done      Migraines      Vertigo      Depression      Hallux valgus (acquired), left foot      SHELIA (obstructive sleep apnea)  home test done by PCP in 2016, SHELIA pt was using mouth guard but it broken, not using it now, to notify Anesthesia      H/O: hysterectomy  2011      S/P arthroscopy  left knee and left foot=- 2016      Brain aneurysm  coil sx - 2011      Chronic lower back pain  s/p lumbar discectomy -2013, s/p fusion with hardware- 2017      History of bunionectomy  left foot          Cipro (Vomiting)      Meds:  acetaminophen     Tablet .. 650 milliGRAM(s) Oral every 6 hours PRN  carvedilol 6.25 milliGRAM(s) Oral every 12 hours  cefTRIAXone   IVPB 2000 milliGRAM(s) IV Intermittent every 24 hours  chlorhexidine 2% Cloths 1 Application(s) Topical daily  cyclobenzaprine 10 milliGRAM(s) Oral at bedtime PRN  gabapentin 800 milliGRAM(s) Oral three times a day  heparin   Injectable 5000 Unit(s) IV Push once  lactated ringers. 1000 milliLiter(s) IV Continuous <Continuous>  ondansetron Injectable 4 milliGRAM(s) IV Push every 8 hours PRN  pantoprazole    Tablet 40 milliGRAM(s) Oral before breakfast  predniSONE   Tablet 10 milliGRAM(s) Oral daily  sulfaSALAzine 1000 milliGRAM(s) Oral three times a day  tamsulosin 0.4 milliGRAM(s) Oral at bedtime      SOCIAL HISTORY:  Smoker:  YES / NO        PACK YEARS:                         WHEN QUIT?  ETOH use:  YES / NO               FREQUENCY / QUANTITY:  Ilic[employer] Drug use:  YES / NO  Occupation:  Assisted device use (Cane / Walker):  Live with:    FAMILY HISTORY:  Family history of hypertension    Family history of kidney stones (Sibling)        VITALS:  Vital Signs Last 24 Hrs  T(C): 36.9 (28 Feb 2024 13:13), Max: 37.9 (28 Feb 2024 05:25)  T(F): 98.4 (28 Feb 2024 13:13), Max: 100.2 (28 Feb 2024 05:25)  HR: 87 (28 Feb 2024 13:13) (87 - 113)  BP: 112/74 (28 Feb 2024 13:13) (100/59 - 113/72)  BP(mean): 79 (27 Feb 2024 23:33) (79 - 79)  RR: 18 (28 Feb 2024 13:13) (18 - 18)  SpO2: 97% (28 Feb 2024 13:13) (93% - 97%)    Parameters below as of 28 Feb 2024 13:13  Patient On (Oxygen Delivery Method): room air        LABS/DIAGNOSTIC TESTS:                          10.2   10.61 )-----------( 145      ( 28 Feb 2024 05:42 )             32.0     WBC Count: 10.61 K/uL (02-28 @ 05:42)  WBC Count: 8.26 K/uL (02-27 @ 13:40)      02-28    142  |  115<H>  |  10  ----------------------------<  109<H>  4.0   |  19<L>  |  0.78    Ca    8.2<L>      28 Feb 2024 05:42  Mg     1.6     02-27    TPro  6.5  /  Alb  2.6<L>  /  TBili  2.0<H>  /  DBili  x   /  AST  94<H>  /  ALT  115<H>  /  AlkPhos  139<H>  02-28      Urine Microscopic-Add On (NC) (02.27.24 @ 13:40)   Bacteria: Many /HPF  Comment - Urine: Triple Phosphate Crystals Present  Squamous Epithelial Cells: Present  Red Blood Cell - Urine: 8 /HPF  White Blood Cell - Urine: 15 /HPFUrinalysis (02.27.24 @ 13:40)   pH Urine: 8.5  Glucose Qualitative, Urine: Negative mg/dL  Blood, Urine: Large  Color: Dark Yellow  Urine Appearance: Cloudy  Bilirubin: Small  Ketone - Urine: Negative mg/dL  Specific Gravity: 1.019  Protein, Urine: 100 mg/dL  Urobilinogen: 1.0 mg/dL  Nitrite: Positive  Leukocyte Esterase Concentration: Large      LIVER FUNCTIONS - ( 28 Feb 2024 05:42 )  Alb: 2.6 g/dL / Pro: 6.5 g/dL / ALK PHOS: 139 U/L / ALT: 115 U/L DA / AST: 94 U/L / GGT: x             PT/INR - ( 28 Feb 2024 05:42 )   PT: 14.2 sec;   INR: 1.25 ratio             LACTATE:Lactate, Blood: 1.4 mmol/L (02-28 @ 01:29)      ABG -     CULTURES:   .Blood Blood-Peripheral  02-27 @ 15:35   Growth in aerobic bottle: Gram Negative Rods  Growth in anaerobic bottle: Gram Negative Rods  Direct identification is available within approximately 3-5  hours either by Blood Panel Multiplexed PCR or Direct  MALDI-TOF. Details: [email or web address]  --  Blood Culture PCR      .Blood Blood-Peripheral  02-27 @ 15:30   Growth in aerobic bottle: Gram Negative Rods  Growth in anaerobic bottle: Gram Negative Rods  --    Growth in aerobic bottle: Gram Negative Rods  Growth in anaerobic bottle: Gram Negative Rods          RADIOLOGY:< from: CT Abdomen and Pelvis w/ IV Cont (02.27.24 @ 15:55) >  ACC: [id number] EXAM:  CT ABDOMEN AND PELVIS IC   ORDERED BY: JUAREZ RENAE     PROCEDURE DATE:  02/27/2024          INTERPRETATION:  CLINICAL INFORMATION: Nausea, vomiting, diarrhea and   abdominal pain.    COMPARISON: 7/27/2020 CT scan of the abdomen and pelvis    CONTRAST/COMPLICATIONS:  IV Contrast: Omnipaque 350  90 cc administered   10 cc discarded  Oral Contrast: NONE  Complications: None reported at time of study completion    PROCEDURE:  CT of the Abdomen and Pelvis was performed.  Sagittal and coronal reformats were performed.    FINDINGS:  LOWER CHEST: Scattered atelectasis at the lung bases most prominent in   the lingula.    LIVER: Within normal limits.  BILE DUCTS: Normal caliber.  GALLBLADDER: Mild nonspecific fluid interposed between the gallbladder   and liver.  SPLEEN: Within normal limits.  PANCREAS: Within normal limits.  ADRENALS: Within normal limits.  KIDNEYS/URETERS: Subcentimeter hypodensities right kidney, too small to   characterize. 4 mm nonobstructing right lower pole renal calculus. There   is mild left hydroureteronephrosis secondary to a 4 mm stone in the left   mid ureter.    BLADDER: Within normal limits.  REPRODUCTIVE ORGANS: Hysterectomy.    BOWEL: No bowel obstruction. Tiny hiatal hernia.  PERITONEUM: Trace amount of perisplenic fluid.  VESSELS: Within normal limits.  RETROPERITONEUM/LYMPH NODES: No lymphadenopathy.  ABDOMINAL WALL: Within normal limits.  BONES: There are intervertebral disc spacers and spinal fusion hardware   anteriorly and posteriorly L4-S1. There is a neurostimulator implanted in   subcutaneous tissues with wire coursing into the spinal canal.    IMPRESSION:  Mild left hydroureteronephrosis secondary to a 4 mm stone in the mid left   ureter.        --- End of Report ---            TOMAS MARADIAGA MD; Attending Radiologist  This document has been electronically signed. 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 States sharp pain started suddenly in the L flank radiating to the L groin. She did not come to the hospital initially, but today blood pressure was really low, below the 60s systolic she felt like she passed out which prompted the visit to the ED. States she has not been able to tolerate PO fluids. Minimal appetite. Pt is able to void, states urine is murky appearing.     She Denies dysuria, hematuria, difficulty urinating. No other urinary complaints. Pt has never had a ureteral stone in the past.    In the ED, CT showed IMPRESSION: Mild left hydroureteronephrosis secondary to a 4 mm stone in the mid left  ureter. K was 2.9 lactate elevated, she received 1 LR bolus and 1NS bolus   (27 Feb 2024 22:17)        History as above, asked to see this patient who presents to the hospital with left flank pain x 4-5 days along with some nausea , vomiting and fever as high as 103 and chills and was found to have a left ureteral stone here with possible Pyelonephritis and Bacteremia with a gram negative doreen. She is s/p stent placement today and is feeling much better with no left flank pain now. She has no urinary symptoms but her urine was cloudy and smelly at home.           PAST MEDICAL & SURGICAL HISTORY:  Asthma  last time used Ventolin in summer 2018, never intubated or hopsitalized, under Pulmonolofy , Dr Josh Parr care      Sarcoidosis, lung  dx 2016 with bx      HLD (hyperlipidemia)      HTN (hypertension)      GERD (gastroesophageal reflux disease)      Fibromyalgia      RA (rheumatoid arthritis)      Seasonal allergies      Fibroids      Brain aneurysm  dx 2011, sx done      Migraines      Vertigo      Depression      Hallux valgus (acquired), left foot      SHELIA (obstructive sleep apnea)  home test done by PCP in 2016, SHELIA pt was using mouth guard but it broken, not using it now, to notify Anesthesia      H/O: hysterectomy  2011      S/P arthroscopy  left knee and left foot=- 2016      Brain aneurysm  coil sx - 2011      Chronic lower back pain  s/p lumbar discectomy -2013, s/p fusion with hardware- 2017      History of bunionectomy  left foot          Cipro (Vomiting)      Meds:  acetaminophen     Tablet .. 650 milliGRAM(s) Oral every 6 hours PRN  carvedilol 6.25 milliGRAM(s) Oral every 12 hours  cefTRIAXone   IVPB 2000 milliGRAM(s) IV Intermittent every 24 hours  chlorhexidine 2% Cloths 1 Application(s) Topical daily  cyclobenzaprine 10 milliGRAM(s) Oral at bedtime PRN  gabapentin 800 milliGRAM(s) Oral three times a day  heparin   Injectable 5000 Unit(s) IV Push once  lactated ringers. 1000 milliLiter(s) IV Continuous <Continuous>  ondansetron Injectable 4 milliGRAM(s) IV Push every 8 hours PRN  pantoprazole    Tablet 40 milliGRAM(s) Oral before breakfast  predniSONE   Tablet 10 milliGRAM(s) Oral daily  sulfaSALAzine 1000 milliGRAM(s) Oral three times a day  tamsulosin 0.4 milliGRAM(s) Oral at bedtime      SOCIAL HISTORY:  Smoker:  no  ETOH use:  no      FAMILY HISTORY:  Family history of hypertension    Family history of kidney stones (Sibling)        VITALS:  Vital Signs Last 24 Hrs  T(C): 36.9 (28 Feb 2024 13:13), Max: 37.9 (28 Feb 2024 05:25)  T(F): 98.4 (28 Feb 2024 13:13), Max: 100.2 (28 Feb 2024 05:25)  HR: 87 (28 Feb 2024 13:13) (87 - 113)  BP: 112/74 (28 Feb 2024 13:13) (100/59 - 113/72)  BP(mean): 79 (27 Feb 2024 23:33) (79 - 79)  RR: 18 (28 Feb 2024 13:13) (18 - 18)  SpO2: 97% (28 Feb 2024 13:13) (93% - 97%)    Parameters below as of 28 Feb 2024 13:13  Patient On (Oxygen Delivery Method): room air        LABS/DIAGNOSTIC TESTS:                          10.2   10.61 )-----------( 145      ( 28 Feb 2024 05:42 )             32.0     WBC Count: 10.61 K/uL (02-28 @ 05:42)  WBC Count: 8.26 K/uL (02-27 @ 13:40)      02-28    142  |  115<H>  |  10  ----------------------------<  109<H>  4.0   |  19<L>  |  0.78    Ca    8.2<L>      28 Feb 2024 05:42  Mg     1.6     02-27    TPro  6.5  /  Alb  2.6<L>  /  TBili  2.0<H>  /  DBili  x   /  AST  94<H>  /  ALT  115<H>  /  AlkPhos  139<H>  02-28      Urine Microscopic-Add On (NC) (02.27.24 @ 13:40)   Bacteria: Many /HPF  Comment - Urine: Triple Phosphate Crystals Present  Squamous Epithelial Cells: Present  Red Blood Cell - Urine: 8 /HPF  White Blood Cell - Urine: 15 /HPFUrinalysis (02.27.24 @ 13:40)   pH Urine: 8.5  Glucose Qualitative, Urine: Negative mg/dL  Blood, Urine: Large  Color: Dark Yellow  Urine Appearance: Cloudy  Bilirubin: Small  Ketone - Urine: Negative mg/dL  Specific Gravity: 1.019  Protein, Urine: 100 mg/dL  Urobilinogen: 1.0 mg/dL  Nitrite: Positive  Leukocyte Esterase Concentration: Large      LIVER FUNCTIONS - ( 28 Feb 2024 05:42 )  Alb: 2.6 g/dL / Pro: 6.5 g/dL / ALK PHOS: 139 U/L / ALT: 115 U/L DA / AST: 94 U/L / GGT: x             PT/INR - ( 28 Feb 2024 05:42 )   PT: 14.2 sec;   INR: 1.25 ratio             LACTATE:Lactate, Blood: 1.4 mmol/L (02-28 @ 01:29)      ABG -     CULTURES:   .Blood Blood-Peripheral  02-27 @ 15:35   Growth in aerobic bottle: Gram Negative Rods  Growth in anaerobic bottle: Gram Negative Rods  Direct identification is available within approximately 3-5  hours either by Blood Panel Multiplexed PCR or Direct  MALDI-TOF. Details: [email or web address]  --  Blood Culture PCR      .Blood Blood-Peripheral  02-27 @ 15:30   Growth in aerobic bottle: Gram Negative Rods  Growth in anaerobic bottle: Gram Negative Rods  --    Growth in aerobic bottle: Gram Negative Rods  Growth in anaerobic bottle: Gram Negative Rods          RADIOLOGY:< from: CT Abdomen and Pelvis w/ IV Cont (02.27.24 @ 15:55) >  ACC: [id number] EXAM:  CT ABDOMEN AND PELVIS IC   ORDERED BY: JUAREZ RENAE     PROCEDURE DATE:  02/27/2024          INTERPRETATION:  CLINICAL INFORMATION: Nausea, vomiting, diarrhea and   abdominal pain.    COMPARISON: 7/27/2020 CT scan of the abdomen and pelvis    CONTRAST/COMPLICATIONS:  IV Contrast: Omnipaque 350  90 cc administered   10 cc discarded  Oral Contrast: NONE  Complications: None reported at time of study completion    PROCEDURE:  CT of the Abdomen and Pelvis was performed.  Sagittal and coronal reformats were performed.    FINDINGS:  LOWER CHEST: Scattered atelectasis at the lung bases most prominent in   the lingula.    LIVER: Within normal limits.  BILE DUCTS: Normal caliber.  GALLBLADDER: Mild nonspecific fluid interposed between the gallbladder   and liver.  SPLEEN: Within normal limits.  PANCREAS: Within normal limits.  ADRENALS: Within normal limits.  KIDNEYS/URETERS: Subcentimeter hypodensities right kidney, too small to   characterize. 4 mm nonobstructing right lower pole renal calculus. There   is mild left hydroureteronephrosis secondary to a 4 mm stone in the left   mid ureter.    BLADDER: Within normal limits.  REPRODUCTIVE ORGANS: Hysterectomy.    BOWEL: No bowel obstruction. Tiny hiatal hernia.  PERITONEUM: Trace amount of perisplenic fluid.  VESSELS: Within normal limits.  RETROPERITONEUM/LYMPH NODES: No lymphadenopathy.  ABDOMINAL WALL: Within normal limits.  BONES: There are intervertebral disc spacers and spinal fusion hardware   anteriorly and posteriorly L4-S1. There is a neurostimulator implanted in   subcutaneous tissues with wire coursing into the spinal canal.    IMPRESSION:  Mild left hydroureteronephrosis secondary to a 4 mm stone in the mid left   ureter.        --- End of Report ---            TOMAS MARADIAGA MD; Attending Radiologist  This document has been electronically signed. Feb 27 2024  4:07PM    < end of copied text >        ROS  [  ] UNABLE TO ELICIT

## 2024-02-28 NOTE — CONSULT NOTE ADULT - ASSESSMENT
Sepsis  Left sided Pyelonephritis  Bacteremia  Fevers    Plan - Cont Rocephin 2gms iv q24hrs  repeat blood cultures.

## 2024-02-28 NOTE — PROVIDER CONTACT NOTE (CRITICAL VALUE NOTIFICATION) - DATE AND TIME:
----- Message from Angelia Sampson sent at 2017  8:44 AM CDT -----  Placed pedro pablo consultation notes in [place] in box.  
28-Feb-2024 09:16
28-Feb-2024 09:00
28-Feb-2024 10:45

## 2024-02-28 NOTE — PROGRESS NOTE ADULT - ASSESSMENT
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 States sharp pain started suddenly in the L flank radiating to the L groin.  CT showed : Mild left hydroureteronephrosis secondary to a 4 mm stone in the mid left  ureter. Admitted for renal colic iso of left renal stone.   Planned for cystoscopy with renal stent placement with urology today.

## 2024-02-28 NOTE — PATIENT PROFILE ADULT - FALL HARM RISK - HARM RISK INTERVENTIONS

## 2024-02-28 NOTE — PROGRESS NOTE ADULT - ASSESSMENT
60 y/o female with with L 4mm mid ureteral stone w/ L hydro   tachycardic, 100.2 fever ON, UA+    Plan   - Fu AM labs   - Poss add on for cysto stent  - keep NPO  - IV Ceftriaxone  - Fu urine and blood cx  - Aggressive hydration  - Flomax 0.4mg qHS X30 days  - pain / nausea control prn   - document medical clearance / medical optimization   - uro to follow    Discussed with Dr. Kirkland 58 y/o female with with L 4mm mid ureteral stone w/ L hydro   tachycardic, 100.2 fever ON, UA+    Plan   - OR today for cystoscopy left stent placement  - keep NPO  - IV Ceftriaxone  - Fu urine and blood cx  - Aggressive hydration  - Flomax 0.4mg qHS X30 days  - pain / nausea control prn   - document medical clearance / medical optimization   - uro to follow    Discussed with Dr. Kirkland

## 2024-02-28 NOTE — PATIENT PROFILE ADULT - VISION (WITH CORRECTIVE LENSES IF THE PATIENT USUALLY WEARS THEM):
glasses @ bedside/Partially impaired: cannot see medication labels or newsprint, but can see obstacles in path, and the surrounding layout; can count fingers at arm's length

## 2024-02-28 NOTE — PROVIDER CONTACT NOTE (CRITICAL VALUE NOTIFICATION) - TEST AND RESULT REPORTED:
anaerobic & aerobic gram negative rods
BCx Aerobic gram negative rods from second collection
BXC- Aerobic gram negative rods

## 2024-02-29 DIAGNOSIS — A49.8 OTHER BACTERIAL INFECTIONS OF UNSPECIFIED SITE: ICD-10-CM

## 2024-02-29 LAB
-  AMPICILLIN/SULBACTAM: SIGNIFICANT CHANGE UP
-  AMPICILLIN: SIGNIFICANT CHANGE UP
-  AZTREONAM: SIGNIFICANT CHANGE UP
-  CEFAZOLIN: SIGNIFICANT CHANGE UP
-  CEFEPIME: SIGNIFICANT CHANGE UP
-  CEFOXITIN: SIGNIFICANT CHANGE UP
-  CEFTRIAXONE: SIGNIFICANT CHANGE UP
-  CIPROFLOXACIN: SIGNIFICANT CHANGE UP
-  ERTAPENEM: SIGNIFICANT CHANGE UP
-  GENTAMICIN: SIGNIFICANT CHANGE UP
-  LEVOFLOXACIN: SIGNIFICANT CHANGE UP
-  MEROPENEM: SIGNIFICANT CHANGE UP
-  PIPERACILLIN/TAZOBACTAM: SIGNIFICANT CHANGE UP
-  TOBRAMYCIN: SIGNIFICANT CHANGE UP
-  TRIMETHOPRIM/SULFAMETHOXAZOLE: SIGNIFICANT CHANGE UP
ALBUMIN SERPL ELPH-MCNC: 2.5 G/DL — LOW (ref 3.5–5)
ALP SERPL-CCNC: 136 U/L — HIGH (ref 40–120)
ALT FLD-CCNC: 98 U/L DA — HIGH (ref 10–60)
ANION GAP SERPL CALC-SCNC: 8 MMOL/L — SIGNIFICANT CHANGE UP (ref 5–17)
AST SERPL-CCNC: 53 U/L — HIGH (ref 10–40)
BASOPHILS # BLD AUTO: 0.04 K/UL — SIGNIFICANT CHANGE UP (ref 0–0.2)
BASOPHILS NFR BLD AUTO: 0.3 % — SIGNIFICANT CHANGE UP (ref 0–2)
BILIRUB SERPL-MCNC: 0.4 MG/DL — SIGNIFICANT CHANGE UP (ref 0.2–1.2)
BUN SERPL-MCNC: 9 MG/DL — SIGNIFICANT CHANGE UP (ref 7–18)
CALCIUM SERPL-MCNC: 9.4 MG/DL — SIGNIFICANT CHANGE UP (ref 8.4–10.5)
CHLORIDE SERPL-SCNC: 110 MMOL/L — HIGH (ref 96–108)
CO2 SERPL-SCNC: 22 MMOL/L — SIGNIFICANT CHANGE UP (ref 22–31)
CREAT SERPL-MCNC: 0.69 MG/DL — SIGNIFICANT CHANGE UP (ref 0.5–1.3)
CULTURE RESULTS: ABNORMAL
EGFR: 99 ML/MIN/1.73M2 — SIGNIFICANT CHANGE UP
EOSINOPHIL # BLD AUTO: 0 K/UL — SIGNIFICANT CHANGE UP (ref 0–0.5)
EOSINOPHIL NFR BLD AUTO: 0 % — SIGNIFICANT CHANGE UP (ref 0–6)
GLUCOSE SERPL-MCNC: 188 MG/DL — HIGH (ref 70–99)
HCT VFR BLD CALC: 31.6 % — LOW (ref 34.5–45)
HGB BLD-MCNC: 10.2 G/DL — LOW (ref 11.5–15.5)
IMM GRANULOCYTES NFR BLD AUTO: 0.7 % — SIGNIFICANT CHANGE UP (ref 0–0.9)
LYMPHOCYTES # BLD AUTO: 1.76 K/UL — SIGNIFICANT CHANGE UP (ref 1–3.3)
LYMPHOCYTES # BLD AUTO: 12 % — LOW (ref 13–44)
MCHC RBC-ENTMCNC: 30.4 PG — SIGNIFICANT CHANGE UP (ref 27–34)
MCHC RBC-ENTMCNC: 32.3 GM/DL — SIGNIFICANT CHANGE UP (ref 32–36)
MCV RBC AUTO: 94.3 FL — SIGNIFICANT CHANGE UP (ref 80–100)
METHOD TYPE: SIGNIFICANT CHANGE UP
MONOCYTES # BLD AUTO: 0.51 K/UL — SIGNIFICANT CHANGE UP (ref 0–0.9)
MONOCYTES NFR BLD AUTO: 3.5 % — SIGNIFICANT CHANGE UP (ref 2–14)
NEUTROPHILS # BLD AUTO: 12.3 K/UL — HIGH (ref 1.8–7.4)
NEUTROPHILS NFR BLD AUTO: 83.5 % — HIGH (ref 43–77)
NRBC # BLD: 0 /100 WBCS — SIGNIFICANT CHANGE UP (ref 0–0)
ORGANISM # SPEC MICROSCOPIC CNT: ABNORMAL
ORGANISM # SPEC MICROSCOPIC CNT: ABNORMAL
PLATELET # BLD AUTO: 155 K/UL — SIGNIFICANT CHANGE UP (ref 150–400)
POTASSIUM SERPL-MCNC: 3.2 MMOL/L — LOW (ref 3.5–5.3)
POTASSIUM SERPL-SCNC: 3.2 MMOL/L — LOW (ref 3.5–5.3)
PROT SERPL-MCNC: 6.6 G/DL — SIGNIFICANT CHANGE UP (ref 6–8.3)
RBC # BLD: 3.35 M/UL — LOW (ref 3.8–5.2)
RBC # FLD: 13.1 % — SIGNIFICANT CHANGE UP (ref 10.3–14.5)
SODIUM SERPL-SCNC: 140 MMOL/L — SIGNIFICANT CHANGE UP (ref 135–145)
SPECIMEN SOURCE: SIGNIFICANT CHANGE UP
WBC # BLD: 14.71 K/UL — HIGH (ref 3.8–10.5)
WBC # FLD AUTO: 14.71 K/UL — HIGH (ref 3.8–10.5)

## 2024-02-29 PROCEDURE — 99232 SBSQ HOSP IP/OBS MODERATE 35: CPT

## 2024-02-29 RX ORDER — SUMATRIPTAN SUCCINATE 4 MG/.5ML
50 INJECTION, SOLUTION SUBCUTANEOUS ONCE
Refills: 0 | Status: COMPLETED | OUTPATIENT
Start: 2024-02-29 | End: 2024-02-29

## 2024-02-29 RX ORDER — POTASSIUM CHLORIDE 20 MEQ
40 PACKET (EA) ORAL EVERY 4 HOURS
Refills: 0 | Status: COMPLETED | OUTPATIENT
Start: 2024-02-29 | End: 2024-02-29

## 2024-02-29 RX ADMIN — Medication 1000 MILLIGRAM(S): at 13:26

## 2024-02-29 RX ADMIN — CARVEDILOL PHOSPHATE 6.25 MILLIGRAM(S): 80 CAPSULE, EXTENDED RELEASE ORAL at 18:16

## 2024-02-29 RX ADMIN — CEFTRIAXONE 100 MILLIGRAM(S): 500 INJECTION, POWDER, FOR SOLUTION INTRAMUSCULAR; INTRAVENOUS at 18:17

## 2024-02-29 RX ADMIN — SUMATRIPTAN SUCCINATE 50 MILLIGRAM(S): 4 INJECTION, SOLUTION SUBCUTANEOUS at 10:00

## 2024-02-29 RX ADMIN — TAMSULOSIN HYDROCHLORIDE 0.4 MILLIGRAM(S): 0.4 CAPSULE ORAL at 22:03

## 2024-02-29 RX ADMIN — CARVEDILOL PHOSPHATE 6.25 MILLIGRAM(S): 80 CAPSULE, EXTENDED RELEASE ORAL at 05:22

## 2024-02-29 RX ADMIN — PANTOPRAZOLE SODIUM 40 MILLIGRAM(S): 20 TABLET, DELAYED RELEASE ORAL at 05:23

## 2024-02-29 RX ADMIN — Medication 650 MILLIGRAM(S): at 22:03

## 2024-02-29 RX ADMIN — SUMATRIPTAN SUCCINATE 50 MILLIGRAM(S): 4 INJECTION, SOLUTION SUBCUTANEOUS at 09:01

## 2024-02-29 RX ADMIN — Medication 1000 MILLIGRAM(S): at 01:48

## 2024-02-29 RX ADMIN — Medication 10 MILLIGRAM(S): at 05:20

## 2024-02-29 RX ADMIN — Medication 40 MILLIEQUIVALENT(S): at 12:08

## 2024-02-29 RX ADMIN — Medication 650 MILLIGRAM(S): at 23:29

## 2024-02-29 RX ADMIN — CHLORHEXIDINE GLUCONATE 1 APPLICATION(S): 213 SOLUTION TOPICAL at 12:07

## 2024-02-29 RX ADMIN — GABAPENTIN 800 MILLIGRAM(S): 400 CAPSULE ORAL at 13:26

## 2024-02-29 RX ADMIN — GABAPENTIN 800 MILLIGRAM(S): 400 CAPSULE ORAL at 22:03

## 2024-02-29 RX ADMIN — Medication 1000 MILLIGRAM(S): at 08:16

## 2024-02-29 RX ADMIN — GABAPENTIN 800 MILLIGRAM(S): 400 CAPSULE ORAL at 05:20

## 2024-02-29 RX ADMIN — Medication 40 MILLIEQUIVALENT(S): at 08:16

## 2024-02-29 NOTE — PROGRESS NOTE ADULT - PROBLEM SELECTOR PLAN 12
- Pt is from home  - Pending Final Bcx & Ucx
- Pt is from home, ambulatory. No need for PT  - Pending Final Bcx & Ucx
- Pt is from home, ambulatory. No need for PT  - Pending Final Bcx & Ucx
- Pt is from home  - Pending Final Bcx & Ucx

## 2024-02-29 NOTE — PROGRESS NOTE ADULT - SUBJECTIVE AND OBJECTIVE BOX
Subjective  Low grade fever 100.2 overnight. Left flank pain resolved, however endorses that her whole body is in pain.     Objective    Vital signs  T(F): , Max: 100.2 (02-28-24 @ 05:25)  HR: 108 (02-28-24 @ 05:25)  BP: 110/58 (02-28-24 @ 05:25)  SpO2: 96% (02-28-24 @ 05:25)  Wt(kg): --    Output       Gen: NAD  Abd: soft, nontender, nondistended  : voiding spontaneously     Labs      02-28 @ 05:42    WBC 10.61 / Hct 32.0  / SCr --       02-28 @ 01:08    WBC --    / Hct --    / SCr 0.86         
Subjective  No acute complaints.     Objective    Vital signs  T(F): , Max: 97.9 (02-28-24 @ 21:30)  HR: 80 (02-29-24 @ 18:15)  BP: 115/72 (02-29-24 @ 18:15)  SpO2: 96% (02-29-24 @ 18:15)  Wt(kg): --    Output     OUT:    Voided (mL): 4025 mL  Total OUT: 4025 mL    Total NET: -4025 mL          Gen: NAD  Abd: soft, nontender, nondistended  : voiding spontaneously     Labs      02-29 @ 06:14    WBC 14.71 / Hct 31.6  / SCr 0.69     02-28 @ 05:42    WBC 10.61 / Hct 32.0  / SCr 0.78         Culture - Blood (collected 02-27-24 @ 15:35)  Source: .Blood Blood-Peripheral  Gram Stain (02-28-24 @ 10:37):    Growth in aerobic bottle: Gram Negative Rods    Growth in anaerobic bottle: Gram Negative Rods  Preliminary Report (02-29-24 @ 13:41):    Growth in aerobic and anaerobic bottles: Proteus mirabilis    Direct identification is available within approximately 3-5    hours either by Blood Panel Multiplexed PCR or Direct    MALDI-TOF. Details: [email or web address]  Organism: Blood Culture PCR (02-28-24 @ 10:18)  Organism: Blood Culture PCR (02-28-24 @ 10:18)      Method Type: PCR      -  Proteus species: Detec    Culture - Blood (collected 02-27-24 @ 15:30)  Source: .Blood Blood-Peripheral  Gram Stain (02-28-24 @ 11:00):    Growth in aerobic bottle: Gram Negative Rods    Growth in anaerobic bottle: Gram Negative Rods  Final Report (02-29-24 @ 15:00):    Growth in aerobic and anaerobic bottles: Proteus mirabilis  Organism: Proteus mirabilis (02-29-24 @ 15:00)  Organism: Proteus mirabilis (02-29-24 @ 15:00)      Method Type: YULIYA      -  Ampicillin: S <=8 These ampicillin results predict results for amoxicillin      -  Ampicillin/Sulbactam: S <=4/2      -  Aztreonam: S <=4      -  Cefazolin: S <=2      -  Cefepime: S <=2      -  Cefoxitin: S <=8      -  Ceftriaxone: S <=1      -  Ciprofloxacin: S <=0.25      -  Ertapenem: S <=0.5      -  Gentamicin: S <=2      -  Levofloxacin: S <=0.5      -  Meropenem: S <=1      -  Piperacillin/Tazobactam: S <=8      -  Tobramycin: S <=2      -  Trimethoprim/Sulfamethoxazole: S <=0.5/9.5    Culture - Urine (collected 02-27-24 @ 13:40)  Source: Clean Catch Clean Catch (Midstream)  Preliminary Report (02-29-24 @ 17:10):    >100,000 CFU/ml Proteus mirabilis        
NP Note discussed with  primary attending    Patient is a 59y old  Female who presents with a chief complaint of     INTERVAL HPI/OVERNIGHT EVENTS: no new complaints    MEDICATIONS  (STANDING):  carvedilol 6.25 milliGRAM(s) Oral every 12 hours  cefTRIAXone   IVPB 2000 milliGRAM(s) IV Intermittent every 24 hours  chlorhexidine 2% Cloths 1 Application(s) Topical daily  gabapentin 800 milliGRAM(s) Oral three times a day  heparin   Injectable 5000 Unit(s) IV Push once  lactated ringers. 1000 milliLiter(s) (100 mL/Hr) IV Continuous <Continuous>  pantoprazole    Tablet 40 milliGRAM(s) Oral before breakfast  potassium chloride    Tablet ER 40 milliEquivalent(s) Oral every 4 hours  predniSONE   Tablet 10 milliGRAM(s) Oral daily  sulfaSALAzine 1000 milliGRAM(s) Oral three times a day  tamsulosin 0.4 milliGRAM(s) Oral at bedtime    MEDICATIONS  (PRN):  acetaminophen     Tablet .. 650 milliGRAM(s) Oral every 6 hours PRN Temp greater or equal to 38C (100.4F), Mild Pain (1 - 3)  cyclobenzaprine 10 milliGRAM(s) Oral at bedtime PRN Muscle Spasm  ondansetron Injectable 4 milliGRAM(s) IV Push every 8 hours PRN Nausea and/or Vomiting      __________________________________________________  REVIEW OF SYSTEMS:    CONSTITUTIONAL: No fever,   EYES: no acute visual disturbances  NECK: No pain or stiffness  RESPIRATORY: No cough; No shortness of breath  CARDIOVASCULAR: No chest pain, no palpitations  GASTROINTESTINAL: No pain. No nausea or vomiting; No diarrhea   NEUROLOGICAL: No headache or numbness, no tremors  MUSCULOSKELETAL: No joint pain, no muscle pain  GENITOURINARY: no dysuria, no frequency, no hesitancy  PSYCHIATRY: no depression , no anxiety  ALL OTHER  ROS negative        Vital Signs Last 24 Hrs  T(C): 36.3 (29 Feb 2024 05:00), Max: 37.2 (28 Feb 2024 16:46)  T(F): 97.4 (29 Feb 2024 05:00), Max: 99 (28 Feb 2024 16:46)  HR: 80 (29 Feb 2024 05:00) (72 - 91)  BP: 121/75 (29 Feb 2024 05:00) (103/70 - 121/75)  BP(mean): 90 (28 Feb 2024 17:31) (81 - 90)  RR: 18 (29 Feb 2024 05:00) (12 - 18)  SpO2: 93% (29 Feb 2024 05:00) (93% - 99%)    Parameters below as of 29 Feb 2024 05:00  Patient On (Oxygen Delivery Method): room air        ________________________________________________  PHYSICAL EXAM:  GENERAL: NAD  HEENT: Normocephalic;  conjunctivae and sclerae clear; moist mucous membranes;   NECK : supple  CHEST/LUNG: Clear to ausculitation bilaterally with good air entry   HEART: S1 S2  regular; no murmurs, gallops or rubs  ABDOMEN: Soft, Nontender, Nondistended; Bowel sounds present  EXTREMITIES: no cyanosis; no edema; no calf tenderness  SKIN: warm and dry; no rash  NERVOUS SYSTEM:  Awake and alert; Oriented  to place, person and time ; no new deficits    _________________________________________________  LABS:                        10.2   14.71 )-----------( 155      ( 29 Feb 2024 06:14 )             31.6     02-29    140  |  110<H>  |  9   ----------------------------<  188<H>  3.2<L>   |  22  |  0.69    Ca    9.4      29 Feb 2024 06:14  Mg     1.6     02-27    TPro  6.6  /  Alb  2.5<L>  /  TBili  0.4  /  DBili  x   /  AST  53<H>  /  ALT  98<H>  /  AlkPhos  136<H>  02-29    PT/INR - ( 28 Feb 2024 05:42 )   PT: 14.2 sec;   INR: 1.25 ratio           Urinalysis Basic - ( 29 Feb 2024 06:14 )    Color: x / Appearance: x / SG: x / pH: x  Gluc: 188 mg/dL / Ketone: x  / Bili: x / Urobili: x   Blood: x / Protein: x / Nitrite: x   Leuk Esterase: x / RBC: x / WBC x   Sq Epi: x / Non Sq Epi: x / Bacteria: x      CAPILLARY BLOOD GLUCOSE      POCT Blood Glucose.: 150 mg/dL (28 Feb 2024 11:35)        RADIOLOGY & ADDITIONAL TESTS:    < from: CT Abdomen and Pelvis w/ IV Cont (02.27.24 @ 15:55) >  IMPRESSION:  Mild left hydroureteronephrosis secondary to a 4 mm stone in the mid left   ureter.      < end of copied text >  Imaging Personally Reviewed:  YES    Consultant(s) Notes Reviewed:   YES    Care Discussed with Consultants :     Plan of care was discussed with patient and /or primary care giver; all questions and concerns were addressed and care was aligned with patient's wishes.    
MR#[id number]  PATIENT NAME:PRADIP BOLAÑOS    DATE OF SERVICE: 02-29-24 @ 17:38  Patient was seen and examined by Sinan Arce MD on    02-29-24 @ 17:38 .  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 States sharp pain started suddenly in the L flank radiating to the L groin. She did not come to the hospital initially, but today blood pressure was really low, below the 60s systolic she felt like she passed out which prompted the visit to the ED. States she has not been able to tolerate PO fluids. Minimal appetite. Pt is able to void, states urine is murky appearing.     She Denies dysuria, hematuria, difficulty urinating. No other urinary complaints. Pt has never had a ureteral stone in the past.    In the ED, CT showed IMPRESSION: Mild left hydroureteronephrosis secondary to a 4 mm stone in the mid left  ureter. K was 2.9 lactate elevated, she received 1 LR bolus and 1NS bolus   (27 Feb 2024 22:17)      INTERIM EVENTS:Patient seen at bedside ,interim events noted.      PMH -reviewed admission note, no change since admission  HEART FAILURE: Acute[ ]Chronic[ ] Systolic[ ] Diastolic[ ] Combined Systolic and Diastolic[ ]  CAD[ ] CABG[ ] PCI[ ]  DEVICES[ ] PPM[ ] ICD[ ] ILR[ ]  ATRIAL FIBRILLATION[ ] Paroxysmal[ ] Permanent[ ] CHADS2-[  ]  SALLIE[ ] CKD1[ ] CKD2[ ] CKD3[ ] CKD4[ ] ESRD[ ]  COPD[ ] HTN[ ]   DM[ ] Type1[ ] Type 2[ ]   CVA[ ] Paresis[ ]    AMBULATION: Assisted[ ] Cane/walker[ ] Independent[ ]    MEDICATIONS  (STANDING):  carvedilol 6.25 milliGRAM(s) Oral every 12 hours  cefTRIAXone   IVPB 2000 milliGRAM(s) IV Intermittent every 24 hours  chlorhexidine 2% Cloths 1 Application(s) Topical daily  gabapentin 800 milliGRAM(s) Oral three times a day  heparin   Injectable 5000 Unit(s) IV Push once  lactated ringers. 1000 milliLiter(s) (100 mL/Hr) IV Continuous <Continuous>  pantoprazole    Tablet 40 milliGRAM(s) Oral before breakfast  predniSONE   Tablet 10 milliGRAM(s) Oral daily  sulfaSALAzine 1000 milliGRAM(s) Oral three times a day  tamsulosin 0.4 milliGRAM(s) Oral at bedtime    MEDICATIONS  (PRN):  acetaminophen     Tablet .. 650 milliGRAM(s) Oral every 6 hours PRN Temp greater or equal to 38C (100.4F), Mild Pain (1 - 3)  cyclobenzaprine 10 milliGRAM(s) Oral at bedtime PRN Muscle Spasm  ondansetron Injectable 4 milliGRAM(s) IV Push every 8 hours PRN Nausea and/or Vomiting            REVIEW OF SYSTEMS:  Constitutional: [ ] fever, [ ]weight loss,  [ ]fatigue [ ]weight gain  Eyes: [ ] visual changes  Respiratory: [ ]shortness of breath;  [ ] cough, [ ]wheezing, [ ]chills, [ ]hemoptysis  Cardiovascular: [ ] chest pain, [ ]palpitations, [ ]dizziness,  [ ]leg swelling[ ]orthopnea[ ]PND  Gastrointestinal: [ ] abdominal pain, [ ]nausea, [ ]vomiting,  [ ]diarrhea [ ]Constipation [ ]Melena  Genitourinary: [ ] dysuria, [ ] hematuria [ ]Nails  Neurologic: [ ] headaches [ ] tremors[ ]weakness [ ]Paralysis Right[ ] Left[ ]  Skin: [ ] itching, [ ]burning, [ ] rashes  Endocrine: [ ] heat or cold intolerance  Musculoskeletal: [ ] joint pain or swelling; [ ] muscle, back, or extremity pain  Psychiatric: [ ] depression, [ ]anxiety, [ ]mood swings, or [ ]difficulty sleeping  Hematologic: [ ] easy bruising, [ ] bleeding gums    [ ] All remaining systems negative except as per above.   [ ]Unable to obtain.  [x] No change in ROS since admission      Vital Signs Last 24 Hrs  T(C): 36.4 (29 Feb 2024 14:19), Max: 37.2 (28 Feb 2024 17:46)  T(F): 97.5 (29 Feb 2024 14:19), Max: 99 (28 Feb 2024 17:46)  HR: 83 (29 Feb 2024 14:19) (79 - 91)  BP: 113/77 (29 Feb 2024 14:19) (113/77 - 121/75)  BP(mean): --  RR: 18 (29 Feb 2024 14:19) (14 - 18)  SpO2: 95% (29 Feb 2024 14:19) (93% - 99%)    Parameters below as of 29 Feb 2024 14:19  Patient On (Oxygen Delivery Method): room air      I&O's Summary    28 Feb 2024 07:01  -  29 Feb 2024 07:00  --------------------------------------------------------  IN: 250 mL / OUT: 4025 mL / NET: -3775 mL        PHYSICAL EXAM:  General: No acute distress BMI-  HEENT: EOMI, PERRL  Neck: Supple, [ ] JVD  Lungs: Equal air entry bilaterally; [ ] rales [ ] wheezing [ ] rhonchi  Heart: Regular rate and rhythm; [x ] murmur   2/6 [ x] systolic [ ] diastolic [ ] radiation[ ] rubs [ ]  gallops  Abdomen: Nontender, bowel sounds present  Extremities: No clubbing, cyanosis, [ ] edema [ ]Pulses  equal and intact  Nervous system:  Alert & Oriented X3, no focal deficits  Psychiatric: Normal affect  Skin: No rashes or lesions    LABS:  02-29    140  |  110<H>  |  9   ----------------------------<  188<H>  3.2<L>   |  22  |  0.69    Ca    9.4      29 Feb 2024 06:14    TPro  6.6  /  Alb  2.5<L>  /  TBili  0.4  /  DBili  x   /  AST  53<H>  /  ALT  98<H>  /  AlkPhos  136<H>  02-29    Creatinine Trend: 0.69<--, 0.78<--, 0.86<--, 1.25<--                        10.2   14.71 )-----------( 155      ( 29 Feb 2024 06:14 )             31.6     PT/INR - ( 28 Feb 2024 05:42 )   PT: 14.2 sec;   INR: 1.25 ratio                   
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 States sharp pain started suddenly in the L flank radiating to the L groin. She did not come to the hospital initially, but today blood pressure was really low, below the 60s systolic she felt like she passed out which prompted the visit to the ED. States she has not been able to tolerate PO fluids. Minimal appetite. Pt is able to void, states urine is murky appearing.     She Denies dysuria, hematuria, difficulty urinating. No other urinary complaints. Pt has never had a ureteral stone in the past.    In the ED, CT showed IMPRESSION: Mild left hydroureteronephrosis secondary to a 4 mm stone in the mid left  ureter. 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      HLD (hyperlipidemia)      HTN (hypertension)      GERD (gastroesophageal reflux disease)      Fibromyalgia      RA (rheumatoid arthritis)      Seasonal allergies      Fibroids      Brain aneurysm  dx 2011, sx done      Migraines      Vertigo      Depression      Hallux valgus (acquired), left foot      SHELIA (obstructive sleep apnea)  home test done by PCP in 2016, SHELIA pt was using mouth guard but it broken, not using it now, to notify Anesthesia      H/O: hysterectomy  2011      S/P arthroscopy  left knee and left foot=- 2016      Brain aneurysm  coil sx - 2011      Chronic lower back pain  s/p lumbar discectomy -2013, s/p fusion with hardware- 2017      History of bunionectomy  left foot          PREVIOUS DIAGNOSTIC TESTING:      ECHO  FINDINGS:    STRESS  FINDINGS:    CATHETERIZATION  FINDINGS:    MEDICATIONS  (STANDING):  carvedilol 6.25 milliGRAM(s) Oral every 12 hours  cefTRIAXone   IVPB 2000 milliGRAM(s) IV Intermittent every 24 hours  chlorhexidine 2% Cloths 1 Application(s) Topical daily  gabapentin 800 milliGRAM(s) Oral three times a day  heparin   Injectable 5000 Unit(s) IV Push once  lactated ringers. 1000 milliLiter(s) (100 mL/Hr) IV Continuous <Continuous>  pantoprazole    Tablet 40 milliGRAM(s) Oral before breakfast  predniSONE   Tablet 10 milliGRAM(s) Oral daily  sulfaSALAzine 1000 milliGRAM(s) Oral three times a day  tamsulosin 0.4 milliGRAM(s) Oral at bedtime    MEDICATIONS  (PRN):  acetaminophen     Tablet .. 650 milliGRAM(s) Oral every 6 hours PRN Temp greater or equal to 38C (100.4F), Mild Pain (1 - 3)  cyclobenzaprine 10 milliGRAM(s) Oral at bedtime PRN Muscle Spasm  ondansetron Injectable 4 milliGRAM(s) IV Push every 8 hours PRN Nausea and/or Vomiting      FAMILY HISTORY:  Family history of hypertension    Family history of kidney stones (Sibling)        SOCIAL HISTORY:    CIGARETTES:    ALCOHOL:    REVIEW OF SYSTEMS:  CONSTITUTIONAL: No fever, weight loss, or fatigue  EYES: No eye pain, visual disturbances, or discharge  ENMT:  No difficulty hearing, tinnitus, vertigo; No sinus or throat pain  NECK: No pain or stiffness  RESPIRATORY: No cough, wheezing, chills or hemoptysis; No shortness of breath  CARDIOVASCULAR: No chest pain, palpitations, dizziness, or leg swelling  GASTROINTESTINAL: No abdominal or epigastric pain. No nausea, vomiting, or hematemesis; No diarrhea or constipation. No melena or hematochezia.  GENITOURINARY: No dysuria, frequency, hematuria, or incontinence  NEUROLOGICAL: No headaches, memory loss, loss of strength, numbness, or tremors  SKIN: No itching, burning, rashes, or lesions   LYMPH NODES: No enlarged glands  ENDOCRINE: No heat or cold intolerance; No hair loss  MUSCULOSKELETAL: No joint pain or swelling; No muscle, back, or extremity pain  PSYCHIATRIC: No depression, anxiety, mood swings, or difficulty sleeping  HEME/LYMPH: No easy bruising, or bleeding gums  ALLERY AND IMMUNOLOGIC: No hives or eczema    Vital Signs Last 24 Hrs  T(C): 36.5 (28 Feb 2024 18:20), Max: 37.9 (28 Feb 2024 05:25)  T(F): 97.7 (28 Feb 2024 18:20), Max: 100.2 (28 Feb 2024 05:25)  HR: 89 (28 Feb 2024 18:20) (72 - 109)  BP: 117/80 (28 Feb 2024 18:20) (102/67 - 117/80)  BP(mean): 90 (28 Feb 2024 17:31) (79 - 90)  RR: 18 (28 Feb 2024 18:20) (12 - 18)  SpO2: 99% (28 Feb 2024 18:20) (93% - 99%)    Parameters below as of 28 Feb 2024 18:20  Patient On (Oxygen Delivery Method): room air          PHYSICAL EXAM:  GENERAL: NAD, well-groomed, well-developed  HEAD:  Atraumatic, Normocephalic  EYES: EOMI, PERRLA, conjunctiva and sclera clear  ENMT: No tonsillar erythema, exudates, or enlargement; Moist mucous membranes, Good dentition, No lesions  NECK: Supple, No JVD, Normal thyroid  NERVOUS SYSTEM:  Alert & Oriented X3, Good concentration; Motor Strength 5/5 B/L upper and lower extremities; DTRs 2+ intact and symmetric  CHEST/LUNG: Clear to percussion bilaterally; No rales, rhonchi, wheezing, or rubs  HEART: Regular rate and rhythm; No murmurs, rubs, or gallops  ABDOMEN: Soft, Nontender, Nondistended; Bowel sounds present  EXTREMITIES:  2+ Peripheral Pulses, No clubbing, cyanosis, or edema  LYMPH: No lymphadenopathy noted  SKIN: No rashes or lesions      INTERPRETATION OF TELEMETRY:    ECG:    KEKEDSVAS:     LABS:                        10.2   10.61 )-----------( 145      ( 28 Feb 2024 05:42 )             32.0     02-28    142  |  115<H>  |  10  ----------------------------<  109<H>  4.0   |  19<L>  |  0.78    Ca    8.2<L>      28 Feb 2024 05:42  Mg     1.6     02-27    TPro  6.5  /  Alb  2.6<L>  /  TBili  2.0<H>  /  DBili  x   /  AST  94<H>  /  ALT  115<H>  /  AlkPhos  139<H>  02-28        PT/INR - ( 28 Feb 2024 05:42 )   PT: 14.2 sec;   INR: 1.25 ratio           Urinalysis Basic - ( 28 Feb 2024 05:42 )    Color: x / Appearance: x / SG: x / pH: x  Gluc: 109 mg/dL / Ketone: x  / Bili: x / Urobili: x   Blood: x / Protein: x / Nitrite: x   Leuk Esterase: x / RBC: x / WBC x   Sq Epi: x / Non Sq Epi: x / Bacteria: x      Lipid Panel:   I&O's Summary    28 Feb 2024 07:01  -  28 Feb 2024 21:12  --------------------------------------------------------  IN: 250 mL / OUT: 0 mL / NET: 250 mL        RADIOLOGY & ADDITIONAL STUDIE
NP Note discussed with  primary attending    Patient is a 59y old  Female who presents with a chief complaint of     INTERVAL HPI/OVERNIGHT EVENTS: no new complaints    MEDICATIONS  (STANDING):  carvedilol 6.25 milliGRAM(s) Oral every 12 hours  cefTRIAXone   IVPB 2000 milliGRAM(s) IV Intermittent every 24 hours  chlorhexidine 2% Cloths 1 Application(s) Topical daily  gabapentin 800 milliGRAM(s) Oral three times a day  heparin   Injectable 5000 Unit(s) IV Push once  lactated ringers. 1000 milliLiter(s) (100 mL/Hr) IV Continuous <Continuous>  pantoprazole    Tablet 40 milliGRAM(s) Oral before breakfast  predniSONE   Tablet 10 milliGRAM(s) Oral daily  sulfaSALAzine 1000 milliGRAM(s) Oral three times a day  tamsulosin 0.4 milliGRAM(s) Oral at bedtime    MEDICATIONS  (PRN):  acetaminophen     Tablet .. 650 milliGRAM(s) Oral every 6 hours PRN Temp greater or equal to 38C (100.4F), Mild Pain (1 - 3)  cyclobenzaprine 10 milliGRAM(s) Oral at bedtime PRN Muscle Spasm  ondansetron Injectable 4 milliGRAM(s) IV Push every 8 hours PRN Nausea and/or Vomiting      __________________________________________________  REVIEW OF SYSTEMS:    CONSTITUTIONAL: No fever,   EYES: no acute visual disturbances  NECK: No pain or stiffness  RESPIRATORY: No cough; No shortness of breath  CARDIOVASCULAR: No chest pain, no palpitations  GASTROINTESTINAL: No pain. No nausea or vomiting; No diarrhea   NEUROLOGICAL: No headache or numbness, no tremors  MUSCULOSKELETAL: No joint pain, no muscle pain  GENITOURINARY: no dysuria, no frequency, no hesitancy  PSYCHIATRY: no depression , no anxiety  ALL OTHER  ROS negative        Vital Signs Last 24 Hrs  T(C): 36.6 (28 Feb 2024 10:15), Max: 37.9 (28 Feb 2024 05:25)  T(F): 97.9 (28 Feb 2024 10:15), Max: 100.2 (28 Feb 2024 05:25)  HR: 96 (28 Feb 2024 10:15) (96 - 113)  BP: 113/72 (28 Feb 2024 10:15) (100/59 - 113/72)  BP(mean): 79 (27 Feb 2024 23:33) (79 - 79)  RR: 18 (28 Feb 2024 10:15) (18 - 18)  SpO2: 96% (28 Feb 2024 10:15) (93% - 97%)    Parameters below as of 28 Feb 2024 10:15  Patient On (Oxygen Delivery Method): room air        ________________________________________________  PHYSICAL EXAM:  GENERAL: NAD  HEENT: Normocephalic;  conjunctivae and sclerae clear; moist mucous membranes;   NECK : supple  CHEST/LUNG: Clear to ausculitation bilaterally with good air entry   HEART: S1 S2  regular; no murmurs, gallops or rubs  ABDOMEN: Soft, Nontender, Nondistended; Bowel sounds present  EXTREMITIES: no cyanosis; no edema; no calf tenderness  SKIN: warm and dry; no rash  NERVOUS SYSTEM:  Awake and alert; Oriented  to place, person and time ; no new deficits    _________________________________________________  LABS:                        10.2   10.61 )-----------( 145      ( 28 Feb 2024 05:42 )             32.0     02-28    142  |  115<H>  |  10  ----------------------------<  109<H>  4.0   |  19<L>  |  0.78    Ca    8.2<L>      28 Feb 2024 05:42  Mg     1.6     02-27    TPro  6.5  /  Alb  2.6<L>  /  TBili  2.0<H>  /  DBili  x   /  AST  94<H>  /  ALT  115<H>  /  AlkPhos  139<H>  02-28    PT/INR - ( 28 Feb 2024 05:42 )   PT: 14.2 sec;   INR: 1.25 ratio           Urinalysis Basic - ( 28 Feb 2024 05:42 )    Color: x / Appearance: x / SG: x / pH: x  Gluc: 109 mg/dL / Ketone: x  / Bili: x / Urobili: x   Blood: x / Protein: x / Nitrite: x   Leuk Esterase: x / RBC: x / WBC x   Sq Epi: x / Non Sq Epi: x / Bacteria: x      CAPILLARY BLOOD GLUCOSE      POCT Blood Glucose.: 150 mg/dL (28 Feb 2024 11:35)        RADIOLOGY & ADDITIONAL TESTS:  < from: CT Abdomen and Pelvis w/ IV Cont (02.27.24 @ 15:55) >  IMPRESSION:  Mild left hydroureteronephrosis secondary to a 4 mm stone in the mid left   ureter.      < end of copied text >    Imaging Personally Reviewed:  YES    Consultant(s) Notes Reviewed:   YES    Care Discussed with Consultants :     Plan of care was discussed with patient and /or primary care giver; all questions and concerns were addressed and care was aligned with patient's wishes.

## 2024-02-29 NOTE — PROGRESS NOTE ADULT - PROBLEM SELECTOR PROBLEM 5
Proteus mirabilis infection
Proteus mirabilis infection
Preoperative examination
Preoperative examination

## 2024-02-29 NOTE — PROGRESS NOTE ADULT - ATTENDING COMMENTS
Pt seen and examined. Agree with note as written above    - Plan as above  - FU as outpt for definitive stone management

## 2024-02-29 NOTE — PROGRESS NOTE ADULT - PROBLEM SELECTOR PLAN 4
- Bcx growing GNR  - C/W Ceftriaxone  - F/U final bcx  - ID Dr. Hayes consulted
- Bcx growing GNR  - C/W Ceftriaxone  - ID Dr. Hayes consulted
- Bcx growing GNR  - C/W Ceftriaxone  - ID Dr. Hayes consulted
- Bcx growing GNR  - C/W Ceftriaxone  - F/U final bcx  - ID Dr. Hayes consulted

## 2024-02-29 NOTE — CHART NOTE - NSCHARTNOTEFT_GEN_A_CORE
Pt POD 0 s/p Cystoscopic insertion of ureteral stent   comfortable  no c/o    voiding non bloody    Vital Signs Last 24 Hrs  T(C): 36.6 (28 Feb 2024 21:30), Max: 37.9 (28 Feb 2024 05:25)  T(F): 97.9 (28 Feb 2024 21:30), Max: 100.2 (28 Feb 2024 05:25)  HR: 91 (28 Feb 2024 21:30) (72 - 108)  BP: 118/76 (28 Feb 2024 21:30) (103/70 - 118/76)  BP(mean): 90 (28 Feb 2024 17:31) (81 - 90)  RR: 18 (28 Feb 2024 21:30) (12 - 18)  SpO2: 98% (28 Feb 2024 21:30) (95% - 99%)    Parameters below as of 28 Feb 2024 21:30  Patient On (Oxygen Delivery Method): room air    stable post-op

## 2024-02-29 NOTE — PROGRESS NOTE ADULT - PROBLEM SELECTOR PLAN 5
-Ucx growing Protein Mirabilis GNR uti  - C/W abx as above  - F/U final ucx   - ID Dr. Hayes
Hx of   - no history CAD or CHF, no asthma exacerbation many years  - scheduled for Ureteral stent  - patient has no risk factors for coronary artery disease  - Good functional capacity (METS ), no exertional chest pain or shortness of breath  - EKG findings pending  - RCRI score:  0 points  Class I Risk  3.9 %  30-day risk of death, MI, or cardiac arrest  - medically optimized for the procedure
Hx of   - no history CAD or CHF, no asthma exacerbation many years  - scheduled for Ureteral stent  - patient has no risk factors for coronary artery disease  - Good functional capacity (METS ), no exertional chest pain or shortness of breath  - EKG findings pending  - RCRI score:  0 points  Class I Risk  3.9 %  30-day risk of death, MI, or cardiac arrest  - medically optimized for the procedure
-Ucx growing Protein Mirabilis GNR uti  - C/W abx as above  - F/U final ucx   - ID Dr. Hayes

## 2024-02-29 NOTE — PROGRESS NOTE ADULT - PROBLEM SELECTOR PLAN 1
presents with L flank pain since 2/24 radiating to the L groin with associated fever 103F, chills, vomiting at home.   CT showed : Mild left hydroureteronephrosis secondary to a 4 mm stone in the mid left ureter     - S/P cystoscopy with stent placement 2/28  - IV Ceftriaxone  - urine and blood cx growing GNR  - Aggressive hydration  - pain / nausea control prn   - Start Flomax 0.4mg HS x 30days  - Urology following
presents with L flank pain since 2/24 radiating to the L groin with associated fever 103F, chills, vomiting at home.   CT showed : Mild left hydroureteronephrosis secondary to a 4 mm stone in the mid left ureter     - IV Ceftriaxone  - Fu urine and blood cx  - Aggressive hydration  - pain / nausea control prn   - Start Flomax 0.4mg HS x 30days  - For cystoscopy with stent placement today  - Urology following
presents with L flank pain since 2/24 radiating to the L groin with associated fever 103F, chills, vomiting at home.   CT showed : Mild left hydroureteronephrosis secondary to a 4 mm stone in the mid left ureter     - S/P cystoscopy with stent placement 2/28  - IV Ceftriaxone  - urine and blood cx growing GNR  - Aggressive hydration  - pain / nausea control prn   - Start Flomax 0.4mg HS x 30days  - Urology following
presents with L flank pain since 2/24 radiating to the L groin with associated fever 103F, chills, vomiting at home.   CT showed : Mild left hydroureteronephrosis secondary to a 4 mm stone in the mid left ureter     - IV Ceftriaxone  - Fu urine and blood cx  - Aggressive hydration  - pain / nausea control prn   - Start Flomax 0.4mg HS x 30days  - For cystoscopy with stent placement today  - Urology following

## 2024-02-29 NOTE — PROGRESS NOTE ADULT - PROBLEM SELECTOR PLAN 7
- cont home medications sulfazaline and steroids

## 2024-02-29 NOTE — PROGRESS NOTE ADULT - PROBLEM SELECTOR PLAN 10
- K 2.9  - Repleted 20X2 PO, plus 1 rider iv plus PO 40  - Monitor BMP
- K 2.9  - Repleted 20X2 PO, plus 1 rider iv plus PO 40  - Resolved
- K 2.9  - Repleted 20X2 PO, plus 1 rider iv plus PO 40  - Resolved
- K 2.9  - Repleted 20X2 PO, plus 1 rider iv plus PO 40  - Monitor BMP

## 2024-02-29 NOTE — PROGRESS NOTE ADULT - PROBLEM SELECTOR PLAN 11
- DVT PPX: Hold iso of OR today   - GI PPX: PPI
- DVT PPX: Hold iso of OR today   - GI PPX: PPI
- DVT PPX: Will restart Heparin sq if no hematuria noted today  - GI PPX: PPI
- DVT PPX: Will restart Heparin sq if no hematuria noted today  - GI PPX: PPI

## 2024-02-29 NOTE — PROGRESS NOTE ADULT - TIME BILLING
- Review of records, telemetry, vital signs and daily labs.   - General and cardiovascular physical examination.  - Generation of cardiovascular treatment plan.  - Coordination of care.      Patient was seen and examined by me on 2/28/24,interim events noted,labs and radiology studies reviewed.  Sinan Arce MD,FACC.  [address] [place], [place] [id number].  [phone]
- Review of records, telemetry, vital signs and daily labs.   - General and cardiovascular physical examination.  - Generation of cardiovascular treatment plan.  - Coordination of care.      Patient was seen and examined by me on 2/29/24,interim events noted,labs and radiology studies reviewed.  Sinan Arce MD,FACC.  [address] [place], [place] [id number].  [phone]

## 2024-02-29 NOTE — PROGRESS NOTE ADULT - ASSESSMENT
60 y/o female with with L 4mm mid ureteral stone w/ L hydro and low grade fever, bacteremic now s/p left stent placement     Plan   - Blood cx proteus, urine cx GNR  - Fu ID recs and continue IV abx  - Aggressive hydration  - pain / nausea control prn   - Patient to follow up outpatient with Dr. Kirkland for definitive stone treatment  - Urology to sign off at this time, reconsult PRN    Discussed with Dr. Kirkland

## 2024-02-29 NOTE — PROGRESS NOTE ADULT - PROBLEM SELECTOR PLAN 2
CT showed : Mild left hydroureteronephrosis secondary to a 4 mm stone in the mid left ureter   - Plan as above

## 2024-02-29 NOTE — PROGRESS NOTE ADULT - ASSESSMENT
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 States sharp pain started suddenly in the L flank radiating to the L groin.  CT showed : Mild left hydroureteronephrosis secondary to a 4 mm stone in the mid left  ureter. Admitted for renal colic iso of left renal stone.   S/P cystoscopy with renal stent placement with urology 2/28. Blood culture & Ucx growing GNR. ID Dr. Hayes following.

## 2024-03-01 ENCOUNTER — TRANSCRIPTION ENCOUNTER (OUTPATIENT)
Age: 59
End: 2024-03-01

## 2024-03-01 VITALS
TEMPERATURE: 99 F | RESPIRATION RATE: 18 BRPM | SYSTOLIC BLOOD PRESSURE: 118 MMHG | DIASTOLIC BLOOD PRESSURE: 77 MMHG | HEART RATE: 79 BPM | OXYGEN SATURATION: 94 %

## 2024-03-01 LAB
-  AMOXICILLIN/CLAVULANIC ACID: SIGNIFICANT CHANGE UP
-  AMPICILLIN/SULBACTAM: SIGNIFICANT CHANGE UP
-  AMPICILLIN/SULBACTAM: SIGNIFICANT CHANGE UP
-  AMPICILLIN: SIGNIFICANT CHANGE UP
-  AMPICILLIN: SIGNIFICANT CHANGE UP
-  AZTREONAM: SIGNIFICANT CHANGE UP
-  AZTREONAM: SIGNIFICANT CHANGE UP
-  CEFAZOLIN: SIGNIFICANT CHANGE UP
-  CEFAZOLIN: SIGNIFICANT CHANGE UP
-  CEFEPIME: SIGNIFICANT CHANGE UP
-  CEFEPIME: SIGNIFICANT CHANGE UP
-  CEFOXITIN: SIGNIFICANT CHANGE UP
-  CEFOXITIN: SIGNIFICANT CHANGE UP
-  CEFTRIAXONE: SIGNIFICANT CHANGE UP
-  CEFTRIAXONE: SIGNIFICANT CHANGE UP
-  CEFUROXIME: SIGNIFICANT CHANGE UP
-  CIPROFLOXACIN: SIGNIFICANT CHANGE UP
-  CIPROFLOXACIN: SIGNIFICANT CHANGE UP
-  ERTAPENEM: SIGNIFICANT CHANGE UP
-  ERTAPENEM: SIGNIFICANT CHANGE UP
-  GENTAMICIN: SIGNIFICANT CHANGE UP
-  GENTAMICIN: SIGNIFICANT CHANGE UP
-  LEVOFLOXACIN: SIGNIFICANT CHANGE UP
-  LEVOFLOXACIN: SIGNIFICANT CHANGE UP
-  MEROPENEM: SIGNIFICANT CHANGE UP
-  MEROPENEM: SIGNIFICANT CHANGE UP
-  NITROFURANTOIN: SIGNIFICANT CHANGE UP
-  PIPERACILLIN/TAZOBACTAM: SIGNIFICANT CHANGE UP
-  PIPERACILLIN/TAZOBACTAM: SIGNIFICANT CHANGE UP
-  TOBRAMYCIN: SIGNIFICANT CHANGE UP
-  TOBRAMYCIN: SIGNIFICANT CHANGE UP
-  TRIMETHOPRIM/SULFAMETHOXAZOLE: SIGNIFICANT CHANGE UP
-  TRIMETHOPRIM/SULFAMETHOXAZOLE: SIGNIFICANT CHANGE UP
ALBUMIN SERPL ELPH-MCNC: 2.5 G/DL — LOW (ref 3.5–5)
ALP SERPL-CCNC: 149 U/L — HIGH (ref 40–120)
ALT FLD-CCNC: 66 U/L DA — HIGH (ref 10–60)
ANION GAP SERPL CALC-SCNC: 9 MMOL/L — SIGNIFICANT CHANGE UP (ref 5–17)
AST SERPL-CCNC: 13 U/L — SIGNIFICANT CHANGE UP (ref 10–40)
BILIRUB SERPL-MCNC: 0.3 MG/DL — SIGNIFICANT CHANGE UP (ref 0.2–1.2)
BUN SERPL-MCNC: 21 MG/DL — HIGH (ref 7–18)
CALCIUM SERPL-MCNC: 8.7 MG/DL — SIGNIFICANT CHANGE UP (ref 8.4–10.5)
CHLORIDE SERPL-SCNC: 112 MMOL/L — HIGH (ref 96–108)
CO2 SERPL-SCNC: 20 MMOL/L — LOW (ref 22–31)
CREAT SERPL-MCNC: 0.78 MG/DL — SIGNIFICANT CHANGE UP (ref 0.5–1.3)
CULTURE RESULTS: ABNORMAL
CULTURE RESULTS: ABNORMAL
EGFR: 87 ML/MIN/1.73M2 — SIGNIFICANT CHANGE UP
GLUCOSE SERPL-MCNC: 173 MG/DL — HIGH (ref 70–99)
HCT VFR BLD CALC: 31.6 % — LOW (ref 34.5–45)
HGB BLD-MCNC: 10.1 G/DL — LOW (ref 11.5–15.5)
MCHC RBC-ENTMCNC: 30.7 PG — SIGNIFICANT CHANGE UP (ref 27–34)
MCHC RBC-ENTMCNC: 32 GM/DL — SIGNIFICANT CHANGE UP (ref 32–36)
MCV RBC AUTO: 96 FL — SIGNIFICANT CHANGE UP (ref 80–100)
METHOD TYPE: SIGNIFICANT CHANGE UP
METHOD TYPE: SIGNIFICANT CHANGE UP
NRBC # BLD: 0 /100 WBCS — SIGNIFICANT CHANGE UP (ref 0–0)
ORGANISM # SPEC MICROSCOPIC CNT: ABNORMAL
PLATELET # BLD AUTO: 178 K/UL — SIGNIFICANT CHANGE UP (ref 150–400)
POTASSIUM SERPL-MCNC: 3.1 MMOL/L — LOW (ref 3.5–5.3)
POTASSIUM SERPL-SCNC: 3.1 MMOL/L — LOW (ref 3.5–5.3)
PROT SERPL-MCNC: 6.3 G/DL — SIGNIFICANT CHANGE UP (ref 6–8.3)
RBC # BLD: 3.29 M/UL — LOW (ref 3.8–5.2)
RBC # FLD: 13.3 % — SIGNIFICANT CHANGE UP (ref 10.3–14.5)
SODIUM SERPL-SCNC: 141 MMOL/L — SIGNIFICANT CHANGE UP (ref 135–145)
SPECIMEN SOURCE: SIGNIFICANT CHANGE UP
SPECIMEN SOURCE: SIGNIFICANT CHANGE UP
WBC # BLD: 17.12 K/UL — HIGH (ref 3.8–10.5)
WBC # FLD AUTO: 17.12 K/UL — HIGH (ref 3.8–10.5)

## 2024-03-01 PROCEDURE — 85025 COMPLETE CBC W/AUTO DIFF WBC: CPT

## 2024-03-01 PROCEDURE — 86900 BLOOD TYPING SEROLOGIC ABO: CPT

## 2024-03-01 PROCEDURE — 87040 BLOOD CULTURE FOR BACTERIA: CPT

## 2024-03-01 PROCEDURE — 86901 BLOOD TYPING SEROLOGIC RH(D): CPT

## 2024-03-01 PROCEDURE — 85027 COMPLETE CBC AUTOMATED: CPT

## 2024-03-01 PROCEDURE — 80048 BASIC METABOLIC PNL TOTAL CA: CPT

## 2024-03-01 PROCEDURE — 82962 GLUCOSE BLOOD TEST: CPT

## 2024-03-01 PROCEDURE — 83735 ASSAY OF MAGNESIUM: CPT

## 2024-03-01 PROCEDURE — 96374 THER/PROPH/DIAG INJ IV PUSH: CPT

## 2024-03-01 PROCEDURE — 96375 TX/PRO/DX INJ NEW DRUG ADDON: CPT

## 2024-03-01 PROCEDURE — 80053 COMPREHEN METABOLIC PANEL: CPT

## 2024-03-01 PROCEDURE — 87637 SARSCOV2&INF A&B&RSV AMP PRB: CPT

## 2024-03-01 PROCEDURE — 87077 CULTURE AEROBIC IDENTIFY: CPT

## 2024-03-01 PROCEDURE — 85610 PROTHROMBIN TIME: CPT

## 2024-03-01 PROCEDURE — 86850 RBC ANTIBODY SCREEN: CPT

## 2024-03-01 PROCEDURE — C2617: CPT

## 2024-03-01 PROCEDURE — 74177 CT ABD & PELVIS W/CONTRAST: CPT | Mod: MC

## 2024-03-01 PROCEDURE — 87186 SC STD MICRODIL/AGAR DIL: CPT

## 2024-03-01 PROCEDURE — 87086 URINE CULTURE/COLONY COUNT: CPT

## 2024-03-01 PROCEDURE — 87150 DNA/RNA AMPLIFIED PROBE: CPT

## 2024-03-01 PROCEDURE — 36415 COLL VENOUS BLD VENIPUNCTURE: CPT

## 2024-03-01 PROCEDURE — 84484 ASSAY OF TROPONIN QUANT: CPT

## 2024-03-01 PROCEDURE — 83690 ASSAY OF LIPASE: CPT

## 2024-03-01 PROCEDURE — 99285 EMERGENCY DEPT VISIT HI MDM: CPT

## 2024-03-01 PROCEDURE — 81001 URINALYSIS AUTO W/SCOPE: CPT

## 2024-03-01 PROCEDURE — 76000 FLUOROSCOPY <1 HR PHYS/QHP: CPT

## 2024-03-01 PROCEDURE — 83605 ASSAY OF LACTIC ACID: CPT

## 2024-03-01 RX ORDER — CEFUROXIME AXETIL 250 MG
1 TABLET ORAL
Qty: 20 | Refills: 0
Start: 2024-03-01 | End: 2024-03-10

## 2024-03-01 RX ORDER — TAMSULOSIN HYDROCHLORIDE 0.4 MG/1
1 CAPSULE ORAL
Qty: 30 | Refills: 0
Start: 2024-03-01 | End: 2024-03-30

## 2024-03-01 RX ORDER — POTASSIUM CHLORIDE 20 MEQ
10 PACKET (EA) ORAL
Refills: 0 | Status: COMPLETED | OUTPATIENT
Start: 2024-03-01 | End: 2024-03-01

## 2024-03-01 RX ADMIN — GABAPENTIN 800 MILLIGRAM(S): 400 CAPSULE ORAL at 05:39

## 2024-03-01 RX ADMIN — Medication 10 MILLIGRAM(S): at 05:38

## 2024-03-01 RX ADMIN — Medication 100 MILLIEQUIVALENT(S): at 09:42

## 2024-03-01 RX ADMIN — Medication 100 MILLIEQUIVALENT(S): at 12:22

## 2024-03-01 RX ADMIN — CARVEDILOL PHOSPHATE 6.25 MILLIGRAM(S): 80 CAPSULE, EXTENDED RELEASE ORAL at 05:39

## 2024-03-01 RX ADMIN — CHLORHEXIDINE GLUCONATE 1 APPLICATION(S): 213 SOLUTION TOPICAL at 12:22

## 2024-03-01 RX ADMIN — Medication 100 MILLIEQUIVALENT(S): at 10:58

## 2024-03-01 RX ADMIN — PANTOPRAZOLE SODIUM 40 MILLIGRAM(S): 20 TABLET, DELAYED RELEASE ORAL at 05:39

## 2024-03-01 NOTE — DISCHARGE NOTE PROVIDER - NSDCMRMEDTOKEN_GEN_ALL_CORE_FT
carvedilol 6.25 mg oral tablet: 1 tab(s) orally 2 times a day  cefuroxime 500 mg oral tablet: 1 tab(s) orally 2 times a day Stop after 10 days  cyclobenzaprine 10 mg oral tablet: 1 tab(s) orally once a day (at bedtime)  DULoxetine 60 mg oral delayed release capsule: 1 cap(s) orally once a day  gabapentin 800 mg oral tablet: 1 tab(s) orally 3 times a day  leflunomide 20 mg oral tablet: 1 tab(s) orally once a day  meclizine 12.5 mg oral tablet: 1 tab(s) orally 2 times a day  mirtazapine 30 mg oral tablet: 1 tab(s) orally once a day (at bedtime)  ondansetron 4 mg oral tablet: 1 tab(s) orally once a day  pantoprazole 40 mg oral delayed release tablet: 1 tab(s) orally once a day  predniSONE 10 mg oral tablet: 1 tab(s) orally once a day  Savella 100 mg oral tablet: 1 tab(s) orally 2 times a day  Sulfazine 500 mg oral tablet: 2 tab(s) orally 3 times a day  SUMAtriptan 100 mg oral tablet: 1 tab(s) orally once, As Needed  tamsulosin 0.4 mg oral capsule: 1 cap(s) orally once a day (at bedtime)  Vraylar 1.5 mg oral capsule: 1 cap(s) orally once a day

## 2024-03-01 NOTE — DISCHARGE NOTE PROVIDER - HOSPITAL COURSE
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   States sharp pain started suddenly in the L flank radiating to the L groin.  CT showed : Mild left hydroureteronephrosis secondary   to a 4 mm stone in the mid left  ureter.  Admitted for renal colic iso of left renal stone.   S/P cystoscopy with renal stent placement with urology 2/28. Blood culture & Ucx growing GNR, Protein Mirabilis.   ID Dr. Hayes following. Started on IV Ceftriaxone    Urology recs:  - Patient to follow up outpatient urology with Dr. Kirkland for definitive stone treatment

## 2024-03-01 NOTE — DISCHARGE NOTE PROVIDER - CARE PROVIDER_API CALL
Eric Flores  Internal Medicine  [address] [place] [address] [place] [address]  Phone: [phone]  Fax: [phone]  Follow Up Time: 1 week    Nancy Kirkland  Urology  [address] [place] [address], [address] [place] [address]  Phone: [phone]  Fax: [phone]  Established Patient  Follow Up Time: 1 week

## 2024-03-01 NOTE — DISCHARGE NOTE PROVIDER - NSDCFUSCHEDAPPT_GEN_ALL_CORE_FT
[facility]  ORTHOSURG 5 [facility]  Scheduled Appointment: 03/05/2024

## 2024-03-01 NOTE — DISCHARGE NOTE NURSING/CASE MANAGEMENT/SOCIAL WORK - PATIENT PORTAL LINK FT
You can access the FollowMyHealth Patient Portal offered by [facility] by registering at the following website: [email or web address]. By joining [employer]’s FollowMyHealth portal, you will also be able to view your health information using other applications (apps) compatible with our system.

## 2024-03-01 NOTE — DISCHARGE NOTE NURSING/CASE MANAGEMENT/SOCIAL WORK - NSDCPEFALRISK_GEN_ALL_CORE
For information on Fall & Injury Prevention, visit: [email or web address] OR  [email or web address] OR  https://www.cdc.gov/steadi/patient.html

## 2024-03-01 NOTE — DISCHARGE NOTE NURSING/CASE MANAGEMENT/SOCIAL WORK - NSDCVIVACCINE_GEN_ALL_CORE_FT
influenza, injectable, quadrivalent, preservative free; 15-Sep-2019 12:21; Mar Solis (RN); [employer]; [id number] (Exp. Date: 30-Jun-2020); IntraMuscular; Deltoid Left.; 0.5 milliLiter(s); VIS (VIS Published: 15-Aug-2019, VIS Presented: 15-Sep-2019);   rabies, intradermal injection; 12-Sep-2019 18:54; Epi Interiano (RN); [employer]; [id number] (Exp. Date: 12-Feb-2023); IntraMuscular; Deltoid Left.; 1 milliLiter(s); VIS (VIS Published: 12-Sep-2019, VIS Presented: 12-Sep-2019);   rabies, intradermal injection; 15-Sep-2019 12:58; Mar Solis (RN); [employer]; [id number] (Exp. Date: 15-Jul-2020); IntraMuscular; Deltoid Right.; 1 milliLiter(s); VIS (VIS Published: 15-Sep-2020, VIS Presented: 15-Sep-2019);   rabies, intradermal injection; 19-Sep-2019 12:35; Tiffani Olvera (RN); [employer]; [id number] (Exp. Date: 09-Jan-2022); IntraMuscular; Deltoid Right.; 1 milliLiter(s); VIS (VIS Published: 08-Jan-2018, VIS Presented: 19-Sep-2019);   rabies, intradermal injection; 26-Sep-2019 11:41; Irma De La Torre (RN); [employer]; [id number] (Exp. Date: 07-Jan-2022); IntraMuscular; Deltoid Left.; 1 milliLiter(s); VIS (VIS Published: 26-May-2018, VIS Presented: 26-Sep-2019);   Tdap; 12-Sep-2019 17:31; Epi Interiano (RN); [employer]; [id number] (Exp. Date: 25-Jul-2021); IntraMuscular; Deltoid Left.; 0.5 milliLiter(s); VIS (VIS Published: 09-May-2013, VIS Presented: 12-Sep-2019);

## 2024-03-01 NOTE — DISCHARGE NOTE PROVIDER - NSDCCPCAREPLAN_GEN_ALL_CORE_FT
PRINCIPAL DISCHARGE DIAGNOSIS  Diagnosis: Renal colic on left side  Assessment and Plan of Treatment: You presented with left flank pain in setting of renal colic.  - Your cat scan shows 4mm renal stone, with left hydronephrosis.  - You were treated with IV antibiotics  - You underwent cystoscopy with stent placement with urology  - Please follow up outpatient with Dr. Kirkland for definitive stone treatment      SECONDARY DISCHARGE DIAGNOSES  Diagnosis: Hydronephrosis, left  Assessment and Plan of Treatment: - Plan same as above    Diagnosis: Bacteremia due to Proteus species  Assessment and Plan of Treatment: You were also noted to have Proteus Mirabilis bacterial infection both in your urine & blood  - You were treated with IV antibiotics under the care of an infection disease doctor.  - Take your medicine as prescribed.  Take the medicine as told. Finish them even if you start to feel better.  Do not give your medicine to other people.  Do not use your medicine in the future for a different infection.  Ask your doctor about which side effects to watch for.  Try not to miss any doses. If you miss a dose, take it as soon as possible      Diagnosis: Sarcoidosis  Assessment and Plan of Treatment: - Continue to take your prednisone as prescribed  - Follow up with your primary doctor after discharge.

## 2024-03-01 NOTE — DISCHARGE NOTE PROVIDER - PROVIDER TOKENS
PROVIDER:[TOKEN:[[id number]],FOLLOWUP:[1 week]],PROVIDER:[TOKEN:[[id number]],FOLLOWUP:[1 week],ESTABLISHEDPATIENT:[T]]

## 2024-03-05 ENCOUNTER — APPOINTMENT (OUTPATIENT)
Dept: ORTHOPEDIC SURGERY | Facility: CLINIC | Age: 59
End: 2024-03-05
Payer: OTHER MISCELLANEOUS

## 2024-03-05 PROCEDURE — 99214 OFFICE O/P EST MOD 30 MIN: CPT

## 2024-03-06 LAB
CULTURE RESULTS: SIGNIFICANT CHANGE UP
SPECIMEN SOURCE: SIGNIFICANT CHANGE UP

## 2024-03-06 NOTE — PHYSICAL EXAM
[de-identified] : General: No acute distress, conversant, well-nourished. Head: Normocephalic, atraumatic Neck: trachea midline, FROM Heart: normotensive and normal rate and rhythm Lungs: No labored breathing Skin: No abrasions, no rashes, no edema Psych: Alert and oriented to person, place and time Extremities: no peripheral edema or digital cyanosis Gait: Normal gait. Can perform tandem gait.   Vascular: warm and well perfused distally, palpable distal pulses MSK: Lumbar spine: incisions well healed  NEURO EXAM: Sensation  Left L2  -  2/2             Left L3  -  2/2 Left L4  -  2/2 Left L5  -  2/2 Left S1  -  2/2  Right L2  -  2/2             Right L3  -  2/2 Right L4  -  1/2 Right L5  -  1/2 Right S1  -  1/2  Motor:  Left L2 (hip flexion)                            5/5                 Left L3 (knee extension)                   5/5                 Left L4 (ankle dorsiflexion)                 5/5                 Left L5 (long toe extensor)                5/5                 Left S1 (ankle plantar flexion)           5/5  Right L2 (hip flexion)                            5/5                 Right L3 (knee extension)                   5/5                 Right L4 (ankle dorsiflexion)                 5/5                 Right L5 (long toe extensor)                5/5                 Right S1 (ankle plantar flexion)           5/5  Reflexes: Normal and symmetric Negative clonus.  Down-going Babinski.    [de-identified] : I independently reviewed her lumbar CT which shows severe stenosis L3-L4.    Lumbar 4 view radiographs no dislocation or fracture.  Lumbar spondylosis.  s/p L4-S1 instrumented fusion with interbody with hardware in appropriate position and alignment.    I independently reviewed her lumbar MRI which shows severe stenosis L3-L4.     CT LUMBAR SPINE WITHOUT CONTRAST  02/21/2024  1.  Multilevel lumbar spondylosis as described, worst at L3-4. 2.  L3-L4: Disc bulge with superimposed moderately sized central extrusion with cranial migration to the level of L3 and bilateral facet arthrosis causing moderate to severe spinal canal stenosis and moderate bilateral foraminal stenosis. These findings are stable. 3.  Posterior instrumented fusion from L4 to S1 with hardware intact without evidence for fracture or loosening. Additional anterior plate and screws at L4-5 and L5-S1. 4.  Interbody and posterior osseous fusion from L4 to S1

## 2024-03-06 NOTE — ASSESSMENT
[FreeTextEntry1] : 58 year old female followup with acute exacerbation of chronic low back pain.  The pain radiates to her legs right worse than left. She has numbness in her right leg.  She denies recent illness, fevers, weakness, balance problems, saddle anesthesia, urinary retention or fecal incontinence.  She had two work injuries as a .  The injuries were on 4/4/2005 and 7/11/2006. Both involved heavy lifting.  She had L4-S1 ALIF and PSIF by Dr. Jayant Cobian in 2017.  She has tried extensive conservative treatment including PT, prescription medications, spinal injections and spinal cord stimulator without relief. Since her last visit the pain has continued to progress. She is here to review her CT.  She is awaiting workers comp approval for surgery.  Surgery would be L3-L4 decompression and extension of fusion: L3-L4 TLIF.  Surgery would require over 50% removal of facet joints requiring fusion. In addition fusion is required due to adjacent fusion.  We discussed the risks and benefits of surgery. The risks include anesthesia, blood loss, need for blood transfusion, clots, stroke, myocardial infarction, durotomy, infection, nonunion, hardware failure, damage to neurovascular structures and need for reoperation. The patient understands the risks.  She asked several great questions all of which were answered. She elects to proceed with surgery. We discussed red flag symptoms that would require emergent evaluation. She knows to call with any questions or concerns or if her symptoms acutely worsen.

## 2024-03-06 NOTE — REASON FOR VISIT
[Follow-Up Visit] : a follow-up visit for [Workers' Comp: Date of Injury: _______] : This visit is related to worker's compensation. Date of Injury: [unfilled] [Back Pain] : back pain [FreeTextEntry2] : ct review

## 2024-03-06 NOTE — HISTORY OF PRESENT ILLNESS
[FreeTextEntry1] : 58 year old female followup with acute exacerbation of chronic low back pain.  The pain radiates to her legs right worse than left. She has numbness in her right leg.  She denies recent illness, fevers, weakness, balance problems, saddle anesthesia, urinary retention or fecal incontinence.  She had two work injuries as a .  The injuries were on 4/4/2005 and 7/11/2006. Both involved heavy lifting.  She had L4-S1 ALIF and PSIF by Dr. Jayant Cobian in 2017.  She has tried extensive conservative treatment including PT, prescription medications, spinal injections and spinal cord stimulator without relief. Since her last visit the pain has continued to progress. She is here to review her CT.  She is awaiting workers comp approval for surgery.

## 2024-03-11 ENCOUNTER — APPOINTMENT (OUTPATIENT)
Dept: UROLOGY | Facility: CLINIC | Age: 59
End: 2024-03-11
Payer: MEDICARE

## 2024-03-11 VITALS
WEIGHT: 150 LBS | HEART RATE: 79 BPM | TEMPERATURE: 97.6 F | SYSTOLIC BLOOD PRESSURE: 115 MMHG | DIASTOLIC BLOOD PRESSURE: 79 MMHG | BODY MASS INDEX: 30.24 KG/M2 | OXYGEN SATURATION: 98 % | HEIGHT: 59 IN

## 2024-03-11 DIAGNOSIS — Z82.49 FAMILY HISTORY OF ISCHEMIC HEART DISEASE AND OTHER DISEASES OF THE CIRCULATORY SYSTEM: ICD-10-CM

## 2024-03-11 DIAGNOSIS — Z83.3 FAMILY HISTORY OF DIABETES MELLITUS: ICD-10-CM

## 2024-03-11 PROCEDURE — 99214 OFFICE O/P EST MOD 30 MIN: CPT

## 2024-03-11 NOTE — HISTORY OF PRESENT ILLNESS
[FreeTextEntry1] : 58 yo F presented to ER with left flank pain, fever, chills Found to have obstructing left ureteral stone S/p left ureteral stent Blood cultures ultimately found to be positive Last dose of abx was today Doing well since hospital discharge - no recurrence of fever and no significant pain from stent No prior history of stones Of note, pt is pending back surgery in April

## 2024-03-11 NOTE — PHYSICAL EXAM
[Normal Appearance] : normal appearance [Well Groomed] : well groomed [General Appearance - In No Acute Distress] : no acute distress [Edema] : no peripheral edema [Respiration, Rhythm And Depth] : normal respiratory rhythm and effort [Exaggerated Use Of Accessory Muscles For Inspiration] : no accessory muscle use [Abdomen Soft] : soft [Abdomen Tenderness] : non-tender [Costovertebral Angle Tenderness] : no ~M costovertebral angle tenderness [Urinary Bladder Findings] : the bladder was normal on palpation [Normal Station and Gait] : the gait and station were normal for the patient's age [] : no rash [Oriented To Time, Place, And Person] : oriented to person, place, and time [No Focal Deficits] : no focal deficits [Affect] : the affect was normal [Mood] : the mood was normal [No Palpable Adenopathy] : no palpable adenopathy

## 2024-03-11 NOTE — ASSESSMENT
[FreeTextEntry1] : 58 yo F with left nephrolithiasis  - Reviewed records from recent hospitalization -I discussed the different treatment modalities for nephrolithiasis with the patient, including medical management, spontaneous stone passage, percutaneous stone extraction, extracorporeal shock wave lithotripsy, and ureteroscopy with laser lithotripsy and stone extraction. Given the size and location of the stone, I recommend and the patient opted to proceed with ureteroscopy. The risks, benefits, and alternatives to ureteroscopy were discussed with the patient, including but not limited to pain, infection, bleeding, bladder injury, ureteral injury, renal injury, and treatment failure. I also discussed the possible need for a temporary ureteral stent. I discussed the possible side effects of a temporary ureteral stent, including flank pain, hematuria, and bladder spasms. The patient understands that a stent is a temporary implant that must be removed in the future. The patient wishes to proceed and we will schedule the surgery for the near future.  - UA and culture today - Will try to schedule procedure prior to her back surgery

## 2024-03-13 ENCOUNTER — OUTPATIENT (OUTPATIENT)
Dept: OUTPATIENT SERVICES | Facility: HOSPITAL | Age: 59
LOS: 1 days | End: 2024-03-13
Payer: COMMERCIAL

## 2024-03-13 VITALS
HEIGHT: 59 IN | SYSTOLIC BLOOD PRESSURE: 129 MMHG | TEMPERATURE: 98 F | OXYGEN SATURATION: 98 % | DIASTOLIC BLOOD PRESSURE: 82 MMHG | RESPIRATION RATE: 18 BRPM | WEIGHT: 151.9 LBS | HEART RATE: 88 BPM

## 2024-03-13 DIAGNOSIS — I10 ESSENTIAL (PRIMARY) HYPERTENSION: ICD-10-CM

## 2024-03-13 DIAGNOSIS — I67.1 CEREBRAL ANEURYSM, NONRUPTURED: Chronic | ICD-10-CM

## 2024-03-13 DIAGNOSIS — Z90.710 ACQUIRED ABSENCE OF BOTH CERVIX AND UTERUS: Chronic | ICD-10-CM

## 2024-03-13 DIAGNOSIS — Z98.890 OTHER SPECIFIED POSTPROCEDURAL STATES: Chronic | ICD-10-CM

## 2024-03-13 DIAGNOSIS — M54.5 LOW BACK PAIN: Chronic | ICD-10-CM

## 2024-03-13 DIAGNOSIS — Z01.818 ENCOUNTER FOR OTHER PREPROCEDURAL EXAMINATION: ICD-10-CM

## 2024-03-13 DIAGNOSIS — G47.33 OBSTRUCTIVE SLEEP APNEA (ADULT) (PEDIATRIC): ICD-10-CM

## 2024-03-13 DIAGNOSIS — F32.9 MAJOR DEPRESSIVE DISORDER, SINGLE EPISODE, UNSPECIFIED: ICD-10-CM

## 2024-03-13 DIAGNOSIS — N20.0 CALCULUS OF KIDNEY: ICD-10-CM

## 2024-03-13 RX ORDER — LEFLUNOMIDE 10 MG/1
1 TABLET ORAL
Refills: 0 | DISCHARGE

## 2024-03-13 RX ORDER — CYCLOBENZAPRINE HYDROCHLORIDE 10 MG/1
1 TABLET, FILM COATED ORAL
Refills: 0 | DISCHARGE

## 2024-03-13 RX ORDER — ONDANSETRON 8 MG/1
1 TABLET, FILM COATED ORAL
Refills: 0 | DISCHARGE

## 2024-03-13 NOTE — H&P PST ADULT - HISTORY OF PRESENT ILLNESS
59year old female with pmhx of hallux valgus of bilateral feet, asthma, brain aneurysm - coiling sx 2011, depression, fibroids, fibromyalgia, GERD, hyperlipidemia, hypertension, migraines, SHELIA - no CPAP or mouthpiece, rheumatoid arthritis, sarcoidosis, leiomyoma of uterus, seasonal allergies, vertigo, lumbar spine disease - reports multiple lumbar surgeries presents with c/o left kidney infection secondary to calculi associated with hematuria. Patient is here today for presurgical testing for scheduled cystoscopy, left ureteroscopy with laser lithotripsy ureteral stent exchange and retrograde pyelogram on 3/19/2024 59year old female with pmhx of hallux valgus of bilateral feet, asthma, brain aneurysm - coiling sx 2011, depression, fibroids, fibromyalgia, GERD, hyperlipidemia, hypertension, migraines, SHELIA - no CPAP or mouthpiece, rheumatoid arthritis, sarcoidosis, leiomyoma of uterus, seasonal allergies, vertigo, lumbar spine disease - reports multiple lumbar surgeries presents with c/o left kidney infection secondary to calculi associated with hematuria. Patient is here today for presurgical testing for scheduled cystoscopy, left ureteroscopy with laser lithotripsy ureteral stent exchange and retrograde pyelogram on 3/26/2024

## 2024-03-13 NOTE — H&P PST ADULT - PROBLEM SELECTOR PLAN 3
Patient with hx of SHELIA - No CPAP/Mouthpiece (pt unable to tolerate)  Recommend perioperative SHELIA precautions to be maintained.  Discussed 3hr PACU monitoring and plan of care with patient, agrees

## 2024-03-13 NOTE — H&P PST ADULT - PROBLEM SELECTOR PLAN 1
Patient is scheduled for Cystoscopy, left ureteroscopy with laser lithotripsy ureteral stent exchange and retrograde pyelogram on 3/19/2024  Written and oral preoperative instructions given to patient with understanding verbalized.   Instructions given to include using 4% chlorhexidine wash as directed starting 3days before day of surgery (inclusive of day of surgery)  Maintaining NPO status post midnight day before surgery  Stopping aspirin, NSAIDs, herbs, vitamins 7days before surgery   Patient is to expect a phone call day before surgery between the hours of 430- 630pm giving arrival time for surgery day. Patient is scheduled for Cystoscopy, left ureteroscopy with laser lithotripsy ureteral stent exchange and retrograde pyelogram on 3/26/2024  Written and oral preoperative instructions given to patient with understanding verbalized.   Instructions given to include using 4% chlorhexidine wash as directed starting 3days before day of surgery (inclusive of day of surgery)  Maintaining NPO status post midnight day before surgery  Stopping aspirin, NSAIDs, herbs, vitamins 7days before surgery   Patient is to expect a phone call day before surgery between the hours of 430- 630pm giving arrival time for surgery day.

## 2024-03-13 NOTE — H&P PST ADULT - PROBLEM SELECTOR PLAN 2
[Mother] : mother [Formula ___ oz/feed] : [unfilled] oz of formula per feed [Hours between feeds ___] : Child is fed every [unfilled] hours [Normal] : Normal [___ voids per day] : [unfilled] voids per day [Frequency of stools: ___] : Frequency of stools: [unfilled]  stools [per day] : per day. [Loose] : loose consistency [In Bassinet/Crib] : sleeps in bassinet/crib [No] : No cigarette smoke exposure [Water heater temperature set at <120 degrees F] : Water heater temperature set at <120 degrees F [Rear facing car seat in back seat] : Rear facing car seat in back seat [Carbon Monoxide Detectors] : Carbon monoxide detectors at home [Smoke Detectors] : Smoke detectors at home. [Vitamins ___] : no vitamins [On back] : does not sleep on back [Co-sleeping] : no co-sleeping [Loose bedding, pillow, toys, and/or bumpers in crib] : no loose bedding, pillow, toys, and/or bumpers in crib [Pacifier use] : not using pacifier [Exposure to electronic nicotine delivery system] : No exposure to electronic nicotine delivery system [Gun in Home] : No gun in home [At risk for exposure to TB] : Not at risk for exposure to Tuberculosis  [FreeTextEntry7] : 2 mo with multiple issues including chromosomal abnormalities , craniosynostosis , cleft of soft palate , spinal misalignment and congentiatl heart disease. she is followed by several specialists and has multiple planned suregeries in the upcoming months.  first will be for the craniosynostosis in the upcoming weeks. Current treatment regimen - Carvedilol 6.25mg daily  Advised to continue with current regimen   Patient instructed with understanding verbalized to take carvedilol with a sip of water the morning of surgery   Follow up with PCP/Cardiologist postoperatively

## 2024-03-13 NOTE — H&P PST ADULT - RESPIRATORY AND THORAX COMMENTS
Hx Asthma, last exacerbation -10years ago no intubation/hospitalization, pulmonary sarcoidosis, SHELIA not on CPAP/mouthpiece

## 2024-03-13 NOTE — H&P PST ADULT - NEGATIVE OPHTHALMOLOGIC SYMPTOMS
Glasses for reading and distance/no blurred vision L/no blurred vision R/no loss of vision L/no loss of vision R

## 2024-03-13 NOTE — H&P PST ADULT - REASON FOR ADMISSION
Cystoscopy, left ureteroscopy with laser lithotripsy ureteral stent exchange and retrograde pyelogram

## 2024-03-13 NOTE — H&P PST ADULT - NSICDXPROCEDURE_GEN_ALL_CORE_FT
PROCEDURES:  Cystoureteroscopy, with lithotripsy using laser and ureteral stent insertion 13-Mar-2024 16:48:53  Sophia Bland

## 2024-03-13 NOTE — H&P PST ADULT - PROBLEM SELECTOR PLAN 4
Current treatment regimen - Mirtazepine, Vraylar and duloxetine   Advised to continue with current regimen   Follow up with Psychiatrist postoperatively

## 2024-03-13 NOTE — H&P PST ADULT - PSY GEN HX ROS MEA POS PC
Stable on mirtazapine, vraylar, duloxetine. Visits with psychiatrist every 3months/depression/insomnia

## 2024-03-13 NOTE — H&P PST ADULT - NSHP PST SURGERY DATE_DT_GEN_A_CORE
[Smoking Cessation Guidance Provided] : Smoking cessation guidance was provided to patient [Smoking Cessation] : smoking cessation [Age appropriate screenings] : Age appropriate screenings [Regular follow-up with healthcare provider] : regular follow-up with healthcare provider [FreeTextEntry1] : Her LDCT is scheduled for 12/28/20 at the [facility] location.  She will follow up with Dr. Leonard for the results. 19-Mar-2024 26-Mar-2024

## 2024-03-14 ENCOUNTER — LABORATORY RESULT (OUTPATIENT)
Age: 59
End: 2024-03-14

## 2024-03-14 ENCOUNTER — NON-APPOINTMENT (OUTPATIENT)
Age: 59
End: 2024-03-14

## 2024-03-14 ENCOUNTER — APPOINTMENT (OUTPATIENT)
Dept: INTERNAL MEDICINE | Facility: CLINIC | Age: 59
End: 2024-03-14
Payer: OTHER MISCELLANEOUS

## 2024-03-14 VITALS
DIASTOLIC BLOOD PRESSURE: 80 MMHG | TEMPERATURE: 98.1 F | RESPIRATION RATE: 16 BRPM | BODY MASS INDEX: 30.24 KG/M2 | WEIGHT: 150 LBS | HEART RATE: 82 BPM | SYSTOLIC BLOOD PRESSURE: 125 MMHG | OXYGEN SATURATION: 97 % | HEIGHT: 59 IN

## 2024-03-14 DIAGNOSIS — Z01.818 ENCOUNTER FOR OTHER PREPROCEDURAL EXAMINATION: ICD-10-CM

## 2024-03-14 DIAGNOSIS — I10 ESSENTIAL (PRIMARY) HYPERTENSION: ICD-10-CM

## 2024-03-14 DIAGNOSIS — R00.2 PALPITATIONS: ICD-10-CM

## 2024-03-14 PROCEDURE — G0403: CPT

## 2024-03-14 PROCEDURE — G0463: CPT

## 2024-03-14 PROCEDURE — 99215 OFFICE O/P EST HI 40 MIN: CPT

## 2024-03-14 PROCEDURE — 36415 COLL VENOUS BLD VENIPUNCTURE: CPT

## 2024-03-14 RX ORDER — MILNACIPRAN HYDROCHLORIDE 100 MG/1
100 TABLET, FILM COATED ORAL
Refills: 0 | Status: ACTIVE | COMMUNITY

## 2024-03-14 RX ORDER — DULOXETINE HYDROCHLORIDE 60 MG/1
60 CAPSULE, DELAYED RELEASE PELLETS ORAL
Qty: 90 | Refills: 0 | Status: ACTIVE | COMMUNITY
Start: 2024-03-14

## 2024-03-14 RX ORDER — SULFASALAZINE 500 MG/1
500 TABLET ORAL
Refills: 0 | Status: ACTIVE | COMMUNITY

## 2024-03-14 RX ORDER — CARVEDILOL 6.25 MG/1
6.25 TABLET, FILM COATED ORAL TWICE DAILY
Qty: 30 | Refills: 0 | Status: ACTIVE | COMMUNITY
Start: 2024-03-14

## 2024-03-14 RX ORDER — CARIPRAZINE 1.5 MG/1
1.5 CAPSULE, GELATIN COATED ORAL
Refills: 0 | Status: ACTIVE | COMMUNITY

## 2024-03-14 RX ORDER — MECLIZINE HYDROCHLORIDE 12.5 MG/1
12.5 TABLET ORAL
Refills: 0 | Status: ACTIVE | COMMUNITY

## 2024-03-14 RX ORDER — PANTOPRAZOLE 40 MG/1
40 TABLET, DELAYED RELEASE ORAL
Refills: 0 | Status: ACTIVE | COMMUNITY

## 2024-03-14 RX ORDER — GABAPENTIN 800 MG/1
800 TABLET, COATED ORAL 3 TIMES DAILY
Qty: 90 | Refills: 0 | Status: ACTIVE | COMMUNITY
Start: 2024-03-14

## 2024-03-14 RX ORDER — PREDNISONE 10 MG/1
10 TABLET ORAL
Refills: 0 | Status: ACTIVE | COMMUNITY

## 2024-03-14 RX ORDER — CYCLOBENZAPRINE HYDROCHLORIDE 10 MG/1
10 TABLET, FILM COATED ORAL
Qty: 10 | Refills: 0 | Status: ACTIVE | COMMUNITY
Start: 2024-03-14

## 2024-03-14 RX ORDER — MIRTAZAPINE 30 MG/1
30 TABLET, FILM COATED ORAL
Refills: 0 | Status: ACTIVE | COMMUNITY

## 2024-03-14 RX ORDER — LEFLUNOMIDE 20 MG/1
20 TABLET, FILM COATED ORAL
Refills: 0 | Status: ACTIVE | COMMUNITY

## 2024-03-14 NOTE — HISTORY OF PRESENT ILLNESS
[Moderate (4-6 METs)] : Moderate (4-6 METs) [No Pertinent Cardiac History] : no history of aortic stenosis, atrial fibrillation, coronary artery disease, recent myocardial infarction, or implantable device/pacemaker [Asthma] : asthma [No Adverse Anesthesia Reaction] : no adverse anesthesia reaction in self or family member [FreeTextEntry1] : CYSTO LT RTG LT URS W LASER LITHO LT URETERAL STENT [FreeTextEntry2] : 03/19/2024 [FreeTextEntry4] : 59 yrs old F with pmx of HTN, fibromyalgia, rheumatoid arthritis, sarcoidosis, fatty liver, vertigo, asthma (not on medications), major depression  comes in for pre-op eval for CYSTO LT RTG LT URS W LASER LITHO LT URETERAL STENT Pt was recently admitted to [facility], and discharged on 03/01/2024, for sepsis ? pyelonephritis vs UTI, 5 days prior to that she passed out in her apartment, EMS found her BP to be in 60/50s.  Pt says she feels palpitations sometimes with limited activity- just moving around in the house.   Denies any chest pain, cough, fevers, abd pain, vomiting, diarrhea, rash, sob. Moderate functional capacity: can walk 2-3 blocks without any problems.  No problems with anesthesia in the past. Denies any blood thinners or herbal meds use. [FreeTextEntry6] : has on and off palpitations.

## 2024-03-14 NOTE — REVIEW OF SYSTEMS
[Back Pain] : back pain [Negative] : Heme/Lymph [Palpitations] : palpitations [Joint Pain] : joint pain

## 2024-03-14 NOTE — PHYSICAL EXAM
[No Acute Distress] : no acute distress [Normal Sclera/Conjunctiva] : normal sclera/conjunctiva [EOMI] : extraocular movements intact [PERRL] : pupils equal round and reactive to light [Normal Outer Ear/Nose] : the outer ears and nose were normal in appearance [No JVD] : no jugular venous distention [Normal Oropharynx] : the oropharynx was normal [No Lymphadenopathy] : no lymphadenopathy [Supple] : supple [Thyroid Normal, No Nodules] : the thyroid was normal and there were no nodules present [No Respiratory Distress] : no respiratory distress  [No Accessory Muscle Use] : no accessory muscle use [Clear to Auscultation] : lungs were clear to auscultation bilaterally [Normal Rate] : normal rate  [Regular Rhythm] : with a regular rhythm [Normal S1, S2] : normal S1 and S2 [No Murmur] : no murmur heard [No Carotid Bruits] : no carotid bruits [Pedal Pulses Present] : the pedal pulses are present [No Abdominal Bruit] : a ~M bruit was not heard ~T in the abdomen [No Varicosities] : no varicosities [No Palpable Aorta] : no palpable aorta [No Edema] : there was no peripheral edema [No Extremity Clubbing/Cyanosis] : no extremity clubbing/cyanosis [Non Tender] : non-tender [Soft] : abdomen soft [Non-distended] : non-distended [No Masses] : no abdominal mass palpated [No HSM] : no HSM [Normal Bowel Sounds] : normal bowel sounds [Normal Posterior Cervical Nodes] : no posterior cervical lymphadenopathy [No CVA Tenderness] : no CVA  tenderness [Normal Anterior Cervical Nodes] : no anterior cervical lymphadenopathy [No Spinal Tenderness] : no spinal tenderness [No Joint Swelling] : no joint swelling [Grossly Normal Strength/Tone] : grossly normal strength/tone [No Rash] : no rash [Coordination Grossly Intact] : coordination grossly intact [No Focal Deficits] : no focal deficits [Normal Gait] : normal gait [Normal Affect] : the affect was normal [Deep Tendon Reflexes (DTR)] : deep tendon reflexes were 2+ and symmetric [Normal Insight/Judgement] : insight and judgment were intact

## 2024-03-14 NOTE — ASSESSMENT
[Cardiology consultation] : Cardiology consultation [Patient Requires Further Testing] : Patient requires further testing [FreeTextEntry4] : 59 yrs old F with pmx of HTN, fibromyalgia, rheumatoid arthritis, sarcoidosis, fatty liver, vertigo, asthma (not on medications), major depression  comes in for pre-op eval for CYSTO LT RTG LT URS W LASER LITHO LT URETERAL STENT RCRI 0 points class I risk. Moderate functional status Pt with recent syncope, on and off palpitations, with abnormal EKG, needs cardiology eval.

## 2024-03-15 DIAGNOSIS — N39.0 URINARY TRACT INFECTION, SITE NOT SPECIFIED: ICD-10-CM

## 2024-03-15 DIAGNOSIS — A49.9 URINARY TRACT INFECTION, SITE NOT SPECIFIED: ICD-10-CM

## 2024-03-15 LAB
ALBUMIN SERPL ELPH-MCNC: 4.4 G/DL
ALP BLD-CCNC: 119 U/L
ALT SERPL-CCNC: 18 U/L
ANION GAP SERPL CALC-SCNC: 15 MMOL/L
APPEARANCE: CLEAR
APTT BLD: 31.8 SEC
AST SERPL-CCNC: 18 U/L
BACTERIA UR CULT: ABNORMAL
BILIRUB SERPL-MCNC: 0.3 MG/DL
BILIRUBIN URINE: NEGATIVE
BLOOD URINE: ABNORMAL
BUN SERPL-MCNC: 10 MG/DL
CALCIUM SERPL-MCNC: 9.7 MG/DL
CHLORIDE SERPL-SCNC: 104 MMOL/L
CO2 SERPL-SCNC: 21 MMOL/L
COLOR: YELLOW
CREAT SERPL-MCNC: 0.59 MG/DL
EGFR: 103 ML/MIN/1.73M2
ESTIMATED AVERAGE GLUCOSE: 114 MG/DL
GLUCOSE QUALITATIVE U: NEGATIVE MG/DL
GLUCOSE SERPL-MCNC: 103 MG/DL
HBA1C MFR BLD HPLC: 5.6 %
HCT VFR BLD CALC: 38.9 %
HGB BLD-MCNC: 11.9 G/DL
INR PPP: 0.87 RATIO
KETONES URINE: NEGATIVE MG/DL
LEUKOCYTE ESTERASE URINE: ABNORMAL
MCHC RBC-ENTMCNC: 30.4 PG
MCHC RBC-ENTMCNC: 30.6 GM/DL
MCV RBC AUTO: 99.5 FL
NITRITE URINE: NEGATIVE
PH URINE: 7
PLATELET # BLD AUTO: 462 K/UL
POTASSIUM SERPL-SCNC: 4 MMOL/L
PROT SERPL-MCNC: 7.2 G/DL
PROTEIN URINE: NORMAL MG/DL
PT BLD: 9.9 SEC
RBC # BLD: 3.91 M/UL
RBC # FLD: 13.6 %
SODIUM SERPL-SCNC: 141 MMOL/L
SPECIFIC GRAVITY URINE: 1.01
UROBILINOGEN URINE: 0.2 MG/DL
WBC # FLD AUTO: 8.22 K/UL

## 2024-03-15 RX ORDER — NITROFURANTOIN MACROCRYSTALS 100 MG/1
100 CAPSULE ORAL
Qty: 10 | Refills: 0 | Status: ACTIVE | COMMUNITY
Start: 2024-03-15 | End: 1900-01-01

## 2024-03-18 ENCOUNTER — NON-APPOINTMENT (OUTPATIENT)
Age: 59
End: 2024-03-18

## 2024-03-25 ENCOUNTER — INPATIENT (INPATIENT)
Facility: HOSPITAL | Age: 59
LOS: 0 days | Discharge: HOME | DRG: 694 | End: 2024-03-26
Attending: UROLOGY | Admitting: UROLOGY
Payer: COMMERCIAL

## 2024-03-25 ENCOUNTER — TRANSCRIPTION ENCOUNTER (OUTPATIENT)
Age: 59
End: 2024-03-25

## 2024-03-25 VITALS
HEIGHT: 59 IN | SYSTOLIC BLOOD PRESSURE: 150 MMHG | WEIGHT: 154.1 LBS | OXYGEN SATURATION: 97 % | TEMPERATURE: 98 F | RESPIRATION RATE: 17 BRPM | DIASTOLIC BLOOD PRESSURE: 90 MMHG | HEART RATE: 85 BPM

## 2024-03-25 DIAGNOSIS — I67.1 CEREBRAL ANEURYSM, NONRUPTURED: Chronic | ICD-10-CM

## 2024-03-25 DIAGNOSIS — Z90.710 ACQUIRED ABSENCE OF BOTH CERVIX AND UTERUS: Chronic | ICD-10-CM

## 2024-03-25 DIAGNOSIS — Z98.890 OTHER SPECIFIED POSTPROCEDURAL STATES: Chronic | ICD-10-CM

## 2024-03-25 DIAGNOSIS — N20.0 CALCULUS OF KIDNEY: ICD-10-CM

## 2024-03-25 DIAGNOSIS — M54.5 LOW BACK PAIN: Chronic | ICD-10-CM

## 2024-03-25 LAB
ALBUMIN SERPL ELPH-MCNC: 3.7 G/DL — SIGNIFICANT CHANGE UP (ref 3.5–5)
ALP SERPL-CCNC: 107 U/L — SIGNIFICANT CHANGE UP (ref 40–120)
ALT FLD-CCNC: 22 U/L DA — SIGNIFICANT CHANGE UP (ref 10–60)
ANION GAP SERPL CALC-SCNC: 9 MMOL/L — SIGNIFICANT CHANGE UP (ref 5–17)
APPEARANCE UR: CLEAR — SIGNIFICANT CHANGE UP
AST SERPL-CCNC: 13 U/L — SIGNIFICANT CHANGE UP (ref 10–40)
BACTERIA # UR AUTO: ABNORMAL /HPF
BASE EXCESS BLDV CALC-SCNC: -1.2 MMOL/L — SIGNIFICANT CHANGE UP
BASOPHILS # BLD AUTO: 0.02 K/UL — SIGNIFICANT CHANGE UP (ref 0–0.2)
BASOPHILS NFR BLD AUTO: 0.2 % — SIGNIFICANT CHANGE UP (ref 0–2)
BILIRUB DIRECT SERPL-MCNC: <0.1 MG/DL — SIGNIFICANT CHANGE UP (ref 0–0.3)
BILIRUB INDIRECT FLD-MCNC: >0.1 MG/DL — LOW (ref 0.2–1)
BILIRUB SERPL-MCNC: 0.2 MG/DL — SIGNIFICANT CHANGE UP (ref 0.2–1.2)
BILIRUB UR-MCNC: NEGATIVE — SIGNIFICANT CHANGE UP
BUN SERPL-MCNC: 15 MG/DL — SIGNIFICANT CHANGE UP (ref 7–18)
CALCIUM SERPL-MCNC: 9.4 MG/DL — SIGNIFICANT CHANGE UP (ref 8.4–10.5)
CHLORIDE SERPL-SCNC: 109 MMOL/L — HIGH (ref 96–108)
CO2 SERPL-SCNC: 23 MMOL/L — SIGNIFICANT CHANGE UP (ref 22–31)
COLOR SPEC: YELLOW — SIGNIFICANT CHANGE UP
CREAT SERPL-MCNC: 0.78 MG/DL — SIGNIFICANT CHANGE UP (ref 0.5–1.3)
DIFF PNL FLD: ABNORMAL
EGFR: 87 ML/MIN/1.73M2 — SIGNIFICANT CHANGE UP
EOSINOPHIL # BLD AUTO: 0.14 K/UL — SIGNIFICANT CHANGE UP (ref 0–0.5)
EOSINOPHIL NFR BLD AUTO: 1.4 % — SIGNIFICANT CHANGE UP (ref 0–6)
EPI CELLS # UR: PRESENT
GLUCOSE SERPL-MCNC: 109 MG/DL — HIGH (ref 70–99)
GLUCOSE UR QL: NEGATIVE MG/DL — SIGNIFICANT CHANGE UP
HCO3 BLDV-SCNC: 24 MMOL/L — SIGNIFICANT CHANGE UP (ref 22–29)
HCT VFR BLD CALC: 37.1 % — SIGNIFICANT CHANGE UP (ref 34.5–45)
HGB BLD-MCNC: 11.9 G/DL — SIGNIFICANT CHANGE UP (ref 11.5–15.5)
IMM GRANULOCYTES NFR BLD AUTO: 0.3 % — SIGNIFICANT CHANGE UP (ref 0–0.9)
KETONES UR-MCNC: NEGATIVE MG/DL — SIGNIFICANT CHANGE UP
LEUKOCYTE ESTERASE UR-ACNC: ABNORMAL
LIDOCAIN IGE QN: 52 U/L — SIGNIFICANT CHANGE UP (ref 13–75)
LYMPHOCYTES # BLD AUTO: 4.17 K/UL — HIGH (ref 1–3.3)
LYMPHOCYTES # BLD AUTO: 41 % — SIGNIFICANT CHANGE UP (ref 13–44)
MCHC RBC-ENTMCNC: 30.7 PG — SIGNIFICANT CHANGE UP (ref 27–34)
MCHC RBC-ENTMCNC: 32.1 GM/DL — SIGNIFICANT CHANGE UP (ref 32–36)
MCV RBC AUTO: 95.9 FL — SIGNIFICANT CHANGE UP (ref 80–100)
MONOCYTES # BLD AUTO: 0.71 K/UL — SIGNIFICANT CHANGE UP (ref 0–0.9)
MONOCYTES NFR BLD AUTO: 7 % — SIGNIFICANT CHANGE UP (ref 2–14)
NEUTROPHILS # BLD AUTO: 5.1 K/UL — SIGNIFICANT CHANGE UP (ref 1.8–7.4)
NEUTROPHILS NFR BLD AUTO: 50.1 % — SIGNIFICANT CHANGE UP (ref 43–77)
NITRITE UR-MCNC: NEGATIVE — SIGNIFICANT CHANGE UP
NRBC # BLD: 0 /100 WBCS — SIGNIFICANT CHANGE UP (ref 0–0)
PCO2 BLDV: 39 MMHG — SIGNIFICANT CHANGE UP (ref 39–42)
PH BLDV: 7.39 — SIGNIFICANT CHANGE UP (ref 7.32–7.43)
PH UR: 6 — SIGNIFICANT CHANGE UP (ref 5–8)
PLATELET # BLD AUTO: 327 K/UL — SIGNIFICANT CHANGE UP (ref 150–400)
PO2 BLDV: 66 MMHG — SIGNIFICANT CHANGE UP
POTASSIUM SERPL-MCNC: 3.8 MMOL/L — SIGNIFICANT CHANGE UP (ref 3.5–5.3)
POTASSIUM SERPL-SCNC: 3.8 MMOL/L — SIGNIFICANT CHANGE UP (ref 3.5–5.3)
PROT SERPL-MCNC: 7.9 G/DL — SIGNIFICANT CHANGE UP (ref 6–8.3)
PROT UR-MCNC: ABNORMAL MG/DL
RBC # BLD: 3.87 M/UL — SIGNIFICANT CHANGE UP (ref 3.8–5.2)
RBC # FLD: 13.2 % — SIGNIFICANT CHANGE UP (ref 10.3–14.5)
RBC CASTS # UR COMP ASSIST: 25 /HPF — HIGH (ref 0–4)
SAO2 % BLDV: 92.7 % — SIGNIFICANT CHANGE UP
SODIUM SERPL-SCNC: 141 MMOL/L — SIGNIFICANT CHANGE UP (ref 135–145)
SP GR SPEC: 1.01 — SIGNIFICANT CHANGE UP (ref 1–1.03)
UROBILINOGEN FLD QL: 0.2 MG/DL — SIGNIFICANT CHANGE UP (ref 0.2–1)
WBC # BLD: 10.17 K/UL — SIGNIFICANT CHANGE UP (ref 3.8–10.5)
WBC # FLD AUTO: 10.17 K/UL — SIGNIFICANT CHANGE UP (ref 3.8–10.5)
WBC UR QL: 12 /HPF — HIGH (ref 0–5)

## 2024-03-25 PROCEDURE — 99285 EMERGENCY DEPT VISIT HI MDM: CPT

## 2024-03-25 PROCEDURE — 99222 1ST HOSP IP/OBS MODERATE 55: CPT

## 2024-03-25 RX ORDER — LINEZOLID 600 MG/300ML
INJECTION, SOLUTION INTRAVENOUS
Refills: 0 | Status: DISCONTINUED | OUTPATIENT
Start: 2024-03-26 | End: 2024-03-26

## 2024-03-25 RX ORDER — LINEZOLID 600 MG/300ML
600 INJECTION, SOLUTION INTRAVENOUS ONCE
Refills: 0 | Status: COMPLETED | OUTPATIENT
Start: 2024-03-25 | End: 2024-03-26

## 2024-03-25 RX ORDER — LINEZOLID 600 MG/300ML
600 INJECTION, SOLUTION INTRAVENOUS EVERY 12 HOURS
Refills: 0 | Status: DISCONTINUED | OUTPATIENT
Start: 2024-03-26 | End: 2024-03-26

## 2024-03-25 RX ORDER — SODIUM CHLORIDE 9 MG/ML
1000 INJECTION, SOLUTION INTRAVENOUS
Refills: 0 | Status: DISCONTINUED | OUTPATIENT
Start: 2024-03-25 | End: 2024-03-26

## 2024-03-25 NOTE — ED PROVIDER NOTE - OBJECTIVE STATEMENT
59 female sent by urologist for admission for stent placement.  Patient has a large obstructing kidney stone.    GENERAL APPEARANCE:  AxOx4, generally well-appearing, no acute distress.  HEENT:  NC, AT. MMM. EOMI, clear conjunctiva, oropharynx clear.  NECK:  Supple without lymphadenopathy.  No stiffness or restricted ROM.  HEART:  Normal rate and regular rhythm, normal S1/S1, no m/r/g  LUNGS:  CTAB, moving air well. No crackles or wheezes are heard.  ABDOMEN:  Soft, nontender, nondistended with good bowel sounds heard.  BACK: No CVAT, no obvious deformity.  EXTREMITIES:  Without cyanosis, clubbing or edema.  NEUROLOGICAL:  Grossly nonfocal. Alert and oriented, moving all 4 extremities. CN II-XII grossly intact. Observed to ambulate with normal gait.  Skin:  Warm and dry without any rash.

## 2024-03-25 NOTE — H&P ADULT - ASSESSMENT
59F pre-op cysto left ureteroscopy with laser lithotripsy ureteral stent exchange and retrograde pyelogram on 3/26/2024      Plan:  -NPO/IVF  -IV abx  -pain control PRN  -discussed with Dr. Kirkland

## 2024-03-25 NOTE — H&P ADULT - NS ATTEND AMEND GEN_ALL_CORE FT
Pt seen and examined. Agree with note as written above. Preadmitted for VRE urine culture.    - Plan as above  - OR tomorrow for URS/HLL

## 2024-03-25 NOTE — ED ADULT NURSE NOTE - OBJECTIVE STATEMENT
Pt is a 58 y/o  female sent by urologist for admission for stent placement.  Patient has a large obstructing kidney stone.

## 2024-03-25 NOTE — ED PROVIDER NOTE - CLINICAL SUMMARY MEDICAL DECISION MAKING FREE TEXT BOX
After introducing myself to the patient and/or family I have performed a medical history, review of systems, and physical examination. I have formulated a differential diagnosis and plan of care for this visit and discussed this with the patient and/or family. I have also addressed the risks and benefits of diagnostic and treatment modalities planned for this visit.  Vital Signs: Reviewed the patient's vital signs  Nursing Notes: Reviewed and utilized the nursing notes  Old Medical Records: The patient's available past medical records and past encounters were reviewed  Laboratory Studies: Ordered and independently interpreted laboratory test, see above  Imaging Studies: Imaging studies ordered. Radiologist interpretation reviewed. I have independently reviewed imaging.      Patient sent by urologist for cystoureteroscopy, with lithotripsy using laser and ureteral stent insertion.  Discussed case with Urology PA who accepted patient for admission under Dr. Kirkland.

## 2024-03-25 NOTE — H&P ADULT - HISTORY OF PRESENT ILLNESS
59year old female with pmhx of hallux valgus of bilateral feet, asthma, brain aneurysm - coiling sx 2011, depression, fibroids, fibromyalgia, GERD, hyperlipidemia, hypertension, migraines, SHELIA - no CPAP or mouthpiece, rheumatoid arthritis, sarcoidosis, leiomyoma of uterus, seasonal allergies, vertigo, lumbar spine disease - reports multiple lumbar surgeries presents with c/o left kidney infection secondary to calculi associated with hematuria. Patient is here today for IV abx treatment pre procedure for cystoscopy, left ureteroscopy with laser lithotripsy ureteral stent exchange and retrograde pyelogram on 3/26/2024. The patient was seen and examined at bedside in [facility] ED. The patient endorses left sided flank  pain, otherwise feels well. Denies N/V/F/C. Denies urinary symptoms.

## 2024-03-25 NOTE — H&P ADULT - NSHPPHYSICALEXAM_GEN_ALL_CORE
T(C): 36.8 (03-25-24 @ 21:03), Max: 36.8 (03-25-24 @ 21:03)  HR: 85 (03-25-24 @ 21:03) (85 - 85)  BP: 150/90 (03-25-24 @ 21:03) (150/90 - 150/90)  RR: 17 (03-25-24 @ 21:03) (17 - 17)  SpO2: 97% (03-25-24 @ 21:03) (97% - 97%)    CONSTITUTIONAL: Well groomed, no apparent distress  RESP: No respiratory distress, no use of accessory muscles  GI: Soft, NT, ND, no rebound, no guarding; no palpable masses; no hepatosplenomegaly; no hernia palpated  BACK: ++Left CVA tenderness  : voiding without issue  PSYCH: Appropriate insight/judgment; A+O x 3, mood and affect appropriate, recent/remote memory intact

## 2024-03-26 ENCOUNTER — TRANSCRIPTION ENCOUNTER (OUTPATIENT)
Age: 59
End: 2024-03-26

## 2024-03-26 ENCOUNTER — RESULT REVIEW (OUTPATIENT)
Age: 59
End: 2024-03-26

## 2024-03-26 ENCOUNTER — APPOINTMENT (OUTPATIENT)
Dept: UROLOGY | Facility: HOSPITAL | Age: 59
End: 2024-03-26

## 2024-03-26 VITALS
DIASTOLIC BLOOD PRESSURE: 86 MMHG | TEMPERATURE: 98 F | RESPIRATION RATE: 16 BRPM | SYSTOLIC BLOOD PRESSURE: 149 MMHG | OXYGEN SATURATION: 95 % | HEART RATE: 99 BPM

## 2024-03-26 LAB
ANION GAP SERPL CALC-SCNC: 9 MMOL/L — SIGNIFICANT CHANGE UP (ref 5–17)
APTT BLD: 36.4 SEC — HIGH (ref 24.5–35.6)
BUN SERPL-MCNC: 10 MG/DL — SIGNIFICANT CHANGE UP (ref 7–18)
CALCIUM SERPL-MCNC: 9 MG/DL — SIGNIFICANT CHANGE UP (ref 8.4–10.5)
CHLORIDE SERPL-SCNC: 108 MMOL/L — SIGNIFICANT CHANGE UP (ref 96–108)
CO2 SERPL-SCNC: 24 MMOL/L — SIGNIFICANT CHANGE UP (ref 22–31)
CREAT SERPL-MCNC: 0.74 MG/DL — SIGNIFICANT CHANGE UP (ref 0.5–1.3)
EGFR: 93 ML/MIN/1.73M2 — SIGNIFICANT CHANGE UP
GLUCOSE SERPL-MCNC: 114 MG/DL — HIGH (ref 70–99)
HCT VFR BLD CALC: 36.2 % — SIGNIFICANT CHANGE UP (ref 34.5–45)
HGB BLD-MCNC: 11.6 G/DL — SIGNIFICANT CHANGE UP (ref 11.5–15.5)
INR BLD: 0.99 RATIO — SIGNIFICANT CHANGE UP (ref 0.85–1.18)
MCHC RBC-ENTMCNC: 30.8 PG — SIGNIFICANT CHANGE UP (ref 27–34)
MCHC RBC-ENTMCNC: 32 GM/DL — SIGNIFICANT CHANGE UP (ref 32–36)
MCV RBC AUTO: 96 FL — SIGNIFICANT CHANGE UP (ref 80–100)
NRBC # BLD: 0 /100 WBCS — SIGNIFICANT CHANGE UP (ref 0–0)
PLATELET # BLD AUTO: 301 K/UL — SIGNIFICANT CHANGE UP (ref 150–400)
POTASSIUM SERPL-MCNC: 3.9 MMOL/L — SIGNIFICANT CHANGE UP (ref 3.5–5.3)
POTASSIUM SERPL-SCNC: 3.9 MMOL/L — SIGNIFICANT CHANGE UP (ref 3.5–5.3)
PROTHROM AB SERPL-ACNC: 11.3 SEC — SIGNIFICANT CHANGE UP (ref 9.5–13)
RBC # BLD: 3.77 M/UL — LOW (ref 3.8–5.2)
RBC # FLD: 13.2 % — SIGNIFICANT CHANGE UP (ref 10.3–14.5)
SODIUM SERPL-SCNC: 141 MMOL/L — SIGNIFICANT CHANGE UP (ref 135–145)
WBC # BLD: 9.01 K/UL — SIGNIFICANT CHANGE UP (ref 3.8–10.5)
WBC # FLD AUTO: 9.01 K/UL — SIGNIFICANT CHANGE UP (ref 3.8–10.5)

## 2024-03-26 PROCEDURE — 88300 SURGICAL PATH GROSS: CPT

## 2024-03-26 PROCEDURE — 86850 RBC ANTIBODY SCREEN: CPT

## 2024-03-26 PROCEDURE — C1769: CPT

## 2024-03-26 PROCEDURE — 76000 FLUOROSCOPY <1 HR PHYS/QHP: CPT

## 2024-03-26 PROCEDURE — C1758: CPT

## 2024-03-26 PROCEDURE — 81001 URINALYSIS AUTO W/SCOPE: CPT

## 2024-03-26 PROCEDURE — 86901 BLOOD TYPING SEROLOGIC RH(D): CPT

## 2024-03-26 PROCEDURE — 82365 CALCULUS SPECTROSCOPY: CPT

## 2024-03-26 PROCEDURE — 80076 HEPATIC FUNCTION PANEL: CPT

## 2024-03-26 PROCEDURE — ZZZZZ: CPT

## 2024-03-26 PROCEDURE — 85610 PROTHROMBIN TIME: CPT

## 2024-03-26 PROCEDURE — 80048 BASIC METABOLIC PNL TOTAL CA: CPT

## 2024-03-26 PROCEDURE — 85025 COMPLETE CBC W/AUTO DIFF WBC: CPT

## 2024-03-26 PROCEDURE — 85027 COMPLETE CBC AUTOMATED: CPT

## 2024-03-26 PROCEDURE — 52352 CYSTOURETERO W/STONE REMOVE: CPT | Mod: LT

## 2024-03-26 PROCEDURE — 86900 BLOOD TYPING SEROLOGIC ABO: CPT

## 2024-03-26 PROCEDURE — 83690 ASSAY OF LIPASE: CPT

## 2024-03-26 PROCEDURE — C1889: CPT

## 2024-03-26 PROCEDURE — 99285 EMERGENCY DEPT VISIT HI MDM: CPT

## 2024-03-26 PROCEDURE — 85730 THROMBOPLASTIN TIME PARTIAL: CPT

## 2024-03-26 PROCEDURE — 36415 COLL VENOUS BLD VENIPUNCTURE: CPT

## 2024-03-26 PROCEDURE — 82803 BLOOD GASES ANY COMBINATION: CPT

## 2024-03-26 PROCEDURE — 88300 SURGICAL PATH GROSS: CPT | Mod: 26

## 2024-03-26 RX ORDER — ACETAMINOPHEN 500 MG
1000 TABLET ORAL ONCE
Refills: 0 | Status: COMPLETED | OUTPATIENT
Start: 2024-03-26 | End: 2024-03-26

## 2024-03-26 RX ORDER — TAMSULOSIN HYDROCHLORIDE 0.4 MG/1
0.4 CAPSULE ORAL AT BEDTIME
Refills: 0 | Status: DISCONTINUED | OUTPATIENT
Start: 2024-03-26 | End: 2024-03-26

## 2024-03-26 RX ORDER — HYDROMORPHONE HYDROCHLORIDE 2 MG/ML
0.25 INJECTION INTRAMUSCULAR; INTRAVENOUS; SUBCUTANEOUS
Refills: 0 | Status: DISCONTINUED | OUTPATIENT
Start: 2024-03-26 | End: 2024-03-26

## 2024-03-26 RX ORDER — KETOROLAC TROMETHAMINE 30 MG/ML
15 SYRINGE (ML) INJECTION EVERY 6 HOURS
Refills: 0 | Status: DISCONTINUED | OUTPATIENT
Start: 2024-03-26 | End: 2024-03-26

## 2024-03-26 RX ORDER — PANTOPRAZOLE SODIUM 20 MG/1
40 TABLET, DELAYED RELEASE ORAL
Refills: 0 | Status: DISCONTINUED | OUTPATIENT
Start: 2024-03-26 | End: 2024-03-26

## 2024-03-26 RX ORDER — HEPARIN SODIUM 5000 [USP'U]/ML
5000 INJECTION INTRAVENOUS; SUBCUTANEOUS EVERY 8 HOURS
Refills: 0 | Status: DISCONTINUED | OUTPATIENT
Start: 2024-03-26 | End: 2024-03-26

## 2024-03-26 RX ORDER — ACETAMINOPHEN 500 MG
650 TABLET ORAL EVERY 6 HOURS
Refills: 0 | Status: DISCONTINUED | OUTPATIENT
Start: 2024-03-26 | End: 2024-03-26

## 2024-03-26 RX ORDER — ONDANSETRON 8 MG/1
4 TABLET, FILM COATED ORAL ONCE
Refills: 0 | Status: COMPLETED | OUTPATIENT
Start: 2024-03-26 | End: 2024-03-26

## 2024-03-26 RX ORDER — CARVEDILOL PHOSPHATE 80 MG/1
6.25 CAPSULE, EXTENDED RELEASE ORAL EVERY 12 HOURS
Refills: 0 | Status: DISCONTINUED | OUTPATIENT
Start: 2024-03-26 | End: 2024-03-26

## 2024-03-26 RX ADMIN — HYDROMORPHONE HYDROCHLORIDE 0.25 MILLIGRAM(S): 2 INJECTION INTRAMUSCULAR; INTRAVENOUS; SUBCUTANEOUS at 15:40

## 2024-03-26 RX ADMIN — ONDANSETRON 4 MILLIGRAM(S): 8 TABLET, FILM COATED ORAL at 08:34

## 2024-03-26 RX ADMIN — SODIUM CHLORIDE 110 MILLILITER(S): 9 INJECTION, SOLUTION INTRAVENOUS at 02:33

## 2024-03-26 RX ADMIN — LINEZOLID 300 MILLIGRAM(S): 600 INJECTION, SOLUTION INTRAVENOUS at 03:37

## 2024-03-26 RX ADMIN — CARVEDILOL PHOSPHATE 6.25 MILLIGRAM(S): 80 CAPSULE, EXTENDED RELEASE ORAL at 17:12

## 2024-03-26 RX ADMIN — LINEZOLID 300 MILLIGRAM(S): 600 INJECTION, SOLUTION INTRAVENOUS at 10:01

## 2024-03-26 RX ADMIN — Medication 15 MILLIGRAM(S): at 17:57

## 2024-03-26 RX ADMIN — SODIUM CHLORIDE 110 MILLILITER(S): 9 INJECTION, SOLUTION INTRAVENOUS at 08:34

## 2024-03-26 RX ADMIN — HYDROMORPHONE HYDROCHLORIDE 0.25 MILLIGRAM(S): 2 INJECTION INTRAMUSCULAR; INTRAVENOUS; SUBCUTANEOUS at 15:55

## 2024-03-26 NOTE — PROGRESS NOTE ADULT - SUBJECTIVE AND OBJECTIVE BOX
Subjective  endorsed mild nausea otherwise feels well    Objective    Vital signs  T(F): , Max: 98.2 (03-25-24 @ 21:03)  HR: 73 (03-26-24 @ 06:33)  BP: 134/83 (03-26-24 @ 06:33)  SpO2: 95% (03-26-24 @ 06:33)  Wt(kg): --    Output       Gen: NAD  Abd: soft, nontender, nondistended  : voiding    Labs      03-25 @ 22:45    WBC 10.17 / Hct 37.1  / SCr 0.78           Urine Cx: ?  Blood Cx: ?    Imaging

## 2024-03-26 NOTE — PROGRESS NOTE ADULT - SUBJECTIVE AND OBJECTIVE BOX
S: Postop cysto/stent stone extraction of left ureteral stent. Denies chest pain, shortness of breath, or urinary issues. Voided postop.     O:  Vital Signs Last 24 Hrs  T(C): 36.8 (26 Mar 2024 16:36), Max: 36.9 (26 Mar 2024 14:34)  T(F): 98.2 (26 Mar 2024 16:36), Max: 98.4 (26 Mar 2024 14:34)  HR: 99 (26 Mar 2024 16:36) (73 - 99)  BP: 149/86 (26 Mar 2024 16:36) (122/74 - 150/90)  BP(mean): 91 (26 Mar 2024 15:19) (81 - 106)  RR: 16 (26 Mar 2024 16:36) (12 - 21)  SpO2: 95% (26 Mar 2024 16:36) (94% - 99%)    Parameters below as of 26 Mar 2024 16:36  Patient On (Oxygen Delivery Method): room air    Exam:  General: awake, alert, sitting upright and eating  Resp: nonlabored  Abdomen: soft, NT     S: Postop cysto/stent stone extraction of left ureteral stone. Denies chest pain, shortness of breath, or urinary issues. Voided postop.     O:  Vital Signs Last 24 Hrs  T(C): 36.8 (26 Mar 2024 16:36), Max: 36.9 (26 Mar 2024 14:34)  T(F): 98.2 (26 Mar 2024 16:36), Max: 98.4 (26 Mar 2024 14:34)  HR: 99 (26 Mar 2024 16:36) (73 - 99)  BP: 149/86 (26 Mar 2024 16:36) (122/74 - 150/90)  BP(mean): 91 (26 Mar 2024 15:19) (81 - 106)  RR: 16 (26 Mar 2024 16:36) (12 - 21)  SpO2: 95% (26 Mar 2024 16:36) (94% - 99%)    Parameters below as of 26 Mar 2024 16:36  Patient On (Oxygen Delivery Method): room air    Exam:  General: awake, alert, sitting upright and eating  Resp: nonlabored  Abdomen: soft, NT

## 2024-03-26 NOTE — DISCHARGE NOTE NURSING/CASE MANAGEMENT/SOCIAL WORK - PATIENT PORTAL LINK FT
You can access the FollowMyHealth Patient Portal offered by [facility] by registering at the following website: [email or web address]. By joining [employer]’s FollowMyHealth portal, you will also be able to view your health information using other applications (apps) compatible with our system.

## 2024-03-26 NOTE — DISCHARGE NOTE PROVIDER - HOSPITAL COURSE
59F with pmhx of hallux valgus of bilateral feet, asthma, brain aneurysm - coiling sx 2011, depression, fibroids, fibromyalgia, GERD, hyperlipidemia, hypertension, migraines, SHELIA - no CPAP or mouthpiece, rheumatoid arthritis, sarcoidosis, leiomyoma of uterus, seasonal allergies, vertigo, lumbar spine disease - reports multiple lumbar surgeries presents with c/o left kidney infection secondary to calculi associated with hematuria. Patient presented to the hospital for IV abx treatment pre procedure for cystoscopy, left ureteroscopy with laser lithotripsy ureteral stent exchange and retrograde pyelogram on 3/26/2024. Pt underwent the procedure and tolerated it well. Was stable for discharge with outpatient f/u and antibiotics. 59F with pmhx of hallux valgus of bilateral feet, asthma, brain aneurysm - coiling sx 2011, depression, fibroids, fibromyalgia, GERD, hyperlipidemia, hypertension, migraines, SHELIA - no CPAP or mouthpiece, rheumatoid arthritis, sarcoidosis, leiomyoma of uterus, seasonal allergies, vertigo, lumbar spine disease - reports multiple lumbar surgeries presents with c/o left kidney infection secondary to calculi associated with hematuria. Patient presented to the hospital for IV abx treatment pre procedure for cystoscopy, left ureteral stent removal, left ureteroscopy with laser lithotripsy and retrograde pyelogram on 3/26/2024. Pt underwent the procedure and tolerated it well. Was stable for discharge with outpatient f/u and antibiotics.

## 2024-03-26 NOTE — PATIENT PROFILE ADULT - FALL HARM RISK - HARM RISK INTERVENTIONS
Assistance with ambulation/Communicate Risk of Fall with Harm to all staff/Monitor gait and stability/Reinforce activity limits and safety measures with patient and family/Review medications for side effects contributing to fall risk/Sit up slowly, dangle for a short time, stand at bedside before walking/Tailored Fall Risk Interventions/Use of alarms - bed, chair and/or voice tab/Visual Cue: Yellow wristband and red socks/Bed in lowest position, wheels locked, appropriate side rails in place/Call bell, personal items and telephone in reach/Instruct patient to call for assistance before getting out of bed or chair/Non-slip footwear when patient is out of bed/[place] to call system/Physically safe environment - no spills, clutter or unnecessary equipment/Purposeful Proactive Rounding/Room/bathroom lighting operational, light cord in reach

## 2024-03-26 NOTE — DISCHARGE NOTE PROVIDER - NSDCCPCAREPLAN_GEN_ALL_CORE_FT
PRINCIPAL DISCHARGE DIAGNOSIS  Diagnosis: Left ureteral stone  Assessment and Plan of Treatment: Please take your antibiotics as directed.  Return to the ED for any worsening abdominal pain, nausea, vomiting, dysuria, fever or chills.

## 2024-03-26 NOTE — PROGRESS NOTE ADULT - ASSESSMENT
s/p cysto left ureteroscopy stone extraction  stable for discharge with appropriate f/u in the office with Dr. Kirkland s/p cysto left ureteroscopy stone extraction and removal of prior left ureteral stent  stable for discharge with appropriate abx and f/u in the office with Dr. Kirkland

## 2024-03-26 NOTE — DISCHARGE NOTE NURSING/CASE MANAGEMENT/SOCIAL WORK - NSDCPEFALRISK_GEN_ALL_CORE
For information on Fall & Injury Prevention, visit: [email or web address] OR  [email or web address] OR  https://www.cdc.gov/steadi/patient.html

## 2024-03-26 NOTE — PATIENT PROFILE ADULT - HAVE YOU EXPERIENCED VIOLENCE OR A TRAUMATIC EVENT?
Plan: Lumiere eye cream nightly as tolerated\\nElta MD 46 on the full face for daily use\\Tracy prolonged activity Blue Lizard for the full face\\nTretinoin compounded 0.025% nightly as tolerated
Detail Level: Zone
no

## 2024-03-26 NOTE — BRIEF OPERATIVE NOTE - NSICDXBRIEFPROCEDURE_GEN_ALL_CORE_FT
PROCEDURES:  Cystoscopy with left ureteroscopy, retrograde pyelography, extraction of urinary calculus and insertion of ureteral stent 26-Mar-2024 15:03:28  Mariposa Smith   PROCEDURES:  Cystoscopy with left ureteroscopy with extraction of calculus using stone retrieval basket 26-Mar-2024 17:13:54  Mariposa Smith

## 2024-03-26 NOTE — ED ADULT NURSE REASSESSMENT NOTE - NS ED NURSE REASSESS COMMENT FT1
Call placed to [facility] Nurse Station per  Concha RN not ready will call back for report. charge nurse Soha navarro.

## 2024-03-26 NOTE — DISCHARGE NOTE PROVIDER - NSDCCPTREATMENT_GEN_ALL_CORE_FT
PRINCIPAL PROCEDURE  Procedure: Cystoscopy with left ureteroscopy, retrograde pyelography, extraction of urinary calculus and insertion of ureteral stent  Findings and Treatment:

## 2024-03-26 NOTE — DISCHARGE NOTE PROVIDER - NSDCMRMEDTOKEN_GEN_ALL_CORE_FT
Bactrim  mg-160 mg oral tablet: 1 tab(s) orally 2 times a day  carvedilol 6.25 mg oral tablet: 1 tab(s) orally 2 times a day  cyclobenzaprine 10 mg oral tablet: 1 tab(s) orally once a day as needed for  muscle spasm  diclofenac potassium 50 mg oral tablet: 1 tab(s) orally once a day  DULoxetine 60 mg oral delayed release capsule: 1 cap(s) orally once a day  gabapentin 800 mg oral tablet: 1 tab(s) orally 3 times a day  meclizine 12.5 mg oral tablet: 1 tab(s) orally 2 times a day  mirtazapine 30 mg oral tablet: 1 tab(s) orally once a day (at bedtime)  pantoprazole 40 mg oral delayed release tablet: 1 tab(s) orally once a day  Savella 100 mg oral tablet: 1 tab(s) orally 2 times a day  Sulfazine 500 mg oral tablet: 2 tab(s) orally 3 times a day  SUMAtriptan 100 mg oral tablet: 1 tab(s) orally once, As Needed  tamsulosin 0.4 mg oral capsule: 1 cap(s) orally once a day (at bedtime)  Vraylar 1.5 mg oral capsule: 1 cap(s) orally once a day

## 2024-03-26 NOTE — PROGRESS NOTE ADULT - ASSESSMENT
59F pre-op cysto left ureteroscopy with laser lithotripsy ureteral stent exchange and retrograde pyelogram on 3/26/2024      Plan:  -NPO/IVF  -IV abx  -pain control PRN  - consented  - prn zofran   -discussed with Dr. Kirkland

## 2024-03-26 NOTE — DISCHARGE NOTE NURSING/CASE MANAGEMENT/SOCIAL WORK - NSDCVIVACCINE_GEN_ALL_CORE_FT
influenza, injectable, quadrivalent, preservative free; 15-Sep-2019 12:21; Mar Solis (RN); [employer]; [id number] (Exp. Date: 30-Jun-2020); IntraMuscular; Deltoid Left.; 0.5 milliLiter(s); VIS (VIS Published: 15-Aug-2019, VIS Presented: 15-Sep-2019);   rabies, intradermal injection; 12-Sep-2019 18:54; Epi Interiano (RN); [employer]; [id number] (Exp. Date: 12-Feb-2023); IntraMuscular; Deltoid Left.; 1 milliLiter(s); VIS (VIS Published: 12-Sep-2019, VIS Presented: 12-Sep-2019);   rabies, intradermal injection; 15-Sep-2019 12:58; Mar Solis (RN); [employer]; [id number] (Exp. Date: 15-Jul-2020); IntraMuscular; Deltoid Right.; 1 milliLiter(s); VIS (VIS Published: 15-Sep-2020, VIS Presented: 15-Sep-2019);   rabies, intradermal injection; 19-Sep-2019 12:35; Tiffani Olvera (RN); [employer]; [id number] (Exp. Date: 09-Jan-2022); IntraMuscular; Deltoid Right.; 1 milliLiter(s); VIS (VIS Published: 08-Jan-2018, VIS Presented: 19-Sep-2019);   rabies, intradermal injection; 26-Sep-2019 11:41; Irma De La Torre (RN); [employer]; [id number] (Exp. Date: 07-Jan-2022); IntraMuscular; Deltoid Left.; 1 milliLiter(s); VIS (VIS Published: 26-May-2018, VIS Presented: 26-Sep-2019);   Tdap; 12-Sep-2019 17:31; Epi Interiano (RN); [employer]; [id number] (Exp. Date: 25-Jul-2021); IntraMuscular; Deltoid Left.; 0.5 milliLiter(s); VIS (VIS Published: 09-May-2013, VIS Presented: 12-Sep-2019);

## 2024-03-26 NOTE — DISCHARGE NOTE PROVIDER - CARE PROVIDER_API CALL
Nancy Kirkland [facility]  Urology  [address] [place] [address]  Phone: [phone]  Fax: [phone]  Follow Up Time:

## 2024-04-04 LAB
CELL MATERIAL STONE EST-MCNT: SIGNIFICANT CHANGE UP
LABORATORY COMMENT REPORT: SIGNIFICANT CHANGE UP
NIDUS STONE QN: SIGNIFICANT CHANGE UP

## 2024-04-19 ENCOUNTER — NON-APPOINTMENT (OUTPATIENT)
Age: 59
End: 2024-04-19

## 2024-04-25 ENCOUNTER — APPOINTMENT (OUTPATIENT)
Age: 59
End: 2024-04-25
Payer: MEDICARE

## 2024-04-25 VITALS
TEMPERATURE: 96.4 F | BODY MASS INDEX: 30.24 KG/M2 | HEART RATE: 90 BPM | DIASTOLIC BLOOD PRESSURE: 86 MMHG | WEIGHT: 150 LBS | OXYGEN SATURATION: 98 % | SYSTOLIC BLOOD PRESSURE: 131 MMHG | HEIGHT: 59 IN

## 2024-04-25 DIAGNOSIS — Z87.898 PERSONAL HISTORY OF OTHER SPECIFIED CONDITIONS: ICD-10-CM

## 2024-04-25 DIAGNOSIS — N20.0 CALCULUS OF KIDNEY: ICD-10-CM

## 2024-04-25 LAB — SURGICAL PATHOLOGY STUDY: SIGNIFICANT CHANGE UP

## 2024-04-25 PROCEDURE — 99213 OFFICE O/P EST LOW 20 MIN: CPT

## 2024-05-03 VITALS
TEMPERATURE: 97 F | OXYGEN SATURATION: 97 % | WEIGHT: 148.81 LBS | HEART RATE: 88 BPM | SYSTOLIC BLOOD PRESSURE: 131 MMHG | RESPIRATION RATE: 16 BRPM | DIASTOLIC BLOOD PRESSURE: 87 MMHG | HEIGHT: 59 IN

## 2024-05-03 RX ORDER — POVIDONE-IODINE 5 %
1 AEROSOL (ML) TOPICAL ONCE
Refills: 0 | Status: COMPLETED | OUTPATIENT
Start: 2024-05-06 | End: 2024-05-06

## 2024-05-03 NOTE — H&P ADULT - NSHPLABSRESULTS_GEN_ALL_CORE
preop cbc/bmp/coags/ua wnl per medical clearance   Ucx + for E. coli   Cr 0.59  H/H 12.2/37.8  EKG- NSR  Nuclear Stress test 4/24- negative for ischemia  TTE 4/24- EF 70%

## 2024-05-03 NOTE — ASU PATIENT PROFILE, ADULT - NSICDXPASTSURGICALHX_GEN_ALL_CORE_FT
PAST SURGICAL HISTORY:  Brain aneurysm coil sx - 2011    Chronic lower back pain s/p lumbar discectomy -2013, s/p fusion with hardware- 2017, spinal stimulator    H/O: hysterectomy 2011    History of bunionectomy left foot    S/P arthroscopy left knee and left foot=- 2016

## 2024-05-03 NOTE — ASU PATIENT PROFILE, ADULT - FALL HARM RISK - UNIVERSAL INTERVENTIONS
Bed in lowest position, wheels locked, appropriate side rails in place/Call bell, personal items and telephone in reach/Instruct patient to call for assistance before getting out of bed or chair/Non-slip footwear when patient is out of bed/[place] to call system/Physically safe environment - no spills, clutter or unnecessary equipment/Purposeful Proactive Rounding/Room/bathroom lighting operational, light cord in reach

## 2024-05-03 NOTE — H&P ADULT - NSHPPHYSICALEXAM_GEN_ALL_CORE
GENERAL:  PE:  Decreased ROM secondary to pain. Rest of PE per medical clearance. Gen: NAD  MSK: decreased ROM 2/2 pain at the lumbar spine     Motor: quad/TA/GS/EHL/FHl 5/5 bilateral lower extremities  Sensation: intact to light touch throughout bilateral lower extremities  Pulses: 2+ DP pulses bilaterally, extremities warm and well perfused, capillary refill brisk     Remainder of exam per medical clearance note

## 2024-05-03 NOTE — H&P ADULT - HISTORY OF PRESENT ILLNESS
60yo f c/o low back pain x   Presents today for elective REVISION L3-L4 TLIF.  60yo f c/o low back pain x years. Patient has history of lumbar spinal fusion in 2017 and states pain has been present since prior surgery. States low back pain radiates into right lower extremity. Patient denies current use of a cane/walker. Has failed conservative management including oral analgesics, activity modification. Patient reports recent admission at outside hospital at the end of February for kidney stone/urosepsis/IV antibiotics. Denies history of blood clots/use of anticoagulants.   Presents today for elective REVISION L3-L4 TLIF.

## 2024-05-03 NOTE — H&P ADULT - PROBLEM SELECTOR PLAN 1
Admit to Orthopaedic Service.  Presents today for elective REVISION L3-L4 TLIF.   Pt medically stable and cleared for procedure today by Dr. Ruiz.

## 2024-05-03 NOTE — H&P ADULT - NSICDXPASTSURGICALHX_GEN_ALL_CORE_FT
PAST SURGICAL HISTORY:  Brain aneurysm coil sx - 2011    Chronic lower back pain s/p lumbar discectomy -2013, s/p fusion with hardware- 2017    H/O: hysterectomy 2011    History of bunionectomy left foot    S/P arthroscopy left knee and left foot=- 2016     PAST SURGICAL HISTORY:  Brain aneurysm coil sx - 2011    Chronic lower back pain s/p lumbar discectomy -2013, s/p fusion with hardware- 2017, spinal stimulator    H/O: hysterectomy 2011    History of bunionectomy left foot    S/P arthroscopy left knee and left foot=- 2016

## 2024-05-05 ENCOUNTER — TRANSCRIPTION ENCOUNTER (OUTPATIENT)
Age: 59
End: 2024-05-05

## 2024-05-05 PROBLEM — Z87.898 HISTORY OF BACTEREMIA: Status: RESOLVED | Noted: 2024-03-11 | Resolved: 2024-05-05

## 2024-05-05 PROBLEM — N20.0 NEPHROLITHIASIS: Status: ACTIVE | Noted: 2024-03-11

## 2024-05-05 NOTE — ASSESSMENT
[FreeTextEntry1] : 58 yo F s/p URS/HLL for nephrolithiasis  - Reviewed stone analysis with patient - Discussed diet and behavioral modifications for stone prevention - FU as needed

## 2024-05-05 NOTE — HISTORY OF PRESENT ILLNESS
[FreeTextEntry1] : 58 yo F presented to ER with left flank pain, fever, chills Found to have obstructing left ureteral stone S/p left ureteral stent Blood cultures ultimately found to be positive Last dose of abx was today Doing well since hospital discharge - no recurrence of fever and no significant pain from stent No prior history of stones Of note, pt is pending back surgery in April 4/25/24 Interval history: s/p URS/hLL with stent removal Doing well since stent removal No fever, no LUTS

## 2024-05-06 ENCOUNTER — INPATIENT (INPATIENT)
Facility: HOSPITAL | Age: 59
LOS: 4 days | Discharge: HOME | DRG: 454 | End: 2024-05-11
Attending: STUDENT IN AN ORGANIZED HEALTH CARE EDUCATION/TRAINING PROGRAM | Admitting: STUDENT IN AN ORGANIZED HEALTH CARE EDUCATION/TRAINING PROGRAM
Payer: OTHER MISCELLANEOUS

## 2024-05-06 ENCOUNTER — APPOINTMENT (OUTPATIENT)
Dept: ORTHOPEDIC SURGERY | Facility: HOSPITAL | Age: 59
End: 2024-05-06

## 2024-05-06 DIAGNOSIS — I67.1 CEREBRAL ANEURYSM, NONRUPTURED: Chronic | ICD-10-CM

## 2024-05-06 DIAGNOSIS — D86.0 SARCOIDOSIS OF LUNG: ICD-10-CM

## 2024-05-06 DIAGNOSIS — Z87.39 PERSONAL HISTORY OF OTHER DISEASES OF THE MUSCULOSKELETAL SYSTEM AND CONNECTIVE TISSUE: ICD-10-CM

## 2024-05-06 DIAGNOSIS — J45.909 UNSPECIFIED ASTHMA, UNCOMPLICATED: ICD-10-CM

## 2024-05-06 DIAGNOSIS — Z98.890 OTHER SPECIFIED POSTPROCEDURAL STATES: Chronic | ICD-10-CM

## 2024-05-06 DIAGNOSIS — Z90.710 ACQUIRED ABSENCE OF BOTH CERVIX AND UTERUS: Chronic | ICD-10-CM

## 2024-05-06 DIAGNOSIS — F32.9 MAJOR DEPRESSIVE DISORDER, SINGLE EPISODE, UNSPECIFIED: ICD-10-CM

## 2024-05-06 DIAGNOSIS — M54.5 LOW BACK PAIN: Chronic | ICD-10-CM

## 2024-05-06 DIAGNOSIS — G43.909 MIGRAINE, UNSPECIFIED, NOT INTRACTABLE, WITHOUT STATUS MIGRAINOSUS: ICD-10-CM

## 2024-05-06 DIAGNOSIS — I10 ESSENTIAL (PRIMARY) HYPERTENSION: ICD-10-CM

## 2024-05-06 DIAGNOSIS — E78.5 HYPERLIPIDEMIA, UNSPECIFIED: ICD-10-CM

## 2024-05-06 PROCEDURE — 22853 INSJ BIOMECHANICAL DEVICE: CPT

## 2024-05-06 PROCEDURE — 22633 ARTHRD CMBN 1NTRSPC LUMBAR: CPT

## 2024-05-06 PROCEDURE — 20939 BONE MARROW ASPIR BONE GRFG: CPT

## 2024-05-06 PROCEDURE — 22853 INSJ BIOMECHANICAL DEVICE: CPT | Mod: AS

## 2024-05-06 PROCEDURE — 22840 INSERT SPINE FIXATION DEVICE: CPT | Mod: AS

## 2024-05-06 PROCEDURE — 22633 ARTHRD CMBN 1NTRSPC LUMBAR: CPT | Mod: AS

## 2024-05-06 PROCEDURE — 22840 INSERT SPINE FIXATION DEVICE: CPT

## 2024-05-06 PROCEDURE — 61783 SCAN PROC SPINAL: CPT

## 2024-05-06 DEVICE — IMPLANTABLE DEVICE: Type: IMPLANTABLE DEVICE | Status: FUNCTIONAL

## 2024-05-06 DEVICE — SET SCREW NXG: Type: IMPLANTABLE DEVICE | Status: FUNCTIONAL

## 2024-05-06 DEVICE — HEAD POLY BODY TOP LOAD: Type: IMPLANTABLE DEVICE | Status: FUNCTIONAL

## 2024-05-06 DEVICE — SURGIFOAM PAD 8CM X 12.5CM X 10MM (100): Type: IMPLANTABLE DEVICE | Status: FUNCTIONAL

## 2024-05-06 DEVICE — SURGIFLO HEMOSTATIC MATRIX KIT: Type: IMPLANTABLE DEVICE | Status: FUNCTIONAL

## 2024-05-06 DEVICE — CAGE LORDOTIC FORZA XP 6.5X10X24MM: Type: IMPLANTABLE DEVICE | Status: FUNCTIONAL

## 2024-05-06 RX ORDER — MILNACIPRAN HYDROCHLORIDE 50 MG/1
1 TABLET, FILM COATED ORAL
Refills: 0 | DISCHARGE

## 2024-05-06 RX ORDER — CARVEDILOL PHOSPHATE 80 MG/1
1 CAPSULE, EXTENDED RELEASE ORAL
Refills: 0 | DISCHARGE

## 2024-05-06 RX ORDER — PANTOPRAZOLE SODIUM 20 MG/1
1 TABLET, DELAYED RELEASE ORAL
Refills: 0 | DISCHARGE

## 2024-05-06 RX ORDER — CHLORHEXIDINE GLUCONATE 213 G/1000ML
1 SOLUTION TOPICAL ONCE
Refills: 0 | Status: COMPLETED | OUTPATIENT
Start: 2024-05-06 | End: 2024-05-06

## 2024-05-06 RX ORDER — METHOCARBAMOL 500 MG/1
500 TABLET, FILM COATED ORAL EVERY 8 HOURS
Refills: 0 | Status: DISCONTINUED | OUTPATIENT
Start: 2024-05-06 | End: 2024-05-11

## 2024-05-06 RX ORDER — APREPITANT 80 MG/1
40 CAPSULE ORAL ONCE
Refills: 0 | Status: COMPLETED | OUTPATIENT
Start: 2024-05-06 | End: 2024-05-06

## 2024-05-06 RX ORDER — CARVEDILOL PHOSPHATE 80 MG/1
6.25 CAPSULE, EXTENDED RELEASE ORAL EVERY 12 HOURS
Refills: 0 | Status: DISCONTINUED | OUTPATIENT
Start: 2024-05-06 | End: 2024-05-06

## 2024-05-06 RX ORDER — CEFAZOLIN SODIUM 1 G
2000 VIAL (EA) INJECTION EVERY 8 HOURS
Refills: 0 | Status: COMPLETED | OUTPATIENT
Start: 2024-05-06 | End: 2024-05-07

## 2024-05-06 RX ORDER — HYDROMORPHONE HYDROCHLORIDE 2 MG/ML
0.5 INJECTION INTRAMUSCULAR; INTRAVENOUS; SUBCUTANEOUS EVERY 4 HOURS
Refills: 0 | Status: DISCONTINUED | OUTPATIENT
Start: 2024-05-06 | End: 2024-05-07

## 2024-05-06 RX ORDER — POLYETHYLENE GLYCOL 3350 17 G/17G
17 POWDER, FOR SOLUTION ORAL DAILY
Refills: 0 | Status: DISCONTINUED | OUTPATIENT
Start: 2024-05-06 | End: 2024-05-11

## 2024-05-06 RX ORDER — ONDANSETRON 8 MG/1
4 TABLET, FILM COATED ORAL EVERY 6 HOURS
Refills: 0 | Status: DISCONTINUED | OUTPATIENT
Start: 2024-05-06 | End: 2024-05-11

## 2024-05-06 RX ORDER — SULFASALAZINE 500 MG
500 TABLET ORAL THREE TIMES A DAY
Refills: 0 | Status: DISCONTINUED | OUTPATIENT
Start: 2024-05-06 | End: 2024-05-11

## 2024-05-06 RX ORDER — PANTOPRAZOLE SODIUM 20 MG/1
40 TABLET, DELAYED RELEASE ORAL
Refills: 0 | Status: DISCONTINUED | OUTPATIENT
Start: 2024-05-06 | End: 2024-05-06

## 2024-05-06 RX ORDER — MILNACIPRAN HYDROCHLORIDE 50 MG/1
100 TABLET, FILM COATED ORAL EVERY 12 HOURS
Refills: 0 | Status: COMPLETED | OUTPATIENT
Start: 2024-05-07 | End: 2024-05-11

## 2024-05-06 RX ORDER — ACETAMINOPHEN 500 MG
1000 TABLET ORAL ONCE
Refills: 0 | Status: COMPLETED | OUTPATIENT
Start: 2024-05-06 | End: 2024-05-06

## 2024-05-06 RX ORDER — GABAPENTIN 400 MG/1
800 CAPSULE ORAL THREE TIMES A DAY
Refills: 0 | Status: DISCONTINUED | OUTPATIENT
Start: 2024-05-06 | End: 2024-05-11

## 2024-05-06 RX ORDER — DULOXETINE HYDROCHLORIDE 30 MG/1
60 CAPSULE, DELAYED RELEASE ORAL DAILY
Refills: 0 | Status: DISCONTINUED | OUTPATIENT
Start: 2024-05-06 | End: 2024-05-06

## 2024-05-06 RX ORDER — MECLIZINE HCL 12.5 MG
12.5 TABLET ORAL
Refills: 0 | Status: DISCONTINUED | OUTPATIENT
Start: 2024-05-06 | End: 2024-05-06

## 2024-05-06 RX ORDER — SODIUM CHLORIDE 9 MG/ML
1000 INJECTION, SOLUTION INTRAVENOUS
Refills: 0 | Status: DISCONTINUED | OUTPATIENT
Start: 2024-05-06 | End: 2024-05-07

## 2024-05-06 RX ORDER — CARVEDILOL PHOSPHATE 80 MG/1
6.25 CAPSULE, EXTENDED RELEASE ORAL EVERY 12 HOURS
Refills: 0 | Status: DISCONTINUED | OUTPATIENT
Start: 2024-05-06 | End: 2024-05-11

## 2024-05-06 RX ORDER — BENZOCAINE AND MENTHOL 5; 1 G/100ML; G/100ML
1 LIQUID ORAL
Refills: 0 | Status: DISCONTINUED | OUTPATIENT
Start: 2024-05-06 | End: 2024-05-11

## 2024-05-06 RX ORDER — SULFASALAZINE 500 MG
2 TABLET ORAL
Refills: 0 | DISCHARGE

## 2024-05-06 RX ORDER — MIRTAZAPINE 45 MG/1
1 TABLET, ORALLY DISINTEGRATING ORAL
Refills: 0 | DISCHARGE

## 2024-05-06 RX ORDER — CARIPRAZINE 1.5 MG/1
1.5 CAPSULE, GELATIN COATED ORAL AT BEDTIME
Refills: 0 | Status: DISCONTINUED | OUTPATIENT
Start: 2024-05-06 | End: 2024-05-11

## 2024-05-06 RX ORDER — CYCLOBENZAPRINE HYDROCHLORIDE 10 MG/1
10 TABLET, FILM COATED ORAL THREE TIMES A DAY
Refills: 0 | Status: DISCONTINUED | OUTPATIENT
Start: 2024-05-06 | End: 2024-05-07

## 2024-05-06 RX ORDER — PANTOPRAZOLE SODIUM 20 MG/1
40 TABLET, DELAYED RELEASE ORAL
Refills: 0 | Status: DISCONTINUED | OUTPATIENT
Start: 2024-05-06 | End: 2024-05-11

## 2024-05-06 RX ORDER — MIRTAZAPINE 45 MG/1
30 TABLET, ORALLY DISINTEGRATING ORAL AT BEDTIME
Refills: 0 | Status: DISCONTINUED | OUTPATIENT
Start: 2024-05-06 | End: 2024-05-11

## 2024-05-06 RX ORDER — DULOXETINE HYDROCHLORIDE 30 MG/1
60 CAPSULE, DELAYED RELEASE ORAL EVERY 12 HOURS
Refills: 0 | Status: DISCONTINUED | OUTPATIENT
Start: 2024-05-06 | End: 2024-05-11

## 2024-05-06 RX ORDER — DEXAMETHASONE 0.5 MG/5ML
6 ELIXIR ORAL EVERY 6 HOURS
Refills: 0 | Status: COMPLETED | OUTPATIENT
Start: 2024-05-06 | End: 2024-05-07

## 2024-05-06 RX ORDER — SENNA PLUS 8.6 MG/1
2 TABLET ORAL AT BEDTIME
Refills: 0 | Status: DISCONTINUED | OUTPATIENT
Start: 2024-05-06 | End: 2024-05-11

## 2024-05-06 RX ORDER — ENOXAPARIN SODIUM 100 MG/ML
40 INJECTION SUBCUTANEOUS EVERY 24 HOURS
Refills: 0 | Status: DISCONTINUED | OUTPATIENT
Start: 2024-05-07 | End: 2024-05-11

## 2024-05-06 RX ORDER — SUMATRIPTAN SUCCINATE 4 MG/.5ML
50 INJECTION, SOLUTION SUBCUTANEOUS
Refills: 0 | Status: DISCONTINUED | OUTPATIENT
Start: 2024-05-06 | End: 2024-05-06

## 2024-05-06 RX ORDER — CARIPRAZINE 1.5 MG/1
1.5 CAPSULE, GELATIN COATED ORAL AT BEDTIME
Refills: 0 | Status: DISCONTINUED | OUTPATIENT
Start: 2024-05-06 | End: 2024-05-06

## 2024-05-06 RX ORDER — CARIPRAZINE 1.5 MG/1
1 CAPSULE, GELATIN COATED ORAL
Refills: 0 | DISCHARGE

## 2024-05-06 RX ORDER — SUMATRIPTAN SUCCINATE 4 MG/.5ML
50 INJECTION, SOLUTION SUBCUTANEOUS
Refills: 0 | Status: DISCONTINUED | OUTPATIENT
Start: 2024-05-06 | End: 2024-05-08

## 2024-05-06 RX ORDER — DULOXETINE HYDROCHLORIDE 30 MG/1
1 CAPSULE, DELAYED RELEASE ORAL
Refills: 0 | DISCHARGE

## 2024-05-06 RX ORDER — SUMATRIPTAN SUCCINATE 4 MG/.5ML
1 INJECTION, SOLUTION SUBCUTANEOUS
Qty: 0 | Refills: 0 | DISCHARGE

## 2024-05-06 RX ORDER — MECLIZINE HCL 12.5 MG
1 TABLET ORAL
Refills: 0 | DISCHARGE

## 2024-05-06 RX ORDER — HYDROMORPHONE HYDROCHLORIDE 2 MG/ML
0.5 INJECTION INTRAMUSCULAR; INTRAVENOUS; SUBCUTANEOUS
Refills: 0 | Status: DISCONTINUED | OUTPATIENT
Start: 2024-05-06 | End: 2024-05-07

## 2024-05-06 RX ORDER — MIRTAZAPINE 45 MG/1
30 TABLET, ORALLY DISINTEGRATING ORAL AT BEDTIME
Refills: 0 | Status: DISCONTINUED | OUTPATIENT
Start: 2024-05-06 | End: 2024-05-06

## 2024-05-06 RX ORDER — GABAPENTIN 400 MG/1
1 CAPSULE ORAL
Refills: 0 | DISCHARGE

## 2024-05-06 RX ORDER — SULFASALAZINE 500 MG
500 TABLET ORAL THREE TIMES A DAY
Refills: 0 | Status: DISCONTINUED | OUTPATIENT
Start: 2024-05-06 | End: 2024-05-06

## 2024-05-06 RX ORDER — MILNACIPRAN HYDROCHLORIDE 50 MG/1
100 TABLET, FILM COATED ORAL EVERY 12 HOURS
Refills: 0 | Status: DISCONTINUED | OUTPATIENT
Start: 2024-05-06 | End: 2024-05-06

## 2024-05-06 RX ORDER — ACETAMINOPHEN 500 MG
1000 TABLET ORAL EVERY 8 HOURS
Refills: 0 | Status: DISCONTINUED | OUTPATIENT
Start: 2024-05-06 | End: 2024-05-11

## 2024-05-06 RX ORDER — OXYCODONE HYDROCHLORIDE 5 MG/1
5 TABLET ORAL EVERY 4 HOURS
Refills: 0 | Status: DISCONTINUED | OUTPATIENT
Start: 2024-05-06 | End: 2024-05-07

## 2024-05-06 RX ADMIN — Medication 500 MILLIGRAM(S): at 23:59

## 2024-05-06 RX ADMIN — CHLORHEXIDINE GLUCONATE 1 APPLICATION(S): 213 SOLUTION TOPICAL at 11:29

## 2024-05-06 RX ADMIN — APREPITANT 40 MILLIGRAM(S): 80 CAPSULE ORAL at 11:28

## 2024-05-06 RX ADMIN — Medication 100 MILLIGRAM(S): at 22:46

## 2024-05-06 RX ADMIN — Medication 1000 MILLIGRAM(S): at 22:46

## 2024-05-06 RX ADMIN — SENNA PLUS 2 TABLET(S): 8.6 TABLET ORAL at 22:46

## 2024-05-06 RX ADMIN — OXYCODONE HYDROCHLORIDE 5 MILLIGRAM(S): 5 TABLET ORAL at 23:30

## 2024-05-06 RX ADMIN — METHOCARBAMOL 500 MILLIGRAM(S): 500 TABLET, FILM COATED ORAL at 22:46

## 2024-05-06 RX ADMIN — HYDROMORPHONE HYDROCHLORIDE 0.5 MILLIGRAM(S): 2 INJECTION INTRAMUSCULAR; INTRAVENOUS; SUBCUTANEOUS at 21:33

## 2024-05-06 RX ADMIN — Medication 1000 MILLIGRAM(S): at 23:30

## 2024-05-06 RX ADMIN — Medication 1 APPLICATION(S): at 11:29

## 2024-05-06 RX ADMIN — HYDROMORPHONE HYDROCHLORIDE 0.5 MILLIGRAM(S): 2 INJECTION INTRAMUSCULAR; INTRAVENOUS; SUBCUTANEOUS at 22:00

## 2024-05-06 RX ADMIN — MIRTAZAPINE 30 MILLIGRAM(S): 45 TABLET, ORALLY DISINTEGRATING ORAL at 22:47

## 2024-05-06 RX ADMIN — Medication 1000 MILLIGRAM(S): at 11:28

## 2024-05-06 RX ADMIN — OXYCODONE HYDROCHLORIDE 5 MILLIGRAM(S): 5 TABLET ORAL at 22:46

## 2024-05-06 RX ADMIN — Medication 6 MILLIGRAM(S): at 23:59

## 2024-05-06 RX ADMIN — ONDANSETRON 4 MILLIGRAM(S): 8 TABLET, FILM COATED ORAL at 23:59

## 2024-05-06 NOTE — BRIEF OPERATIVE NOTE - NSICDXBRIEFPROCEDURE_GEN_ALL_CORE_FT
PROCEDURES:  Fusion, spine, lumbar, interbody, 1-2 levels, transforaminal approach 06-May-2024 18:49:16 L3-L4, extended from previous TLIF L4-S1 Miguel Dumont

## 2024-05-06 NOTE — PRE-ANESTHESIA EVALUATION ADULT - NSANTHDIETYNSD_GEN_ALL_CORE
Patient needs nurse visit for blood pressure check next    Future Appointments   Date Time Provider [address]   12/26/2023 10:40 AM Yves Meneses MD Neuro Spec Neurology -   1/11/2024 10:00 AM Yevgeniy Gallegos DO [facility] Yes

## 2024-05-06 NOTE — PROGRESS NOTE ADULT - SUBJECTIVE AND OBJECTIVE BOX
Ortho Post Op Check    Procedure: Revision TLIF L3-L4  Surgeon:    Pt comfortable without complaints, pain controlled  Denies CP, SOB, N/V, numbness/tingling     Vital Signs Last 24 Hrs  T(C): 36.1 (06 May 2024 13:06), Max: 36.1 (06 May 2024 13:06)  T(F): --  HR: 88 (06 May 2024 13:06) (88 - 88)  BP: 131/87 (06 May 2024 13:06) (131/87 - 131/87)  BP(mean): --  ABP: --  ABP(mean): --  RR: 16 (06 May 2024 13:06) (16 - 16)  SpO2: 97% (06 May 2024 13:06) (97% - 97%)        General: Pt Alert and oriented, NAD  B/L LE:  DP 2+, brisk cap refill, EHL/FHL/TA/GS firing bilaterally  aquacell x1 drain, HV      Post-op X-Ray: n/a    A/P: 59yFemale POD#0 s/p revision TLIF L3-L4  - Stable  - Pain Control  - DVT ppx: scds pod 0  - Post op abx: ancef  - PT, WBS:  wbat    Ortho Pager [phone]

## 2024-05-07 LAB
ANION GAP SERPL CALC-SCNC: 11 MMOL/L — SIGNIFICANT CHANGE UP (ref 5–17)
BASOPHILS # BLD AUTO: 0.01 K/UL — SIGNIFICANT CHANGE UP (ref 0–0.2)
BASOPHILS NFR BLD AUTO: 0.1 % — SIGNIFICANT CHANGE UP (ref 0–2)
BLD GP AB SCN SERPL QL: NEGATIVE — SIGNIFICANT CHANGE UP
BUN SERPL-MCNC: 9 MG/DL — SIGNIFICANT CHANGE UP (ref 7–23)
CALCIUM SERPL-MCNC: 9.2 MG/DL — SIGNIFICANT CHANGE UP (ref 8.4–10.5)
CHLORIDE SERPL-SCNC: 103 MMOL/L — SIGNIFICANT CHANGE UP (ref 96–108)
CO2 SERPL-SCNC: 22 MMOL/L — SIGNIFICANT CHANGE UP (ref 22–31)
CREAT SERPL-MCNC: 0.6 MG/DL — SIGNIFICANT CHANGE UP (ref 0.5–1.3)
EGFR: 103 ML/MIN/1.73M2 — SIGNIFICANT CHANGE UP
EOSINOPHIL # BLD AUTO: 0 K/UL — SIGNIFICANT CHANGE UP (ref 0–0.5)
EOSINOPHIL NFR BLD AUTO: 0 % — SIGNIFICANT CHANGE UP (ref 0–6)
GLUCOSE BLDC GLUCOMTR-MCNC: 182 MG/DL — HIGH (ref 70–99)
GLUCOSE SERPL-MCNC: 190 MG/DL — HIGH (ref 70–99)
HCT VFR BLD CALC: 32.3 % — LOW (ref 34.5–45)
HGB BLD-MCNC: 10.4 G/DL — LOW (ref 11.5–15.5)
IMM GRANULOCYTES NFR BLD AUTO: 0.4 % — SIGNIFICANT CHANGE UP (ref 0–0.9)
LYMPHOCYTES # BLD AUTO: 1.69 K/UL — SIGNIFICANT CHANGE UP (ref 1–3.3)
LYMPHOCYTES # BLD AUTO: 11.5 % — LOW (ref 13–44)
MCHC RBC-ENTMCNC: 30.1 PG — SIGNIFICANT CHANGE UP (ref 27–34)
MCHC RBC-ENTMCNC: 32.2 GM/DL — SIGNIFICANT CHANGE UP (ref 32–36)
MCV RBC AUTO: 93.6 FL — SIGNIFICANT CHANGE UP (ref 80–100)
MONOCYTES # BLD AUTO: 0.17 K/UL — SIGNIFICANT CHANGE UP (ref 0–0.9)
MONOCYTES NFR BLD AUTO: 1.2 % — LOW (ref 2–14)
NEUTROPHILS # BLD AUTO: 12.82 K/UL — HIGH (ref 1.8–7.4)
NEUTROPHILS NFR BLD AUTO: 86.8 % — HIGH (ref 43–77)
NIGHT BLUE STAIN TISS: SIGNIFICANT CHANGE UP
NRBC # BLD: 0 /100 WBCS — SIGNIFICANT CHANGE UP (ref 0–0)
PLATELET # BLD AUTO: 306 K/UL — SIGNIFICANT CHANGE UP (ref 150–400)
POTASSIUM SERPL-MCNC: 3.6 MMOL/L — SIGNIFICANT CHANGE UP (ref 3.5–5.3)
POTASSIUM SERPL-SCNC: 3.6 MMOL/L — SIGNIFICANT CHANGE UP (ref 3.5–5.3)
RBC # BLD: 3.45 M/UL — LOW (ref 3.8–5.2)
RBC # FLD: 13 % — SIGNIFICANT CHANGE UP (ref 10.3–14.5)
RH IG SCN BLD-IMP: POSITIVE — SIGNIFICANT CHANGE UP
SODIUM SERPL-SCNC: 136 MMOL/L — SIGNIFICANT CHANGE UP (ref 135–145)
SPECIMEN SOURCE: SIGNIFICANT CHANGE UP
WBC # BLD: 14.75 K/UL — HIGH (ref 3.8–10.5)
WBC # FLD AUTO: 14.75 K/UL — HIGH (ref 3.8–10.5)

## 2024-05-07 PROCEDURE — 99223 1ST HOSP IP/OBS HIGH 75: CPT

## 2024-05-07 RX ORDER — INSULIN LISPRO 100/ML
VIAL (ML) SUBCUTANEOUS
Refills: 0 | Status: DISCONTINUED | OUTPATIENT
Start: 2024-05-07 | End: 2024-05-11

## 2024-05-07 RX ORDER — OXYCODONE HYDROCHLORIDE 5 MG/1
10 TABLET ORAL EVERY 4 HOURS
Refills: 0 | Status: DISCONTINUED | OUTPATIENT
Start: 2024-05-07 | End: 2024-05-07

## 2024-05-07 RX ORDER — OXYCODONE HYDROCHLORIDE 5 MG/1
5 TABLET ORAL
Refills: 0 | Status: DISCONTINUED | OUTPATIENT
Start: 2024-05-07 | End: 2024-05-07

## 2024-05-07 RX ORDER — DEXTROSE 50 % IN WATER 50 %
15 SYRINGE (ML) INTRAVENOUS ONCE
Refills: 0 | Status: DISCONTINUED | OUTPATIENT
Start: 2024-05-07 | End: 2024-05-11

## 2024-05-07 RX ORDER — POTASSIUM CHLORIDE 20 MEQ
20 PACKET (EA) ORAL ONCE
Refills: 0 | Status: COMPLETED | OUTPATIENT
Start: 2024-05-07 | End: 2024-05-08

## 2024-05-07 RX ORDER — SODIUM CHLORIDE 9 MG/ML
1000 INJECTION, SOLUTION INTRAVENOUS
Refills: 0 | Status: DISCONTINUED | OUTPATIENT
Start: 2024-05-07 | End: 2024-05-11

## 2024-05-07 RX ORDER — DEXTROSE 50 % IN WATER 50 %
25 SYRINGE (ML) INTRAVENOUS ONCE
Refills: 0 | Status: DISCONTINUED | OUTPATIENT
Start: 2024-05-07 | End: 2024-05-11

## 2024-05-07 RX ORDER — OXYCODONE HYDROCHLORIDE 5 MG/1
10 TABLET ORAL
Refills: 0 | Status: DISCONTINUED | OUTPATIENT
Start: 2024-05-07 | End: 2024-05-09

## 2024-05-07 RX ORDER — GLUCAGON INJECTION, SOLUTION 0.5 MG/.1ML
1 INJECTION, SOLUTION SUBCUTANEOUS ONCE
Refills: 0 | Status: DISCONTINUED | OUTPATIENT
Start: 2024-05-07 | End: 2024-05-11

## 2024-05-07 RX ORDER — SODIUM CHLORIDE 9 MG/ML
1000 INJECTION, SOLUTION INTRAVENOUS ONCE
Refills: 0 | Status: COMPLETED | OUTPATIENT
Start: 2024-05-07 | End: 2024-05-07

## 2024-05-07 RX ORDER — GABAPENTIN 400 MG/1
800 CAPSULE ORAL ONCE
Refills: 0 | Status: COMPLETED | OUTPATIENT
Start: 2024-05-07 | End: 2024-05-07

## 2024-05-07 RX ORDER — OXYCODONE HYDROCHLORIDE 5 MG/1
5 TABLET ORAL
Refills: 0 | Status: DISCONTINUED | OUTPATIENT
Start: 2024-05-07 | End: 2024-05-09

## 2024-05-07 RX ORDER — DEXTROSE 10 % IN WATER 10 %
125 INTRAVENOUS SOLUTION INTRAVENOUS ONCE
Refills: 0 | Status: DISCONTINUED | OUTPATIENT
Start: 2024-05-07 | End: 2024-05-11

## 2024-05-07 RX ORDER — DEXTROSE 50 % IN WATER 50 %
12.5 SYRINGE (ML) INTRAVENOUS ONCE
Refills: 0 | Status: DISCONTINUED | OUTPATIENT
Start: 2024-05-07 | End: 2024-05-11

## 2024-05-07 RX ADMIN — Medication 1000 MILLIGRAM(S): at 13:26

## 2024-05-07 RX ADMIN — DULOXETINE HYDROCHLORIDE 60 MILLIGRAM(S): 30 CAPSULE, DELAYED RELEASE ORAL at 05:56

## 2024-05-07 RX ADMIN — Medication 6 MILLIGRAM(S): at 13:27

## 2024-05-07 RX ADMIN — GABAPENTIN 800 MILLIGRAM(S): 400 CAPSULE ORAL at 06:42

## 2024-05-07 RX ADMIN — CARIPRAZINE 1.5 MILLIGRAM(S): 1.5 CAPSULE, GELATIN COATED ORAL at 21:56

## 2024-05-07 RX ADMIN — GABAPENTIN 800 MILLIGRAM(S): 400 CAPSULE ORAL at 13:26

## 2024-05-07 RX ADMIN — OXYCODONE HYDROCHLORIDE 10 MILLIGRAM(S): 5 TABLET ORAL at 16:13

## 2024-05-07 RX ADMIN — PANTOPRAZOLE SODIUM 40 MILLIGRAM(S): 20 TABLET, DELAYED RELEASE ORAL at 06:42

## 2024-05-07 RX ADMIN — ENOXAPARIN SODIUM 40 MILLIGRAM(S): 100 INJECTION SUBCUTANEOUS at 21:55

## 2024-05-07 RX ADMIN — ONDANSETRON 4 MILLIGRAM(S): 8 TABLET, FILM COATED ORAL at 05:56

## 2024-05-07 RX ADMIN — METHOCARBAMOL 500 MILLIGRAM(S): 500 TABLET, FILM COATED ORAL at 13:26

## 2024-05-07 RX ADMIN — OXYCODONE HYDROCHLORIDE 10 MILLIGRAM(S): 5 TABLET ORAL at 15:13

## 2024-05-07 RX ADMIN — OXYCODONE HYDROCHLORIDE 5 MILLIGRAM(S): 5 TABLET ORAL at 11:12

## 2024-05-07 RX ADMIN — Medication 6 MILLIGRAM(S): at 17:59

## 2024-05-07 RX ADMIN — CARVEDILOL PHOSPHATE 6.25 MILLIGRAM(S): 80 CAPSULE, EXTENDED RELEASE ORAL at 05:56

## 2024-05-07 RX ADMIN — METHOCARBAMOL 500 MILLIGRAM(S): 500 TABLET, FILM COATED ORAL at 21:56

## 2024-05-07 RX ADMIN — METHOCARBAMOL 500 MILLIGRAM(S): 500 TABLET, FILM COATED ORAL at 05:56

## 2024-05-07 RX ADMIN — POLYETHYLENE GLYCOL 3350 17 GRAM(S): 17 POWDER, FOR SOLUTION ORAL at 11:12

## 2024-05-07 RX ADMIN — GABAPENTIN 800 MILLIGRAM(S): 400 CAPSULE ORAL at 21:56

## 2024-05-07 RX ADMIN — Medication 500 MILLIGRAM(S): at 06:44

## 2024-05-07 RX ADMIN — Medication 100 MILLIGRAM(S): at 05:57

## 2024-05-07 RX ADMIN — ONDANSETRON 4 MILLIGRAM(S): 8 TABLET, FILM COATED ORAL at 23:21

## 2024-05-07 RX ADMIN — ONDANSETRON 4 MILLIGRAM(S): 8 TABLET, FILM COATED ORAL at 17:55

## 2024-05-07 RX ADMIN — Medication 1000 MILLIGRAM(S): at 06:45

## 2024-05-07 RX ADMIN — Medication 6 MILLIGRAM(S): at 05:56

## 2024-05-07 RX ADMIN — Medication 500 MILLIGRAM(S): at 13:26

## 2024-05-07 RX ADMIN — SENNA PLUS 2 TABLET(S): 8.6 TABLET ORAL at 21:56

## 2024-05-07 RX ADMIN — Medication 500 MILLIGRAM(S): at 21:56

## 2024-05-07 RX ADMIN — OXYCODONE HYDROCHLORIDE 5 MILLIGRAM(S): 5 TABLET ORAL at 06:45

## 2024-05-07 RX ADMIN — MILNACIPRAN HYDROCHLORIDE 100 MILLIGRAM(S): 50 TABLET, FILM COATED ORAL at 07:02

## 2024-05-07 RX ADMIN — OXYCODONE HYDROCHLORIDE 5 MILLIGRAM(S): 5 TABLET ORAL at 12:12

## 2024-05-07 RX ADMIN — SODIUM CHLORIDE 110 MILLILITER(S): 9 INJECTION, SOLUTION INTRAVENOUS at 04:27

## 2024-05-07 RX ADMIN — SODIUM CHLORIDE 1000 MILLILITER(S): 9 INJECTION, SOLUTION INTRAVENOUS at 15:13

## 2024-05-07 RX ADMIN — DULOXETINE HYDROCHLORIDE 60 MILLIGRAM(S): 30 CAPSULE, DELAYED RELEASE ORAL at 17:56

## 2024-05-07 RX ADMIN — Medication 1000 MILLIGRAM(S): at 05:56

## 2024-05-07 RX ADMIN — CARVEDILOL PHOSPHATE 6.25 MILLIGRAM(S): 80 CAPSULE, EXTENDED RELEASE ORAL at 17:55

## 2024-05-07 RX ADMIN — Medication 2: at 21:55

## 2024-05-07 RX ADMIN — MILNACIPRAN HYDROCHLORIDE 100 MILLIGRAM(S): 50 TABLET, FILM COATED ORAL at 18:19

## 2024-05-07 RX ADMIN — MIRTAZAPINE 30 MILLIGRAM(S): 45 TABLET, ORALLY DISINTEGRATING ORAL at 21:57

## 2024-05-07 RX ADMIN — GABAPENTIN 800 MILLIGRAM(S): 400 CAPSULE ORAL at 00:22

## 2024-05-07 RX ADMIN — ONDANSETRON 4 MILLIGRAM(S): 8 TABLET, FILM COATED ORAL at 11:12

## 2024-05-07 RX ADMIN — Medication 1000 MILLIGRAM(S): at 21:56

## 2024-05-07 RX ADMIN — OXYCODONE HYDROCHLORIDE 5 MILLIGRAM(S): 5 TABLET ORAL at 05:58

## 2024-05-07 NOTE — PHYSICAL THERAPY INITIAL EVALUATION ADULT - PERTINENT HX OF CURRENT PROBLEM, REHAB EVAL
Patient is 60yo f c/o low back pain x years. Patient has history of lumbar spinal fusion in 2017 and states pain has been present since prior surgery. States low back pain radiates into right lower extremity.  Has failed conservative management including oral analgesics, activity modification. Patient reports recent admission at outside hospital at the end of February for kidney stone/urosepsis/IV antibiotics. Denies history of blood clots/use of anticoagulants.   Presents for elective REVISION L3-L4 TLIF.

## 2024-05-07 NOTE — PROGRESS NOTE ADULT - SUBJECTIVE AND OBJECTIVE BOX
PAIN MANAGEMENT CONSULT NOTE    Chief Complaint:    HPI:  60yo f c/o low back pain x years. Patient has history of lumbar spinal fusion in 2017 and states pain has been present since prior surgery. States low back pain radiates into right lower extremity. Patient denies current use of a cane/walker. Has failed conservative management including oral analgesics, activity modification. Patient reports recent admission at outside hospital at the end of February for kidney stone/urosepsis/IV antibiotics. Denies history of blood clots/use of anticoagulants.   Presents today for elective REVISION L3-L4 TLIF.  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      HLD (hyperlipidemia)      HTN (hypertension)      GERD (gastroesophageal reflux disease)      Fibromyalgia      RA (rheumatoid arthritis)      Seasonal allergies      Fibroids      Brain aneurysm  dx 2011, sx done      Migraines      Vertigo      Depression      Hallux valgus (acquired), left foot      SHELIA (obstructive sleep apnea)  home test done by PCP in 2016, SHELIA pt was using mouth guard but it broken, not using it now, to notify Anesthesia      H/O: hysterectomy  2011      S/P arthroscopy  left knee and left foot=- 2016      Brain aneurysm  coil sx - 2011      Chronic lower back pain  s/p lumbar discectomy -2013, s/p fusion with hardware- 2017, spinal stimulator      History of bunionectomy  left foot          FAMILY HISTORY:  Family history of hypertension    Family history of kidney stones (Sibling)        SOCIAL HISTORY:  [ ] Denies Smoking, Alcohol, or Drug Use    HOME MEDICATIONS:   Please refer to initial HNP    Allergies    Cipro (Vomiting)    Intolerances        PAIN MEDICATIONS:  acetaminophen     Tablet .. 1000 milliGRAM(s) Oral every 8 hours  cariprazine 1.5 milliGRAM(s) Oral at bedtime  cyclobenzaprine 10 milliGRAM(s) Oral three times a day PRN  DULoxetine 60 milliGRAM(s) Oral every 12 hours  gabapentin 800 milliGRAM(s) Oral three times a day  HYDROmorphone  Injectable 0.5 milliGRAM(s) IV Push every 4 hours PRN  methocarbamol 500 milliGRAM(s) Oral every 8 hours  milnacipran 100 milliGRAM(s) Oral every 12 hours  mirtazapine 30 milliGRAM(s) Oral at bedtime  ondansetron   Disintegrating Tablet 4 milliGRAM(s) Oral every 6 hours  oxyCODONE    IR 5 milliGRAM(s) Oral every 4 hours PRN  SUMAtriptan 50 milliGRAM(s) Oral two times a day PRN    Heme:  enoxaparin Injectable 40 milliGRAM(s) SubCutaneous every 24 hours    Antibiotics:    Cardiovascular:  carvedilol 6.25 milliGRAM(s) Oral every 12 hours    GI:  pantoprazole    Tablet 40 milliGRAM(s) Oral before breakfast  polyethylene glycol 3350 17 Gram(s) Oral daily  senna 2 Tablet(s) Oral at bedtime  sulfaSALAzine 500 milliGRAM(s) Oral three times a day    Endocrine:  dexAMETHasone  Injectable 6 milliGRAM(s) IV Push every 6 hours    All Other Medications:  benzocaine/menthol Lozenge 1 Lozenge Oral five times a day PRN  lactated ringers. 1000 milliLiter(s) IV Continuous <Continuous>  potassium chloride    Tablet ER 20 milliEquivalent(s) Oral once      Vital Signs Last 24 Hrs  T(C): 36.3 (07 May 2024 08:11), Max: 36.6 (06 May 2024 21:01)  T(F): 97.4 (07 May 2024 08:11), Max: 97.9 (06 May 2024 21:01)  HR: 92 (07 May 2024 08:11) (70 - 102)  BP: 110/55 (07 May 2024 08:11) (84/51 - 131/87)  BP(mean): 81 (06 May 2024 21:01) (61 - 86)  RR: 16 (07 May 2024 08:11) (10 - 20)  SpO2: 95% (07 May 2024 08:11) (93% - 100%)    Parameters below as of 07 May 2024 08:11  Patient On (Oxygen Delivery Method): room air        LABS:                        10.4   14.75 )-----------( 306      ( 07 May 2024 05:30 )             32.3     05-07    136  |  103  |  9   ----------------------------<  190<H>  3.6   |  22  |  0.60    Ca    9.2      07 May 2024 05:30        Urinalysis Basic - ( 07 May 2024 05:30 )    Color: x / Appearance: x / SG: x / pH: x  Gluc: 190 mg/dL / Ketone: x  / Bili: x / Urobili: x   Blood: x / Protein: x / Nitrite: x   Leuk Esterase: x / RBC: x / WBC x   Sq Epi: x / Non Sq Epi: x / Bacteria: x        RADIOLOGY:    Drug Screen:        REVIEW OF SYSTEMS:  CONSTITUTIONAL: Denies fever or fatigue   EYES: Denies eye pain, visual disturbances  HEENT: Denies difficulty hearing, throat/neck pain or stiffness  RESPIRATORY: Denies SOB, cough, wheezing  CARDIOVASCULAR: Denies chest pain, palpitations.   GASTROINTESTINAL: Endorses +flatus, BMs. Denies nausea, vomiting, abdominal or epigastric pain.   GENITOURINARY: Denies dysuria, frequency, or incontinence  NEUROLOGICAL: Endorses *** numbness, tingling. Denies headaches, loss of strength, tremors, dizzinesss or lightheadedness with pain medications.   MUSCULOSKELETAL: Denies joint pain or swelling      FUNCTIONAL ASSESSMENT:  PAIN SCORE AT REST:         SCALE USED: (1-10 VNRS)  PAIN SCORE WITH ACTIVITY:         SCALE USED: (1-10 VNRS)    PAIN ASSESSMENT:    FOCUSED PHYSICAL EXAM  GENERAL: Laying in bed, NAD  NEURO/MOTOR: CN II-XII PERRRL, EOMI  PULM: unlabored  CV: Regular rate and rhythm; No murmurs, rubs, or gallops  ABDOMEN: Soft, Nontender, Nondistended; Bowel sounds present  EXTREMITIES:  2+ Peripheral Pulses, No clubbing, cyanosis, or edema  SKIN: No rashes or lesions      ASSESSMENT: 59yFemale s/p revision TLIF L3-L4 5/6/24      PLAN:   -        - Bowel regimen: Senna    - Nausea ppx: Zofran standing  - Functional Goals: Pt will get OOB with PT today. Pt will resume previous level of activity without impairment from surgery.   - Additional Consults: None recommended.   - Additional Labs/Imaging:  None recommended.     - Discharge Planning: per primary team  - Pain Management follow up plan: will continue to follow    Plan d/w Dr. Floyd.     Eli Valdivia NP  Acute Pain Service           PAIN MANAGEMENT CONSULT NOTE    Chief Complaint: back pain    HPI:  58yo f c/o low back pain x years. Patient has history of lumbar spinal fusion in 2017 and states pain has been present since prior surgery. States low back pain radiates into right lower extremity. Patient denies current use of a cane/walker. Has failed conservative management including oral analgesics, activity modification. Patient reports recent admission at outside hospital at the end of February for kidney stone/urosepsis/IV antibiotics. Denies history of blood clots/use of anticoagulants.   Presents today for elective REVISION L3-L4 TLIF.  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      HLD (hyperlipidemia)      HTN (hypertension)      GERD (gastroesophageal reflux disease)      Fibromyalgia      RA (rheumatoid arthritis)      Seasonal allergies      Fibroids      Brain aneurysm  dx 2011, sx done      Migraines      Vertigo      Depression      Hallux valgus (acquired), left foot      SHELIA (obstructive sleep apnea)  home test done by PCP in 2016, SHELIA pt was using mouth guard but it broken, not using it now, to notify Anesthesia      H/O: hysterectomy  2011      S/P arthroscopy  left knee and left foot=- 2016      Brain aneurysm  coil sx - 2011      Chronic lower back pain  s/p lumbar discectomy -2013, s/p fusion with hardware- 2017, spinal stimulator      History of bunionectomy  left foot          FAMILY HISTORY:  Family history of hypertension    Family history of kidney stones (Sibling)        SOCIAL HISTORY:  [ ] Denies Smoking, Alcohol, or Drug Use    HOME MEDICATIONS:   Please refer to initial HNP    Allergies    Cipro (Vomiting)    Intolerances        PAIN MEDICATIONS:  acetaminophen     Tablet .. 1000 milliGRAM(s) Oral every 8 hours  cariprazine 1.5 milliGRAM(s) Oral at bedtime  cyclobenzaprine 10 milliGRAM(s) Oral three times a day PRN  DULoxetine 60 milliGRAM(s) Oral every 12 hours  gabapentin 800 milliGRAM(s) Oral three times a day  HYDROmorphone  Injectable 0.5 milliGRAM(s) IV Push every 4 hours PRN  methocarbamol 500 milliGRAM(s) Oral every 8 hours  milnacipran 100 milliGRAM(s) Oral every 12 hours  mirtazapine 30 milliGRAM(s) Oral at bedtime  ondansetron   Disintegrating Tablet 4 milliGRAM(s) Oral every 6 hours  oxyCODONE    IR 5 milliGRAM(s) Oral every 4 hours PRN  SUMAtriptan 50 milliGRAM(s) Oral two times a day PRN    Heme:  enoxaparin Injectable 40 milliGRAM(s) SubCutaneous every 24 hours    Antibiotics:    Cardiovascular:  carvedilol 6.25 milliGRAM(s) Oral every 12 hours    GI:  pantoprazole    Tablet 40 milliGRAM(s) Oral before breakfast  polyethylene glycol 3350 17 Gram(s) Oral daily  senna 2 Tablet(s) Oral at bedtime  sulfaSALAzine 500 milliGRAM(s) Oral three times a day    Endocrine:  dexAMETHasone  Injectable 6 milliGRAM(s) IV Push every 6 hours    All Other Medications:  benzocaine/menthol Lozenge 1 Lozenge Oral five times a day PRN  lactated ringers. 1000 milliLiter(s) IV Continuous <Continuous>  potassium chloride    Tablet ER 20 milliEquivalent(s) Oral once      Vital Signs Last 24 Hrs  T(C): 36.3 (07 May 2024 08:11), Max: 36.6 (06 May 2024 21:01)  T(F): 97.4 (07 May 2024 08:11), Max: 97.9 (06 May 2024 21:01)  HR: 92 (07 May 2024 08:11) (70 - 102)  BP: 110/55 (07 May 2024 08:11) (84/51 - 131/87)  BP(mean): 81 (06 May 2024 21:01) (61 - 86)  RR: 16 (07 May 2024 08:11) (10 - 20)  SpO2: 95% (07 May 2024 08:11) (93% - 100%)    Parameters below as of 07 May 2024 08:11  Patient On (Oxygen Delivery Method): room air        LABS:                        10.4   14.75 )-----------( 306      ( 07 May 2024 05:30 )             32.3     05-07    136  |  103  |  9   ----------------------------<  190<H>  3.6   |  22  |  0.60    Ca    9.2      07 May 2024 05:30        Urinalysis Basic - ( 07 May 2024 05:30 )    Color: x / Appearance: x / SG: x / pH: x  Gluc: 190 mg/dL / Ketone: x  / Bili: x / Urobili: x   Blood: x / Protein: x / Nitrite: x   Leuk Esterase: x / RBC: x / WBC x   Sq Epi: x / Non Sq Epi: x / Bacteria: x        RADIOLOGY:    Drug Screen:        REVIEW OF SYSTEMS:  CONSTITUTIONAL: Denies fever or fatigue   EYES: Denies eye pain, visual disturbances  HEENT: Denies difficulty hearing, throat/neck pain or stiffness  RESPIRATORY: Denies SOB, cough, wheezing  CARDIOVASCULAR: Denies chest pain, palpitations.   GASTROINTESTINAL: Endorses +flatus, BMs. Denies nausea, vomiting, abdominal or epigastric pain.   GENITOURINARY: Denies dysuria, frequency, or incontinence  NEUROLOGICAL: Endorses numbness, tingling. Denies headaches, loss of strength, tremors, dizziness or lightheadedness with pain medications.   MUSCULOSKELETAL: Denies joint pain or swelling      FUNCTIONAL ASSESSMENT:  PAIN SCORE AT REST:         SCALE USED: (1-10 VNRS)  PAIN SCORE WITH ACTIVITY:         SCALE USED: (1-10 VNRS)    PAIN ASSESSMENT:  - c/o incisional/back pain radiating down to L thigh    FOCUSED PHYSICAL EXAM  GENERAL: Laying in bed, NAD  NEURO/MOTOR: CN II-XII PERRRL, EOMI  PULM: unlabored  CV: Regular rate and rhythm; No murmurs, rubs, or gallops  ABDOMEN: Soft, Nontender, Nondistended; Bowel sounds present  EXTREMITIES:  2+ Peripheral Pulses, No clubbing, cyanosis, or edema  SKIN: No rashes or lesions      ASSESSMENT: 59yFemale s/p revision TLIF L3-L4 5/6/24      PLAN:   - continue tylenol 1gm q8h  - continue gabapentin 800mg TID  - continue robaxin 500mg q8h  - continue milnacipran 100mg q12h  - continue decadron 6mg q6h x 4 doses  - continue oxycodone 5-10mg q4h prn moderate-severe pain  - dc flexeril  - dc IV dilaudid  - dc sumatriptan       - Bowel regimen: Senna    - Nausea ppx: Zofran standing  - Functional Goals: Pt will get OOB with PT today. Pt will resume previous level of activity without impairment from surgery.   - Additional Consults: None recommended.   - Additional Labs/Imaging:  None recommended.     - Discharge Planning: per primary team  - Pain Management follow up plan: will continue to follow    Plan d/w Dr. Floyd.     Eli Valdivia NP  Acute Pain Service

## 2024-05-07 NOTE — PHYSICAL THERAPY INITIAL EVALUATION ADULT - CRITERIA FOR SKILLED THERAPEUTIC INTERVENTIONS
Evelyn for podiatry referral.  I know the gentleman at [facility] is 1st name is Alexander and his last name begins with an L..  She can get a 2nd opinion from a dermatologist.  She could see the MD that works with [facility] or she could see Dr. James in [place]   impairments found/functional limitations in following categories/therapy frequency/anticipated discharge recommendation

## 2024-05-07 NOTE — CONSULT NOTE ADULT - SUBJECTIVE AND OBJECTIVE BOX
S: patient seen bedside. no acute complaints no overnight events. Tolerated procedure well. had some pain, describes it as "tolerable".   ROS otherwise negative.         HPI:  60yo f c/o low back pain x years. Patient has history of lumbar spinal fusion in 2017 and states pain has been present since prior surgery. States low back pain radiates into right lower extremity. Patient denies current use of a cane/walker. Has failed conservative management including oral analgesics, activity modification. Patient reports recent admission at outside hospital at the end of February for kidney stone/urosepsis/IV antibiotics. Denies history of blood clots/use of anticoagulants.   Presents today for elective REVISION L3-L4 TLIF.  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      HLD (hyperlipidemia)      HTN (hypertension)      GERD (gastroesophageal reflux disease)      Fibromyalgia      RA (rheumatoid arthritis)      Seasonal allergies      Fibroids      Brain aneurysm  dx 2011, sx done      Migraines      Vertigo      Depression      Hallux valgus (acquired), left foot      SHELIA (obstructive sleep apnea)  home test done by PCP in 2016, SHELIA pt was using mouth guard but it broken, not using it now, to notify Anesthesia      H/O: hysterectomy  2011      S/P arthroscopy  left knee and left foot=- 2016      Brain aneurysm  coil sx - 2011      Chronic lower back pain  s/p lumbar discectomy -2013, s/p fusion with hardware- 2017, spinal stimulator      History of bunionectomy  left foot        Home Medications:  carvedilol 6.25 mg oral tablet: 1 tab(s) orally 2 times a day (06 May 2024 11:30)  cyclobenzaprine 10 mg oral tablet: 1 tab(s) orally once a day as needed for  muscle spasm (06 May 2024 11:30)  diclofenac potassium 50 mg oral tablet: 1 tab(s) orally once a day (03 May 2024 12:21)  DULoxetine 60 mg oral delayed release capsule: 1 cap(s) orally once a day (06 May 2024 11:30)  gabapentin 800 mg oral tablet: 1 tab(s) orally 3 times a day (06 May 2024 11:30)  meclizine 12.5 mg oral tablet: 1 tab(s) orally 2 times a day (06 May 2024 11:30)  mirtazapine 30 mg oral tablet: 1 tab(s) orally once a day (at bedtime) (06 May 2024 11:30)  pantoprazole 40 mg oral delayed release tablet: 1 tab(s) orally once a day (06 May 2024 11:30)  Savella 100 mg oral tablet: 1 tab(s) orally 2 times a day (06 May 2024 11:30)  Sulfazine 500 mg oral tablet: 2 tab(s) orally 3 times a day (06 May 2024 11:30)  SUMAtriptan 100 mg oral tablet: 1 tab(s) orally once, As Needed (06 May 2024 11:30)  Vraylar 1.5 mg oral capsule: 1 cap(s) orally once a day (06 May 2024 11:30)    Allergies    Cipro (Vomiting)    Intolerances      FAMILY HISTORY:  Family history of hypertension    Family history of kidney stones (Sibling)      Social History:      REVIEW OF SYSTEMS:  CONSTITUTIONAL: No fever, weight loss  EYES: No eye pain, or discharge  ENMT:  No tinnitus, vertigo  NECK: No pain or stiffness  RESPIRATORY: No cough, No dyspnea  CARDIOVASCULAR: No chest pain, or leg swelling  GASTROINTESTINAL: No abdominal pain. No diarrhea ;No melena or hematochezia.  GENITOURINARY: No dysuria, frequency, or hematuria  NEUROLOGICAL: No numbness, or tremors  SKIN: No itching, burning, rashes, or lesions   ENDOCRINE: No heat or cold intolerance;  MUSCULOSKELETAL: as above  PSYCHIATRIC: No mood swings, or difficulty sleeping  HEME/LYMPH: No easy bruising, or bleeding gums  ALLERGY AND IMMUNOLOGIC: No hives or eczema    Diet, Regular:      Special Instructions for Nursing:  Unless airway concerns (05-06-24 @ 18:43) [Active]      CURRENT MEDICATIONS:   acetaminophen     Tablet .. 1000 milliGRAM(s) Oral every 8 hours  benzocaine/menthol Lozenge 1 Lozenge Oral five times a day PRN  cariprazine 1.5 milliGRAM(s) Oral at bedtime  carvedilol 6.25 milliGRAM(s) Oral every 12 hours  dexAMETHasone  Injectable 6 milliGRAM(s) IV Push every 6 hours  DULoxetine 60 milliGRAM(s) Oral every 12 hours  enoxaparin Injectable 40 milliGRAM(s) SubCutaneous every 24 hours  gabapentin 800 milliGRAM(s) Oral three times a day  lactated ringers. 1000 milliLiter(s) IV Continuous <Continuous>  methocarbamol 500 milliGRAM(s) Oral every 8 hours  milnacipran 100 milliGRAM(s) Oral every 12 hours  mirtazapine 30 milliGRAM(s) Oral at bedtime  ondansetron   Disintegrating Tablet 4 milliGRAM(s) Oral every 6 hours  oxyCODONE    IR 5 milliGRAM(s) Oral every 4 hours PRN  pantoprazole    Tablet 40 milliGRAM(s) Oral before breakfast  polyethylene glycol 3350 17 Gram(s) Oral daily  potassium chloride    Tablet ER 20 milliEquivalent(s) Oral once  senna 2 Tablet(s) Oral at bedtime  sulfaSALAzine 500 milliGRAM(s) Oral three times a day  SUMAtriptan 50 milliGRAM(s) Oral two times a day PRN      VITAL SIGNS, INS/OUTS (last 24 hours):  Vital Signs Last 24 Hrs  T(C): 36.3 (07 May 2024 08:11), Max: 36.6 (06 May 2024 21:01)  T(F): 97.4 (07 May 2024 08:11), Max: 97.9 (06 May 2024 21:01)  HR: 95 (07 May 2024 10:08) (70 - 102)  BP: 110/55 (07 May 2024 08:11) (84/51 - 120/70)  BP(mean): 81 (06 May 2024 21:01) (61 - 86)  RR: 1 (07 May 2024 10:08) (1 - 20)  SpO2: 97% (07 May 2024 10:08) (93% - 100%)    Parameters below as of 07 May 2024 10:08  Patient On (Oxygen Delivery Method): room air      I&O's Summary    06 May 2024 07:01  -  07 May 2024 07:00  --------------------------------------------------------  IN: 1310 mL / OUT: 2500 mL / NET: -1190 mL    07 May 2024 07:01  -  07 May 2024 13:38  --------------------------------------------------------  IN: 280 mL / OUT: 1500 mL / NET: -1220 mL        PHYSICAL EXAM:  Gen: Reclining in bed at time of exam, appears stated age  HEENT: NCAT, MMM, clear OP  Neck: supple, trachea at midline  CV: RRR, +S1/S2  Pulm: adequate respiratory effort, no increase in work of breathing  Abd: soft, NTND  Skin: warm and dry,  Ext: no LE edema  : ely in place draining clear/yellow urine.   Psych: affect and behavior appropriate, pleasant at time of interview  MSK: surgical drain in place, dark fluid draining.     BASIC LABS:                        10.4   14.75 )-----------( 306      ( 07 May 2024 05:30 )             32.3     05-07    136  |  103  |  9   ----------------------------<  190<H>  3.6   |  22  |  0.60    Ca    9.2      07 May 2024 05:30        Urinalysis Basic - ( 07 May 2024 05:30 )    Color: x / Appearance: x / SG: x / pH: x  Gluc: 190 mg/dL / Ketone: x  / Bili: x / Urobili: x   Blood: x / Protein: x / Nitrite: x   Leuk Esterase: x / RBC: x / WBC x   Sq Epi: x / Non Sq Epi: x / Bacteria: x      CAPILLARY BLOOD GLUCOSE          OTHER LABS:        MICRODATA:      IMAGING:    EKG:    #Diet - Diet, Regular:      Special Instructions for Nursing:  Unless airway concerns (05-06-24 @ 18:43) [Active]        #DVT PPx - enoxaparin Injectable: [Ordered as LOVENOX]  40 milliGRAM(s), SubCutaneous, every 24 hours  Administration Instructions: This is a High Alert Medication. (05-06-24 @ 18:45)

## 2024-05-07 NOTE — PROGRESS NOTE ADULT - SUBJECTIVE AND OBJECTIVE BOX
Ortho Note    Pt seen and examined at bedside. Pt reporting incisional low back pain radiating into left thigh. Denies muscle spasms and describes pain as "just pain," but states it is improved with medications. Denies N/T. Notes some dizziness with turning her head. Voiding with ely. No BM yet. Tolerating PO diet.   Denies CP, SOB, N/V, new numbness/tingling,    Vital Signs Last 24 Hrs  T(C): 36.3 (05-07-24 @ 08:11), Max: 36.6 (05-07-24 @ 05:52)  T(F): 97.4 (05-07-24 @ 08:11), Max: 97.9 (05-07-24 @ 05:52)  HR: 92 (05-07-24 @ 08:11) (90 - 95)  BP: 110/55 (05-07-24 @ 08:11) (110/55 - 120/70)  BP(mean): --  RR: 16 (05-07-24 @ 08:11) (16 - 20)  SpO2: 95% (05-07-24 @ 08:11) (94% - 95%)  I&O's Summary    06 May 2024 07:01  -  07 May 2024 07:00  --------------------------------------------------------  IN: 1310 mL / OUT: 2500 mL / NET: -1190 mL        General: Appears slightly sleepy, NAD  Neuro: A&O x 3 to self, time and place, speaking in full sentences  DSG C/D/I- Aquacel, HV x 1 holding suction  Pulses: 2+ DP bilaterally, brisk cap refill  Calves soft, nontender bilaterally  Sensation: SILT distally bilateral lower extremities  Motor:   EHL/FHL/TA/GS: 5/5 bilaterally  Ely draining yellow urine                          10.4   14.75 )-----------( 306      ( 07 May 2024 05:30 )             32.3     05-07    136  |  103  |  9   ----------------------------<  190<H>  3.6   |  22  |  0.60    Ca    9.2      07 May 2024 05:30        A/P: 59yFemale POD #1 s/p L3-L4 revision TLIF with Dr. Lopez on 5/6  - Stable, labs and vitals reviewed  - Appreciate medicine recs  - Pain Control- follow up pain management recs, discontinued flexeril  - Discontinue ely when OOB with PT, follow up TOV  - Continue to monitor drain output: 100cc in 24 hrs  - Patient's daughter to bring cariprazole 1.5mg today  - Continue with bowel regimen  - OOB with meals, encourage IS  - DVT ppx: SCDs,   - PT, WBS: WBAT  - Dispo: pending drain, PT eval     Ortho Pager [phone] Ortho Note    Pt seen and examined at bedside. Pt reporting incisional low back pain radiating into left thigh. Denies muscle spasms and describes pain as "just pain," but states it is improved with medications. Denies N/T. Notes some dizziness with turning her head. Voiding with ely. No BM yet. Tolerating PO diet.   Denies CP, SOB, N/V, new numbness/tingling,    Vital Signs Last 24 Hrs  T(C): 36.3 (05-07-24 @ 08:11), Max: 36.6 (05-07-24 @ 05:52)  T(F): 97.4 (05-07-24 @ 08:11), Max: 97.9 (05-07-24 @ 05:52)  HR: 92 (05-07-24 @ 08:11) (90 - 95)  BP: 110/55 (05-07-24 @ 08:11) (110/55 - 120/70)  BP(mean): --  RR: 16 (05-07-24 @ 08:11) (16 - 20)  SpO2: 95% (05-07-24 @ 08:11) (94% - 95%)  I&O's Summary    06 May 2024 07:01  -  07 May 2024 07:00  --------------------------------------------------------  IN: 1310 mL / OUT: 2500 mL / NET: -1190 mL        General: Appears slightly sleepy, NAD  Neuro: A&O x 3 to self, time and place, speaking in full sentences  DSG C/D/I- Aquacel, HV x 1 holding suction  Pulses: 2+ DP bilaterally, brisk cap refill  Calves soft, nontender bilaterally  Sensation: SILT distally bilateral lower extremities  Motor:   EHL/FHL/TA/GS: 5/5 bilaterally  Ely draining yellow urine                          10.4   14.75 )-----------( 306      ( 07 May 2024 05:30 )             32.3     05-07    136  |  103  |  9   ----------------------------<  190<H>  3.6   |  22  |  0.60    Ca    9.2      07 May 2024 05:30        A/P: 59yFemale POD #1 s/p L3-L4 revision TLIF with Dr. Lopez on 5/6  - Stable, labs and vitals reviewed  - Appreciate medicine recs  - Pain Control- follow up pain management recs, discontinued flexeril  - Discontinue ely when OOB with PT, follow up TOV  - Continue to monitor drain output: 100cc in 24 hrs  - Patient's daughter to bring cariprazole 1.5mg today  - Continue with bowel regimen  - OOB with meals, encourage IS  - DVT ppx: SCDs, LVX to start tonight  - PT, WBS: WBAT  - Dispo: pending drain, PT eval     Ortho Pager [phone]

## 2024-05-07 NOTE — PHYSICAL THERAPY INITIAL EVALUATION ADULT - ADDITIONAL COMMENTS
Patient endorsed she lives with daughter, in apartment with elevators, no steps.   PTA, patient ambulatory IND with use of RW, patient reports she needed partial assist  for ADL's (showering), able to complete dressing IND, required assist fro meal preparation/cleaning.      Pt has HHA services for 2 days 4 hours, + 2 days 5 hours, daughter assists  patient when home aide is not available.

## 2024-05-07 NOTE — PHYSICAL THERAPY INITIAL EVALUATION ADULT - GENERAL OBSERVATIONS, REHAB EVAL
DONOVAN Fuchs cleared patient for PT eval.  Patient received supine in bed in NAD, + tele/EKG, HV x 1 , B SCDs, , + heplock, spine incision C/D/I , friend at bedside.

## 2024-05-07 NOTE — PROGRESS NOTE ADULT - SUBJECTIVE AND OBJECTIVE BOX
Procedure: Revision TLIF L3-L4  Surgeon: John    Pt comfortable without complaints, pain controlled. Notes numbness over dorsum of L foot, otherwise denies CP, SOB, N/V, numbness/tingling     Vital Signs Last 24 Hrs  T(C): 36.6 (07 May 2024 05:52), Max: 36.6 (06 May 2024 21:01)  T(F): 97.9 (07 May 2024 05:52), Max: 97.9 (06 May 2024 21:01)  HR: 95 (07 May 2024 05:35) (70 - 102)  BP: 120/70 (07 May 2024 05:01) (84/51 - 131/87)  BP(mean): 81 (06 May 2024 21:01) (61 - 86)  RR: 20 (07 May 2024 05:35) (10 - 20)  SpO2: 95% (07 May 2024 05:35) (93% - 100%)    Parameters below as of 07 May 2024 05:35  Patient On (Oxygen Delivery Method): room air      General: Pt Alert and oriented, NAD  B/L LE:  DP 2+, brisk cap refill, EHL/FHL/TA/GS firing bilaterally  aquacell x1 drain, HV  Ely in place    Labs:                        10.4   14.75 )-----------( 306      ( 07 May 2024 05:30 )             32.3     05-07    136  |  103  |  9   ----------------------------<  190<H>  3.6   |  22  |  0.60    Ca    9.2      07 May 2024 05:30        A/P: 59yFemale POD#1 s/p revision TLIF L3-L4  - Stable  - Pain Control  - DVT ppx: SCDs pod 0, lovenox POD1  - Post op abx: ancef  - Monitor drain o/p  - Dc ely for TOV  - PT, WBS: WBAT  - Dispo: pending PT, drain     Ortho Pager [phone]

## 2024-05-07 NOTE — PHYSICAL THERAPY INITIAL EVALUATION ADULT - LIGHT TOUCH SENSATION, RUE, REHAB EVAL
Does patient need more gabapentin? Will call patent regarding everything mentioned.   within normal limits

## 2024-05-07 NOTE — CONSULT NOTE ADULT - ASSESSMENT
60yo f c/o low back pain x years. Patient has history of lumbar spinal fusion in 2017 and states pain has been present since prior surgery. States low back pain radiates into right lower extremity. Patient denies current use of a cane/walker. Has failed conservative management including oral analgesics, activity modification. Patient reports recent admission at outside hospital at the end of February for kidney stone/urosepsis/IV antibiotics. Denies history of blood clots/use of anticoagulants.   Presents today for elective REVISION L3-L4 TLIF.          #L3-L4 TLIF 5/6  -Management a/p ortho  #Post operative state-  -Encourage IS. Ambulate/OOBTC as tolerated. PT consult. DVT ppx a/p primary. Multi modal pain control, can use IV dilaudid for severe/breakthrough pain.     #Acute blood loss anemia  -Preop labs hgb 12.2 down to 10.4 post op  -No further s/s of blood loss  -Continue monitoring.     #Steroid induced hyperglycemia  -190 on BMP post operatively. Reviewed outpatient, A1C 5.6.   -SSI for now.     #HTN/HLD/depression  -c/w home meds. Patient to bring in cariprazine, milnacipran.     #BMI 30 - affects all aspects of care      MDM High  Reviewed blood work, vitals, OR course, outpatient hospital records  Case d/w ortho ACP  Management of blood loss anemia, managmenet of pain requiring IV dilaudid.

## 2024-05-08 ENCOUNTER — TRANSCRIPTION ENCOUNTER (OUTPATIENT)
Age: 59
End: 2024-05-08

## 2024-05-08 LAB
A1C WITH ESTIMATED AVERAGE GLUCOSE RESULT: 5.5 % — SIGNIFICANT CHANGE UP (ref 4–5.6)
ANION GAP SERPL CALC-SCNC: 8 MMOL/L — SIGNIFICANT CHANGE UP (ref 5–17)
BASOPHILS # BLD AUTO: 0.02 K/UL — SIGNIFICANT CHANGE UP (ref 0–0.2)
BASOPHILS NFR BLD AUTO: 0.1 % — SIGNIFICANT CHANGE UP (ref 0–2)
BUN SERPL-MCNC: 18 MG/DL — SIGNIFICANT CHANGE UP (ref 7–23)
CALCIUM SERPL-MCNC: 9.3 MG/DL — SIGNIFICANT CHANGE UP (ref 8.4–10.5)
CHLORIDE SERPL-SCNC: 107 MMOL/L — SIGNIFICANT CHANGE UP (ref 96–108)
CO2 SERPL-SCNC: 26 MMOL/L — SIGNIFICANT CHANGE UP (ref 22–31)
CREAT SERPL-MCNC: 0.64 MG/DL — SIGNIFICANT CHANGE UP (ref 0.5–1.3)
EGFR: 101 ML/MIN/1.73M2 — SIGNIFICANT CHANGE UP
EOSINOPHIL # BLD AUTO: 0 K/UL — SIGNIFICANT CHANGE UP (ref 0–0.5)
EOSINOPHIL NFR BLD AUTO: 0 % — SIGNIFICANT CHANGE UP (ref 0–6)
ESTIMATED AVERAGE GLUCOSE: 111 MG/DL — SIGNIFICANT CHANGE UP (ref 68–114)
GLUCOSE BLDC GLUCOMTR-MCNC: 111 MG/DL — HIGH (ref 70–99)
GLUCOSE BLDC GLUCOMTR-MCNC: 143 MG/DL — HIGH (ref 70–99)
GLUCOSE BLDC GLUCOMTR-MCNC: 189 MG/DL — HIGH (ref 70–99)
GLUCOSE BLDC GLUCOMTR-MCNC: 224 MG/DL — HIGH (ref 70–99)
GLUCOSE SERPL-MCNC: 150 MG/DL — HIGH (ref 70–99)
HCT VFR BLD CALC: 28.6 % — LOW (ref 34.5–45)
HGB BLD-MCNC: 9.2 G/DL — LOW (ref 11.5–15.5)
IMM GRANULOCYTES NFR BLD AUTO: 0.6 % — SIGNIFICANT CHANGE UP (ref 0–0.9)
LYMPHOCYTES # BLD AUTO: 10.3 % — LOW (ref 13–44)
LYMPHOCYTES # BLD AUTO: 2.06 K/UL — SIGNIFICANT CHANGE UP (ref 1–3.3)
MCHC RBC-ENTMCNC: 29.7 PG — SIGNIFICANT CHANGE UP (ref 27–34)
MCHC RBC-ENTMCNC: 32.2 GM/DL — SIGNIFICANT CHANGE UP (ref 32–36)
MCV RBC AUTO: 92.3 FL — SIGNIFICANT CHANGE UP (ref 80–100)
MONOCYTES # BLD AUTO: 0.74 K/UL — SIGNIFICANT CHANGE UP (ref 0–0.9)
MONOCYTES NFR BLD AUTO: 3.7 % — SIGNIFICANT CHANGE UP (ref 2–14)
NEUTROPHILS # BLD AUTO: 16.98 K/UL — HIGH (ref 1.8–7.4)
NEUTROPHILS NFR BLD AUTO: 85.3 % — HIGH (ref 43–77)
NRBC # BLD: 0 /100 WBCS — SIGNIFICANT CHANGE UP (ref 0–0)
PLATELET # BLD AUTO: 304 K/UL — SIGNIFICANT CHANGE UP (ref 150–400)
POTASSIUM SERPL-MCNC: 3.7 MMOL/L — SIGNIFICANT CHANGE UP (ref 3.5–5.3)
POTASSIUM SERPL-SCNC: 3.7 MMOL/L — SIGNIFICANT CHANGE UP (ref 3.5–5.3)
RBC # BLD: 3.1 M/UL — LOW (ref 3.8–5.2)
RBC # FLD: 13.3 % — SIGNIFICANT CHANGE UP (ref 10.3–14.5)
SODIUM SERPL-SCNC: 141 MMOL/L — SIGNIFICANT CHANGE UP (ref 135–145)
WBC # BLD: 19.92 K/UL — HIGH (ref 3.8–10.5)
WBC # FLD AUTO: 19.92 K/UL — HIGH (ref 3.8–10.5)

## 2024-05-08 PROCEDURE — 99233 SBSQ HOSP IP/OBS HIGH 50: CPT

## 2024-05-08 RX ADMIN — MILNACIPRAN HYDROCHLORIDE 100 MILLIGRAM(S): 50 TABLET, FILM COATED ORAL at 19:41

## 2024-05-08 RX ADMIN — Medication 2: at 12:10

## 2024-05-08 RX ADMIN — Medication 1000 MILLIGRAM(S): at 22:02

## 2024-05-08 RX ADMIN — DULOXETINE HYDROCHLORIDE 60 MILLIGRAM(S): 30 CAPSULE, DELAYED RELEASE ORAL at 05:37

## 2024-05-08 RX ADMIN — OXYCODONE HYDROCHLORIDE 10 MILLIGRAM(S): 5 TABLET ORAL at 22:05

## 2024-05-08 RX ADMIN — POLYETHYLENE GLYCOL 3350 17 GRAM(S): 17 POWDER, FOR SOLUTION ORAL at 12:10

## 2024-05-08 RX ADMIN — GABAPENTIN 800 MILLIGRAM(S): 400 CAPSULE ORAL at 13:29

## 2024-05-08 RX ADMIN — ENOXAPARIN SODIUM 40 MILLIGRAM(S): 100 INJECTION SUBCUTANEOUS at 22:04

## 2024-05-08 RX ADMIN — Medication 20 MILLIEQUIVALENT(S): at 22:09

## 2024-05-08 RX ADMIN — OXYCODONE HYDROCHLORIDE 10 MILLIGRAM(S): 5 TABLET ORAL at 23:05

## 2024-05-08 RX ADMIN — ONDANSETRON 4 MILLIGRAM(S): 8 TABLET, FILM COATED ORAL at 05:37

## 2024-05-08 RX ADMIN — OXYCODONE HYDROCHLORIDE 10 MILLIGRAM(S): 5 TABLET ORAL at 13:30

## 2024-05-08 RX ADMIN — ONDANSETRON 4 MILLIGRAM(S): 8 TABLET, FILM COATED ORAL at 18:12

## 2024-05-08 RX ADMIN — CARVEDILOL PHOSPHATE 6.25 MILLIGRAM(S): 80 CAPSULE, EXTENDED RELEASE ORAL at 18:12

## 2024-05-08 RX ADMIN — Medication 1000 MILLIGRAM(S): at 05:37

## 2024-05-08 RX ADMIN — Medication 1000 MILLIGRAM(S): at 13:29

## 2024-05-08 RX ADMIN — ONDANSETRON 4 MILLIGRAM(S): 8 TABLET, FILM COATED ORAL at 12:10

## 2024-05-08 RX ADMIN — METHOCARBAMOL 500 MILLIGRAM(S): 500 TABLET, FILM COATED ORAL at 05:36

## 2024-05-08 RX ADMIN — METHOCARBAMOL 500 MILLIGRAM(S): 500 TABLET, FILM COATED ORAL at 22:05

## 2024-05-08 RX ADMIN — DULOXETINE HYDROCHLORIDE 60 MILLIGRAM(S): 30 CAPSULE, DELAYED RELEASE ORAL at 18:12

## 2024-05-08 RX ADMIN — PANTOPRAZOLE SODIUM 40 MILLIGRAM(S): 20 TABLET, DELAYED RELEASE ORAL at 05:36

## 2024-05-08 RX ADMIN — MILNACIPRAN HYDROCHLORIDE 100 MILLIGRAM(S): 50 TABLET, FILM COATED ORAL at 07:33

## 2024-05-08 RX ADMIN — CARIPRAZINE 1.5 MILLIGRAM(S): 1.5 CAPSULE, GELATIN COATED ORAL at 22:02

## 2024-05-08 RX ADMIN — Medication 500 MILLIGRAM(S): at 05:37

## 2024-05-08 RX ADMIN — OXYCODONE HYDROCHLORIDE 10 MILLIGRAM(S): 5 TABLET ORAL at 10:54

## 2024-05-08 RX ADMIN — GABAPENTIN 800 MILLIGRAM(S): 400 CAPSULE ORAL at 22:04

## 2024-05-08 RX ADMIN — GABAPENTIN 800 MILLIGRAM(S): 400 CAPSULE ORAL at 05:37

## 2024-05-08 RX ADMIN — Medication 1000 MILLIGRAM(S): at 23:00

## 2024-05-08 RX ADMIN — SENNA PLUS 2 TABLET(S): 8.6 TABLET ORAL at 22:05

## 2024-05-08 RX ADMIN — CARVEDILOL PHOSPHATE 6.25 MILLIGRAM(S): 80 CAPSULE, EXTENDED RELEASE ORAL at 05:37

## 2024-05-08 RX ADMIN — METHOCARBAMOL 500 MILLIGRAM(S): 500 TABLET, FILM COATED ORAL at 13:29

## 2024-05-08 RX ADMIN — Medication 500 MILLIGRAM(S): at 13:30

## 2024-05-08 RX ADMIN — Medication 4: at 18:12

## 2024-05-08 RX ADMIN — OXYCODONE HYDROCHLORIDE 10 MILLIGRAM(S): 5 TABLET ORAL at 09:54

## 2024-05-08 RX ADMIN — Medication 500 MILLIGRAM(S): at 22:06

## 2024-05-08 RX ADMIN — OXYCODONE HYDROCHLORIDE 10 MILLIGRAM(S): 5 TABLET ORAL at 14:30

## 2024-05-08 NOTE — PATIENT PROFILE ADULT - FALL HARM RISK - HARM RISK INTERVENTIONS
Assistance with ambulation/Assistance OOB with selected safe patient handling equipment/Communicate Risk of Fall with Harm to all staff/Discuss with provider need for PT consult/Monitor gait and stability/Provide patient with walking aids - walker, cane, crutches/Reinforce activity limits and safety measures with patient and family/Sit up slowly, dangle for a short time, stand at bedside before walking/Tailored Fall Risk Interventions/Toileting schedule using arm’s reach rule for commode and bathroom/Use of alarms - bed, chair and/or voice tab/Visual Cue: Yellow wristband and red socks/Bed in lowest position, wheels locked, appropriate side rails in place/Call bell, personal items and telephone in reach/Instruct patient to call for assistance before getting out of bed or chair/Non-slip footwear when patient is out of bed/[place] to call system/Physically safe environment - no spills, clutter or unnecessary equipment/Purposeful Proactive Rounding/Room/bathroom lighting operational, light cord in reach

## 2024-05-08 NOTE — PROVIDER CONTACT NOTE (OTHER) - ASSESSMENT
Pt's midline aquacel back dressing was rolled up about 3/4 way up. Asked whether to remove old dressing and keep incision open to air or apply a new dressing. And, MD stated someone will change dressing.

## 2024-05-08 NOTE — PROVIDER CONTACT NOTE (OTHER) - ACTION/TREATMENT ORDERED:
As per pt, an ortho resident rolled down old dressing but rolled back up. Removed old dressing. Cleaned site with chlorhexidine swab and applied new aquacel dressing.

## 2024-05-08 NOTE — OCCUPATIONAL THERAPY INITIAL EVALUATION ADULT - PERTINENT HX OF CURRENT PROBLEM, REHAB EVAL
Patient is 58yo f c/o low back pain x years. Patient has history of lumbar spinal fusion in 2017 and states pain has been present since prior surgery. States low back pain radiates into right lower extremity.  Has failed conservative management including oral analgesics, activity modification. Patient reports recent admission at outside hospital at the end of February for kidney stone/urosepsis/IV antibiotics. Denies history of blood clots/use of anticoagulants.   Presents for elective REVISION L3-L4 TLIF.

## 2024-05-08 NOTE — DISCHARGE NOTE PROVIDER - HOSPITAL COURSE
Admit to Orthopaedics 5/6/24 s/p Revision TLIF L3-L4   Perioperative Antibiotics  DVT prophylaxis  Physical Therapy  Pain Management   Medical Co Management Admit to Orthopaedics 5/6/24 s/p Revision TLIF L3-L4   Perioperative Antibiotics  DVT prophylaxis  Physical Therapy  Pain Management   Medical Co Management   Drain discontinued when output appropriately low

## 2024-05-08 NOTE — PROGRESS NOTE ADULT - SUBJECTIVE AND OBJECTIVE BOX
S: patient seen bedside. no acute complaints no overnight events. Doing well today. Pain controlle.d   ROS otherwise negative.       Vital Signs Last 24 Hrs  T(C): 36.6 (08 May 2024 08:40), Max: 36.8 (08 May 2024 05:26)  T(F): 97.9 (08 May 2024 08:40), Max: 98.3 (08 May 2024 05:26)  HR: 92 (08 May 2024 10:15) (76 - 124)  BP: 113/68 (08 May 2024 10:15) (107/59 - 139/78)  BP(mean): --  RR: 18 (08 May 2024 09:10) (13 - 18)  SpO2: 96% (08 May 2024 10:15) (93% - 99%)    Parameters below as of 08 May 2024 10:15  Patient On (Oxygen Delivery Method): room air            PHYSICAL EXAM:  Gen: Reclining in bed at time of exam, appears stated age  HEENT: NCAT, MMM, clear OP  Neck: supple, trachea at midline  CV: RRR, +S1/S2  Pulm: adequate respiratory effort, no increase in work of breathing  Abd: soft, NTND  Skin: warm and dry,  Ext: no LE edema  Psych: affect and behavior appropriate, pleasant at time of interview  MSK: surgical drain in place, dark fluid draining.                             9.2    19.92 )-----------( 304      ( 08 May 2024 05:30 )             28.6   05-08    141  |  107  |  18  ----------------------------<  150<H>  3.7   |  26  |  0.64    Ca    9.3      08 May 2024 05:30

## 2024-05-08 NOTE — PROGRESS NOTE ADULT - SUBJECTIVE AND OBJECTIVE BOX
Ortho Note    Patient seen and examined at bedside. Pt comfortable without complaints, pain controlled. Reports her left leg pain has improved since surgery. Otherwise voiding freely, passing flatus.   Denies CP, SOB, N/V, new numbness/tingling     Vital Signs Last 24 Hrs  T(C): 36.6 (05-08-24 @ 08:40), Max: 36.6 (05-08-24 @ 08:40)  T(F): 97.9 (05-08-24 @ 08:40), Max: 97.9 (05-08-24 @ 08:40)  HR: 92 (05-08-24 @ 10:15) (83 - 93)  BP: 113/68 (05-08-24 @ 10:15) (113/68 - 139/78)  BP(mean): --  RR: 18 (05-08-24 @ 09:10) (18 - 18)  SpO2: 96% (05-08-24 @ 10:15) (94% - 96%)  I&O's Summary    07 May 2024 07:01  -  08 May 2024 07:00  --------------------------------------------------------  IN: 1280 mL / OUT: 3860 mL / NET: -2580 mL    08 May 2024 07:01  -  08 May 2024 12:04  --------------------------------------------------------  IN: 120 mL / OUT: 0 mL / NET: 120 mL        General: Pt Alert and oriented, NAD  DSG C/D/I- Aquacel to lumbar spine, HVx1 in place holding suction   Pulses: 2+ DP pulses palpable bilaterally, skin wwp, cap refill brisk, no calf tenderness bilaterally   Sensation: SILT to L2-S1 dermatomes bilaterally  Motor: EHL/FHL/TA/GS 5/5 bilaterally                          9.2    19.92 )-----------( 304      ( 08 May 2024 05:30 )             28.6     05-08    141  |  107  |  18  ----------------------------<  150<H>  3.7   |  26  |  0.64    Ca    9.3      08 May 2024 05:30        A/P: 58yo Female POD#2 s/p L3-L4 TLIF   - Stable, appreciate medical comanagement  - Pain Control  - OOB/IS  - Bowel Regimen  - HVx1: 60/160, continue drain, monitor output  - DVT ppx: LVX 40mg daily  - PT, WBS: WBAT  - Dispo: HPT pending PT clearance and drain removal    Ortho Pager [phone]

## 2024-05-08 NOTE — OCCUPATIONAL THERAPY INITIAL EVALUATION ADULT - GENERAL OBSERVATIONS, REHAB EVAL
Pt received semi-supine in bed, +tele, +hemovac, +heplock, +spinal dressing C/D/I, NAD, and agreeable to OT. Cleared by DONOVAN Jacobs to see.

## 2024-05-08 NOTE — OCCUPATIONAL THERAPY INITIAL EVALUATION ADULT - PLANNED THERAPY INTERVENTIONS, OT EVAL
ADL retraining/IADL retraining/balance training/bed mobility training/motor coordination training/neuromuscular re-education/ROM/strengthening/stretching/transfer training

## 2024-05-08 NOTE — DISCHARGE NOTE PROVIDER - NSDCCPTREATMENT_GEN_ALL_CORE_FT
PRINCIPAL PROCEDURE  Procedure: Fusion, spine, lumbar, interbody, 1-2 levels, transforaminal approach  Findings and Treatment: L3-L4, extended from previous TLIF L4-S1

## 2024-05-08 NOTE — OCCUPATIONAL THERAPY INITIAL EVALUATION ADULT - ADDITIONAL COMMENTS
Patient states she lives with her daughter and her daughter's , in apartment with elevators, no steps. Patient reports she needed partial assist for ADLs and was ambulating with a RW. Pt has HHA services for 2 days 4 hours, +2 days 5 hours, daughter assists patient when home aide is not available.

## 2024-05-08 NOTE — OCCUPATIONAL THERAPY INITIAL EVALUATION ADULT - MODIFIED CLINICAL TEST OF SENSORY INTEGRATION IN BALANCE TEST
Pt able to ambulate to the toilet 15 feet x2 and 45 feet x2 in hallway with CGA using RW, demonstrating difficulty weight-shifting, decreased step length and nathan

## 2024-05-08 NOTE — PROGRESS NOTE ADULT - SUBJECTIVE AND OBJECTIVE BOX
Ortho Note    Pt comfortable without complaints, pain controlled with medication. No longer having numbness over dorsum of L foot.   Denies CP, SOB, N/V, numbness/tingling     Vital Signs Last 24 Hrs  T(C): 36.8 (05-08-24 @ 05:26), Max: 36.8 (05-08-24 @ 05:26)  T(F): 98.3 (05-08-24 @ 05:26), Max: 98.3 (05-08-24 @ 05:26)  HR: 81 (05-08-24 @ 05:46) (81 - 83)  BP: 125/70 (05-08-24 @ 05:26) (125/70 - 125/70)  BP(mean): --  RR: 18 (05-08-24 @ 05:46) (18 - 18)  SpO2: 94% (05-08-24 @ 05:46) (93% - 94%)  I&O's Summary    07 May 2024 07:01  -  08 May 2024 07:00  --------------------------------------------------------  IN: 1280 mL / OUT: 3860 mL / NET: -2580 mL        General: Pt Alert and oriented, NAD  Aquacel DSG C/D/I, HVx1  Pulses: 2+ DP/PT b/l  Sensation: SILT b/l  Motor: Quad/Ham/EHL/FHL/TA/GS  5/5                          9.2    19.92 )-----------( 304      ( 08 May 2024 05:30 )             28.6     05-08    141  |  107  |  x   ----------------------------<  150<H>  3.7   |  26  |  0.64    Ca    9.3      08 May 2024 05:30        A/P: 59yFemale POD#2 s/p revision TLIF L3-L4    - Stable  - Pain Control  - DVT ppx: SCDs, lovenox  - Post op abx: ancef  - Monitor drain o/p  - PT, WBS: WBAT  - Dispo: pending PT, drain     Ortho Pager [phone]

## 2024-05-08 NOTE — DISCHARGE NOTE PROVIDER - CARE PROVIDER_API CALL
Ayden Lopez Mat  Orthopaedic Surgery  [address] [place] [address], [address]  [place] [address]  Phone: [phone]  Fax: [phone]  Follow Up Time:

## 2024-05-08 NOTE — OCCUPATIONAL THERAPY INITIAL EVALUATION ADULT - DIAGNOSIS, OT EVAL
Pt admitted POD2 for fusion, spine, lumbar, interbody, L3-L4, extended from previous TLIF L4-S1 and presents with impaired balance, decreased strength and activity tolerance.

## 2024-05-08 NOTE — DISCHARGE NOTE PROVIDER - NSDCFUADDINST_GEN_ALL_CORE_FT
ACTIVITY:   - No extreme bending, extending, turning, twisting, or straining. No strenuous activity, heavy lifting, driving or returning to work until cleared by your surgeon.     DRESSING/SHOWERING:      Your outer aquacel dressing is water resistant. You may shower.  Once dressing removed keep incision clean and dry. Do not pick at your incision. If you have a mesh dressing overlying your incision do not remove it - it will be removed at your post operative visit. Do not apply creams, ointments or oils to your incision until cleared by your surgeon. Do not soak your incision in sitting water (ie tubs, pools, lakes, etc.) until cleared by your surgeon.      MEDICATION/ANTICOAGULATION:   - You have been prescribed medications for pain:     - Tylenol (Acetaminophen) for mild to moderate pain. Do not exceed 3,000mg daily.     - For more severe pain, you may continue to take the Tylenol with the addition of narcotic pain medication. Take this medication as prescribed. Do not take more than prescribed. Note that this medication may cause drowsiness or dizziness. Do not operate machinery. This medication may cause constipation.   - If you have been prescribed a muscle relaxer, take this medication as needed for muscle spasm. Follow instructions on bottle.   - Try to have regular bowel movements. Take stool softener or laxative if necessary. You may wish to take Miralax daily until you have regular bowel movements.    - Do not take antiinflammatories (Aleve, Advil, Naproxen, Ibuprofen, etc.) until cleared by your surgeon. Tylenol is not an anti-inflammatory and okay to take (see above).   - If you have a pain management physician, please follow-up with them postoperatively.    - If you experience any negative side effects of your medications, please call your surgeon's office to discuss.     FOLLOW UP:   - Call to schedule an appt with Dr. Lopez for follow up.    - Please follow-up with your primary care physician or any other specialist you see postoperatively, if needed.    - Contact your doctor or go to the emergency room if you experience: fever greater than 101.5, chills, chest pain, difficulty breathing, redness or excessive drainage around the incision, other concerns.

## 2024-05-08 NOTE — PROGRESS NOTE ADULT - SUBJECTIVE AND OBJECTIVE BOX
PAIN MANAGEMENT CONSULT NOTE    Interval Events:  -       Since yesterday 6am, pt has required:  - oxycodone 5mg x 1, 10mg x 1    HOME MEDICATIONS:   Please refer to initial HNP    Allergies    Cipro (Vomiting)    Intolerances        PAIN MEDICATIONS:  acetaminophen     Tablet .. 1000 milliGRAM(s) Oral every 8 hours  cariprazine 1.5 milliGRAM(s) Oral at bedtime  DULoxetine 60 milliGRAM(s) Oral every 12 hours  gabapentin 800 milliGRAM(s) Oral three times a day  methocarbamol 500 milliGRAM(s) Oral every 8 hours  milnacipran 100 milliGRAM(s) Oral every 12 hours  mirtazapine 30 milliGRAM(s) Oral at bedtime  ondansetron   Disintegrating Tablet 4 milliGRAM(s) Oral every 6 hours  oxyCODONE    IR 5 milliGRAM(s) Oral every 3 hours PRN  oxyCODONE    IR 10 milliGRAM(s) Oral every 3 hours PRN  SUMAtriptan 50 milliGRAM(s) Oral two times a day PRN    Heme:  enoxaparin Injectable 40 milliGRAM(s) SubCutaneous every 24 hours    Antibiotics:    Cardiovascular:  carvedilol 6.25 milliGRAM(s) Oral every 12 hours    GI:  pantoprazole    Tablet 40 milliGRAM(s) Oral before breakfast  polyethylene glycol 3350 17 Gram(s) Oral daily  senna 2 Tablet(s) Oral at bedtime  sulfaSALAzine 500 milliGRAM(s) Oral three times a day    Endocrine:  dextrose 50% Injectable 12.5 Gram(s) IV Push once  dextrose 50% Injectable 25 Gram(s) IV Push once  dextrose Oral Gel 15 Gram(s) Oral once PRN  glucagon  Injectable 1 milliGRAM(s) IntraMuscular once  insulin lispro (ADMELOG) corrective regimen sliding scale   SubCutaneous Before meals and at bedtime    All Other Medications:  benzocaine/menthol Lozenge 1 Lozenge Oral five times a day PRN  dextrose 10% Bolus 125 milliLiter(s) IV Bolus once  dextrose 5%. 1000 milliLiter(s) IV Continuous <Continuous>  dextrose 5%. 1000 milliLiter(s) IV Continuous <Continuous>  potassium chloride    Tablet ER 20 milliEquivalent(s) Oral once      Vital Signs Last 24 Hrs  T(C): 36.6 (08 May 2024 08:40), Max: 36.8 (08 May 2024 05:26)  T(F): 97.9 (08 May 2024 08:40), Max: 98.3 (08 May 2024 05:26)  HR: 81 (08 May 2024 05:46) (76 - 124)  BP: 125/70 (08 May 2024 05:26) (107/59 - 135/67)  BP(mean): --  RR: 18 (08 May 2024 05:46) (1 - 18)  SpO2: 94% (08 May 2024 05:46) (93% - 99%)    Parameters below as of 08 May 2024 05:46  Patient On (Oxygen Delivery Method): room air        LABS:                        9.2    19.92 )-----------( 304      ( 08 May 2024 05:30 )             28.6     05-08    141  |  107  |  18  ----------------------------<  150<H>  3.7   |  26  |  0.64    Ca    9.3      08 May 2024 05:30        Urinalysis Basic - ( 08 May 2024 05:30 )    Color: x / Appearance: x / SG: x / pH: x  Gluc: 150 mg/dL / Ketone: x  / Bili: x / Urobili: x   Blood: x / Protein: x / Nitrite: x   Leuk Esterase: x / RBC: x / WBC x   Sq Epi: x / Non Sq Epi: x / Bacteria: x        RADIOLOGY:    Drug Screen:        REVIEW OF SYSTEMS:  CONSTITUTIONAL: Denies fever or fatigue   EYES: Denies eye pain, visual disturbances  HEENT: Denies difficulty hearing, throat/neck pain or stiffness  RESPIRATORY: Denies SOB, cough, wheezing  CARDIOVASCULAR: Denies chest pain, palpitations.   GASTROINTESTINAL: Endorses +flatus, BMs. Denies nausea, vomiting, abdominal or epigastric pain.   GENITOURINARY: Denies dysuria, frequency, or incontinence  NEUROLOGICAL: Endorses *** numbness, tingling. Denies headaches, loss of strength, tremors, dizzinesss or lightheadedness with pain medications.   MUSCULOSKELETAL: Denies joint pain or swelling      FUNCTIONAL ASSESSMENT:  PAIN SCORE AT REST:         SCALE USED: (1-10 VNRS)  PAIN SCORE WITH ACTIVITY:         SCALE USED: (1-10 VNRS)    PAIN ASSESSMENT:    FOCUSED PHYSICAL EXAM  GENERAL: Laying in bed, NAD  NEURO/MOTOR: CN II-XII PERRRL, EOMI  PULM: unlabored  CV: Regular rate and rhythm; No murmurs, rubs, or gallops  ABDOMEN: Soft, Nontender, Nondistended; Bowel sounds present  EXTREMITIES:  2+ Peripheral Pulses, No clubbing, cyanosis, or edema  SKIN: No rashes or lesions      ASSESSMENT: 59yFemale s/p revision TLIF L3-L4 5/6/24      PLAN:   - continue tylenol 1gm q8h  - continue gabapentin 800mg TID  - continue robaxin 500mg q8h  - continue milnacipran 100mg q12h  - continue oxycodone 5-10mg q4h prn moderate-severe pain  - dc sumatriptan       - Bowel regimen: Senna    - Nausea ppx: Zofran standing  - Functional Goals: Pt will get OOB with PT today. Pt will resume previous level of activity without impairment from surgery.   - Additional Consults: None recommended.   - Additional Labs/Imaging:  None recommended.     - Discharge Planning: per primary team  - Pain Management follow up plan: will continue to follow    Plan d/w Dr. Floyd.     Eli Valdivia NP  Acute Pain Service PAIN MANAGEMENT CONSULT NOTE    Interval Events:  - NAEON  - ambulating in halls this AM      Since yesterday 6am, pt has required:  - oxycodone 5mg x 1, 10mg x 1    HOME MEDICATIONS:   Please refer to initial HNP    Allergies    Cipro (Vomiting)    Intolerances        PAIN MEDICATIONS:  acetaminophen     Tablet .. 1000 milliGRAM(s) Oral every 8 hours  cariprazine 1.5 milliGRAM(s) Oral at bedtime  DULoxetine 60 milliGRAM(s) Oral every 12 hours  gabapentin 800 milliGRAM(s) Oral three times a day  methocarbamol 500 milliGRAM(s) Oral every 8 hours  milnacipran 100 milliGRAM(s) Oral every 12 hours  mirtazapine 30 milliGRAM(s) Oral at bedtime  ondansetron   Disintegrating Tablet 4 milliGRAM(s) Oral every 6 hours  oxyCODONE    IR 5 milliGRAM(s) Oral every 3 hours PRN  oxyCODONE    IR 10 milliGRAM(s) Oral every 3 hours PRN  SUMAtriptan 50 milliGRAM(s) Oral two times a day PRN    Heme:  enoxaparin Injectable 40 milliGRAM(s) SubCutaneous every 24 hours    Antibiotics:    Cardiovascular:  carvedilol 6.25 milliGRAM(s) Oral every 12 hours    GI:  pantoprazole    Tablet 40 milliGRAM(s) Oral before breakfast  polyethylene glycol 3350 17 Gram(s) Oral daily  senna 2 Tablet(s) Oral at bedtime  sulfaSALAzine 500 milliGRAM(s) Oral three times a day    Endocrine:  dextrose 50% Injectable 12.5 Gram(s) IV Push once  dextrose 50% Injectable 25 Gram(s) IV Push once  dextrose Oral Gel 15 Gram(s) Oral once PRN  glucagon  Injectable 1 milliGRAM(s) IntraMuscular once  insulin lispro (ADMELOG) corrective regimen sliding scale   SubCutaneous Before meals and at bedtime    All Other Medications:  benzocaine/menthol Lozenge 1 Lozenge Oral five times a day PRN  dextrose 10% Bolus 125 milliLiter(s) IV Bolus once  dextrose 5%. 1000 milliLiter(s) IV Continuous <Continuous>  dextrose 5%. 1000 milliLiter(s) IV Continuous <Continuous>  potassium chloride    Tablet ER 20 milliEquivalent(s) Oral once      Vital Signs Last 24 Hrs  T(C): 36.6 (08 May 2024 08:40), Max: 36.8 (08 May 2024 05:26)  T(F): 97.9 (08 May 2024 08:40), Max: 98.3 (08 May 2024 05:26)  HR: 81 (08 May 2024 05:46) (76 - 124)  BP: 125/70 (08 May 2024 05:26) (107/59 - 135/67)  BP(mean): --  RR: 18 (08 May 2024 05:46) (1 - 18)  SpO2: 94% (08 May 2024 05:46) (93% - 99%)    Parameters below as of 08 May 2024 05:46  Patient On (Oxygen Delivery Method): room air        LABS:                        9.2    19.92 )-----------( 304      ( 08 May 2024 05:30 )             28.6     05-08    141  |  107  |  18  ----------------------------<  150<H>  3.7   |  26  |  0.64    Ca    9.3      08 May 2024 05:30        Urinalysis Basic - ( 08 May 2024 05:30 )    Color: x / Appearance: x / SG: x / pH: x  Gluc: 150 mg/dL / Ketone: x  / Bili: x / Urobili: x   Blood: x / Protein: x / Nitrite: x   Leuk Esterase: x / RBC: x / WBC x   Sq Epi: x / Non Sq Epi: x / Bacteria: x        RADIOLOGY:    Drug Screen:        REVIEW OF SYSTEMS:  CONSTITUTIONAL: Denies fever or fatigue   EYES: Denies eye pain, visual disturbances  HEENT: Denies difficulty hearing, throat/neck pain or stiffness  RESPIRATORY: Denies SOB, cough, wheezing  CARDIOVASCULAR: Denies chest pain, palpitations.   GASTROINTESTINAL: Endorses +flatus, BMs. Denies nausea, vomiting, abdominal or epigastric pain.   GENITOURINARY: Denies dysuria, frequency, or incontinence  NEUROLOGICAL: Denies numbness, tingling. Denies headaches, loss of strength, tremors, dizziness or lightheadedness with pain medications.   MUSCULOSKELETAL: Denies joint pain or swelling      FUNCTIONAL ASSESSMENT:  PAIN SCORE AT REST:         SCALE USED: (1-10 VNRS)  PAIN SCORE WITH ACTIVITY:         SCALE USED: (1-10 VNRS)    PAIN ASSESSMENT:  - incisional pain    FOCUSED PHYSICAL EXAM  GENERAL: Laying in bed, NAD  NEURO/MOTOR: CN II-XII PERRRL, EOMI  PULM: unlabored  CV: Regular rate and rhythm; No murmurs, rubs, or gallops  ABDOMEN: Soft, Nontender, Nondistended; Bowel sounds present  EXTREMITIES:  2+ Peripheral Pulses, No clubbing, cyanosis, or edema  SKIN: No rashes or lesions      ASSESSMENT: 59yFemale s/p revision TLIF L3-L4 5/6/24      PLAN:   - continue tylenol 1gm q8h  - continue gabapentin 800mg TID  - continue robaxin 500mg q8h  - continue milnacipran 100mg q12h  - continue oxycodone 5-10mg q4h prn moderate-severe pain  - dc sumatriptan       - Bowel regimen: Senna    - Nausea ppx: Zofran standing  - Functional Goals: Pt will get OOB with PT today. Pt will resume previous level of activity without impairment from surgery.   - Additional Consults: None recommended.   - Additional Labs/Imaging:  None recommended.     - Discharge Planning: per primary team  - Pain Management follow up plan: will continue to follow    Plan d/w Dr. Floyd.     Eli Valdivia NP  Acute Pain Service

## 2024-05-08 NOTE — OCCUPATIONAL THERAPY INITIAL EVALUATION ADULT - SOCIAL CONCERNS
Discussed plan for labs by phone with Vane. She is out of town but plans to come in to do the Hepatitis panel when she returns (after Tuesday). Reviewed pre-e signs and reasons to call. Vane agrees. Already has follow up appt scheduled.  
None

## 2024-05-08 NOTE — DISCHARGE NOTE PROVIDER - NSDCMRMEDTOKEN_GEN_ALL_CORE_FT
carvedilol 6.25 mg oral tablet: 1 tab(s) orally 2 times a day  cyclobenzaprine 10 mg oral tablet: 1 tab(s) orally once a day as needed for  muscle spasm  diclofenac potassium 50 mg oral tablet: 1 tab(s) orally once a day  DULoxetine 60 mg oral delayed release capsule: 1 cap(s) orally once a day  gabapentin 800 mg oral tablet: 1 tab(s) orally 3 times a day  meclizine 12.5 mg oral tablet: 1 tab(s) orally 2 times a day  mirtazapine 30 mg oral tablet: 1 tab(s) orally once a day (at bedtime)  pantoprazole 40 mg oral delayed release tablet: 1 tab(s) orally once a day  Savella 100 mg oral tablet: 1 tab(s) orally 2 times a day  Sulfazine 500 mg oral tablet: 2 tab(s) orally 3 times a day  SUMAtriptan 100 mg oral tablet: 1 tab(s) orally once, As Needed  Vraylar 1.5 mg oral capsule: 1 cap(s) orally once a day   acetaminophen 500 mg oral tablet: 2 tab(s) orally every 8 hours  carvedilol 6.25 mg oral tablet: 1 tab(s) orally 2 times a day  DULoxetine 60 mg oral delayed release capsule: 1 cap(s) orally once a day  gabapentin 800 mg oral tablet: 1 tab(s) orally 3 times a day  meclizine 12.5 mg oral tablet: 1 tab(s) orally 2 times a day  methocarbamol 500 mg oral tablet: 1 tab(s) orally every 8 hours as needed for  muscle spasm MDD: 3  mirtazapine 30 mg oral tablet: 1 tab(s) orally once a day (at bedtime)  oxyCODONE 5 mg oral tablet: 1 tab(s) orally every 4 to 6 hours as needed for Severe Pain (7 - 10) MDD: 6  pantoprazole 40 mg oral delayed release tablet: 1 tab(s) orally once a day  polyethylene glycol 3350 oral powder for reconstitution: 17 gram(s) orally once a day  Savella 100 mg oral tablet: 1 tab(s) orally 2 times a day  senna leaf extract oral tablet: 2 tab(s) orally once a day (at bedtime)  Sulfazine 500 mg oral tablet: 2 tab(s) orally 3 times a day  SUMAtriptan 100 mg oral tablet: 1 tab(s) orally once, As Needed  Vraylar 1.5 mg oral capsule: 1 cap(s) orally once a day

## 2024-05-09 LAB
ANION GAP SERPL CALC-SCNC: 11 MMOL/L — SIGNIFICANT CHANGE UP (ref 5–17)
BASOPHILS # BLD AUTO: 0 K/UL — SIGNIFICANT CHANGE UP (ref 0–0.2)
BASOPHILS NFR BLD AUTO: 0 % — SIGNIFICANT CHANGE UP (ref 0–2)
BUN SERPL-MCNC: 21 MG/DL — SIGNIFICANT CHANGE UP (ref 7–23)
CALCIUM SERPL-MCNC: 8.8 MG/DL — SIGNIFICANT CHANGE UP (ref 8.4–10.5)
CHLORIDE SERPL-SCNC: 104 MMOL/L — SIGNIFICANT CHANGE UP (ref 96–108)
CO2 SERPL-SCNC: 24 MMOL/L — SIGNIFICANT CHANGE UP (ref 22–31)
CREAT SERPL-MCNC: 0.75 MG/DL — SIGNIFICANT CHANGE UP (ref 0.5–1.3)
EGFR: 91 ML/MIN/1.73M2 — SIGNIFICANT CHANGE UP
EOSINOPHIL # BLD AUTO: 0 K/UL — SIGNIFICANT CHANGE UP (ref 0–0.5)
EOSINOPHIL NFR BLD AUTO: 0 % — SIGNIFICANT CHANGE UP (ref 0–6)
GLUCOSE BLDC GLUCOMTR-MCNC: 110 MG/DL — HIGH (ref 70–99)
GLUCOSE BLDC GLUCOMTR-MCNC: 128 MG/DL — HIGH (ref 70–99)
GLUCOSE BLDC GLUCOMTR-MCNC: 83 MG/DL — SIGNIFICANT CHANGE UP (ref 70–99)
GLUCOSE BLDC GLUCOMTR-MCNC: 97 MG/DL — SIGNIFICANT CHANGE UP (ref 70–99)
GLUCOSE SERPL-MCNC: 97 MG/DL — SIGNIFICANT CHANGE UP (ref 70–99)
HCT VFR BLD CALC: 29.9 % — LOW (ref 34.5–45)
HGB BLD-MCNC: 9.4 G/DL — LOW (ref 11.5–15.5)
LYMPHOCYTES # BLD AUTO: 28.1 % — SIGNIFICANT CHANGE UP (ref 13–44)
LYMPHOCYTES # BLD AUTO: 5.24 K/UL — HIGH (ref 1–3.3)
MANUAL SMEAR VERIFICATION: SIGNIFICANT CHANGE UP
MCHC RBC-ENTMCNC: 29.7 PG — SIGNIFICANT CHANGE UP (ref 27–34)
MCHC RBC-ENTMCNC: 31.4 GM/DL — LOW (ref 32–36)
MCV RBC AUTO: 94.6 FL — SIGNIFICANT CHANGE UP (ref 80–100)
MONOCYTES # BLD AUTO: 0.65 K/UL — SIGNIFICANT CHANGE UP (ref 0–0.9)
MONOCYTES NFR BLD AUTO: 3.5 % — SIGNIFICANT CHANGE UP (ref 2–14)
NEUTROPHILS # BLD AUTO: 12.75 K/UL — HIGH (ref 1.8–7.4)
NEUTROPHILS NFR BLD AUTO: 68.4 % — SIGNIFICANT CHANGE UP (ref 43–77)
OVALOCYTES BLD QL SMEAR: SLIGHT — SIGNIFICANT CHANGE UP
PLAT MORPH BLD: NORMAL — SIGNIFICANT CHANGE UP
PLATELET # BLD AUTO: 288 K/UL — SIGNIFICANT CHANGE UP (ref 150–400)
POIKILOCYTOSIS BLD QL AUTO: SLIGHT — SIGNIFICANT CHANGE UP
POLYCHROMASIA BLD QL SMEAR: SLIGHT — SIGNIFICANT CHANGE UP
POTASSIUM SERPL-MCNC: 4 MMOL/L — SIGNIFICANT CHANGE UP (ref 3.5–5.3)
POTASSIUM SERPL-SCNC: 4 MMOL/L — SIGNIFICANT CHANGE UP (ref 3.5–5.3)
RBC # BLD: 3.16 M/UL — LOW (ref 3.8–5.2)
RBC # FLD: 13.4 % — SIGNIFICANT CHANGE UP (ref 10.3–14.5)
RBC BLD AUTO: ABNORMAL
SODIUM SERPL-SCNC: 139 MMOL/L — SIGNIFICANT CHANGE UP (ref 135–145)
WBC # BLD: 18.64 K/UL — HIGH (ref 3.8–10.5)
WBC # FLD AUTO: 18.64 K/UL — HIGH (ref 3.8–10.5)

## 2024-05-09 PROCEDURE — 99232 SBSQ HOSP IP/OBS MODERATE 35: CPT

## 2024-05-09 PROCEDURE — 72100 X-RAY EXAM L-S SPINE 2/3 VWS: CPT | Mod: 26

## 2024-05-09 RX ORDER — OXYCODONE HYDROCHLORIDE 5 MG/1
5 TABLET ORAL
Refills: 0 | Status: DISCONTINUED | OUTPATIENT
Start: 2024-05-09 | End: 2024-05-11

## 2024-05-09 RX ORDER — SODIUM CHLORIDE 9 MG/ML
1000 INJECTION, SOLUTION INTRAVENOUS ONCE
Refills: 0 | Status: COMPLETED | OUTPATIENT
Start: 2024-05-09 | End: 2024-05-09

## 2024-05-09 RX ORDER — KETOROLAC TROMETHAMINE 30 MG/ML
30 SYRINGE (ML) INJECTION EVERY 8 HOURS
Refills: 0 | Status: DISCONTINUED | OUTPATIENT
Start: 2024-05-09 | End: 2024-05-11

## 2024-05-09 RX ADMIN — METHOCARBAMOL 500 MILLIGRAM(S): 500 TABLET, FILM COATED ORAL at 06:41

## 2024-05-09 RX ADMIN — ONDANSETRON 4 MILLIGRAM(S): 8 TABLET, FILM COATED ORAL at 11:25

## 2024-05-09 RX ADMIN — GABAPENTIN 800 MILLIGRAM(S): 400 CAPSULE ORAL at 14:42

## 2024-05-09 RX ADMIN — ONDANSETRON 4 MILLIGRAM(S): 8 TABLET, FILM COATED ORAL at 00:47

## 2024-05-09 RX ADMIN — CARIPRAZINE 1.5 MILLIGRAM(S): 1.5 CAPSULE, GELATIN COATED ORAL at 22:07

## 2024-05-09 RX ADMIN — CARVEDILOL PHOSPHATE 6.25 MILLIGRAM(S): 80 CAPSULE, EXTENDED RELEASE ORAL at 18:21

## 2024-05-09 RX ADMIN — MIRTAZAPINE 30 MILLIGRAM(S): 45 TABLET, ORALLY DISINTEGRATING ORAL at 00:58

## 2024-05-09 RX ADMIN — PANTOPRAZOLE SODIUM 40 MILLIGRAM(S): 20 TABLET, DELAYED RELEASE ORAL at 06:40

## 2024-05-09 RX ADMIN — Medication 1 ENEMA: at 19:31

## 2024-05-09 RX ADMIN — ONDANSETRON 4 MILLIGRAM(S): 8 TABLET, FILM COATED ORAL at 18:21

## 2024-05-09 RX ADMIN — Medication 1000 MILLIGRAM(S): at 15:41

## 2024-05-09 RX ADMIN — Medication 500 MILLIGRAM(S): at 06:40

## 2024-05-09 RX ADMIN — METHOCARBAMOL 500 MILLIGRAM(S): 500 TABLET, FILM COATED ORAL at 14:41

## 2024-05-09 RX ADMIN — Medication 500 MILLIGRAM(S): at 22:07

## 2024-05-09 RX ADMIN — SENNA PLUS 2 TABLET(S): 8.6 TABLET ORAL at 22:06

## 2024-05-09 RX ADMIN — MIRTAZAPINE 30 MILLIGRAM(S): 45 TABLET, ORALLY DISINTEGRATING ORAL at 22:07

## 2024-05-09 RX ADMIN — CARIPRAZINE 1.5 MILLIGRAM(S): 1.5 CAPSULE, GELATIN COATED ORAL at 07:02

## 2024-05-09 RX ADMIN — ENOXAPARIN SODIUM 40 MILLIGRAM(S): 100 INJECTION SUBCUTANEOUS at 22:05

## 2024-05-09 RX ADMIN — ONDANSETRON 4 MILLIGRAM(S): 8 TABLET, FILM COATED ORAL at 06:40

## 2024-05-09 RX ADMIN — OXYCODONE HYDROCHLORIDE 10 MILLIGRAM(S): 5 TABLET ORAL at 04:00

## 2024-05-09 RX ADMIN — OXYCODONE HYDROCHLORIDE 10 MILLIGRAM(S): 5 TABLET ORAL at 03:16

## 2024-05-09 RX ADMIN — GABAPENTIN 800 MILLIGRAM(S): 400 CAPSULE ORAL at 06:40

## 2024-05-09 RX ADMIN — Medication 30 MILLIGRAM(S): at 23:32

## 2024-05-09 RX ADMIN — METHOCARBAMOL 500 MILLIGRAM(S): 500 TABLET, FILM COATED ORAL at 22:06

## 2024-05-09 RX ADMIN — DULOXETINE HYDROCHLORIDE 60 MILLIGRAM(S): 30 CAPSULE, DELAYED RELEASE ORAL at 18:21

## 2024-05-09 RX ADMIN — Medication 500 MILLIGRAM(S): at 14:41

## 2024-05-09 RX ADMIN — Medication 1000 MILLIGRAM(S): at 06:41

## 2024-05-09 RX ADMIN — DULOXETINE HYDROCHLORIDE 60 MILLIGRAM(S): 30 CAPSULE, DELAYED RELEASE ORAL at 06:40

## 2024-05-09 RX ADMIN — Medication 1000 MILLIGRAM(S): at 22:05

## 2024-05-09 RX ADMIN — Medication 1000 MILLIGRAM(S): at 07:03

## 2024-05-09 RX ADMIN — Medication 1000 MILLIGRAM(S): at 14:41

## 2024-05-09 RX ADMIN — CARVEDILOL PHOSPHATE 6.25 MILLIGRAM(S): 80 CAPSULE, EXTENDED RELEASE ORAL at 06:40

## 2024-05-09 RX ADMIN — MILNACIPRAN HYDROCHLORIDE 100 MILLIGRAM(S): 50 TABLET, FILM COATED ORAL at 18:27

## 2024-05-09 RX ADMIN — ONDANSETRON 4 MILLIGRAM(S): 8 TABLET, FILM COATED ORAL at 23:32

## 2024-05-09 RX ADMIN — Medication 10 MILLIGRAM(S): at 10:44

## 2024-05-09 RX ADMIN — GABAPENTIN 800 MILLIGRAM(S): 400 CAPSULE ORAL at 22:06

## 2024-05-09 RX ADMIN — Medication 30 MILLIGRAM(S): at 15:29

## 2024-05-09 RX ADMIN — SODIUM CHLORIDE 500 MILLILITER(S): 9 INJECTION, SOLUTION INTRAVENOUS at 14:27

## 2024-05-09 RX ADMIN — OXYCODONE HYDROCHLORIDE 5 MILLIGRAM(S): 5 TABLET ORAL at 14:41

## 2024-05-09 RX ADMIN — POLYETHYLENE GLYCOL 3350 17 GRAM(S): 17 POWDER, FOR SOLUTION ORAL at 11:25

## 2024-05-09 RX ADMIN — Medication 30 MILLIGRAM(S): at 15:44

## 2024-05-09 RX ADMIN — MILNACIPRAN HYDROCHLORIDE 100 MILLIGRAM(S): 50 TABLET, FILM COATED ORAL at 07:04

## 2024-05-09 RX ADMIN — OXYCODONE HYDROCHLORIDE 5 MILLIGRAM(S): 5 TABLET ORAL at 15:41

## 2024-05-09 NOTE — PROGRESS NOTE ADULT - SUBJECTIVE AND OBJECTIVE BOX
S: patient seen bedside. no acute complaints no overnight events. Doing well today. Pain controlled.   ROS otherwise negative.     Vital Signs Last 24 Hrs  T(C): 36.4 (09 May 2024 08:47), Max: 37 (08 May 2024 17:30)  T(F): 97.5 (09 May 2024 08:47), Max: 98.6 (08 May 2024 17:30)  HR: 102 (09 May 2024 08:47) (83 - 104)  BP: 111/68 (09 May 2024 08:47) (110/56 - 133/77)  BP(mean): --  RR: 20 (09 May 2024 08:47) (12 - 20)  SpO2: 94% (09 May 2024 08:47) (93% - 98%)    Parameters below as of 09 May 2024 08:47  Patient On (Oxygen Delivery Method): room air        PHYSICAL EXAM:  Gen: Reclining in bed at time of exam, appears stated age  HEENT: NCAT, MMM, clear OP  Neck: supple, trachea at midline  CV: RRR, +S1/S2  Pulm: adequate respiratory effort, no increase in work of breathing  Abd: soft, NTND  Skin: warm and dry,  Ext: no LE edema  Psych: affect and behavior appropriate, pleasant at time of interview  MSK: surgical drain in place, dark fluid draining.       05-09    139  |  104  |  21  ----------------------------<  97  4.0   |  24  |  0.75    Ca    8.8      09 May 2024 07:41                          9.4    18.64 )-----------( 288      ( 09 May 2024 07:41 )             29.9

## 2024-05-09 NOTE — PROGRESS NOTE ADULT - SUBJECTIVE AND OBJECTIVE BOX
Ortho Note    Pt comfortable without complaints, pain controlled  Denies CP, SOB, N/V, numbness/tingling     Vital Signs Last 24 Hrs  T(C): --  T(F): --  HR: 83 (05-09-24 @ 00:13) (83 - 83)  BP: 133/77 (05-09-24 @ 00:13) (133/77 - 133/77)  BP(mean): --  RR: 20 (05-09-24 @ 00:13) (20 - 20)  SpO2: 97% (05-09-24 @ 00:13) (97% - 97%)  I&O's Summary    07 May 2024 07:01  -  08 May 2024 07:00  --------------------------------------------------------  IN: 1280 mL / OUT: 3860 mL / NET: -2580 mL    08 May 2024 07:01  -  09 May 2024 05:57  --------------------------------------------------------  IN: 600 mL / OUT: 1240 mL / NET: -640 mL        General: Pt Alert and oriented, NAD  Aquacel DSG C/D/I, HVx1  Pulses: 2+ DP/PT b/l  Sensation: SILT b/l  Motor: Quad/Ham/EHL/FHL/TA/GS  5/5                          9.2    19.92 )-----------( 304      ( 08 May 2024 05:30 )             28.6     05-08    141  |  107  |  18  ----------------------------<  150<H>  3.7   |  26  |  0.64    Ca    9.3      08 May 2024 05:30        A/P: 59yFemale POD#3 s/p revision TLIF L3-L4    - Stable  - Pain Control  - DVT ppx: SCDs, lovenox  - Post op abx: ancef  - Monitor drain o/p  - PT, WBS: WBAT  - Dispo: pending PT, drain    Ortho Pager [phone]

## 2024-05-09 NOTE — PROGRESS NOTE ADULT - SUBJECTIVE AND OBJECTIVE BOX
Ortho Note    Pt seen and examined at bedside. Reports feeling tired today. comfortable without complaints, pain controlled  Denies CP, SOB, N/V, new numbness/tingling     Vital Signs Last 24 Hrs  T(C): 36.6 (05-09-24 @ 14:19), Max: 36.6 (05-09-24 @ 14:19)  T(F): 97.9 (05-09-24 @ 14:19), Max: 97.9 (05-09-24 @ 14:19)  HR: 98 (05-09-24 @ 14:19) (98 - 114)  BP: 120/68 (05-09-24 @ 14:19) (106/61 - 123/77)  BP(mean): --  RR: 20 (05-09-24 @ 08:47) (20 - 20)  SpO2: 95% (05-09-24 @ 14:19) (94% - 96%)  I&O's Summary    08 May 2024 07:01  -  09 May 2024 07:00  --------------------------------------------------------  IN: 600 mL / OUT: 1265 mL / NET: -665 mL    09 May 2024 07:01  -  09 May 2024 14:43  --------------------------------------------------------  IN: 540 mL / OUT: 1010 mL / NET: -470 mL        General: Pt Alert and oriented, NAD  DSG C/D/I- Aquacel, hv x 1 holding suction  Pulses: 2+ DP bilaterally, brisk cap refill  Calves soft and nontender bilaterally  Sensation: SILT distally bilateral lower extremities  Motor:   EHL/FHL/TA/GS: 5/5 bilaterally                          9.4    18.64 )-----------( 288      ( 09 May 2024 07:41 )             29.9     05-09    139  |  104  |  21  ----------------------------<  97  4.0   |  24  |  0.75    Ca    8.8      09 May 2024 07:41        A/P: 59yFemale POD #3 s/p revision L3-L4 TLIF with Dr. Lopez  - Franki, labs and vitals reviewed  - HV removed at bedside. Cleaned with chlorhexidene, compressive dressing of gauze and tegaderm applied. Fresh aquacel placed over lumbar incision. Patient tolerated well  - Patient dizzy with physical therapy- 1L LR bolus ordered, decreased oxy 10mg to oxy 5mg oral for severe pain  - Appreciate medicine recs  - Continue with bowel regimen- suppository given today  - Pain Control- toradol added per pain management  - DVT ppx: SCDs, lovenox  - PT, WBS: WBAT  -Dispo: pending PT/OT clearance    Ortho Pager [phone]

## 2024-05-09 NOTE — PROGRESS NOTE ADULT - SUBJECTIVE AND OBJECTIVE BOX
PAIN MANAGEMENT CONSULT NOTE    Interval Events:  - worse pain overnight  - working with PT this AM      Since yesterday 6am, pt has required:  - oxycodone 10mg x 4    HOME MEDICATIONS:   Please refer to initial HNP    Allergies    Cipro (Vomiting)    Intolerances        PAIN MEDICATIONS:  acetaminophen     Tablet .. 1000 milliGRAM(s) Oral every 8 hours  cariprazine 1.5 milliGRAM(s) Oral at bedtime  DULoxetine 60 milliGRAM(s) Oral every 12 hours  gabapentin 800 milliGRAM(s) Oral three times a day  methocarbamol 500 milliGRAM(s) Oral every 8 hours  milnacipran 100 milliGRAM(s) Oral every 12 hours  mirtazapine 30 milliGRAM(s) Oral at bedtime  ondansetron   Disintegrating Tablet 4 milliGRAM(s) Oral every 6 hours  oxyCODONE    IR 10 milliGRAM(s) Oral every 3 hours PRN  oxyCODONE    IR 5 milliGRAM(s) Oral every 3 hours PRN    Heme:  enoxaparin Injectable 40 milliGRAM(s) SubCutaneous every 24 hours    Antibiotics:    Cardiovascular:  carvedilol 6.25 milliGRAM(s) Oral every 12 hours    GI:  bisacodyl Suppository 10 milliGRAM(s) Rectal once  pantoprazole    Tablet 40 milliGRAM(s) Oral before breakfast  polyethylene glycol 3350 17 Gram(s) Oral daily  senna 2 Tablet(s) Oral at bedtime  sulfaSALAzine 500 milliGRAM(s) Oral three times a day    Endocrine:  dextrose 50% Injectable 25 Gram(s) IV Push once  dextrose 50% Injectable 12.5 Gram(s) IV Push once  dextrose Oral Gel 15 Gram(s) Oral once PRN  glucagon  Injectable 1 milliGRAM(s) IntraMuscular once  insulin lispro (ADMELOG) corrective regimen sliding scale   SubCutaneous Before meals and at bedtime    All Other Medications:  benzocaine/menthol Lozenge 1 Lozenge Oral five times a day PRN  dextrose 10% Bolus 125 milliLiter(s) IV Bolus once  dextrose 5%. 1000 milliLiter(s) IV Continuous <Continuous>  dextrose 5%. 1000 milliLiter(s) IV Continuous <Continuous>      Vital Signs Last 24 Hrs  T(C): 36.4 (09 May 2024 08:47), Max: 37 (08 May 2024 17:30)  T(F): 97.5 (09 May 2024 08:47), Max: 98.6 (08 May 2024 17:30)  HR: 102 (09 May 2024 08:47) (83 - 104)  BP: 111/68 (09 May 2024 08:47) (110/56 - 133/77)  BP(mean): --  RR: 20 (09 May 2024 08:47) (12 - 20)  SpO2: 94% (09 May 2024 08:47) (93% - 98%)    Parameters below as of 09 May 2024 08:47  Patient On (Oxygen Delivery Method): room air        LABS:                        9.4    18.64 )-----------( 288      ( 09 May 2024 07:41 )             29.9     05-09    139  |  104  |  21  ----------------------------<  97  4.0   |  24  |  0.75    Ca    8.8      09 May 2024 07:41        Urinalysis Basic - ( 09 May 2024 07:41 )    Color: x / Appearance: x / SG: x / pH: x  Gluc: 97 mg/dL / Ketone: x  / Bili: x / Urobili: x   Blood: x / Protein: x / Nitrite: x   Leuk Esterase: x / RBC: x / WBC x   Sq Epi: x / Non Sq Epi: x / Bacteria: x        RADIOLOGY:    Drug Screen:        REVIEW OF SYSTEMS:  CONSTITUTIONAL: Denies fever or fatigue   EYES: Denies eye pain, visual disturbances  HEENT: Denies difficulty hearing, throat/neck pain or stiffness  RESPIRATORY: Denies SOB, cough, wheezing  CARDIOVASCULAR: Denies chest pain, palpitations.   GASTROINTESTINAL: Endorses +flatus, BMs. Denies nausea, vomiting, abdominal or epigastric pain.   GENITOURINARY: Denies dysuria, frequency, or incontinence  NEUROLOGICAL: Denies numbness, tingling. Denies headaches, loss of strength, tremors, dizziness or lightheadedness with pain medications.   MUSCULOSKELETAL: Denies joint pain or swelling      FUNCTIONAL ASSESSMENT:  PAIN SCORE AT REST:         SCALE USED: (1-10 VNRS)  PAIN SCORE WITH ACTIVITY:         SCALE USED: (1-10 VNRS)    PAIN ASSESSMENT:  - low back/incisional pain    FOCUSED PHYSICAL EXAM  GENERAL: Laying in bed, NAD  NEURO/MOTOR: CN II-XII PERRRL, EOMI  PULM: unlabored  CV: Regular rate and rhythm; No murmurs, rubs, or gallops  ABDOMEN: Soft, Nontender, Nondistended; Bowel sounds present  EXTREMITIES:  2+ Peripheral Pulses, No clubbing, cyanosis, or edema  SKIN: No rashes or lesions      ASSESSMENT:  59yFemale s/p revision TLIF L3-L4 5/6/24      PLAN:   - continue tylenol 1gm q8h  - continue gabapentin 800mg TID  - continue robaxin 500mg q8h  - continue milnacipran 100mg q12h  - continue oxycodone 5-10mg q4h prn moderate-severe pain  - start Toradol IV 30mg q8h x 3 days if okay with surgery    - Bowel regimen: Senna, miralax    - Nausea ppx: Zofran standing  - Functional Goals: Pt will get OOB with PT today. Pt will resume previous level of activity without impairment from surgery.   - Additional Consults: None recommended.   - Additional Labs/Imaging:  None recommended.     - Discharge Planning: per primary team  - Pain Management follow up plan: will continue to follow    Plan d/w Dr. Floyd.     Eli Valdivia NP  Acute Pain Service

## 2024-05-10 ENCOUNTER — TRANSCRIPTION ENCOUNTER (OUTPATIENT)
Age: 59
End: 2024-05-10

## 2024-05-10 LAB
ANION GAP SERPL CALC-SCNC: 11 MMOL/L — SIGNIFICANT CHANGE UP (ref 5–17)
BUN SERPL-MCNC: 18 MG/DL — SIGNIFICANT CHANGE UP (ref 7–23)
CALCIUM SERPL-MCNC: 9 MG/DL — SIGNIFICANT CHANGE UP (ref 8.4–10.5)
CHLORIDE SERPL-SCNC: 104 MMOL/L — SIGNIFICANT CHANGE UP (ref 96–108)
CO2 SERPL-SCNC: 24 MMOL/L — SIGNIFICANT CHANGE UP (ref 22–31)
CREAT SERPL-MCNC: 0.77 MG/DL — SIGNIFICANT CHANGE UP (ref 0.5–1.3)
EGFR: 88 ML/MIN/1.73M2 — SIGNIFICANT CHANGE UP
GLUCOSE BLDC GLUCOMTR-MCNC: 103 MG/DL — HIGH (ref 70–99)
GLUCOSE BLDC GLUCOMTR-MCNC: 137 MG/DL — HIGH (ref 70–99)
GLUCOSE BLDC GLUCOMTR-MCNC: 92 MG/DL — SIGNIFICANT CHANGE UP (ref 70–99)
GLUCOSE BLDC GLUCOMTR-MCNC: 94 MG/DL — SIGNIFICANT CHANGE UP (ref 70–99)
GLUCOSE SERPL-MCNC: 86 MG/DL — SIGNIFICANT CHANGE UP (ref 70–99)
HCT VFR BLD CALC: 29.5 % — LOW (ref 34.5–45)
HGB BLD-MCNC: 9.2 G/DL — LOW (ref 11.5–15.5)
MCHC RBC-ENTMCNC: 30 PG — SIGNIFICANT CHANGE UP (ref 27–34)
MCHC RBC-ENTMCNC: 31.2 GM/DL — LOW (ref 32–36)
MCV RBC AUTO: 96.1 FL — SIGNIFICANT CHANGE UP (ref 80–100)
NRBC # BLD: 0 /100 WBCS — SIGNIFICANT CHANGE UP (ref 0–0)
PLATELET # BLD AUTO: 299 K/UL — SIGNIFICANT CHANGE UP (ref 150–400)
POTASSIUM SERPL-MCNC: 3.5 MMOL/L — SIGNIFICANT CHANGE UP (ref 3.5–5.3)
POTASSIUM SERPL-SCNC: 3.5 MMOL/L — SIGNIFICANT CHANGE UP (ref 3.5–5.3)
RBC # BLD: 3.07 M/UL — LOW (ref 3.8–5.2)
RBC # FLD: 13.3 % — SIGNIFICANT CHANGE UP (ref 10.3–14.5)
SODIUM SERPL-SCNC: 139 MMOL/L — SIGNIFICANT CHANGE UP (ref 135–145)
WBC # BLD: 13.09 K/UL — HIGH (ref 3.8–10.5)
WBC # FLD AUTO: 13.09 K/UL — HIGH (ref 3.8–10.5)

## 2024-05-10 PROCEDURE — 99232 SBSQ HOSP IP/OBS MODERATE 35: CPT

## 2024-05-10 RX ORDER — METHOCARBAMOL 500 MG/1
1 TABLET, FILM COATED ORAL
Qty: 21 | Refills: 0
Start: 2024-05-10 | End: 2024-05-16

## 2024-05-10 RX ORDER — OXYCODONE HYDROCHLORIDE 5 MG/1
1 TABLET ORAL
Qty: 42 | Refills: 0
Start: 2024-05-10 | End: 2024-05-16

## 2024-05-10 RX ORDER — MECLIZINE HCL 12.5 MG
12.5 TABLET ORAL ONCE
Refills: 0 | Status: COMPLETED | OUTPATIENT
Start: 2024-05-10 | End: 2024-05-10

## 2024-05-10 RX ORDER — SENNA PLUS 8.6 MG/1
2 TABLET ORAL
Qty: 0 | Refills: 0 | DISCHARGE
Start: 2024-05-10

## 2024-05-10 RX ORDER — POLYETHYLENE GLYCOL 3350 17 G/17G
17 POWDER, FOR SOLUTION ORAL
Qty: 0 | Refills: 0 | DISCHARGE
Start: 2024-05-10

## 2024-05-10 RX ORDER — NALOXEGOL OXALATE 12.5 MG/1
12.5 TABLET, FILM COATED ORAL ONCE
Refills: 0 | Status: COMPLETED | OUTPATIENT
Start: 2024-05-10 | End: 2024-05-10

## 2024-05-10 RX ORDER — MECLIZINE HCL 12.5 MG
12.5 TABLET ORAL EVERY 8 HOURS
Refills: 0 | Status: DISCONTINUED | OUTPATIENT
Start: 2024-05-10 | End: 2024-05-11

## 2024-05-10 RX ORDER — ACETAMINOPHEN 500 MG
2 TABLET ORAL
Qty: 42 | Refills: 0
Start: 2024-05-10 | End: 2024-05-16

## 2024-05-10 RX ORDER — CYCLOBENZAPRINE HYDROCHLORIDE 10 MG/1
1 TABLET, FILM COATED ORAL
Refills: 0 | DISCHARGE

## 2024-05-10 RX ORDER — DICLOFENAC SODIUM 75 MG/1
1 TABLET, DELAYED RELEASE ORAL
Refills: 0 | DISCHARGE

## 2024-05-10 RX ADMIN — Medication 12.5 MILLIGRAM(S): at 11:00

## 2024-05-10 RX ADMIN — DULOXETINE HYDROCHLORIDE 60 MILLIGRAM(S): 30 CAPSULE, DELAYED RELEASE ORAL at 05:40

## 2024-05-10 RX ADMIN — SENNA PLUS 2 TABLET(S): 8.6 TABLET ORAL at 21:46

## 2024-05-10 RX ADMIN — OXYCODONE HYDROCHLORIDE 5 MILLIGRAM(S): 5 TABLET ORAL at 21:47

## 2024-05-10 RX ADMIN — Medication 500 MILLIGRAM(S): at 14:59

## 2024-05-10 RX ADMIN — MILNACIPRAN HYDROCHLORIDE 100 MILLIGRAM(S): 50 TABLET, FILM COATED ORAL at 20:24

## 2024-05-10 RX ADMIN — OXYCODONE HYDROCHLORIDE 5 MILLIGRAM(S): 5 TABLET ORAL at 04:44

## 2024-05-10 RX ADMIN — Medication 1000 MILLIGRAM(S): at 05:40

## 2024-05-10 RX ADMIN — Medication 1000 MILLIGRAM(S): at 16:04

## 2024-05-10 RX ADMIN — Medication 500 MILLIGRAM(S): at 05:40

## 2024-05-10 RX ADMIN — POLYETHYLENE GLYCOL 3350 17 GRAM(S): 17 POWDER, FOR SOLUTION ORAL at 12:48

## 2024-05-10 RX ADMIN — GABAPENTIN 800 MILLIGRAM(S): 400 CAPSULE ORAL at 21:47

## 2024-05-10 RX ADMIN — GABAPENTIN 800 MILLIGRAM(S): 400 CAPSULE ORAL at 05:40

## 2024-05-10 RX ADMIN — Medication 30 MILLIGRAM(S): at 15:00

## 2024-05-10 RX ADMIN — Medication 1000 MILLIGRAM(S): at 21:47

## 2024-05-10 RX ADMIN — CARIPRAZINE 1.5 MILLIGRAM(S): 1.5 CAPSULE, GELATIN COATED ORAL at 21:48

## 2024-05-10 RX ADMIN — Medication 30 MILLIGRAM(S): at 07:02

## 2024-05-10 RX ADMIN — ONDANSETRON 4 MILLIGRAM(S): 8 TABLET, FILM COATED ORAL at 18:11

## 2024-05-10 RX ADMIN — CARVEDILOL PHOSPHATE 6.25 MILLIGRAM(S): 80 CAPSULE, EXTENDED RELEASE ORAL at 18:12

## 2024-05-10 RX ADMIN — PANTOPRAZOLE SODIUM 40 MILLIGRAM(S): 20 TABLET, DELAYED RELEASE ORAL at 07:03

## 2024-05-10 RX ADMIN — METHOCARBAMOL 500 MILLIGRAM(S): 500 TABLET, FILM COATED ORAL at 21:47

## 2024-05-10 RX ADMIN — MILNACIPRAN HYDROCHLORIDE 100 MILLIGRAM(S): 50 TABLET, FILM COATED ORAL at 06:05

## 2024-05-10 RX ADMIN — NALOXEGOL OXALATE 12.5 MILLIGRAM(S): 12.5 TABLET, FILM COATED ORAL at 14:59

## 2024-05-10 RX ADMIN — OXYCODONE HYDROCHLORIDE 5 MILLIGRAM(S): 5 TABLET ORAL at 05:30

## 2024-05-10 RX ADMIN — GABAPENTIN 800 MILLIGRAM(S): 400 CAPSULE ORAL at 15:00

## 2024-05-10 RX ADMIN — METHOCARBAMOL 500 MILLIGRAM(S): 500 TABLET, FILM COATED ORAL at 15:00

## 2024-05-10 RX ADMIN — ONDANSETRON 4 MILLIGRAM(S): 8 TABLET, FILM COATED ORAL at 05:40

## 2024-05-10 RX ADMIN — Medication 12.5 MILLIGRAM(S): at 15:00

## 2024-05-10 RX ADMIN — METHOCARBAMOL 500 MILLIGRAM(S): 500 TABLET, FILM COATED ORAL at 05:40

## 2024-05-10 RX ADMIN — ENOXAPARIN SODIUM 40 MILLIGRAM(S): 100 INJECTION SUBCUTANEOUS at 21:46

## 2024-05-10 RX ADMIN — OXYCODONE HYDROCHLORIDE 5 MILLIGRAM(S): 5 TABLET ORAL at 22:47

## 2024-05-10 RX ADMIN — CARVEDILOL PHOSPHATE 6.25 MILLIGRAM(S): 80 CAPSULE, EXTENDED RELEASE ORAL at 05:40

## 2024-05-10 RX ADMIN — ONDANSETRON 4 MILLIGRAM(S): 8 TABLET, FILM COATED ORAL at 12:48

## 2024-05-10 RX ADMIN — DULOXETINE HYDROCHLORIDE 60 MILLIGRAM(S): 30 CAPSULE, DELAYED RELEASE ORAL at 18:12

## 2024-05-10 RX ADMIN — Medication 500 MILLIGRAM(S): at 21:47

## 2024-05-10 RX ADMIN — Medication 12.5 MILLIGRAM(S): at 21:48

## 2024-05-10 RX ADMIN — MIRTAZAPINE 30 MILLIGRAM(S): 45 TABLET, ORALLY DISINTEGRATING ORAL at 21:47

## 2024-05-10 NOTE — DIETITIAN INITIAL EVALUATION ADULT - ORAL INTAKE PTA/DIET HISTORY
Visited pt at bedside on 9WO. States that pt has a good appetite at home, typically consumes 3 meals a day. Does not follow any therapeutic diet at home. No cultural, ethnic, Mu-ism food preferences noted. Confirms No known food allergies.

## 2024-05-10 NOTE — DIETITIAN INITIAL EVALUATION ADULT - OTHER INFO
- Ordered for mirtazapine   - ISS, POCT ranges  past 24 hours   - Nutritionally Pertinent Labs 5/10 WNL

## 2024-05-10 NOTE — DIETITIAN INITIAL EVALUATION ADULT - PROBLEM/PLAN-3
Subjective:       Patient ID: Ofelia Harper ACE inhibitor/angiotensin II receptor blocker DISPLAY PLAN FREE TEXT

## 2024-05-10 NOTE — PROGRESS NOTE ADULT - SUBJECTIVE AND OBJECTIVE BOX
PAIN MANAGEMENT CONSULT NOTE    Interval Events:  - toradol added    Since yesterday 6am, pt has required:  - oxycodone 5mg x 2      HOME MEDICATIONS:   Please refer to initial HNP    Allergies    Cipro (Vomiting)    Intolerances        PAIN MEDICATIONS:  acetaminophen     Tablet .. 1000 milliGRAM(s) Oral every 8 hours  cariprazine 1.5 milliGRAM(s) Oral at bedtime  DULoxetine 60 milliGRAM(s) Oral every 12 hours  gabapentin 800 milliGRAM(s) Oral three times a day  ketorolac   Injectable 30 milliGRAM(s) IV Push every 8 hours  methocarbamol 500 milliGRAM(s) Oral every 8 hours  milnacipran 100 milliGRAM(s) Oral every 12 hours  mirtazapine 30 milliGRAM(s) Oral at bedtime  ondansetron   Disintegrating Tablet 4 milliGRAM(s) Oral every 6 hours  oxyCODONE    IR 5 milliGRAM(s) Oral every 3 hours PRN    Heme:  enoxaparin Injectable 40 milliGRAM(s) SubCutaneous every 24 hours    Antibiotics:    Cardiovascular:  carvedilol 6.25 milliGRAM(s) Oral every 12 hours    GI:  pantoprazole    Tablet 40 milliGRAM(s) Oral before breakfast  polyethylene glycol 3350 17 Gram(s) Oral daily  senna 2 Tablet(s) Oral at bedtime  sulfaSALAzine 500 milliGRAM(s) Oral three times a day    Endocrine:  dextrose 50% Injectable 25 Gram(s) IV Push once  dextrose 50% Injectable 12.5 Gram(s) IV Push once  dextrose Oral Gel 15 Gram(s) Oral once PRN  glucagon  Injectable 1 milliGRAM(s) IntraMuscular once  insulin lispro (ADMELOG) corrective regimen sliding scale   SubCutaneous Before meals and at bedtime    All Other Medications:  benzocaine/menthol Lozenge 1 Lozenge Oral five times a day PRN  dextrose 10% Bolus 125 milliLiter(s) IV Bolus once  dextrose 5%. 1000 milliLiter(s) IV Continuous <Continuous>  dextrose 5%. 1000 milliLiter(s) IV Continuous <Continuous>      Vital Signs Last 24 Hrs  T(C): 36.4 (10 May 2024 05:37), Max: 36.7 (09 May 2024 20:50)  T(F): 97.6 (10 May 2024 05:37), Max: 98.1 (09 May 2024 20:50)  HR: 91 (10 May 2024 05:37) (91 - 114)  BP: 119/65 (10 May 2024 05:37) (106/61 - 149/93)  BP(mean): 95 (10 May 2024 00:24) (79 - 95)  RR: 20 (10 May 2024 05:37) (16 - 20)  SpO2: 94% (10 May 2024 05:37) (94% - 98%)    Parameters below as of 10 May 2024 05:37  Patient On (Oxygen Delivery Method): room air        LABS:                        9.2    13.09 )-----------( 299      ( 10 May 2024 05:30 )             29.5     05-09    139  |  104  |  21  ----------------------------<  97  4.0   |  24  |  0.75    Ca    8.8      09 May 2024 07:41        Urinalysis Basic - ( 09 May 2024 07:41 )    Color: x / Appearance: x / SG: x / pH: x  Gluc: 97 mg/dL / Ketone: x  / Bili: x / Urobili: x   Blood: x / Protein: x / Nitrite: x   Leuk Esterase: x / RBC: x / WBC x   Sq Epi: x / Non Sq Epi: x / Bacteria: x        RADIOLOGY:    Drug Screen:        REVIEW OF SYSTEMS:  CONSTITUTIONAL: Denies fever or fatigue   EYES: Denies eye pain, visual disturbances  HEENT: Denies difficulty hearing, throat/neck pain or stiffness  RESPIRATORY: Denies SOB, cough, wheezing  CARDIOVASCULAR: Denies chest pain, palpitations.   GASTROINTESTINAL: Endorses +flatus, BMs. Denies nausea, vomiting, abdominal or epigastric pain.   GENITOURINARY: Denies dysuria, frequency, or incontinence  NEUROLOGICAL: Denies numbness, tingling. Denies headaches, loss of strength, tremors, dizzinesss or lightheadedness with pain medications.   MUSCULOSKELETAL: Denies joint pain or swelling      FUNCTIONAL ASSESSMENT:  PAIN SCORE AT REST:         SCALE USED: (1-10 VNRS)  PAIN SCORE WITH ACTIVITY:         SCALE USED: (1-10 VNRS)    PAIN ASSESSMENT:    FOCUSED PHYSICAL EXAM  GENERAL: Laying in bed, NAD  NEURO/MOTOR: CN II-XII PERRRL, EOMI  PULM: unlabored  CV: Regular rate and rhythm; No murmurs, rubs, or gallops  ABDOMEN: Soft, Nontender, Nondistended; Bowel sounds present  EXTREMITIES:  2+ Peripheral Pulses, No clubbing, cyanosis, or edema  SKIN: No rashes or lesions      ASSESSMENT:  59yFemale s/p revision TLIF L3-L4 5/6/24      PLAN:   - continue tylenol 1gm q8h  - continue gabapentin 800mg TID  - continue robaxin 500mg q8h  - continue milnacipran 100mg q12h  - continue oxycodone 5-10mg q4h prn moderate-severe pain  - continue Toradol IV 30mg q8h x 3 days if okay with surgery      - Bowel regimen: Senna, miralax    - Nausea ppx: Zofran standing  - Functional Goals: Pt will get OOB with PT today. Pt will resume previous level of activity without impairment from surgery.   - Additional Consults: None recommended.   - Additional Labs/Imaging:  None recommended.     - Discharge Planning: per primary team  - Pain Management follow up plan: will continue to follow    Plan d/w Dr. Floyd.     Eli Valdivia NP  Acute Pain Service PAIN MANAGEMENT CONSULT NOTE    Interval Events:  - toradol added    Since yesterday 6am, pt has required:  - oxycodone 5mg x 2      HOME MEDICATIONS:   Please refer to initial HNP    Allergies    Cipro (Vomiting)    Intolerances        PAIN MEDICATIONS:  acetaminophen     Tablet .. 1000 milliGRAM(s) Oral every 8 hours  cariprazine 1.5 milliGRAM(s) Oral at bedtime  DULoxetine 60 milliGRAM(s) Oral every 12 hours  gabapentin 800 milliGRAM(s) Oral three times a day  ketorolac   Injectable 30 milliGRAM(s) IV Push every 8 hours  methocarbamol 500 milliGRAM(s) Oral every 8 hours  milnacipran 100 milliGRAM(s) Oral every 12 hours  mirtazapine 30 milliGRAM(s) Oral at bedtime  ondansetron   Disintegrating Tablet 4 milliGRAM(s) Oral every 6 hours  oxyCODONE    IR 5 milliGRAM(s) Oral every 3 hours PRN    Heme:  enoxaparin Injectable 40 milliGRAM(s) SubCutaneous every 24 hours    Antibiotics:    Cardiovascular:  carvedilol 6.25 milliGRAM(s) Oral every 12 hours    GI:  pantoprazole    Tablet 40 milliGRAM(s) Oral before breakfast  polyethylene glycol 3350 17 Gram(s) Oral daily  senna 2 Tablet(s) Oral at bedtime  sulfaSALAzine 500 milliGRAM(s) Oral three times a day    Endocrine:  dextrose 50% Injectable 25 Gram(s) IV Push once  dextrose 50% Injectable 12.5 Gram(s) IV Push once  dextrose Oral Gel 15 Gram(s) Oral once PRN  glucagon  Injectable 1 milliGRAM(s) IntraMuscular once  insulin lispro (ADMELOG) corrective regimen sliding scale   SubCutaneous Before meals and at bedtime    All Other Medications:  benzocaine/menthol Lozenge 1 Lozenge Oral five times a day PRN  dextrose 10% Bolus 125 milliLiter(s) IV Bolus once  dextrose 5%. 1000 milliLiter(s) IV Continuous <Continuous>  dextrose 5%. 1000 milliLiter(s) IV Continuous <Continuous>      Vital Signs Last 24 Hrs  T(C): 36.4 (10 May 2024 05:37), Max: 36.7 (09 May 2024 20:50)  T(F): 97.6 (10 May 2024 05:37), Max: 98.1 (09 May 2024 20:50)  HR: 91 (10 May 2024 05:37) (91 - 114)  BP: 119/65 (10 May 2024 05:37) (106/61 - 149/93)  BP(mean): 95 (10 May 2024 00:24) (79 - 95)  RR: 20 (10 May 2024 05:37) (16 - 20)  SpO2: 94% (10 May 2024 05:37) (94% - 98%)    Parameters below as of 10 May 2024 05:37  Patient On (Oxygen Delivery Method): room air        LABS:                        9.2    13.09 )-----------( 299      ( 10 May 2024 05:30 )             29.5     05-09    139  |  104  |  21  ----------------------------<  97  4.0   |  24  |  0.75    Ca    8.8      09 May 2024 07:41        Urinalysis Basic - ( 09 May 2024 07:41 )    Color: x / Appearance: x / SG: x / pH: x  Gluc: 97 mg/dL / Ketone: x  / Bili: x / Urobili: x   Blood: x / Protein: x / Nitrite: x   Leuk Esterase: x / RBC: x / WBC x   Sq Epi: x / Non Sq Epi: x / Bacteria: x        RADIOLOGY:    Drug Screen:        REVIEW OF SYSTEMS:  CONSTITUTIONAL: Denies fever or fatigue   EYES: Denies eye pain, visual disturbances  HEENT: Denies difficulty hearing, throat/neck pain or stiffness  RESPIRATORY: Denies SOB, cough, wheezing  CARDIOVASCULAR: Denies chest pain, palpitations.   GASTROINTESTINAL: Endorses +flatus, denies BMs. Denies nausea, vomiting, abdominal or epigastric pain.   GENITOURINARY: Denies dysuria, frequency, or incontinence  NEUROLOGICAL: Denies numbness, tingling. Denies headaches, loss of strength, tremors, dizziness or lightheadedness with pain medications.   MUSCULOSKELETAL: Denies joint pain or swelling      FUNCTIONAL ASSESSMENT:  PAIN SCORE AT REST:         SCALE USED: (1-10 VNRS)  PAIN SCORE WITH ACTIVITY:         SCALE USED: (1-10 VNRS)    PAIN ASSESSMENT:  - incisional pain    FOCUSED PHYSICAL EXAM  GENERAL: Laying in bed, NAD  NEURO/MOTOR: CN II-XII PERRRL, EOMI  PULM: unlabored  CV: Regular rate and rhythm; No murmurs, rubs, or gallops  ABDOMEN: Soft, Nontender, Nondistended; Bowel sounds present  EXTREMITIES:  2+ Peripheral Pulses, No clubbing, cyanosis, or edema  SKIN: No rashes or lesions      ASSESSMENT:  59yFemale s/p revision TLIF L3-L4 5/6/24      PLAN:   - continue tylenol 1gm q8h  - continue gabapentin 800mg TID  - continue robaxin 500mg q8h  - continue milnacipran 100mg q12h  - continue oxycodone 5mg q4h prn moderate-severe pain  - continue Toradol IV 30mg q8h x 3 days  - give Movantik 12.5mg x 1    - Bowel regimen: Senna, miralax    - Nausea ppx: Zofran standing  - Functional Goals: Pt will get OOB with PT today. Pt will resume previous level of activity without impairment from surgery.   - Additional Consults: None recommended.   - Additional Labs/Imaging:  None recommended.     - Discharge Planning: per primary team  - Pain Management follow up plan: will continue to follow    Plan d/w Dr. Floyd.     Eli Valdivia NP  Acute Pain Service

## 2024-05-10 NOTE — DIETITIAN INITIAL EVALUATION ADULT - PERTINENT LABORATORY DATA
05-10    139  |  104  |  18  ----------------------------<  86  3.5   |  24  |  0.77    Ca    9.0      10 May 2024 05:30    POCT Blood Glucose.: 94 mg/dL (05-10-24 @ 11:48)  A1C with Estimated Average Glucose Result: 5.5 % (05-08-24 @ 05:30)

## 2024-05-10 NOTE — DISCHARGE NOTE NURSING/CASE MANAGEMENT/SOCIAL WORK - NSDCVIVACCINE_GEN_ALL_CORE_FT
influenza, injectable, quadrivalent, preservative free; 15-Sep-2019 12:21; Mar Solis (RN); [employer]; [id number] (Exp. Date: 30-Jun-2020); IntraMuscular; Deltoid Left.; 0.5 milliLiter(s); VIS (VIS Published: 15-Aug-2019, VIS Presented: 15-Sep-2019);   rabies, intradermal injection; 12-Sep-2019 18:54; Epi Interiano (RN); [employer]; [id number] (Exp. Date: 12-Feb-2023); IntraMuscular; Deltoid Left.; 1 milliLiter(s); VIS (VIS Published: 12-Sep-2019, VIS Presented: 12-Sep-2019);   rabies, intradermal injection; 15-Sep-2019 12:58; Mar Solis (RN); [employer]; [id number] (Exp. Date: 15-Jul-2020); IntraMuscular; Deltoid Right.; 1 milliLiter(s); VIS (VIS Published: 15-Sep-2020, VIS Presented: 15-Sep-2019);   rabies, intradermal injection; 19-Sep-2019 12:35; Tiffani Olvera (RN); [employer]; [id number] (Exp. Date: 09-Jan-2022); IntraMuscular; Deltoid Right.; 1 milliLiter(s); VIS (VIS Published: 08-Jan-2018, VIS Presented: 19-Sep-2019);   rabies, intradermal injection; 26-Sep-2019 11:41; Irma De La Torre (RN); [employer]; [id number] (Exp. Date: 07-Jan-2022); IntraMuscular; Deltoid Left.; 1 milliLiter(s); VIS (VIS Published: 26-May-2018, VIS Presented: 26-Sep-2019);   Tdap; 12-Sep-2019 17:31; Epi Interiano (RN); [employer]; [id number] (Exp. Date: 25-Jul-2021); IntraMuscular; Deltoid Left.; 0.5 milliLiter(s); VIS (VIS Published: 09-May-2013, VIS Presented: 12-Sep-2019);

## 2024-05-10 NOTE — PROGRESS NOTE ADULT - SUBJECTIVE AND OBJECTIVE BOX
S:  Complaints of Dizziness , reports hx of Vertigo , no diplopia , no nausea , no vomiting , no headache ,no localized weakness     ROS otherwise negative.     Vital Signs Last 24 Hrs  T(C): 37.1 (10 May 2024 09:20), Max: 37.1 (10 May 2024 09:20)  T(F): 98.7 (10 May 2024 09:20), Max: 98.7 (10 May 2024 09:20)  HR: 105 (10 May 2024 09:40) (91 - 106)  BP: 114/71 (10 May 2024 09:40) (114/71 - 149/93)  BP(mean): 95 (10 May 2024 00:24) (79 - 95)  RR: 19 (10 May 2024 09:20) (16 - 20)  SpO2: 96% (10 May 2024 09:20) (94% - 98%)    Parameters below as of 10 May 2024 09:20  Patient On (Oxygen Delivery Method): room air    air        PHYSICAL EXAM:  Gen: Reclining in bed at time of exam, appears stated age  HEENT: NCAT, MMM, clear OP  Neck: supple, trachea at midline  CV: RRR, +S1/S2  Pulm: adequate respiratory effort, no increase in work of breathing  Abd: soft, NTND  Skin: warm and dry,  Ext: no LE edema  Psych: affect and behavior appropriate, pleasant at time of interview  MSK: surgical drain in place, dark fluid draining.            Labs                    9.2    13.09 )-----------( 299      ( 10 May 2024 05:30 )             29.5     05-10    139  |  104  |  18  ----------------------------<  86  3.5   |  24  |  0.77    Ca    9.0      10 May 2024 05:30

## 2024-05-10 NOTE — DIETITIAN INITIAL EVALUATION ADULT - EDUCATION DIETARY MODIFICATIONS
Educated/Discussed the importance to prioritize protein at meal times. Reviewed good sources of protein on the menu. Pt receptive and verbalizes understanding./(2) meets goals/outcomes/verbalization

## 2024-05-10 NOTE — DIETITIAN INITIAL EVALUATION ADULT - PERTINENT MEDS FT
MEDICATIONS  (STANDING):  acetaminophen     Tablet .. 1000 milliGRAM(s) Oral every 8 hours  bisacodyl Suppository 10 milliGRAM(s) Rectal once  cariprazine 1.5 milliGRAM(s) Oral at bedtime  carvedilol 6.25 milliGRAM(s) Oral every 12 hours  dextrose 10% Bolus 125 milliLiter(s) IV Bolus once  dextrose 5%. 1000 milliLiter(s) (100 mL/Hr) IV Continuous <Continuous>  dextrose 5%. 1000 milliLiter(s) (50 mL/Hr) IV Continuous <Continuous>  dextrose 50% Injectable 25 Gram(s) IV Push once  dextrose 50% Injectable 12.5 Gram(s) IV Push once  DULoxetine 60 milliGRAM(s) Oral every 12 hours  enoxaparin Injectable 40 milliGRAM(s) SubCutaneous every 24 hours  gabapentin 800 milliGRAM(s) Oral three times a day  glucagon  Injectable 1 milliGRAM(s) IntraMuscular once  insulin lispro (ADMELOG) corrective regimen sliding scale   SubCutaneous Before meals and at bedtime  ketorolac   Injectable 30 milliGRAM(s) IV Push every 8 hours  meclizine 12.5 milliGRAM(s) Oral every 8 hours  methocarbamol 500 milliGRAM(s) Oral every 8 hours  milnacipran 100 milliGRAM(s) Oral every 12 hours  mirtazapine 30 milliGRAM(s) Oral at bedtime  naloxegol 12.5 milliGRAM(s) Oral once  ondansetron   Disintegrating Tablet 4 milliGRAM(s) Oral every 6 hours  pantoprazole    Tablet 40 milliGRAM(s) Oral before breakfast  polyethylene glycol 3350 17 Gram(s) Oral daily  senna 2 Tablet(s) Oral at bedtime  sulfaSALAzine 500 milliGRAM(s) Oral three times a day    MEDICATIONS  (PRN):  benzocaine/menthol Lozenge 1 Lozenge Oral five times a day PRN Sore Throat  dextrose Oral Gel 15 Gram(s) Oral once PRN Blood Glucose LESS THAN 70 milliGRAM(s)/deciliter  oxyCODONE    IR 5 milliGRAM(s) Oral every 3 hours PRN Severe Pain (7 - 10)

## 2024-05-10 NOTE — DISCHARGE NOTE NURSING/CASE MANAGEMENT/SOCIAL WORK - PATIENT PORTAL LINK FT
You can access the FollowMyHealth Patient Portal offered by [facility] by registering at the following website: [email or web address]. By joining [facility]’s FollowMyHealth portal, you will also be able to view your health information using other applications (apps) compatible with our system.

## 2024-05-10 NOTE — DIETITIAN INITIAL EVALUATION ADULT - NS FNS DIET ORDER
Diet, Regular:      Special Instructions for Nursing:  Unless airway concerns (05-06-24 @ 18:43) [Active]

## 2024-05-10 NOTE — DIETITIAN INITIAL EVALUATION ADULT - ADD RECOMMEND
1. C/w current diet as tolerated: Regular  2. Encourage PO intake and honor food preferences as able unless otherwise contraindicated.   3. Monitor PO intake, diet tolerance, weight trends, labs, and skin integrity and bowel movement regularity.   4. RDN will continue to monitor, reassess, and intervene as appropriate.

## 2024-05-10 NOTE — DISCHARGE NOTE NURSING/CASE MANAGEMENT/SOCIAL WORK - NSDCPEFALRISK_GEN_ALL_CORE
For information on Fall & Injury Prevention, visit: [email or web address] OR  [email or web address] OR  https://www.cdc.gov/steadi/patient.html

## 2024-05-10 NOTE — DISCHARGE NOTE NURSING/CASE MANAGEMENT/SOCIAL WORK - NSPROEXTENSIONSOFSELF_GEN_A_NUR
[Care Plan reviewed and provided to patient/caregiver] : Care plan reviewed and provided to patient/caregiver [Care Plan reviewed every ___ weeks] : Care plan reviewed every [unfilled] weeks [Understands and communicates without difficulty] : Patient/Caregiver understands and communicates without difficulty eyeglasses eyeglasses/walker

## 2024-05-10 NOTE — DIETITIAN INITIAL EVALUATION ADULT - NSFNSGIASSESSMENTFT_GEN_A_CORE
No issues with nausea, vomiting, diarrhea reported at time of visit. States that she is experiencing constipation. Last BM noted  5/6 per flow sheets. Pt is currently on a bowel regimen: miralax, senna

## 2024-05-10 NOTE — DIETITIAN INITIAL EVALUATION ADULT - PHYSCIAL ASSESSMENT
Weights:   5/6 148 pounds (Dosing)   UBW: ~149 pounds (per pt) Endorses stable weight x 3 months   IBW: 98 pounds   %IBW: 151%    Weights appear to be stable. RD to continue to monitor weights as available.

## 2024-05-10 NOTE — PROGRESS NOTE ADULT - SUBJECTIVE AND OBJECTIVE BOX
Ortho Note    Patient seen and examined in chair. Pt reports she feels the "room is spinning". She reports a history of vertigo and states this feels similar. She takes Meclizine at home for vertigo symptoms and   Denies CP, SOB, N/V, new numbness/tingling     Vital Signs Last 24 Hrs  T(C): 37.1 (05-10-24 @ 09:20), Max: 37.1 (05-10-24 @ 09:20)  T(F): 98.7 (05-10-24 @ 09:20), Max: 98.7 (05-10-24 @ 09:20)  HR: 106 (05-10-24 @ 09:20) (91 - 106)  BP: 133/73 (05-10-24 @ 09:20) (119/65 - 133/73)  BP(mean): --  RR: 19 (05-10-24 @ 09:20) (19 - 20)  SpO2: 96% (05-10-24 @ 09:20) (94% - 96%)  I&O's Summary    09 May 2024 07:01  -  10 May 2024 07:00  --------------------------------------------------------  IN: 2340 mL / OUT: 2710 mL / NET: -370 mL        General: Pt Alert and oriented, NAD  DSG C/D/I-   Pulses: 2+ DP / Radial pulses palpable bilaterally, skin wwp, cap refill brisk, no calf tenderness bilaterally  Sensation:   Motor:  EHL/FHL/TA/GS ** bilaterally  Ulnar/Radial/Median nerves intact bilaterally, /Biceps/Tricpes ** bilaterally                          9.2    13.09 )-----------( 299      ( 10 May 2024 05:30 )             29.5     05-10    139  |  104  |  18  ----------------------------<  86  3.5   |  24  |  0.77    Ca    9.0      10 May 2024 05:30        A/P: 59yFemale s/p   - Stable  - Pain Control  - OOB/IS  - Bowel Regimen  - DVT ppx:  - PT, WBS:   - Dispo:    Ortho Pager [phone] Ortho Note    Patient seen and examined in chair. Pt reports she feels the "room is spinning". She reports a history of vertigo and states this feels similar. She takes Meclizine at home for vertigo symptoms. Otherwise voiding freely and passing flatus. LBM 5/6 and would like an increased in bowel regimen.   Denies CP, SOB, N/V, new numbness/tingling     Vital Signs Last 24 Hrs  T(C): 37.1 (05-10-24 @ 09:20), Max: 37.1 (05-10-24 @ 09:20)  T(F): 98.7 (05-10-24 @ 09:20), Max: 98.7 (05-10-24 @ 09:20)  HR: 106 (05-10-24 @ 09:20) (91 - 106)  BP: 133/73 (05-10-24 @ 09:20) (119/65 - 133/73)  BP(mean): --  RR: 19 (05-10-24 @ 09:20) (19 - 20)  SpO2: 96% (05-10-24 @ 09:20) (94% - 96%)  I&O's Summary    09 May 2024 07:01  -  10 May 2024 07:00  --------------------------------------------------------  IN: 2340 mL / OUT: 2710 mL / NET: -370 mL        General: Pt Alert and oriented, NAD  DSG C/D/I- Aquacel to lumbar spine  Pulses: 2+ DP pulses palpable bilaterally, skin wwp, cap refill brisk, no calf tenderness bilaterally  Sensation: SILT to L2-S1 dermatomes bilaterally  Motor:  EHL/FHL/TA/GS 5/5 bilaterally                          9.2    13.09 )-----------( 299      ( 10 May 2024 05:30 )             29.5     05-10    139  |  104  |  18  ----------------------------<  86  3.5   |  24  |  0.77    Ca    9.0      10 May 2024 05:30        A/P: 58yo Female POD#4 s/p L3-L4 TLIF   - Stable, appreciate medical comanagement  - Likely vertigo - Meclizine 12.5mg q8hrs  - Pain Control  - OOB/IS  - Bowel Regimen - Movantik 12.5mg x1 dose   - Standing xrays completed   - DVT ppx: LVX 40mg daily  - PT, WBS: WBAT  - Dispo: HPT pending PT clearance     Ortho Pager [phone]

## 2024-05-10 NOTE — DIETITIAN INITIAL EVALUATION ADULT - PROBLEM/PLAN-6
DISPLAY PLAN FREE TEXT
Pt. reports he lives with his wife in a house with 4 steps to enter. Once inside, pt. reports he has a full flight of steps to negotiate to reach 2nd floor where main bedroom and bathroom are located. Per pt., he has a shower stall in his bathroom on the first floor and a bathtub in his bathroom on the second floor.

## 2024-05-10 NOTE — DIETITIAN INITIAL EVALUATION ADULT - NSFNSNUTRHOMESUPPLEMENTFT_GEN_A_CORE
Pt reports no vitamin/mineral supplementation at home. Reports no additional use of oral nutritional supplement.

## 2024-05-10 NOTE — DIETITIAN INITIAL EVALUATION ADULT - OTHER CALCULATIONS
Ideal body weight (44.4kg) used to calculate energy needs due to pt's current body weight exceeds % (151%) ideal body weight. Needs adjusted for clinical course.

## 2024-05-10 NOTE — DIETITIAN INITIAL EVALUATION ADULT - REASON
Nutrition focused physical exam deferred at this time as pt is eating well and reports stable weight. Observed with no overt signs and symptoms of muscle or fat wasting. Based on ASPEN guidelines, pt does not meet criteria for malnutrition.  Patient/Caregiver provided printed discharge information.

## 2024-05-10 NOTE — PROGRESS NOTE ADULT - SUBJECTIVE AND OBJECTIVE BOX
Discharge Summary/Instructions after an Endoscopic Procedure  Patient Name: Fifi Mcnair  Patient MRN: [id number]  Patient YOB: 1951 Wednesday, April 11, 2018  Luis Veras MD  RESTRICTIONS:  During your procedure today, you received medications for sedation.  These   medications may affect your judgment, balance and coordination.  Therefore,   for 24 hours, you have the following restrictions:   - DO NOT drive a car, operate machinery, make legal/financial decisions,   sign important papers or drink alcohol.    ACTIVITY:  The following day: return to full activity including work, except no heavy   lifting, straining or running for 3 days if polyps were removed.  DIET:  Eat and drink normally unless instructed otherwise.     TREATMENT FOR COMMON SIDE EFFECTS:  - Mild abdominal pain, nausea, belching, bloating or excessive gas:  rest,   eat lightly and use a heating pad.  - Sore Throat: treat with throat lozenges and/or gargle with warm salt   water.  - Because air was used during the procedure, expelling large amounts of air   from your rectum or belching is normal.  - If a bowel prep was taken, you may not have a bowel movement for 1-3 days.    This is normal.  SYMPTOMS TO WATCH FOR AND REPORT TO YOUR PHYSICIAN:  1. Abdominal pain or bloating, other than gas cramps.  2. Chest pain.  3. Back pain.  4. Signs of infection such as: chills or fever occurring within 24 hours   after the procedure.  5. Rectal bleeding, which would show as bright red, maroon, or black stools.   (A tablespoon of blood from the rectum is not serious, especially if   hemorrhoids are present.)  6. Vomiting.  7. Weakness or dizziness.  GO DIRECTLY TO THE NEAREST EMERGENCY ROOM IF YOU HAVE ANY OF THE FOLLOWING:      Difficulty breathing              Chills and/or fever over 101 F   Persistent vomiting and/or vomiting blood   Severe abdominal pain   Severe chest pain   Black, tarry stools   Bleeding- more than one  Ortho Note    Pt comfortable without complaints, pain controlled  Denies CP, SOB, N/V, numbness/tingling     Vital Signs Last 24 Hrs  T(C): 36.4 (05-10-24 @ 05:37), Max: 36.6 (05-10-24 @ 00:24)  T(F): 97.6 (05-10-24 @ 05:37), Max: 97.9 (05-10-24 @ 00:24)  HR: 91 (05-10-24 @ 05:37) (91 - 95)  BP: 119/65 (05-10-24 @ 05:37) (119/65 - 138/68)  BP(mean): 95 (05-10-24 @ 00:24) (95 - 95)  RR: 20 (05-10-24 @ 05:37) (16 - 20)  SpO2: 94% (05-10-24 @ 05:37) (94% - 98%)  I&O's Summary    08 May 2024 07:01  -  09 May 2024 07:00  --------------------------------------------------------  IN: 600 mL / OUT: 1265 mL / NET: -665 mL    09 May 2024 07:01  -  10 May 2024 06:18  --------------------------------------------------------  IN: 2340 mL / OUT: 2710 mL / NET: -370 mL        General: Pt Alert and oriented, NAD  Aquacel DSG C/D/I, HVx1 dc'd  Pulses: 2+ DP/PT b/l  Sensation: SILT b/l  Motor: Quad/Ham/EHL/FHL/TA/GS  5/5                          9.4    18.64 )-----------( 288      ( 09 May 2024 07:41 )             29.9     05-09    139  |  104  |  21  ----------------------------<  97  4.0   |  24  |  0.75    Ca    8.8      09 May 2024 07:41        A/P: 59yFemale POD#3 s/p revision TLIF L3-L4    - Stable  - Pain Control  - DVT ppx: SCDs, lovenox  - Post op abx: ancef  - PT, WBS: WBAT  - Dispo: HPT pending clearance     Ortho Pager [phone] tablespoon   Any other symptom or condition that you feel may need urgent attention  Your doctor recommends these additional instructions:  If any biopsies were taken, your doctors clinic will contact you in 1 to 2   weeks with any results.  - Patient has a contact number available for emergencies.  The signs and   symptoms of potential delayed complications were discussed with the   patient.  Return to normal activities tomorrow.  Written discharge   instructions were provided to the patient.   - High fiber diet.   - Continue present medications.   - Await pathology results.   - Repeat colonoscopy in 3 years for surveillance.   - Discharge patient to home (ambulatory).   - Return to my office PRN.  For questions, problems or results please call your physician - Luis Veras MD at Work:  [phone].  [facility], EMERGENCY ROOM PHONE NUMBER: [phone]  IF A COMPLICATION OR EMERGENCY SITUATION ARISES AND YOU ARE UNABLE TO REACH   YOUR PHYSICIAN - GO DIRECTLY TO THE EMERGENCY ROOM.  Luis Veras MD  4/11/2018 10:03:49 AM  This report has been verified and signed electronically.

## 2024-05-10 NOTE — DIETITIAN INITIAL EVALUATION ADULT - ENERGY INTAKE
Pt is tolerating current diet: Regular. States that she has a good appetite and has been consuming 100% of meals while inpatient.

## 2024-05-11 VITALS
SYSTOLIC BLOOD PRESSURE: 113 MMHG | OXYGEN SATURATION: 95 % | TEMPERATURE: 98 F | HEART RATE: 102 BPM | RESPIRATION RATE: 18 BRPM | DIASTOLIC BLOOD PRESSURE: 79 MMHG

## 2024-05-11 LAB
ANION GAP SERPL CALC-SCNC: 13 MMOL/L — SIGNIFICANT CHANGE UP (ref 5–17)
BUN SERPL-MCNC: 19 MG/DL — SIGNIFICANT CHANGE UP (ref 7–23)
CALCIUM SERPL-MCNC: 9.1 MG/DL — SIGNIFICANT CHANGE UP (ref 8.4–10.5)
CHLORIDE SERPL-SCNC: 104 MMOL/L — SIGNIFICANT CHANGE UP (ref 96–108)
CO2 SERPL-SCNC: 21 MMOL/L — LOW (ref 22–31)
CREAT SERPL-MCNC: 0.89 MG/DL — SIGNIFICANT CHANGE UP (ref 0.5–1.3)
CULTURE RESULTS: SIGNIFICANT CHANGE UP
EGFR: 74 ML/MIN/1.73M2 — SIGNIFICANT CHANGE UP
GLUCOSE BLDC GLUCOMTR-MCNC: 102 MG/DL — HIGH (ref 70–99)
GLUCOSE BLDC GLUCOMTR-MCNC: 113 MG/DL — HIGH (ref 70–99)
GLUCOSE SERPL-MCNC: 91 MG/DL — SIGNIFICANT CHANGE UP (ref 70–99)
HCT VFR BLD CALC: 29.4 % — LOW (ref 34.5–45)
HGB BLD-MCNC: 9 G/DL — LOW (ref 11.5–15.5)
MCHC RBC-ENTMCNC: 29.6 PG — SIGNIFICANT CHANGE UP (ref 27–34)
MCHC RBC-ENTMCNC: 30.6 GM/DL — LOW (ref 32–36)
MCV RBC AUTO: 96.7 FL — SIGNIFICANT CHANGE UP (ref 80–100)
NRBC # BLD: 0 /100 WBCS — SIGNIFICANT CHANGE UP (ref 0–0)
PLATELET # BLD AUTO: 299 K/UL — SIGNIFICANT CHANGE UP (ref 150–400)
POTASSIUM SERPL-MCNC: 4 MMOL/L — SIGNIFICANT CHANGE UP (ref 3.5–5.3)
POTASSIUM SERPL-SCNC: 4 MMOL/L — SIGNIFICANT CHANGE UP (ref 3.5–5.3)
RBC # BLD: 3.04 M/UL — LOW (ref 3.8–5.2)
RBC # FLD: 13.4 % — SIGNIFICANT CHANGE UP (ref 10.3–14.5)
SODIUM SERPL-SCNC: 138 MMOL/L — SIGNIFICANT CHANGE UP (ref 135–145)
SPECIMEN SOURCE: SIGNIFICANT CHANGE UP
WBC # BLD: 11.38 K/UL — HIGH (ref 3.8–10.5)
WBC # FLD AUTO: 11.38 K/UL — HIGH (ref 3.8–10.5)

## 2024-05-11 PROCEDURE — 99233 SBSQ HOSP IP/OBS HIGH 50: CPT

## 2024-05-11 RX ADMIN — PANTOPRAZOLE SODIUM 40 MILLIGRAM(S): 20 TABLET, DELAYED RELEASE ORAL at 06:16

## 2024-05-11 RX ADMIN — POLYETHYLENE GLYCOL 3350 17 GRAM(S): 17 POWDER, FOR SOLUTION ORAL at 12:39

## 2024-05-11 RX ADMIN — Medication 12.5 MILLIGRAM(S): at 14:50

## 2024-05-11 RX ADMIN — Medication 30 MILLIGRAM(S): at 00:13

## 2024-05-11 RX ADMIN — METHOCARBAMOL 500 MILLIGRAM(S): 500 TABLET, FILM COATED ORAL at 06:15

## 2024-05-11 RX ADMIN — ONDANSETRON 4 MILLIGRAM(S): 8 TABLET, FILM COATED ORAL at 12:39

## 2024-05-11 RX ADMIN — GABAPENTIN 800 MILLIGRAM(S): 400 CAPSULE ORAL at 14:51

## 2024-05-11 RX ADMIN — DULOXETINE HYDROCHLORIDE 60 MILLIGRAM(S): 30 CAPSULE, DELAYED RELEASE ORAL at 06:16

## 2024-05-11 RX ADMIN — METHOCARBAMOL 500 MILLIGRAM(S): 500 TABLET, FILM COATED ORAL at 14:50

## 2024-05-11 RX ADMIN — CARVEDILOL PHOSPHATE 6.25 MILLIGRAM(S): 80 CAPSULE, EXTENDED RELEASE ORAL at 06:16

## 2024-05-11 RX ADMIN — Medication 12.5 MILLIGRAM(S): at 06:15

## 2024-05-11 RX ADMIN — MILNACIPRAN HYDROCHLORIDE 100 MILLIGRAM(S): 50 TABLET, FILM COATED ORAL at 07:16

## 2024-05-11 RX ADMIN — ONDANSETRON 4 MILLIGRAM(S): 8 TABLET, FILM COATED ORAL at 06:16

## 2024-05-11 RX ADMIN — Medication 30 MILLIGRAM(S): at 06:16

## 2024-05-11 RX ADMIN — Medication 1000 MILLIGRAM(S): at 14:50

## 2024-05-11 RX ADMIN — Medication 500 MILLIGRAM(S): at 14:50

## 2024-05-11 RX ADMIN — Medication 30 MILLIGRAM(S): at 14:51

## 2024-05-11 RX ADMIN — Medication 500 MILLIGRAM(S): at 06:15

## 2024-05-11 RX ADMIN — Medication 1000 MILLIGRAM(S): at 06:15

## 2024-05-11 RX ADMIN — GABAPENTIN 800 MILLIGRAM(S): 400 CAPSULE ORAL at 06:16

## 2024-05-11 NOTE — PROGRESS NOTE ADULT - ASSESSMENT
58yo f c/o low back pain x years. Patient has history of lumbar spinal fusion in 2017 and states pain has been present since prior surgery. States low back pain radiates into right lower extremity. Patient denies current use of a cane/walker. Has failed conservative management including oral analgesics, activity modification. Patient reports recent admission at outside hospital at the end of February for kidney stone/urosepsis/IV antibiotics. Denies history of blood clots/use of anticoagulants.           #L3-L4 TLIF 5/6  -Management a/p ortho  #Post operative state-  -Encourage IS. Ambulate/OOBTC as tolerated. PT consult. DVT ppx a/p primary. Multi modal pain control, can use IV dilaudid for severe/breakthrough pain.     #Acute blood loss anemia  -Preop labs hgb 12.2 down to 10.4, 9.2 today. No further signs of loss. Just continue to follow up.   -No further s/s of blood loss  -Continue monitoring.     #Steroid induced hyperglycemia  -190 on BMP post operatively. Reviewed outpatient, A1C 5.6.   -SSI for now.  - now blood sugars are stable     #HTN/HLD/depression  -c/w home meds. Patient to bring in cariprazine, milnacipran.     # Vertigo - Meclizine 12.5 TID today     #BMI 30 - affects all aspects of care     
60yo f c/o low back pain x years. Patient has history of lumbar spinal fusion in 2017 and states pain has been present since prior surgery. States low back pain radiates into right lower extremity. Patient denies current use of a cane/walker. Has failed conservative management including oral analgesics, activity modification. Patient reports recent admission at outside hospital at the end of February for kidney stone/urosepsis/IV antibiotics. Denies history of blood clots/use of anticoagulants.   Presents today for elective REVISION L3-L4 TLIF.          #L3-L4 TLIF 5/6  -Management a/p ortho  #Post operative state-  -Encourage IS. Ambulate/OOBTC as tolerated. PT consult. DVT ppx a/p primary. Multi modal pain control, can use IV dilaudid for severe/breakthrough pain.     #Acute blood loss anemia  -Preop labs hgb 12.2 down to 10.4, 9.4 today. No further signs of loss. Just continue to follow up.   -No further s/s of blood loss  -Continue monitoring.     #Steroid induced hyperglycemia  -190 on BMP post operatively. Reviewed outpatient, A1C 5.6.   -SSI for now.     #HTN/HLD/depression  -c/w home meds. Patient to bring in cariprazine, milnacipran.     #BMI 30 - affects all aspects of care     
60yo f c/o low back pain x years. Patient has history of lumbar spinal fusion in 2017 and states pain has been present since prior surgery. States low back pain radiates into right lower extremity. Patient denies current use of a cane/walker. Has failed conservative management including oral analgesics, activity modification. Patient reports recent admission at outside hospital at the end of February for kidney stone/urosepsis/IV antibiotics. Denies history of blood clots/use of anticoagulants.   Presents today for elective REVISION L3-L4 TLIF.          #L3-L4 TLIF 5/6  -Management a/p ortho  #Post operative state-  -Encourage IS. Ambulate/OOBTC as tolerated. PT consult. DVT ppx a/p primary. Multi modal pain control, can use IV dilaudid for severe/breakthrough pain.     #Acute blood loss anemia  -Preop labs hgb 12.2 down to 10.4 post op  -No further s/s of blood loss  -Continue monitoring.     #Steroid induced hyperglycemia  -190 on BMP post operatively. Reviewed outpatient, A1C 5.6.   -SSI for now.     #HTN/HLD/depression  -c/w home meds. Patient to bring in cariprazine, milnacipran.     #BMI 30 - affects all aspects of care      MDM High  Reviewed blood work, vitals, OR course, outpatient hospital records  Case d/w ortho ACP  Management of blood loss anemia, management of pain requiring IV dilaudid. 
59F w HTN, HLD, Depression, prior fusion 2017, back pain radiates into RLE, here for L3-4 TLIF 5/6 w Dr. Lopez, c/b vertigo    #Vertigo - on 12.5mg meclizine q8 x2 days    #Post-op state - pain controlled. PPx: SQL. On bowel regimen and incentive spirometer  #L3-L4 TLIF 5/6   - tylenol 1g q8, gabapentin 800 q8, robaxin 500 q8,    - PRN: Oxycodone 5 q3 for severe pain  #Leukocytosis - Improving 11.3 from 13. recent course of steroids  #Acute blood loss anemia - hgb 9 from 9.2. Baseline 11. Asymptomatic    #HTN - on coreg 6.25 BID  #Depression - on remeron 30mg HS, vraylar 1.5mg HS, cymbalta 60 BID  #Autoimmune dz - sulfasalazine 500q 8  #hyperglycemia d/t steroids - A1C 5.5    #BMI 30 - affects all aspects of care    Plan  Monitor pain; vertigo  Continue PT    DISPO: Home w [facility] OT  Above d/w Ortho

## 2024-05-11 NOTE — PROGRESS NOTE ADULT - SUBJECTIVE AND OBJECTIVE BOX
INTERVAL HPI/OVERNIGHT EVENTS: ivan o/n    SUBJECTIVE: Patient seen and examined at bedside.   Feels well. States pain is improved today. Feels vertigo sxs have resolved. eager to mobilize w PT today  Eating wo N/V/Abd pain. +flatus. Voiding. No fever, chest pain, dyspnea.    OBJECTIVE:    VITAL SIGNS:  ICU Vital Signs Last 24 Hrs  T(C): 37.1 (11 May 2024 08:46), Max: 37.1 (11 May 2024 08:46)  T(F): 98.7 (11 May 2024 08:46), Max: 98.7 (11 May 2024 08:46)  HR: 98 (11 May 2024 08:59) (92 - 120)  BP: 122/62 (11 May 2024 08:46) (104/56 - 122/66)  BP(mean): --  ABP: --  ABP(mean): --  RR: 18 (11 May 2024 08:59) (15 - 20)  SpO2: 96% (11 May 2024 08:59) (94% - 100%)    O2 Parameters below as of 11 May 2024 08:59  Patient On (Oxygen Delivery Method): room air              05-10 @ 07:01 - 05-11 @ 07:00  --------------------------------------------------------  IN: 0 mL / OUT: 1351 mL / NET: -1351 mL    05-11 @ 07:01 - 05-11 @ 13:08  --------------------------------------------------------  IN: 180 mL / OUT: 0 mL / NET: 180 mL      CAPILLARY BLOOD GLUCOSE      POCT Blood Glucose.: 102 mg/dL (11 May 2024 12:14)      PHYSICAL EXAM:  GEN: Male in NAD on RA  HEENT: NC/AT, MMM  CV: RRR, nml S1S2, no murmurs  PULM: nml effort, CTAB  ABD: Soft, non-distended, NABS, non-tender  NEURO  A/O x3, moving all extremities, Sensation intact  Lumbar dressing c/d/i. Drain in place - dark fluid  PSYCH: Appropriate      MEDICATIONS:  MEDICATIONS  (STANDING):  acetaminophen     Tablet .. 1000 milliGRAM(s) Oral every 8 hours  cariprazine 1.5 milliGRAM(s) Oral at bedtime  carvedilol 6.25 milliGRAM(s) Oral every 12 hours  dextrose 10% Bolus 125 milliLiter(s) IV Bolus once  dextrose 5%. 1000 milliLiter(s) (100 mL/Hr) IV Continuous <Continuous>  dextrose 5%. 1000 milliLiter(s) (50 mL/Hr) IV Continuous <Continuous>  dextrose 50% Injectable 25 Gram(s) IV Push once  dextrose 50% Injectable 12.5 Gram(s) IV Push once  DULoxetine 60 milliGRAM(s) Oral every 12 hours  enoxaparin Injectable 40 milliGRAM(s) SubCutaneous every 24 hours  gabapentin 800 milliGRAM(s) Oral three times a day  glucagon  Injectable 1 milliGRAM(s) IntraMuscular once  insulin lispro (ADMELOG) corrective regimen sliding scale   SubCutaneous Before meals and at bedtime  ketorolac   Injectable 30 milliGRAM(s) IV Push every 8 hours  meclizine 12.5 milliGRAM(s) Oral every 8 hours  methocarbamol 500 milliGRAM(s) Oral every 8 hours  mirtazapine 30 milliGRAM(s) Oral at bedtime  ondansetron   Disintegrating Tablet 4 milliGRAM(s) Oral every 6 hours  pantoprazole    Tablet 40 milliGRAM(s) Oral before breakfast  polyethylene glycol 3350 17 Gram(s) Oral daily  senna 2 Tablet(s) Oral at bedtime  sulfaSALAzine 500 milliGRAM(s) Oral three times a day    MEDICATIONS  (PRN):  benzocaine/menthol Lozenge 1 Lozenge Oral five times a day PRN Sore Throat  dextrose Oral Gel 15 Gram(s) Oral once PRN Blood Glucose LESS THAN 70 milliGRAM(s)/deciliter  oxyCODONE    IR 5 milliGRAM(s) Oral every 3 hours PRN Severe Pain (7 - 10)      ALLERGIES:  Allergies    Cipro (Vomiting)    Intolerances        LABS:                        9.0    11.38 )-----------( 299      ( 11 May 2024 05:30 )             29.4     05-11    138  |  104  |  19  ----------------------------<  91  4.0   |  21<L>  |  0.89    Ca    9.1      11 May 2024 05:30        Urinalysis Basic - ( 11 May 2024 05:30 )    Color: x / Appearance: x / SG: x / pH: x  Gluc: 91 mg/dL / Ketone: x  / Bili: x / Urobili: x   Blood: x / Protein: x / Nitrite: x   Leuk Esterase: x / RBC: x / WBC x   Sq Epi: x / Non Sq Epi: x / Bacteria: x        RADIOLOGY & ADDITIONAL TESTS: Reviewed.

## 2024-05-16 ENCOUNTER — NON-APPOINTMENT (OUTPATIENT)
Age: 59
End: 2024-05-16

## 2024-05-17 ENCOUNTER — APPOINTMENT (OUTPATIENT)
Dept: ORTHOPEDIC SURGERY | Facility: CLINIC | Age: 59
End: 2024-05-17
Payer: OTHER MISCELLANEOUS

## 2024-05-17 PROCEDURE — 99024 POSTOP FOLLOW-UP VISIT: CPT

## 2024-05-17 PROCEDURE — 72100 X-RAY EXAM L-S SPINE 2/3 VWS: CPT

## 2024-05-17 RX ORDER — OXYCODONE 5 MG/1
5 TABLET ORAL
Qty: 28 | Refills: 0 | Status: ACTIVE | COMMUNITY
Start: 2024-05-17 | End: 1900-01-01

## 2024-05-17 RX ORDER — METHOCARBAMOL 500 MG/1
500 TABLET, FILM COATED ORAL 3 TIMES DAILY
Qty: 90 | Refills: 1 | Status: ACTIVE | COMMUNITY
Start: 2024-05-17 | End: 1900-01-01

## 2024-05-17 NOTE — HISTORY OF PRESENT ILLNESS
[Chills] : no chills [Constipation] : no constipation [Diarrhea] : no diarrhea [Dysuria] : no dysuria [Fever] : no fever [Nausea] : no nausea [Vomiting] : no vomiting [de-identified] : s/p Revision L3-S1 with L3-L4 TLIF (5/6/24) [de-identified] : 59 year old female s/p Revision L3-S1 with L3-L4 TLIF (5/6/24). She denies radicular pain.  It has resolved.  Surgical site pain controlled with medications. Mobilizing well at home. She denies radicular pain, recent illness, fevers, numbness, weakness, saddle anesthesia, urinary retention or fecal incontinence. [de-identified] : MSK: Lumbar spine: Incision well healed  NEURO EXAM: Sensation  Left L2  -  2/2             Left L3  -  2/2 Left L4  -  2/2 Left L5  -  2/2 Left S1  -  2/2  Right L2  -  2/2             Right L3  -  2/2 Right L4  -  2/2 Right L5  -  2/2 Right S1  -  2/2  Motor:  Left L2 (hip flexion)                            5/5                 Left L3 (knee extension)                   5/5                 Left L4 (ankle dorsiflexion)                 5/5                 Left L5 (long toe extensor)                5/5                 Left S1 (ankle plantar flexion)           5/5  Right L2 (hip flexion)                            5/5                 Right L3 (knee extension)                   5/5                 Right L4 (ankle dorsiflexion)                 5/5                 Right L5 (long toe extensor)                5/5                 Right S1 (ankle plantar flexion)           5/5  Reflexes: Normal and symmetric Negative clonus.  Down-going Babinski.    [de-identified] : I ordered radiographs to evaluate the patient's symptoms. Lumbar 2 view radiographs taken in the office today show no dislocation or fracture.  s/p L3-S1 PSIF with hardware in appropriate position and alignment.   [de-identified] : 59 year old female s/p Revision L3-S1 with L3-L4 TLIF (5/6/24).  [de-identified] : Patient is progressing well.  Continue no lifting > 10 lbs, twisting or bending.  F/U in 4 weeks. We discussed red flag symptoms that would require emergent evaluation.  She knows to call with any questions or concerns or if her symptoms acutely worsen.

## 2024-05-22 DIAGNOSIS — D62 ACUTE POSTHEMORRHAGIC ANEMIA: ICD-10-CM

## 2024-05-22 DIAGNOSIS — E66.9 OBESITY, UNSPECIFIED: ICD-10-CM

## 2024-05-22 DIAGNOSIS — R42 DIZZINESS AND GIDDINESS: ICD-10-CM

## 2024-05-22 DIAGNOSIS — R73.9 HYPERGLYCEMIA, UNSPECIFIED: ICD-10-CM

## 2024-05-22 DIAGNOSIS — Z98.890 OTHER SPECIFIED POSTPROCEDURAL STATES: ICD-10-CM

## 2024-05-22 DIAGNOSIS — M06.9 RHEUMATOID ARTHRITIS, UNSPECIFIED: ICD-10-CM

## 2024-05-22 DIAGNOSIS — G43.909 MIGRAINE, UNSPECIFIED, NOT INTRACTABLE, WITHOUT STATUS MIGRAINOSUS: ICD-10-CM

## 2024-05-22 DIAGNOSIS — F32.9 MAJOR DEPRESSIVE DISORDER, SINGLE EPISODE, UNSPECIFIED: ICD-10-CM

## 2024-05-22 DIAGNOSIS — K21.9 GASTRO-ESOPHAGEAL REFLUX DISEASE WITHOUT ESOPHAGITIS: ICD-10-CM

## 2024-05-22 DIAGNOSIS — I10 ESSENTIAL (PRIMARY) HYPERTENSION: ICD-10-CM

## 2024-05-22 DIAGNOSIS — E78.5 HYPERLIPIDEMIA, UNSPECIFIED: ICD-10-CM

## 2024-05-22 DIAGNOSIS — M54.16 RADICULOPATHY, LUMBAR REGION: ICD-10-CM

## 2024-05-22 DIAGNOSIS — M48.062 SPINAL STENOSIS, LUMBAR REGION WITH NEUROGENIC CLAUDICATION: ICD-10-CM

## 2024-05-22 DIAGNOSIS — D86.0 SARCOIDOSIS OF LUNG: ICD-10-CM

## 2024-05-22 DIAGNOSIS — G47.33 OBSTRUCTIVE SLEEP APNEA (ADULT) (PEDIATRIC): ICD-10-CM

## 2024-05-22 DIAGNOSIS — D72.828 OTHER ELEVATED WHITE BLOOD CELL COUNT: ICD-10-CM

## 2024-05-22 DIAGNOSIS — T38.0X5A ADVERSE EFFECT OF GLUCOCORTICOIDS AND SYNTHETIC ANALOGUES, INITIAL ENCOUNTER: ICD-10-CM

## 2024-06-14 ENCOUNTER — APPOINTMENT (OUTPATIENT)
Dept: ORTHOPEDIC SURGERY | Facility: CLINIC | Age: 59
End: 2024-06-14

## 2024-06-18 ENCOUNTER — APPOINTMENT (OUTPATIENT)
Dept: ORTHOPEDIC SURGERY | Facility: CLINIC | Age: 59
End: 2024-06-18
Payer: OTHER MISCELLANEOUS

## 2024-06-18 DIAGNOSIS — M54.16 RADICULOPATHY, LUMBAR REGION: ICD-10-CM

## 2024-06-18 DIAGNOSIS — Z98.1 ARTHRODESIS STATUS: ICD-10-CM

## 2024-06-18 DIAGNOSIS — M54.50 LOW BACK PAIN, UNSPECIFIED: ICD-10-CM

## 2024-06-18 PROCEDURE — 72100 X-RAY EXAM L-S SPINE 2/3 VWS: CPT

## 2024-06-18 PROCEDURE — 99024 POSTOP FOLLOW-UP VISIT: CPT

## 2024-06-18 NOTE — HISTORY OF PRESENT ILLNESS
[Chills] : no chills [Constipation] : no constipation [Diarrhea] : no diarrhea [Dysuria] : no dysuria [Fever] : no fever [Nausea] : no nausea [Vomiting] : no vomiting [de-identified] : s/p Revision L3-S1 with L3-L4 TLIF (5/6/24) [de-identified] : 59 year old female s/p Revision L3-S1 with L3-L4 TLIF (5/6/24). She denies radicular pain.  It has resolved.  She is walking long distances. She denies radicular pain, recent illness, fevers, numbness, weakness, saddle anesthesia, urinary retention or fecal incontinence. She takes gabapentin and Tylenol.  [de-identified] : MSK: Lumbar spine: Incision well healed  NEURO EXAM: Sensation  Left L2  -  2/2             Left L3  -  2/2 Left L4  -  2/2 Left L5  -  2/2 Left S1  -  2/2  Right L2  -  2/2             Right L3  -  2/2 Right L4  -  2/2 Right L5  -  2/2 Right S1  -  2/2  Motor:  Left L2 (hip flexion)                            5/5                 Left L3 (knee extension)                   5/5                 Left L4 (ankle dorsiflexion)                 5/5                 Left L5 (long toe extensor)                5/5                 Left S1 (ankle plantar flexion)           5/5  Right L2 (hip flexion)                            5/5                 Right L3 (knee extension)                   5/5                 Right L4 (ankle dorsiflexion)                 5/5                 Right L5 (long toe extensor)                5/5                 Right S1 (ankle plantar flexion)           5/5  Reflexes: Normal and symmetric Negative clonus.  Down-going Babinski.    [de-identified] : I ordered radiographs to evaluate the patient's symptoms. Lumbar 2 view radiographs taken in the office today show no dislocation or fracture.  s/p L3-S1 PSIF with hardware in appropriate position and alignment.   [de-identified] : 59 year old female s/p Revision L3-S1 with L3-L4 TLIF (5/6/24).  [de-identified] : Patient is progressing well.  The patient was given a referral for physical therapy.  F/U in 2-3 months. We discussed red flag symptoms that would require emergent evaluation.  She knows to call with any questions or concerns or if her symptoms acutely worsen.

## 2024-06-22 LAB
CULTURE RESULTS: SIGNIFICANT CHANGE UP
SPECIMEN SOURCE: SIGNIFICANT CHANGE UP

## 2024-08-13 ENCOUNTER — APPOINTMENT (OUTPATIENT)
Dept: ORTHOPEDIC SURGERY | Facility: CLINIC | Age: 59
End: 2024-08-13
Payer: OTHER MISCELLANEOUS

## 2024-08-13 DIAGNOSIS — Z98.1 ARTHRODESIS STATUS: ICD-10-CM

## 2024-08-13 DIAGNOSIS — M54.50 LOW BACK PAIN, UNSPECIFIED: ICD-10-CM

## 2024-08-13 DIAGNOSIS — M54.16 RADICULOPATHY, LUMBAR REGION: ICD-10-CM

## 2024-08-13 PROCEDURE — 99214 OFFICE O/P EST MOD 30 MIN: CPT

## 2024-08-13 PROCEDURE — 72110 X-RAY EXAM L-2 SPINE 4/>VWS: CPT

## 2024-08-13 NOTE — REASON FOR VISIT
[Follow-Up Visit] : a follow-up visit for [Back Pain] : back pain [FreeTextEntry2] : pain level 5/10

## 2024-08-13 NOTE — HISTORY OF PRESENT ILLNESS
[de-identified] : 59 year old female s/p Revision L3-S1 with L3-L4 TLIF (5/6/24). She is continued to progress well. Minimal discomfort.  She reports no limits in activity.  She takes gabapentin and tylenol as needed.  She denies recent illness, fevers, numbness, weakness, balance problems, saddle anesthesia, urinary retention or fecal incontinence.

## 2024-08-13 NOTE — ASSESSMENT
[FreeTextEntry1] : 59 year old female s/p Revision L3-S1 with L3-L4 TLIF (5/6/24). She is continued to progress well. Minimal discomfort.  She reports no limits in activity.  She takes gabapentin and tylenol as needed.  She denies recent illness, fevers, numbness, weakness, balance problems, saddle anesthesia, urinary retention or fecal incontinence. She is progressing well.  Continue activities as tolerated.  Discussed PT but patient will defer.  Continue gabapentin. F/U in 3 months. We discussed red flag symptoms that would require emergent evaluation. She knows to call with any questions or concerns or if her symptoms acutely worsen.

## 2024-08-13 NOTE — PHYSICAL EXAM
[de-identified] : General: No acute distress, conversant, well-nourished. Head: Normocephalic, atraumatic Neck: trachea midline, FROM Heart: normotensive and normal rate and rhythm Lungs: No labored breathing Skin: No abrasions, no rashes, no edema Psych: Alert and oriented to person, place and time Extremities: no peripheral edema or digital cyanosis  Vascular: warm and well perfused distally, palpable distal pulses MSK: Lumbar spine: Incision well healed NEURO EXAM: Sensation  Left L2  -  2/2             Left L3  -  2/2 Left L4  -  2/2 Left L5  -  2/2 Left S1  -  2/2  Right L2  -  2/2             Right L3  -  2/2 Right L4  -  2/2 Right L5  -  2/2 Right S1  -  2/2  Motor:  Left L2 (hip flexion)                            5/5                 Left L3 (knee extension)                   5/5                 Left L4 (ankle dorsiflexion)                 5/5                 Left L5 (long toe extensor)                5/5                 Left S1 (ankle plantar flexion)           5/5  Right L2 (hip flexion)                            5/5                 Right L3 (knee extension)                   5/5                 Right L4 (ankle dorsiflexion)                 5/5                 Right L5 (long toe extensor)                5/5                 Right S1 (ankle plantar flexion)           5/5  Reflexes: Normal and symmetric Negative clonus.  Down-going Babinski.    [de-identified] : I ordered radiographs to evaluate the patient's symptoms. Lumbar 4 view radiographs taken in the office today show no dislocation or fracture. s/p L3-S1 PSIF with hardware in appropriate position and alignment.

## 2024-08-22 PROCEDURE — 97165 OT EVAL LOW COMPLEX 30 MIN: CPT

## 2024-08-22 PROCEDURE — 97530 THERAPEUTIC ACTIVITIES: CPT

## 2024-08-22 PROCEDURE — 85025 COMPLETE CBC W/AUTO DIFF WBC: CPT

## 2024-08-22 PROCEDURE — 82962 GLUCOSE BLOOD TEST: CPT

## 2024-08-22 PROCEDURE — 97161 PT EVAL LOW COMPLEX 20 MIN: CPT

## 2024-08-22 PROCEDURE — 97110 THERAPEUTIC EXERCISES: CPT

## 2024-08-22 PROCEDURE — 86901 BLOOD TYPING SEROLOGIC RH(D): CPT

## 2024-08-22 PROCEDURE — 86850 RBC ANTIBODY SCREEN: CPT

## 2024-08-22 PROCEDURE — 86900 BLOOD TYPING SEROLOGIC ABO: CPT

## 2024-08-22 PROCEDURE — 87070 CULTURE OTHR SPECIMN AEROBIC: CPT

## 2024-08-22 PROCEDURE — 80048 BASIC METABOLIC PNL TOTAL CA: CPT

## 2024-08-22 PROCEDURE — 87116 MYCOBACTERIA CULTURE: CPT

## 2024-08-22 PROCEDURE — 36415 COLL VENOUS BLD VENIPUNCTURE: CPT

## 2024-08-22 PROCEDURE — 97535 SELF CARE MNGMENT TRAINING: CPT

## 2024-08-22 PROCEDURE — 87075 CULTR BACTERIA EXCEPT BLOOD: CPT

## 2024-08-22 PROCEDURE — 94660 CPAP INITIATION&MGMT: CPT

## 2024-08-22 PROCEDURE — 72100 X-RAY EXAM L-S SPINE 2/3 VWS: CPT

## 2024-08-22 PROCEDURE — C1713: CPT

## 2024-08-22 PROCEDURE — 76000 FLUOROSCOPY <1 HR PHYS/QHP: CPT

## 2024-08-22 PROCEDURE — 83036 HEMOGLOBIN GLYCOSYLATED A1C: CPT

## 2024-08-22 PROCEDURE — 85027 COMPLETE CBC AUTOMATED: CPT

## 2024-08-22 PROCEDURE — C1889: CPT

## 2024-08-22 PROCEDURE — 97116 GAIT TRAINING THERAPY: CPT

## 2024-08-22 PROCEDURE — 87206 SMEAR FLUORESCENT/ACID STAI: CPT

## 2024-09-02 ENCOUNTER — EMERGENCY (EMERGENCY)
Facility: HOSPITAL | Age: 59
LOS: 1 days | Discharge: HOME | End: 2024-09-02
Attending: STUDENT IN AN ORGANIZED HEALTH CARE EDUCATION/TRAINING PROGRAM
Payer: COMMERCIAL

## 2024-09-02 VITALS
HEIGHT: 59 IN | SYSTOLIC BLOOD PRESSURE: 120 MMHG | HEART RATE: 87 BPM | TEMPERATURE: 98 F | OXYGEN SATURATION: 97 % | WEIGHT: 141.98 LBS | RESPIRATION RATE: 18 BRPM | DIASTOLIC BLOOD PRESSURE: 81 MMHG

## 2024-09-02 DIAGNOSIS — I67.1 CEREBRAL ANEURYSM, NONRUPTURED: Chronic | ICD-10-CM

## 2024-09-02 DIAGNOSIS — Z98.890 OTHER SPECIFIED POSTPROCEDURAL STATES: Chronic | ICD-10-CM

## 2024-09-02 DIAGNOSIS — M54.5 LOW BACK PAIN: Chronic | ICD-10-CM

## 2024-09-02 DIAGNOSIS — Z90.710 ACQUIRED ABSENCE OF BOTH CERVIX AND UTERUS: Chronic | ICD-10-CM

## 2024-09-02 LAB
ALBUMIN SERPL ELPH-MCNC: 3.4 G/DL — LOW (ref 3.5–5)
ALP SERPL-CCNC: 144 U/L — HIGH (ref 40–120)
ALT FLD-CCNC: 29 U/L DA — SIGNIFICANT CHANGE UP (ref 10–60)
ANION GAP SERPL CALC-SCNC: 8 MMOL/L — SIGNIFICANT CHANGE UP (ref 5–17)
APPEARANCE UR: ABNORMAL
AST SERPL-CCNC: 39 U/L — SIGNIFICANT CHANGE UP (ref 10–40)
BACTERIA # UR AUTO: ABNORMAL /HPF
BASOPHILS # BLD AUTO: 0.02 K/UL — SIGNIFICANT CHANGE UP (ref 0–0.2)
BASOPHILS NFR BLD AUTO: 0.2 % — SIGNIFICANT CHANGE UP (ref 0–2)
BILIRUB SERPL-MCNC: 0.4 MG/DL — SIGNIFICANT CHANGE UP (ref 0.2–1.2)
BILIRUB UR-MCNC: NEGATIVE — SIGNIFICANT CHANGE UP
BUN SERPL-MCNC: 20 MG/DL — HIGH (ref 7–18)
CALCIUM SERPL-MCNC: 9.4 MG/DL — SIGNIFICANT CHANGE UP (ref 8.4–10.5)
CHLORIDE SERPL-SCNC: 112 MMOL/L — HIGH (ref 96–108)
CO2 SERPL-SCNC: 20 MMOL/L — LOW (ref 22–31)
COLOR SPEC: YELLOW — SIGNIFICANT CHANGE UP
COMMENT - URINE 2: SIGNIFICANT CHANGE UP
CREAT SERPL-MCNC: 0.83 MG/DL — SIGNIFICANT CHANGE UP (ref 0.5–1.3)
DIFF PNL FLD: ABNORMAL
EGFR: 81 ML/MIN/1.73M2 — SIGNIFICANT CHANGE UP
EOSINOPHIL # BLD AUTO: 0.11 K/UL — SIGNIFICANT CHANGE UP (ref 0–0.5)
EOSINOPHIL NFR BLD AUTO: 1.1 % — SIGNIFICANT CHANGE UP (ref 0–6)
EPI CELLS # UR: PRESENT
GLUCOSE SERPL-MCNC: 132 MG/DL — HIGH (ref 70–99)
GLUCOSE UR QL: NEGATIVE MG/DL — SIGNIFICANT CHANGE UP
HCT VFR BLD CALC: 38.7 % — SIGNIFICANT CHANGE UP (ref 34.5–45)
HGB BLD-MCNC: 12.7 G/DL — SIGNIFICANT CHANGE UP (ref 11.5–15.5)
HYALINE CASTS # UR AUTO: PRESENT
IMM GRANULOCYTES NFR BLD AUTO: 0.3 % — SIGNIFICANT CHANGE UP (ref 0–0.9)
KETONES UR-MCNC: ABNORMAL MG/DL
LEUKOCYTE ESTERASE UR-ACNC: ABNORMAL
LIDOCAIN IGE QN: 68 U/L — SIGNIFICANT CHANGE UP (ref 13–75)
LYMPHOCYTES # BLD AUTO: 2.17 K/UL — SIGNIFICANT CHANGE UP (ref 1–3.3)
LYMPHOCYTES # BLD AUTO: 21.8 % — SIGNIFICANT CHANGE UP (ref 13–44)
MCHC RBC-ENTMCNC: 29.1 PG — SIGNIFICANT CHANGE UP (ref 27–34)
MCHC RBC-ENTMCNC: 32.8 GM/DL — SIGNIFICANT CHANGE UP (ref 32–36)
MCV RBC AUTO: 88.6 FL — SIGNIFICANT CHANGE UP (ref 80–100)
MONOCYTES # BLD AUTO: 0.63 K/UL — SIGNIFICANT CHANGE UP (ref 0–0.9)
MONOCYTES NFR BLD AUTO: 6.3 % — SIGNIFICANT CHANGE UP (ref 2–14)
NEUTROPHILS # BLD AUTO: 6.98 K/UL — SIGNIFICANT CHANGE UP (ref 1.8–7.4)
NEUTROPHILS NFR BLD AUTO: 70.3 % — SIGNIFICANT CHANGE UP (ref 43–77)
NITRITE UR-MCNC: POSITIVE
NRBC # BLD: 0 /100 WBCS — SIGNIFICANT CHANGE UP (ref 0–0)
PH UR: 6.5 — SIGNIFICANT CHANGE UP (ref 5–8)
PLATELET # BLD AUTO: 270 K/UL — SIGNIFICANT CHANGE UP (ref 150–400)
POTASSIUM SERPL-MCNC: 3.6 MMOL/L — SIGNIFICANT CHANGE UP (ref 3.5–5.3)
POTASSIUM SERPL-SCNC: 3.6 MMOL/L — SIGNIFICANT CHANGE UP (ref 3.5–5.3)
PROT SERPL-MCNC: 7.5 G/DL — SIGNIFICANT CHANGE UP (ref 6–8.3)
PROT UR-MCNC: 30 MG/DL
RBC # BLD: 4.37 M/UL — SIGNIFICANT CHANGE UP (ref 3.8–5.2)
RBC # FLD: 14.7 % — HIGH (ref 10.3–14.5)
RBC CASTS # UR COMP ASSIST: 3 /HPF — SIGNIFICANT CHANGE UP (ref 0–4)
SODIUM SERPL-SCNC: 140 MMOL/L — SIGNIFICANT CHANGE UP (ref 135–145)
SP GR SPEC: 1.02 — SIGNIFICANT CHANGE UP (ref 1–1.03)
UROBILINOGEN FLD QL: 0.2 MG/DL — SIGNIFICANT CHANGE UP (ref 0.2–1)
WBC # BLD: 9.94 K/UL — SIGNIFICANT CHANGE UP (ref 3.8–10.5)
WBC # FLD AUTO: 9.94 K/UL — SIGNIFICANT CHANGE UP (ref 3.8–10.5)
WBC UR QL: 12 /HPF — HIGH (ref 0–5)

## 2024-09-02 PROCEDURE — 74176 CT ABD & PELVIS W/O CONTRAST: CPT | Mod: 26,MC

## 2024-09-02 PROCEDURE — 80053 COMPREHEN METABOLIC PANEL: CPT

## 2024-09-02 PROCEDURE — 96375 TX/PRO/DX INJ NEW DRUG ADDON: CPT

## 2024-09-02 PROCEDURE — 99284 EMERGENCY DEPT VISIT MOD MDM: CPT

## 2024-09-02 PROCEDURE — 87086 URINE CULTURE/COLONY COUNT: CPT

## 2024-09-02 PROCEDURE — 36415 COLL VENOUS BLD VENIPUNCTURE: CPT

## 2024-09-02 PROCEDURE — 74176 CT ABD & PELVIS W/O CONTRAST: CPT | Mod: MC

## 2024-09-02 PROCEDURE — 96374 THER/PROPH/DIAG INJ IV PUSH: CPT

## 2024-09-02 PROCEDURE — 85025 COMPLETE CBC W/AUTO DIFF WBC: CPT

## 2024-09-02 PROCEDURE — 81001 URINALYSIS AUTO W/SCOPE: CPT

## 2024-09-02 PROCEDURE — 83690 ASSAY OF LIPASE: CPT

## 2024-09-02 PROCEDURE — 87186 SC STD MICRODIL/AGAR DIL: CPT

## 2024-09-02 PROCEDURE — 99284 EMERGENCY DEPT VISIT MOD MDM: CPT | Mod: 25

## 2024-09-02 RX ORDER — CEFPODOXIME PROXETIL 100 MG/5ML
1 GRANULE, FOR SUSPENSION ORAL
Qty: 20 | Refills: 0
Start: 2024-09-02 | End: 2024-09-11

## 2024-09-02 RX ORDER — ONDANSETRON 2 MG/ML
4 INJECTION, SOLUTION INTRAMUSCULAR; INTRAVENOUS ONCE
Refills: 0 | Status: COMPLETED | OUTPATIENT
Start: 2024-09-02 | End: 2024-09-02

## 2024-09-02 RX ORDER — KETOROLAC TROMETHAMINE 30 MG/ML
15 INJECTION, SOLUTION INTRAMUSCULAR ONCE
Refills: 0 | Status: DISCONTINUED | OUTPATIENT
Start: 2024-09-02 | End: 2024-09-02

## 2024-09-02 RX ORDER — CEFUROXIME SODIUM 1.5 G
1 VIAL (EA) INJECTION
Qty: 14 | Refills: 0
Start: 2024-09-02 | End: 2024-09-08

## 2024-09-02 RX ORDER — SODIUM CHLORIDE 9 MG/ML
1000 INJECTION INTRAMUSCULAR; INTRAVENOUS; SUBCUTANEOUS ONCE
Refills: 0 | Status: COMPLETED | OUTPATIENT
Start: 2024-09-02 | End: 2024-09-02

## 2024-09-02 RX ADMIN — KETOROLAC TROMETHAMINE 15 MILLIGRAM(S): 30 INJECTION, SOLUTION INTRAMUSCULAR at 15:08

## 2024-09-02 RX ADMIN — Medication 100 MILLIGRAM(S): at 15:57

## 2024-09-02 RX ADMIN — SODIUM CHLORIDE 1000 MILLILITER(S): 9 INJECTION INTRAMUSCULAR; INTRAVENOUS; SUBCUTANEOUS at 15:08

## 2024-09-02 RX ADMIN — ONDANSETRON 4 MILLIGRAM(S): 2 INJECTION, SOLUTION INTRAMUSCULAR; INTRAVENOUS at 15:09

## 2024-09-02 RX ADMIN — KETOROLAC TROMETHAMINE 15 MILLIGRAM(S): 30 INJECTION, SOLUTION INTRAMUSCULAR at 16:42

## 2024-09-02 NOTE — ED PROVIDER NOTE - CHPI ED SYMPTOMS POS
Patient stated she would like Directions changed to 2 tablets by mouth daily states its better for her.# month supply if MD Approves [phone]   flank pain

## 2024-09-02 NOTE — ED PROVIDER NOTE - OBJECTIVE STATEMENT
59-year-old female with a past medical history of kidney stones, Brain aneurysm, rheumatoid arthritis, fibromyalgia, GERD, hypertension, HLD, sarcoidosis, asthma, hysterectomy presents with bilateral flank pain for 3 days.  Patient denies fevers, dysuria, hematuria, nausea, vomiting, diarrhea.  Patient took Tylenol for symptoms with minimal relief.

## 2024-09-02 NOTE — ED PROVIDER NOTE - CLINICAL SUMMARY MEDICAL DECISION MAKING FREE TEXT BOX
59-year-old female with a past medical history of kidney stones, Brain aneurysm, rheumatoid arthritis, fibromyalgia, GERD, hypertension, HLD, sarcoidosis, asthma, hysterectomy presents with bilateral flank pain for 3 days.  Patient denies fevers, dysuria, hematuria, nausea, vomiting, diarrhea.  Patient took Tylenol for symptoms with minimal relief.    Will obtain labs, urine, CT abdomen pelvis to evaluate for kidney stones, UTI, pyelonephritis.    CT showing nonobstructive renal stones.  Urine showing acute UTI.  Will treat and have patient follow-up with PCP.

## 2024-09-02 NOTE — ED ADULT NURSE NOTE - NSFALLUNIVINTERV_ED_ALL_ED
Bed/Stretcher in lowest position, wheels locked, appropriate side rails in place/Call bell, personal items and telephone in reach/Instruct patient to call for assistance before getting out of bed/chair/stretcher/Non-slip footwear applied when patient is off stretcher/[place] to call system/Physically safe environment - no spills, clutter or unnecessary equipment/Purposeful proactive rounding/Room/bathroom lighting operational, light cord in reach

## 2024-09-02 NOTE — ED PROVIDER NOTE - ATTENDING APP SHARED VISIT CONTRIBUTION OF CARE
Patient presents with flank pain otherwise clinically well we will obtain lab imaging pain control ED observation and reassess

## 2024-09-02 NOTE — ED PROVIDER NOTE - NSFOLLOWUPINSTRUCTIONS_ED_ALL_ED_FT
Follow-up with your primary care doctor within 1 week.    Antibiotics for your urinary tract infection were sent to the pharmacy.  The first dose was given to you in the emergency room.  The next dose he will take tomorrow morning.  Take as directed and until all of the medication is completed, even if you are feeling better.    Follow up with your primary care doctor within 1 week.     You can take Motrin (ibuprofen) 600 mg every 6 hours or Tylenol (acetaminophen) 1000 mg every 6 hours as needed for pain or fever.     If you experience any new or worsening symptoms or if you are concerned you can always come back to the emergency for a re-evaluation.

## 2024-09-02 NOTE — ED PROVIDER NOTE - PATIENT PORTAL LINK FT
You can access the FollowMyHealth Patient Portal offered by [facility] by registering at the following website: [email or web address]. By joining [facility]s FollowMyHealth portal, you will also be able to view your health information using other applications (apps) compatible with our system.

## 2024-09-05 LAB
-  AMOXICILLIN/CLAVULANIC ACID: SIGNIFICANT CHANGE UP
-  AMPICILLIN/SULBACTAM: SIGNIFICANT CHANGE UP
-  AMPICILLIN: SIGNIFICANT CHANGE UP
-  AZTREONAM: SIGNIFICANT CHANGE UP
-  CEFAZOLIN: SIGNIFICANT CHANGE UP
-  CEFEPIME: SIGNIFICANT CHANGE UP
-  CEFOXITIN: SIGNIFICANT CHANGE UP
-  CEFTRIAXONE: SIGNIFICANT CHANGE UP
-  CEFUROXIME: SIGNIFICANT CHANGE UP
-  CIPROFLOXACIN: SIGNIFICANT CHANGE UP
-  ERTAPENEM: SIGNIFICANT CHANGE UP
-  GENTAMICIN: SIGNIFICANT CHANGE UP
-  IMIPENEM: SIGNIFICANT CHANGE UP
-  LEVOFLOXACIN: SIGNIFICANT CHANGE UP
-  MEROPENEM: SIGNIFICANT CHANGE UP
-  NITROFURANTOIN: SIGNIFICANT CHANGE UP
-  PIPERACILLIN/TAZOBACTAM: SIGNIFICANT CHANGE UP
-  TOBRAMYCIN: SIGNIFICANT CHANGE UP
-  TRIMETHOPRIM/SULFAMETHOXAZOLE: SIGNIFICANT CHANGE UP
CULTURE RESULTS: ABNORMAL
METHOD TYPE: SIGNIFICANT CHANGE UP
ORGANISM # SPEC MICROSCOPIC CNT: ABNORMAL
ORGANISM # SPEC MICROSCOPIC CNT: ABNORMAL
SPECIMEN SOURCE: SIGNIFICANT CHANGE UP

## 2024-09-16 NOTE — CONSULT NOTE ADULT - PROBLEM SELECTOR PROBLEM 2
Bp controlled with lisinopril, she will get labs done today to monitor kidney function and CKD        Lab Results   Component Value Date    EGFR 29 09/16/2024    EGFR 32 02/01/2024    EGFR 37 01/08/2024    CREATININE 1.52 (H) 09/16/2024    CREATININE 1.41 (H) 02/01/2024    CREATININE 1.25 01/08/2024       Orders:    Comprehensive metabolic panel; Future    CBC and differential; Future    Albumin / creatinine urine ratio; Future    Urinalysis with microscopic; Future    PTH, intact; Future    Phosphorus; Future    Vitamin D 25 hydroxy; Future    Hemoglobin A1C; Future     Sarcoidosis, lung 88

## 2024-09-20 ENCOUNTER — INPATIENT (INPATIENT)
Facility: HOSPITAL | Age: 59
LOS: 3 days | Discharge: HOME | DRG: 690 | End: 2024-09-24
Attending: STUDENT IN AN ORGANIZED HEALTH CARE EDUCATION/TRAINING PROGRAM | Admitting: STUDENT IN AN ORGANIZED HEALTH CARE EDUCATION/TRAINING PROGRAM
Payer: COMMERCIAL

## 2024-09-20 VITALS — WEIGHT: 156.97 LBS | HEIGHT: 59 IN

## 2024-09-20 DIAGNOSIS — N39.0 URINARY TRACT INFECTION, SITE NOT SPECIFIED: ICD-10-CM

## 2024-09-20 DIAGNOSIS — I67.1 CEREBRAL ANEURYSM, NONRUPTURED: Chronic | ICD-10-CM

## 2024-09-20 DIAGNOSIS — K21.9 GASTRO-ESOPHAGEAL REFLUX DISEASE WITHOUT ESOPHAGITIS: ICD-10-CM

## 2024-09-20 DIAGNOSIS — M06.9 RHEUMATOID ARTHRITIS, UNSPECIFIED: ICD-10-CM

## 2024-09-20 DIAGNOSIS — M79.7 FIBROMYALGIA: ICD-10-CM

## 2024-09-20 DIAGNOSIS — Z98.890 OTHER SPECIFIED POSTPROCEDURAL STATES: Chronic | ICD-10-CM

## 2024-09-20 DIAGNOSIS — M54.5 LOW BACK PAIN: Chronic | ICD-10-CM

## 2024-09-20 DIAGNOSIS — R74.01 ELEVATION OF LEVELS OF LIVER TRANSAMINASE LEVELS: ICD-10-CM

## 2024-09-20 DIAGNOSIS — Z90.710 ACQUIRED ABSENCE OF BOTH CERVIX AND UTERUS: Chronic | ICD-10-CM

## 2024-09-20 DIAGNOSIS — N20.0 CALCULUS OF KIDNEY: ICD-10-CM

## 2024-09-20 DIAGNOSIS — I10 ESSENTIAL (PRIMARY) HYPERTENSION: ICD-10-CM

## 2024-09-20 DIAGNOSIS — R52 PAIN, UNSPECIFIED: ICD-10-CM

## 2024-09-20 DIAGNOSIS — Z29.9 ENCOUNTER FOR PROPHYLACTIC MEASURES, UNSPECIFIED: ICD-10-CM

## 2024-09-20 LAB
ALBUMIN SERPL ELPH-MCNC: 3.6 G/DL — SIGNIFICANT CHANGE UP (ref 3.5–5)
ALP SERPL-CCNC: 255 U/L — HIGH (ref 40–120)
ALT FLD-CCNC: 77 U/L DA — HIGH (ref 10–60)
ANION GAP SERPL CALC-SCNC: 7 MMOL/L — SIGNIFICANT CHANGE UP (ref 5–17)
APPEARANCE UR: ABNORMAL
AST SERPL-CCNC: 46 U/L — HIGH (ref 10–40)
BACTERIA # UR AUTO: ABNORMAL /HPF
BASOPHILS # BLD AUTO: 0.04 K/UL — SIGNIFICANT CHANGE UP (ref 0–0.2)
BASOPHILS NFR BLD AUTO: 0.4 % — SIGNIFICANT CHANGE UP (ref 0–2)
BILIRUB SERPL-MCNC: 0.5 MG/DL — SIGNIFICANT CHANGE UP (ref 0.2–1.2)
BILIRUB UR-MCNC: NEGATIVE — SIGNIFICANT CHANGE UP
BUN SERPL-MCNC: 14 MG/DL — SIGNIFICANT CHANGE UP (ref 7–18)
CALCIUM SERPL-MCNC: 9.1 MG/DL — SIGNIFICANT CHANGE UP (ref 8.4–10.5)
CHLORIDE SERPL-SCNC: 111 MMOL/L — HIGH (ref 96–108)
CO2 SERPL-SCNC: 23 MMOL/L — SIGNIFICANT CHANGE UP (ref 22–31)
COLOR SPEC: SIGNIFICANT CHANGE UP
COMMENT - URINE: SIGNIFICANT CHANGE UP
CREAT SERPL-MCNC: 0.67 MG/DL — SIGNIFICANT CHANGE UP (ref 0.5–1.3)
DIFF PNL FLD: ABNORMAL
EGFR: 100 ML/MIN/1.73M2 — SIGNIFICANT CHANGE UP
EOSINOPHIL # BLD AUTO: 0.09 K/UL — SIGNIFICANT CHANGE UP (ref 0–0.5)
EOSINOPHIL NFR BLD AUTO: 0.9 % — SIGNIFICANT CHANGE UP (ref 0–6)
EPI CELLS # UR: PRESENT
GLUCOSE SERPL-MCNC: 102 MG/DL — HIGH (ref 70–99)
GLUCOSE UR QL: NEGATIVE MG/DL — SIGNIFICANT CHANGE UP
HCT VFR BLD CALC: 37.4 % — SIGNIFICANT CHANGE UP (ref 34.5–45)
HGB BLD-MCNC: 12.3 G/DL — SIGNIFICANT CHANGE UP (ref 11.5–15.5)
HYALINE CASTS # UR AUTO: PRESENT
IMM GRANULOCYTES NFR BLD AUTO: 0.4 % — SIGNIFICANT CHANGE UP (ref 0–0.9)
KETONES UR-MCNC: ABNORMAL MG/DL
LACTATE SERPL-SCNC: 1 MMOL/L — SIGNIFICANT CHANGE UP (ref 0.7–2)
LEUKOCYTE ESTERASE UR-ACNC: ABNORMAL
LIDOCAIN IGE QN: 47 U/L — SIGNIFICANT CHANGE UP (ref 13–75)
LYMPHOCYTES # BLD AUTO: 2.24 K/UL — SIGNIFICANT CHANGE UP (ref 1–3.3)
LYMPHOCYTES # BLD AUTO: 23 % — SIGNIFICANT CHANGE UP (ref 13–44)
MCHC RBC-ENTMCNC: 29.4 PG — SIGNIFICANT CHANGE UP (ref 27–34)
MCHC RBC-ENTMCNC: 32.9 GM/DL — SIGNIFICANT CHANGE UP (ref 32–36)
MCV RBC AUTO: 89.5 FL — SIGNIFICANT CHANGE UP (ref 80–100)
MONOCYTES # BLD AUTO: 0.75 K/UL — SIGNIFICANT CHANGE UP (ref 0–0.9)
MONOCYTES NFR BLD AUTO: 7.7 % — SIGNIFICANT CHANGE UP (ref 2–14)
NEUTROPHILS # BLD AUTO: 6.56 K/UL — SIGNIFICANT CHANGE UP (ref 1.8–7.4)
NEUTROPHILS NFR BLD AUTO: 67.6 % — SIGNIFICANT CHANGE UP (ref 43–77)
NITRITE UR-MCNC: POSITIVE
NRBC # BLD: 0 /100 WBCS — SIGNIFICANT CHANGE UP (ref 0–0)
PH UR: 6.5 — SIGNIFICANT CHANGE UP (ref 5–8)
PLATELET # BLD AUTO: 292 K/UL — SIGNIFICANT CHANGE UP (ref 150–400)
POTASSIUM SERPL-MCNC: 3.8 MMOL/L — SIGNIFICANT CHANGE UP (ref 3.5–5.3)
POTASSIUM SERPL-SCNC: 3.8 MMOL/L — SIGNIFICANT CHANGE UP (ref 3.5–5.3)
PROT SERPL-MCNC: 7.5 G/DL — SIGNIFICANT CHANGE UP (ref 6–8.3)
PROT UR-MCNC: 100 MG/DL
RBC # BLD: 4.18 M/UL — SIGNIFICANT CHANGE UP (ref 3.8–5.2)
RBC # FLD: 15.6 % — HIGH (ref 10.3–14.5)
RBC CASTS # UR COMP ASSIST: 1 /HPF — SIGNIFICANT CHANGE UP (ref 0–4)
SODIUM SERPL-SCNC: 141 MMOL/L — SIGNIFICANT CHANGE UP (ref 135–145)
SP GR SPEC: 1.02 — SIGNIFICANT CHANGE UP (ref 1–1.03)
UROBILINOGEN FLD QL: 1 MG/DL — SIGNIFICANT CHANGE UP (ref 0.2–1)
WBC # BLD: 9.72 K/UL — SIGNIFICANT CHANGE UP (ref 3.8–10.5)
WBC # FLD AUTO: 9.72 K/UL — SIGNIFICANT CHANGE UP (ref 3.8–10.5)
WBC UR QL: 7 /HPF — HIGH (ref 0–5)

## 2024-09-20 PROCEDURE — 99222 1ST HOSP IP/OBS MODERATE 55: CPT | Mod: GC

## 2024-09-20 PROCEDURE — 99223 1ST HOSP IP/OBS HIGH 75: CPT | Mod: GC

## 2024-09-20 PROCEDURE — 99285 EMERGENCY DEPT VISIT HI MDM: CPT

## 2024-09-20 PROCEDURE — 74177 CT ABD & PELVIS W/CONTRAST: CPT | Mod: 26

## 2024-09-20 RX ORDER — CYCLOBENZAPRINE HCL 10 MG
1 TABLET ORAL
Refills: 0 | DISCHARGE

## 2024-09-20 RX ORDER — BISACODYL 5 MG/1
5 TABLET, COATED ORAL EVERY 12 HOURS
Refills: 0 | Status: DISCONTINUED | OUTPATIENT
Start: 2024-09-20 | End: 2024-09-24

## 2024-09-20 RX ORDER — SODIUM CHLORIDE 0.9 % (FLUSH) 0.9 %
1000 SYRINGE (ML) INJECTION
Refills: 0 | Status: DISCONTINUED | OUTPATIENT
Start: 2024-09-20 | End: 2024-09-22

## 2024-09-20 RX ORDER — MORPHINE SULFATE 30 MG/1
2 TABLET, FILM COATED, EXTENDED RELEASE ORAL EVERY 4 HOURS
Refills: 0 | Status: DISCONTINUED | OUTPATIENT
Start: 2024-09-20 | End: 2024-09-24

## 2024-09-20 RX ORDER — SENNOSIDES 8.6 MG
1 TABLET ORAL
Refills: 0 | Status: DISCONTINUED | OUTPATIENT
Start: 2024-09-20 | End: 2024-09-24

## 2024-09-20 RX ORDER — AMLODIPINE BESYLATE 5 MG
1 TABLET ORAL
Refills: 0 | DISCHARGE

## 2024-09-20 RX ORDER — CEFTRIAXONE SODIUM 1 G
2000 VIAL (EA) INJECTION ONCE
Refills: 0 | Status: COMPLETED | OUTPATIENT
Start: 2024-09-20 | End: 2024-09-20

## 2024-09-20 RX ORDER — CARVEDILOL 3.125 MG
6.25 TABLET ORAL EVERY 12 HOURS
Refills: 0 | Status: DISCONTINUED | OUTPATIENT
Start: 2024-09-20 | End: 2024-09-24

## 2024-09-20 RX ORDER — GABAPENTIN 800 MG/1
800 TABLET, FILM COATED ORAL THREE TIMES A DAY
Refills: 0 | Status: DISCONTINUED | OUTPATIENT
Start: 2024-09-20 | End: 2024-09-24

## 2024-09-20 RX ORDER — FENTANYL CITRATE-0.9 % NACL/PF 300MCG/30
25 PATIENT CONTROLLED ANALGESIA VIAL INJECTION ONCE
Refills: 0 | Status: DISCONTINUED | OUTPATIENT
Start: 2024-09-20 | End: 2024-09-20

## 2024-09-20 RX ORDER — KETOROLAC TROMETHAMINE 10 MG/1
15 TABLET, FILM COATED ORAL EVERY 6 HOURS
Refills: 0 | Status: DISCONTINUED | OUTPATIENT
Start: 2024-09-20 | End: 2024-09-24

## 2024-09-20 RX ORDER — MIRTAZAPINE 30 MG/1
30 TABLET, FILM COATED ORAL DAILY
Refills: 0 | Status: DISCONTINUED | OUTPATIENT
Start: 2024-09-20 | End: 2024-09-24

## 2024-09-20 RX ORDER — DULOXETINE HCL 20 MG
30 CAPSULE,DELAYED RELEASE (ENTERIC COATED) ORAL DAILY
Refills: 0 | Status: DISCONTINUED | OUTPATIENT
Start: 2024-09-20 | End: 2024-09-24

## 2024-09-20 RX ORDER — ENOXAPARIN SODIUM 150 MG/ML
40 INJECTION SUBCUTANEOUS EVERY 24 HOURS
Refills: 0 | Status: DISCONTINUED | OUTPATIENT
Start: 2024-09-20 | End: 2024-09-24

## 2024-09-20 RX ORDER — ACETAMINOPHEN 325 MG
650 TABLET ORAL EVERY 6 HOURS
Refills: 0 | Status: DISCONTINUED | OUTPATIENT
Start: 2024-09-20 | End: 2024-09-24

## 2024-09-20 RX ORDER — TAMSULOSIN HCL 0.4 MG
0.4 CAPSULE ORAL AT BEDTIME
Refills: 0 | Status: DISCONTINUED | OUTPATIENT
Start: 2024-09-20 | End: 2024-09-24

## 2024-09-20 RX ORDER — FERROUS SULFATE 325(65) MG
325 TABLET ORAL DAILY
Refills: 0 | Status: DISCONTINUED | OUTPATIENT
Start: 2024-09-20 | End: 2024-09-24

## 2024-09-20 RX ORDER — SULFASALAZINE 500 MG/1
1 TABLET ORAL
Refills: 0 | DISCHARGE

## 2024-09-20 RX ORDER — SODIUM CHLORIDE 0.9 % (FLUSH) 0.9 %
1000 SYRINGE (ML) INJECTION ONCE
Refills: 0 | Status: COMPLETED | OUTPATIENT
Start: 2024-09-20 | End: 2024-09-20

## 2024-09-20 RX ORDER — AMLODIPINE BESYLATE 5 MG
5 TABLET ORAL DAILY
Refills: 0 | Status: DISCONTINUED | OUTPATIENT
Start: 2024-09-21 | End: 2024-09-24

## 2024-09-20 RX ORDER — MAG HYDROX/ALUMINUM HYD/SIMETH 200-200-20
30 SUSPENSION, ORAL (FINAL DOSE FORM) ORAL EVERY 4 HOURS
Refills: 0 | Status: DISCONTINUED | OUTPATIENT
Start: 2024-09-20 | End: 2024-09-24

## 2024-09-20 RX ORDER — ACETAMINOPHEN 325 MG
650 TABLET ORAL ONCE
Refills: 0 | Status: COMPLETED | OUTPATIENT
Start: 2024-09-20 | End: 2024-09-20

## 2024-09-20 RX ADMIN — Medication 25 MICROGRAM(S): at 16:12

## 2024-09-20 RX ADMIN — Medication 17 GRAM(S): at 22:31

## 2024-09-20 RX ADMIN — Medication 650 MILLIGRAM(S): at 16:12

## 2024-09-20 RX ADMIN — ENOXAPARIN SODIUM 40 MILLIGRAM(S): 150 INJECTION SUBCUTANEOUS at 21:48

## 2024-09-20 RX ADMIN — Medication 1000 MILLILITER(S): at 16:11

## 2024-09-20 RX ADMIN — Medication 100 MILLIGRAM(S): at 16:11

## 2024-09-20 RX ADMIN — Medication 30 MILLILITER(S): at 21:58

## 2024-09-20 RX ADMIN — Medication 0.4 MILLIGRAM(S): at 21:45

## 2024-09-20 RX ADMIN — Medication 25 MICROGRAM(S): at 16:42

## 2024-09-20 RX ADMIN — Medication 6.25 MILLIGRAM(S): at 19:31

## 2024-09-20 RX ADMIN — MORPHINE SULFATE 2 MILLIGRAM(S): 30 TABLET, FILM COATED, EXTENDED RELEASE ORAL at 21:45

## 2024-09-20 RX ADMIN — MORPHINE SULFATE 2 MILLIGRAM(S): 30 TABLET, FILM COATED, EXTENDED RELEASE ORAL at 22:15

## 2024-09-20 RX ADMIN — GABAPENTIN 800 MILLIGRAM(S): 800 TABLET, FILM COATED ORAL at 21:45

## 2024-09-20 RX ADMIN — Medication 1 TABLET(S): at 22:29

## 2024-09-20 RX ADMIN — Medication 650 MILLIGRAM(S): at 16:42

## 2024-09-20 NOTE — H&P ADULT - NSHPREVIEWOFSYSTEMS_GEN_ALL_CORE
- CONSTITUTIONAL: Denies fever and chills  - HEENT: Denies changes in vision and hearing.  - RESPIRATORY: Denies SOB and cough.  - CV: Denies chest pain and palpitations  - GI: Denies abdominal pain, nausea, vomiting and diarrhea.  - : Denies dysuria and urinary frequency.  - SKIN: Denies rash and pruritus.  - NEUROLOGICAL: +back pain  - PSYCHIATRIC: Denies recent changes in mood. Denies anxiety and depression.

## 2024-09-20 NOTE — H&P ADULT - PROBLEM SELECTOR PLAN 2
-CT today showing Bilateral nonobstructive nephrolithiasis. Moderate colonic stool burden.  -suspect this presentation is due to passing kidney stones   -will give high rate IV fluids   -starting flomax

## 2024-09-20 NOTE — H&P ADULT - HISTORY OF PRESENT ILLNESS
59-year-old female with a past medical history of kidney stones, Brain aneurysm, rheumatoid arthritis, fibromyalgia, GERD, hypertension, HLD, sarcoidosis, asthma, that comes in for pain in the back. Patient around 2-3 weeks ago was having back pain which made patient go to the ED which was prescribed cefuroxime 500mg which per patient completed. However per patient around 2-3 days ago started feeling that pain was moving to the front of the abdomen. Patient states that the pain is stabbing like and it shoots as well. Patient denies having any pain with urination or denies having passed any stones or grain like in the urine. Patient denies any fevers, chills or dysuria. Pateint also recently on may had back surgery but has seen surgery and pain per doctor has not been related.

## 2024-09-20 NOTE — CHART NOTE - NSCHARTNOTEFT_GEN_A_CORE
EVENT: CT showing moderate colonic stool burden.      BRIEF HPI:      OBJECTIVE:  Vital Signs Last 24 Hrs  T(C): 36.6 (20 Sep 2024 21:35), Max: 36.8 (20 Sep 2024 19:27)  T(F): 97.8 (20 Sep 2024 21:35), Max: 98.2 (20 Sep 2024 19:27)  HR: 82 (20 Sep 2024 21:35) (76 - 92)  BP: 107/68 (20 Sep 2024 21:35) (106/72 - 138/89)  BP(mean): --  RR: 16 (20 Sep 2024 21:35) (16 - 18)  SpO2: 94% (20 Sep 2024 21:35) (94% - 98%)    Parameters below as of 20 Sep 2024 21:35  Patient On (Oxygen Delivery Method): room air        FOCUSED PHYSICAL EXAM:    LABS:                        12.3   9.72  )-----------( 292      ( 20 Sep 2024 12:30 )             37.4     09-20    141  |  111[H]  |  14  ----------------------------<  102[H]  3.8   |  23  |  0.67    Ca    9.1      20 Sep 2024 12:30    TPro  7.5  /  Alb  3.6  /  TBili  0.5  /  DBili  x   /  AST  46[H]  /  ALT  77[H]  /  AlkPhos  255[H]  09-20  ACC: [id number] EXAM:  CT ABDOMEN AND PELVIS IC   ORDERED BY: MALCOLM BUTT     PROCEDURE DATE:  09/20/2024          INTERPRETATION:  CLINICAL INFORMATION: Lower abdominal pain    COMPARISON: CT abdomen and pelvis 9/2/2024    CONTRAST/COMPLICATIONS:  IV Contrast: Omnipaque 350  90 cc administered   10 cc discarded  Oral Contrast: NONE  Complications: None reported at time of study completion    PROCEDURE:  CT of the Abdomen and Pelvis was performed.  Sagittal and coronal reformats were performed.    FINDINGS:  LOWER CHEST: Small hiatal hernia.    LIVER: Within normal limits.  BILE DUCTS: Normal caliber.  GALLBLADDER: Within normal limits.  SPLEEN: Within normal limits.  PANCREAS: Within normal limits.  ADRENALS: Within normal limits.  KIDNEYS/URETERS: Bilateral too small to characterize cortical   hypodensities. No hydronephrosis. Bilateral nonobstructive renal calculi   similar to prior    BLADDER: Within normal limits.  REPRODUCTIVE ORGANS: Hysterectomy.    BOWEL: No bowel obstruction or active inflammation. Moderate colonic   stool burden. Periampullary duodenal diverticulum. Appendix normal  PERITONEUM/RETROPERITONEUM: Within normal limits.  VESSELS: Nonaneurysmal.  LYMPH NODES: No lymphadenopathy.  ABDOMINAL WALL: Small fat-containing umbilical hernia.  BONES: Postoperative changes of the lumbar spine similar to prior.   nuerostimulator leads in the thoracic spinal canal reidentified.    IMPRESSION:  No acute inflammatory or obstructive pathology.    Bilateral nonobstructive nephrolithiasis    Moderate colonic stool burden.      EKG:   IMGAGING:    ASSESSMENT:  HPI:   59-year-old female with a past medical history of kidney stones, Brain aneurysm, rheumatoid arthritis, fibromyalgia, GERD, hypertension, HLD, sarcoidosis, asthma, that comes in for pain in the back. Patient around 2-3 weeks ago was having back pain which made patient go to the ED which was prescribed cefuroxime 500mg which per patient completed. However per patient around 2-3 days ago started feeling that pain was moving to the front of the abdomen. Patient states that the pain is stabbing like and it shoots as well. Patient denies having any pain with urination or denies having passed any stones or grain like in the urine. Patient denies any fevers, chills or dysuria. Pateint also recently on may had back surgery but has seen surgery and pain per doctor has not been related.  (20 Sep 2024 16:55)      PLAN:     MEDICATIONS  (STANDING):  carvedilol 6.25 milliGRAM(s) Oral every 12 hours  DULoxetine 30 milliGRAM(s) Oral daily  enoxaparin Injectable 40 milliGRAM(s) SubCutaneous every 24 hours  ferrous    sulfate 325 milliGRAM(s) Oral daily  gabapentin 800 milliGRAM(s) Oral three times a day  mirtazapine 30 milliGRAM(s) Oral daily  polyethylene glycol 3350 17 Gram(s) Oral two times a day  senna 1 Tablet(s) Oral two times a day  sodium chloride 0.9%. 1000 milliLiter(s) (100 mL/Hr) IV Continuous <Continuous>  tamsulosin 0.4 milliGRAM(s) Oral at bedtime    MEDICATIONS  (PRN):  acetaminophen     Tablet .. 650 milliGRAM(s) Oral every 6 hours PRN Temp greater or equal to 38C (100.4F), Mild Pain (1 - 3)  aluminum hydroxide/magnesium hydroxide/simethicone Suspension 30 milliLiter(s) Oral every 4 hours PRN Dyspepsia  bisacodyl 5 milliGRAM(s) Oral every 12 hours PRN Constipation  ketorolac   Injectable 15 milliGRAM(s) IV Push every 6 hours PRN Moderate Pain (4 - 6)  morphine  - Injectable 2 milliGRAM(s) IV Push every 4 hours PRN Severe Pain (7 - 10)      FOLLOW UP / RESULT: EVENT: CT showing moderate colonic stool burden. Pt reporting BM yesterday    BRIEF HPI: 59-year-old female with a past medical history of kidney stones, Brain aneurysm, rheumatoid arthritis, fibromyalgia, GERD, hypertension, HLD, sarcoidosis, asthma, that comes in for pain in the back. Patient to be admitted for intractable pain.    Vital Signs Last 24 Hrs  T(C): 36.6 (20 Sep 2024 21:35), Max: 36.8 (20 Sep 2024 19:27)  T(F): 97.8 (20 Sep 2024 21:35), Max: 98.2 (20 Sep 2024 19:27)  HR: 82 (20 Sep 2024 21:35) (76 - 92)  BP: 107/68 (20 Sep 2024 21:35) (106/72 - 138/89)  BP(mean): --  RR: 16 (20 Sep 2024 21:35) (16 - 18)  SpO2: 94% (20 Sep 2024 21:35) (94% - 98%)    Parameters below as of 20 Sep 2024 21:35  Patient On (Oxygen Delivery Method): room air    OBJECTIVE:  Vital Signs Last 24 Hrs  T(C): 36.6 (20 Sep 2024 21:35), Max: 36.8 (20 Sep 2024 19:27)  T(F): 97.8 (20 Sep 2024 21:35), Max: 98.2 (20 Sep 2024 19:27)  HR: 82 (20 Sep 2024 21:35) (76 - 92)  BP: 107/68 (20 Sep 2024 21:35) (106/72 - 138/89)  BP(mean): --  RR: 16 (20 Sep 2024 21:35) (16 - 18)  SpO2: 94% (20 Sep 2024 21:35) (94% - 98%)    Parameters below as of 20 Sep 2024 21:35  Patient On (Oxygen Delivery Method): room air    FOCUSED PHYSICAL EXAM:  GEN: Well groomed elderly female sitting up in bed  GI: Abd soft, rounded. BM yesterday but stopped her laxative that she usually take 1 week ago  RESP: Even, unlabored    LABS:                        12.3   9.72  )-----------( 292      ( 20 Sep 2024 12:30 )             37.4     09-20    141  |  111[H]  |  14  ----------------------------<  102[H]  3.8   |  23  |  0.67    Ca    9.1      20 Sep 2024 12:30    TPro  7.5  /  Alb  3.6  /  TBili  0.5  /  DBili  x   /  AST  46[H]  /  ALT  77[H]  /  Alk Phos  255[H]  09-20    IMAGING:  ACC: [id number] EXAM:  CT ABDOMEN AND PELVIS IC     PROCEDURE DATE:  09/20/2024    INTERPRETATION:  CLINICAL INFORMATION: Lower abdominal pain  COMPARISON: CT abdomen and pelvis 9/2/2024  FINDINGS:  LOWER CHEST: Small hiatal hernia.  KIDNEYS/URETERS: Bilateral too small to characterize cortical hypodensity. No hydronephrosis. Bilateral nonobstructive renal calculi   similar to prior  BOWEL: No bowel obstruction or active inflammation. Moderate colonic stool burden. Periampullary duodenal diverticulum. Appendix normal  ABDOMINAL WALL: Small fat-containing umbilical hernia.  BONES: Postoperative changes of the lumbar spine similar to prior. Neurostimulator leads in the thoracic spinal canal reidentified.  IMPRESSION:  No acute inflammatory or obstructive pathology. Bilateral nonobstructive nephrolithiasis. Moderate colonic stool burden.    PLAN:   Polyethylene glycol 3350 17 Gram(s) Oral two times a day  Senna 1 Tablet(s) Oral two times a day  Bisacodyl 5 lucy GRAM(s) Oral every 12 hours PRN Constipation    FOLLOW UP / RESULT: effect of medication

## 2024-09-20 NOTE — ED PROVIDER NOTE - NS_EDPROVIDERDISPOUSERTYPE_ED_A_ED
Spiritual Plan of Care    Pt Name: Graciela Velazquez  Pt : 1958  Date: January 15, 2021    Visit type: In person    Referral source:      Reason for visit: Routine Visit    Visited with: Patient    Taxonomy:    · Intended Effects: Demonstrate caring and concern, Liseth Affirmation  · Methods: Encourage sharing of feelings, Encourage self reflection, Offer support  · Interventions: Active listening, Ask guided questions, [place]    Patient Assessment: Coping , Relies on liseth    Patient  Intervention: Prayer    Second time meeting with Pt.  She unexpectedly came back to hospital after 1 day of leaving.  Prayed with Pt.       Attending Attestation (For Attendings USE Only)...

## 2024-09-20 NOTE — ED PROVIDER NOTE - OBJECTIVE STATEMENT
59-year-old female with PMHx of kidney stones, Raynaud's, RA, fibromyalgia, GERD, HLD, HTN, sarcoidosis, asthma, hysterectomy presents to the emergency department for worsening back pain now radiating anteriorly.  States that she was here and recently found to have urinary tract infection.  On her visit on September 2 she was found to have a few nonobstructing stones.  There is no evidence of hydronephrosis.  She also states that her blood pressure has been low over the last few days at home.  There is no associated nausea vomiting diarrhea.  Pain is anteriorly radiating and mostly in her lower abdomen.

## 2024-09-20 NOTE — PATIENT PROFILE ADULT - CAREGIVER RELATION TO PATIENT
[Negative] : Genitourinary [FreeTextEntry3] : resolving jaundice [FreeTextEntry9] : mild right flank pain post stent placement daughter

## 2024-09-20 NOTE — ED ADULT NURSE NOTE - PAIN RATING/NUMBER SCALE (0-10): ACTIVITY
Pt had some seizure-like activity while on the EMS gurney, his cell phone rang, and he answered it.    8 (severe pain)

## 2024-09-20 NOTE — H&P ADULT - PROBLEM SELECTOR PLAN 3
-on labs AT/ALT 40/70 with alk phos to 200 which is increased from 3 weeks ago  -could be I nthe setting of infection  -will give IV fluids and monitor after   -f/u cmp

## 2024-09-20 NOTE — H&P ADULT - ASSESSMENT
59-year-old female with a past medical history of kidney stones, Brain aneurysm, rheumatoid arthritis, fibromyalgia, GERD, hypertension, HLD, sarcoidosis, asthma, that comes in for pain in the back. Patient to be admitted for intractable pain

## 2024-09-20 NOTE — ED ADULT TRIAGE NOTE - CHIEF COMPLAINT QUOTE
walked  in with  c/o lower abdominal pain radiating  to the  back x  1  month denies  any  N/V. took antibiotics  not  working.

## 2024-09-20 NOTE — H&P ADULT - NSHPPHYSICALEXAM_GEN_ALL_CORE
PHYSICAL EXAMINATION:  GENERAL: NAD  HEAD:  Atraumatic, Normocephalic  EYES:  conjunctiva and sclera clear  NECK: Supple, No JVD, Normal thyroid  CHEST/LUNG: Clear to auscultation. Clear to percussion bilaterally; No rales, rhonchi, wheezing, or rubs  HEART: Regular rate and rhythm; No murmurs, rubs, or gallops  ABDOMEN: tenderness in the left flank pain and CVA tenderness  NERVOUS SYSTEM:  Alert & Oriented X3,    EXTREMITIES:  2+ Peripheral Pulses, No clubbing, cyanosis, or edema  SKIN: warm dry

## 2024-09-20 NOTE — PATIENT PROFILE ADULT - FALL HARM RISK - HARM RISK INTERVENTIONS
Assistance with ambulation/Assistance OOB with selected safe patient handling equipment/Communicate Risk of Fall with Harm to all staff/Discuss with provider need for PT consult/Monitor gait and stability/Provide patient with walking aids - walker, cane, crutches/Reinforce activity limits and safety measures with patient and family/Sit up slowly, dangle for a short time, stand at bedside before walking/Tailored Fall Risk Interventions/Toileting schedule using arm’s reach rule for commode and bathroom/Use of alarms - bed, chair and/or voice tab/Visual Cue: Yellow wristband and red socks/Bed in lowest position, wheels locked, appropriate side rails in place/Call bell, personal items and telephone in reach/Instruct patient to call for assistance before getting out of bed or chair/Non-slip footwear when patient is out of bed/[place] to call system/Physically safe environment - no spills, clutter or unnecessary equipment/Purposeful Proactive Rounding/Room/bathroom lighting operational, light cord in reach

## 2024-09-20 NOTE — ED PROVIDER NOTE - PHYSICAL EXAMINATION
Gen. No acute distress  HEENT: Pharynx is clear moist mucous membrane  Lungs: Bilateral breath  CVS: S1S2   Abd; soft nondistended, mild tenderness anteriorly, CVA tenderness bilateral mild  Ext: No edema no erythema  Neuro: Awake alert oriented x 3  MSK: Strength 5/5 bilateral lower extremity

## 2024-09-20 NOTE — ED PROVIDER NOTE - NS ED ROS FT
Constitutional: no fever no chills  Eyes: no conjunctivitis  Ears: no ear pain no tinnitus  Nose: no nasal congestion, Mouth/Throat: no throat pain, Neck: no stiffness  Cardiovascular: no chest pain  Respiratory: no shortness of breath no cough  Gastrointestinal:see hpi  MSK: no joint pain  : see hpi  Skin: no rash  Neuro: no LOC no seizures

## 2024-09-20 NOTE — H&P ADULT - ATTENDING COMMENTS
59y F with pmh of nephrolithiasis, Raynauds, RA, fibromyalgia, GERD, HTN, HLD, sarcoidosis, asthma, brain aneurysm - coiling sx 2011, depression, vertigo, fibroids, s/p hysterectomy who presented with worsening back pain with radiation to the front. She notes that she was here earlier in Sept 2, for similar symptoms. She notes the symptoms had been ongoing since then, despite course of antibiotics. She also had back surgery in May earlier on this year. Denies any fevers, chills, dysuria or hematuria.    Prior ED visits and admissions reviewed. ED visit on Sep 2, for urinary symptoms, sent home with cefuroxime 7 d course. Had a stay from mar 26-26 during which she presented for IV abx treatment pre procedure for cystoscopy, left ureteral stent removal, left ureteroscopy with laser lithotripsy and retrograde pyelogram on 3/26/2024.   Prior to that from Feb 27- Mar 1, hospitalized for L flank pain and was hospitalized for renal colic iso L renal stone, had a stent placed on 2/28, with bcx and ucx growing proteus.    Vitals, labs and imaging reviewed. Noted wbc 9, alk phos 255. Cr wnl. ASt 46, ALT 77   UA with small LE, nitrite positive, wbc 7. Prior urine cx results reviewed ecoli pansensitive   CT a/p - No acute inflammatory or obstructive pathology. Bilateral nonobstructive nephrolithiasis. Moderate colonic stool burden.   PE- NAD, NC/AT, RRR, lungs CTA b/l, abd soft, mild L CVA ttp, LLQ ttp+, no LE edema       A/P   #Abdominal pain 2/2 Renal colic   #Nonobstructive nephrolithiasis   #Transaminitis   #Moderate stool burden   #RA   #HTN   #GERD   #Sarcoidosis   #Asthma      -IVF hydration   -encourage po intake   -trial of tamsulosin   -pain control   -bowel regimen   -f/u Ucx   -s/p ceftriaxone in ED, holding off further abx for now given no fever, leukocytosis   -trend cbc   -resume home BP meds gradually with hold parameters   -resume other home meds   -trend CMP   -oobtc, ambulate as tolerated  -dvt ppx

## 2024-09-20 NOTE — ED PROVIDER NOTE - CLINICAL SUMMARY MEDICAL DECISION MAKING FREE TEXT BOX
ATTG: : Assessment/plan: UTI and prior visit with no persistent pain anteriorly radiating with tenderness concerns include but not limited to failed outpatient treatment, pyelonephritis, will check labs we will check urinalysis, will check CT, IV fluids, and antibiotics, reevaluate dispo

## 2024-09-20 NOTE — H&P ADULT - PROBLEM SELECTOR PLAN 1
-patient comes in with 2-3 day hx of pain in the back that is radiating to the abdomen   -patient with a prior 2-3 weeks of back pain  -on prior CT done showing non obstructing kidney stones   -CT today showing Bilateral nonobstructive nephrolithiasis. Moderate colonic stool burden.  -suspect this presentation is due to passing kidney stones   - in the ED given ceftriaxone   -will hold CTX  -will give high rate IV fluids   -starting flomax  -pain control with morphine 2mg for severe, toradol for moderate and tylenol for mild   -will also give bowel regimen

## 2024-09-20 NOTE — ED ADULT NURSE NOTE - OBJECTIVE STATEMENT
Pt arrived to ED c/o abd pain radiating to the back x 2-3 weeks , took antibiotics but pain persists

## 2024-09-20 NOTE — ED PROVIDER NOTE - DIFFERENTIAL DIAGNOSIS
Differential Diagnosis Assessment/plan: UTI and prior visit with no persistent pain anteriorly radiating with tenderness concerns include but not limited to failed outpatient treatment, pyelonephritis,

## 2024-09-20 NOTE — ED PROVIDER NOTE - PROGRESS NOTE DETAILS
ATTG: : failed outpatient UTI tx. here today with worse pain. admit to hosptial for further care and treatment.

## 2024-09-21 LAB
ALBUMIN SERPL ELPH-MCNC: 3.2 G/DL — LOW (ref 3.5–5)
ALP SERPL-CCNC: 223 U/L — HIGH (ref 40–120)
ALT FLD-CCNC: 57 U/L DA — SIGNIFICANT CHANGE UP (ref 10–60)
ANION GAP SERPL CALC-SCNC: 7 MMOL/L — SIGNIFICANT CHANGE UP (ref 5–17)
AST SERPL-CCNC: 24 U/L — SIGNIFICANT CHANGE UP (ref 10–40)
BILIRUB DIRECT SERPL-MCNC: 0.1 MG/DL — SIGNIFICANT CHANGE UP (ref 0–0.3)
BILIRUB INDIRECT FLD-MCNC: 0.2 MG/DL — SIGNIFICANT CHANGE UP (ref 0.2–1)
BILIRUB SERPL-MCNC: 0.3 MG/DL — SIGNIFICANT CHANGE UP (ref 0.2–1.2)
BUN SERPL-MCNC: 10 MG/DL — SIGNIFICANT CHANGE UP (ref 7–18)
CALCIUM SERPL-MCNC: 8.2 MG/DL — LOW (ref 8.4–10.5)
CHLORIDE SERPL-SCNC: 113 MMOL/L — HIGH (ref 96–108)
CO2 SERPL-SCNC: 23 MMOL/L — SIGNIFICANT CHANGE UP (ref 22–31)
CREAT SERPL-MCNC: 0.56 MG/DL — SIGNIFICANT CHANGE UP (ref 0.5–1.3)
EGFR: 104 ML/MIN/1.73M2 — SIGNIFICANT CHANGE UP
GLUCOSE SERPL-MCNC: 96 MG/DL — SIGNIFICANT CHANGE UP (ref 70–99)
HCT VFR BLD CALC: 34.9 % — SIGNIFICANT CHANGE UP (ref 34.5–45)
HGB BLD-MCNC: 11.3 G/DL — LOW (ref 11.5–15.5)
MAGNESIUM SERPL-MCNC: 2.2 MG/DL — SIGNIFICANT CHANGE UP (ref 1.6–2.6)
MCHC RBC-ENTMCNC: 29.5 PG — SIGNIFICANT CHANGE UP (ref 27–34)
MCHC RBC-ENTMCNC: 32.4 GM/DL — SIGNIFICANT CHANGE UP (ref 32–36)
MCV RBC AUTO: 91.1 FL — SIGNIFICANT CHANGE UP (ref 80–100)
MRSA PCR RESULT.: SIGNIFICANT CHANGE UP
NRBC # BLD: 0 /100 WBCS — SIGNIFICANT CHANGE UP (ref 0–0)
PHOSPHATE SERPL-MCNC: 2.1 MG/DL — LOW (ref 2.5–4.5)
PLATELET # BLD AUTO: 253 K/UL — SIGNIFICANT CHANGE UP (ref 150–400)
POTASSIUM SERPL-MCNC: 3.5 MMOL/L — SIGNIFICANT CHANGE UP (ref 3.5–5.3)
POTASSIUM SERPL-SCNC: 3.5 MMOL/L — SIGNIFICANT CHANGE UP (ref 3.5–5.3)
PROT SERPL-MCNC: 6.4 G/DL — SIGNIFICANT CHANGE UP (ref 6–8.3)
RBC # BLD: 3.83 M/UL — SIGNIFICANT CHANGE UP (ref 3.8–5.2)
RBC # FLD: 16 % — HIGH (ref 10.3–14.5)
S AUREUS DNA NOSE QL NAA+PROBE: SIGNIFICANT CHANGE UP
SODIUM SERPL-SCNC: 143 MMOL/L — SIGNIFICANT CHANGE UP (ref 135–145)
WBC # BLD: 6.62 K/UL — SIGNIFICANT CHANGE UP (ref 3.8–10.5)
WBC # FLD AUTO: 6.62 K/UL — SIGNIFICANT CHANGE UP (ref 3.8–10.5)

## 2024-09-21 PROCEDURE — 99233 SBSQ HOSP IP/OBS HIGH 50: CPT

## 2024-09-21 PROCEDURE — 99232 SBSQ HOSP IP/OBS MODERATE 35: CPT

## 2024-09-21 RX ORDER — SOD PHOS DI, MONO/K PHOS MONO 250 MG
1 TABLET ORAL ONCE
Refills: 0 | Status: COMPLETED | OUTPATIENT
Start: 2024-09-21 | End: 2024-09-21

## 2024-09-21 RX ADMIN — Medication 1 PACKET(S): at 09:00

## 2024-09-21 RX ADMIN — Medication 30 MILLILITER(S): at 21:23

## 2024-09-21 RX ADMIN — GABAPENTIN 800 MILLIGRAM(S): 800 TABLET, FILM COATED ORAL at 13:29

## 2024-09-21 RX ADMIN — Medication 30 MILLIGRAM(S): at 13:29

## 2024-09-21 RX ADMIN — Medication 5 MILLIGRAM(S): at 06:39

## 2024-09-21 RX ADMIN — Medication 1 TABLET(S): at 17:18

## 2024-09-21 RX ADMIN — ENOXAPARIN SODIUM 40 MILLIGRAM(S): 150 INJECTION SUBCUTANEOUS at 21:23

## 2024-09-21 RX ADMIN — KETOROLAC TROMETHAMINE 15 MILLIGRAM(S): 10 TABLET, FILM COATED ORAL at 20:30

## 2024-09-21 RX ADMIN — Medication 17 GRAM(S): at 17:18

## 2024-09-21 RX ADMIN — KETOROLAC TROMETHAMINE 15 MILLIGRAM(S): 10 TABLET, FILM COATED ORAL at 19:59

## 2024-09-21 RX ADMIN — MIRTAZAPINE 30 MILLIGRAM(S): 30 TABLET, FILM COATED ORAL at 11:56

## 2024-09-21 RX ADMIN — MORPHINE SULFATE 2 MILLIGRAM(S): 30 TABLET, FILM COATED, EXTENDED RELEASE ORAL at 11:56

## 2024-09-21 RX ADMIN — MORPHINE SULFATE 2 MILLIGRAM(S): 30 TABLET, FILM COATED, EXTENDED RELEASE ORAL at 12:45

## 2024-09-21 RX ADMIN — Medication 17 GRAM(S): at 06:40

## 2024-09-21 RX ADMIN — MORPHINE SULFATE 2 MILLIGRAM(S): 30 TABLET, FILM COATED, EXTENDED RELEASE ORAL at 21:23

## 2024-09-21 RX ADMIN — MORPHINE SULFATE 2 MILLIGRAM(S): 30 TABLET, FILM COATED, EXTENDED RELEASE ORAL at 07:15

## 2024-09-21 RX ADMIN — KETOROLAC TROMETHAMINE 15 MILLIGRAM(S): 10 TABLET, FILM COATED ORAL at 00:49

## 2024-09-21 RX ADMIN — Medication 6.25 MILLIGRAM(S): at 06:39

## 2024-09-21 RX ADMIN — MORPHINE SULFATE 2 MILLIGRAM(S): 30 TABLET, FILM COATED, EXTENDED RELEASE ORAL at 06:43

## 2024-09-21 RX ADMIN — KETOROLAC TROMETHAMINE 15 MILLIGRAM(S): 10 TABLET, FILM COATED ORAL at 01:20

## 2024-09-21 RX ADMIN — Medication 6.25 MILLIGRAM(S): at 17:18

## 2024-09-21 RX ADMIN — Medication 1 TABLET(S): at 06:39

## 2024-09-21 RX ADMIN — Medication 325 MILLIGRAM(S): at 11:56

## 2024-09-21 RX ADMIN — Medication 0.4 MILLIGRAM(S): at 21:23

## 2024-09-21 RX ADMIN — MORPHINE SULFATE 2 MILLIGRAM(S): 30 TABLET, FILM COATED, EXTENDED RELEASE ORAL at 21:55

## 2024-09-21 RX ADMIN — GABAPENTIN 800 MILLIGRAM(S): 800 TABLET, FILM COATED ORAL at 21:23

## 2024-09-21 RX ADMIN — GABAPENTIN 800 MILLIGRAM(S): 800 TABLET, FILM COATED ORAL at 06:39

## 2024-09-21 NOTE — PROGRESS NOTE ADULT - SUBJECTIVE AND OBJECTIVE BOX
Patient seen and examined at bedside this morning. States her pain is still present, mainly bilateral flank region. denies any fevers or chills. Has one BM this morning.  Vitals, labs reviewed. Noted wbc 6, alk phos 223. Cr wnl. ast, alt wnl.   PE- NAD, NC/AT, RRR, lungs CTA b/l, abd soft, no CVA ttp, mild suprapubic ttp, no LE edema     Vital Signs Last 24 Hrs  T(C): 36.3 (21 Sep 2024 13:54), Max: 36.8 (20 Sep 2024 19:27)  T(F): 97.4 (21 Sep 2024 13:54), Max: 98.2 (20 Sep 2024 19:27)  HR: 76 (21 Sep 2024 13:54) (65 - 92)  BP: 105/71 (21 Sep 2024 13:54) (102/69 - 138/89)  BP(mean): 80 (21 Sep 2024 06:00) (80 - 80)  RR: 18 (21 Sep 2024 13:54) (16 - 18)  SpO2: 96% (21 Sep 2024 13:54) (94% - 98%)    Parameters below as of 21 Sep 2024 13:54  Patient On (Oxygen Delivery Method): room air                          11.3   6.62  )-----------( 253      ( 21 Sep 2024 06:55 )             34.9     09-21    143  |  113[H]  |  10  ----------------------------<  96  3.5   |  23  |  0.56    Ca    8.2[L]      21 Sep 2024 06:55  Phos  2.1     09-21  Mg     2.2     09-21    TPro  6.4  /  Alb  3.2[L]  /  TBili  0.3  /  DBili  0.1  /  AST  24  /  ALT  57  /  AlkPhos  223[H]  09-21

## 2024-09-22 LAB
ALBUMIN SERPL ELPH-MCNC: 3.2 G/DL — LOW (ref 3.5–5)
ALP SERPL-CCNC: 216 U/L — HIGH (ref 40–120)
ALT FLD-CCNC: 50 U/L DA — SIGNIFICANT CHANGE UP (ref 10–60)
ANION GAP SERPL CALC-SCNC: 7 MMOL/L — SIGNIFICANT CHANGE UP (ref 5–17)
AST SERPL-CCNC: 20 U/L — SIGNIFICANT CHANGE UP (ref 10–40)
BILIRUB SERPL-MCNC: 0.2 MG/DL — SIGNIFICANT CHANGE UP (ref 0.2–1.2)
BUN SERPL-MCNC: 14 MG/DL — SIGNIFICANT CHANGE UP (ref 7–18)
CALCIUM SERPL-MCNC: 8.7 MG/DL — SIGNIFICANT CHANGE UP (ref 8.4–10.5)
CHLORIDE SERPL-SCNC: 113 MMOL/L — HIGH (ref 96–108)
CO2 SERPL-SCNC: 23 MMOL/L — SIGNIFICANT CHANGE UP (ref 22–31)
CREAT SERPL-MCNC: 0.54 MG/DL — SIGNIFICANT CHANGE UP (ref 0.5–1.3)
EGFR: 105 ML/MIN/1.73M2 — SIGNIFICANT CHANGE UP
GLUCOSE SERPL-MCNC: 99 MG/DL — SIGNIFICANT CHANGE UP (ref 70–99)
HCT VFR BLD CALC: 34.2 % — LOW (ref 34.5–45)
HGB BLD-MCNC: 11 G/DL — LOW (ref 11.5–15.5)
MAGNESIUM SERPL-MCNC: 2.4 MG/DL — SIGNIFICANT CHANGE UP (ref 1.6–2.6)
MCHC RBC-ENTMCNC: 29.5 PG — SIGNIFICANT CHANGE UP (ref 27–34)
MCHC RBC-ENTMCNC: 32.2 GM/DL — SIGNIFICANT CHANGE UP (ref 32–36)
MCV RBC AUTO: 91.7 FL — SIGNIFICANT CHANGE UP (ref 80–100)
NRBC # BLD: 0 /100 WBCS — SIGNIFICANT CHANGE UP (ref 0–0)
PHOSPHATE SERPL-MCNC: 2.1 MG/DL — LOW (ref 2.5–4.5)
PLATELET # BLD AUTO: 248 K/UL — SIGNIFICANT CHANGE UP (ref 150–400)
POTASSIUM SERPL-MCNC: 3.5 MMOL/L — SIGNIFICANT CHANGE UP (ref 3.5–5.3)
POTASSIUM SERPL-SCNC: 3.5 MMOL/L — SIGNIFICANT CHANGE UP (ref 3.5–5.3)
PROT SERPL-MCNC: 6.5 G/DL — SIGNIFICANT CHANGE UP (ref 6–8.3)
RBC # BLD: 3.73 M/UL — LOW (ref 3.8–5.2)
RBC # FLD: 16.1 % — HIGH (ref 10.3–14.5)
SODIUM SERPL-SCNC: 143 MMOL/L — SIGNIFICANT CHANGE UP (ref 135–145)
WBC # BLD: 6.82 K/UL — SIGNIFICANT CHANGE UP (ref 3.8–10.5)
WBC # FLD AUTO: 6.82 K/UL — SIGNIFICANT CHANGE UP (ref 3.8–10.5)

## 2024-09-22 PROCEDURE — 99233 SBSQ HOSP IP/OBS HIGH 50: CPT

## 2024-09-22 RX ORDER — POTASSIUM PHOSPHATE, MONOBASIC POTASSIUM PHOSPHATE, DIBASIC 224; 236 MG/ML; MG/ML
15 INJECTION, SOLUTION, CONCENTRATE INTRAVENOUS ONCE
Refills: 0 | Status: COMPLETED | OUTPATIENT
Start: 2024-09-22 | End: 2024-09-22

## 2024-09-22 RX ORDER — ONDANSETRON HCL/PF 4 MG/2 ML
4 VIAL (ML) INJECTION ONCE
Refills: 0 | Status: COMPLETED | OUTPATIENT
Start: 2024-09-22 | End: 2024-09-22

## 2024-09-22 RX ORDER — SODIUM CHLORIDE IRRIG SOLUTION 0.9 %
1000 SOLUTION, IRRIGATION IRRIGATION
Refills: 0 | Status: DISCONTINUED | OUTPATIENT
Start: 2024-09-22 | End: 2024-09-24

## 2024-09-22 RX ORDER — CEFTRIAXONE SODIUM 1 G
1000 VIAL (EA) INJECTION EVERY 24 HOURS
Refills: 0 | Status: COMPLETED | OUTPATIENT
Start: 2024-09-22 | End: 2024-09-24

## 2024-09-22 RX ADMIN — Medication 100 MILLIGRAM(S): at 06:16

## 2024-09-22 RX ADMIN — KETOROLAC TROMETHAMINE 15 MILLIGRAM(S): 10 TABLET, FILM COATED ORAL at 23:20

## 2024-09-22 RX ADMIN — KETOROLAC TROMETHAMINE 15 MILLIGRAM(S): 10 TABLET, FILM COATED ORAL at 22:47

## 2024-09-22 RX ADMIN — Medication 5 MILLIGRAM(S): at 06:13

## 2024-09-22 RX ADMIN — Medication 6.25 MILLIGRAM(S): at 17:23

## 2024-09-22 RX ADMIN — Medication 70 MILLILITER(S): at 11:09

## 2024-09-22 RX ADMIN — Medication 1 TABLET(S): at 06:13

## 2024-09-22 RX ADMIN — GABAPENTIN 800 MILLIGRAM(S): 800 TABLET, FILM COATED ORAL at 13:03

## 2024-09-22 RX ADMIN — POTASSIUM PHOSPHATE, MONOBASIC POTASSIUM PHOSPHATE, DIBASIC 62.5 MILLIMOLE(S): 224; 236 INJECTION, SOLUTION, CONCENTRATE INTRAVENOUS at 12:17

## 2024-09-22 RX ADMIN — MORPHINE SULFATE 2 MILLIGRAM(S): 30 TABLET, FILM COATED, EXTENDED RELEASE ORAL at 06:45

## 2024-09-22 RX ADMIN — Medication 30 MILLIGRAM(S): at 11:10

## 2024-09-22 RX ADMIN — GABAPENTIN 800 MILLIGRAM(S): 800 TABLET, FILM COATED ORAL at 22:47

## 2024-09-22 RX ADMIN — MORPHINE SULFATE 2 MILLIGRAM(S): 30 TABLET, FILM COATED, EXTENDED RELEASE ORAL at 20:03

## 2024-09-22 RX ADMIN — MIRTAZAPINE 30 MILLIGRAM(S): 30 TABLET, FILM COATED ORAL at 11:09

## 2024-09-22 RX ADMIN — MORPHINE SULFATE 2 MILLIGRAM(S): 30 TABLET, FILM COATED, EXTENDED RELEASE ORAL at 06:14

## 2024-09-22 RX ADMIN — MORPHINE SULFATE 2 MILLIGRAM(S): 30 TABLET, FILM COATED, EXTENDED RELEASE ORAL at 20:35

## 2024-09-22 RX ADMIN — GABAPENTIN 800 MILLIGRAM(S): 800 TABLET, FILM COATED ORAL at 06:13

## 2024-09-22 RX ADMIN — Medication 6.25 MILLIGRAM(S): at 06:14

## 2024-09-22 RX ADMIN — Medication 17 GRAM(S): at 06:14

## 2024-09-22 RX ADMIN — Medication 0.4 MILLIGRAM(S): at 22:46

## 2024-09-22 RX ADMIN — ENOXAPARIN SODIUM 40 MILLIGRAM(S): 150 INJECTION SUBCUTANEOUS at 22:47

## 2024-09-22 RX ADMIN — Medication 325 MILLIGRAM(S): at 11:09

## 2024-09-22 RX ADMIN — Medication 4 MILLIGRAM(S): at 01:21

## 2024-09-22 NOTE — PROGRESS NOTE ADULT - SUBJECTIVE AND OBJECTIVE BOX
Patient seen and examined at bedside this morning. States her pain is still present, today it is mainly lower back region. denies any fevers or chills.   Vitals, labs reviewed. Noted wbc 6, phos 2.1 Cr wnl. ast, alt wnl.   PE- NAD, NC/AT, RRR, lungs CTA b/l, abd soft, no CVA ttp, mild suprapubic ttp, no LE edema     Vital Signs Last 24 Hrs  T(C): 36.4 (22 Sep 2024 04:45), Max: 36.8 (21 Sep 2024 20:49)  T(F): 97.6 (22 Sep 2024 04:45), Max: 98.3 (21 Sep 2024 20:49)  HR: 70 (22 Sep 2024 04:45) (70 - 76)  BP: 115/78 (22 Sep 2024 04:45) (104/65 - 118/68)  BP(mean): 79 (21 Sep 2024 20:49) (79 - 79)  RR: 18 (22 Sep 2024 04:45) (17 - 18)  SpO2: 95% (22 Sep 2024 04:45) (95% - 96%)    Parameters below as of 22 Sep 2024 04:45  Patient On (Oxygen Delivery Method): room air                          11.0   6.82  )-----------( 248      ( 22 Sep 2024 05:54 )             34.2   09-22    143  |  113[H]  |  14  ----------------------------<  99  3.5   |  23  |  0.54    Ca    8.7      22 Sep 2024 05:54  Phos  2.1     09-22  Mg     2.4     09-22    TPro  6.5  /  Alb  3.2[L]  /  TBili  0.2  /  DBili  x   /  AST  20  /  ALT  50  /  AlkPhos  216[H]  09-22

## 2024-09-23 ENCOUNTER — TRANSCRIPTION ENCOUNTER (OUTPATIENT)
Age: 59
End: 2024-09-23

## 2024-09-23 DIAGNOSIS — F32.9 MAJOR DEPRESSIVE DISORDER, SINGLE EPISODE, UNSPECIFIED: ICD-10-CM

## 2024-09-23 DIAGNOSIS — Z75.8 OTHER PROBLEMS RELATED TO MEDICAL FACILITIES AND OTHER HEALTH CARE: ICD-10-CM

## 2024-09-23 LAB
ANION GAP SERPL CALC-SCNC: 7 MMOL/L — SIGNIFICANT CHANGE UP (ref 5–17)
BUN SERPL-MCNC: 11 MG/DL — SIGNIFICANT CHANGE UP (ref 7–18)
CALCIUM SERPL-MCNC: 8.9 MG/DL — SIGNIFICANT CHANGE UP (ref 8.4–10.5)
CHLORIDE SERPL-SCNC: 112 MMOL/L — HIGH (ref 96–108)
CO2 SERPL-SCNC: 23 MMOL/L — SIGNIFICANT CHANGE UP (ref 22–31)
CREAT SERPL-MCNC: 0.56 MG/DL — SIGNIFICANT CHANGE UP (ref 0.5–1.3)
EGFR: 104 ML/MIN/1.73M2 — SIGNIFICANT CHANGE UP
GLUCOSE SERPL-MCNC: 106 MG/DL — HIGH (ref 70–99)
HCT VFR BLD CALC: 38.1 % — SIGNIFICANT CHANGE UP (ref 34.5–45)
HGB BLD-MCNC: 12.4 G/DL — SIGNIFICANT CHANGE UP (ref 11.5–15.5)
MAGNESIUM SERPL-MCNC: 2.2 MG/DL — SIGNIFICANT CHANGE UP (ref 1.6–2.6)
MCHC RBC-ENTMCNC: 29.5 PG — SIGNIFICANT CHANGE UP (ref 27–34)
MCHC RBC-ENTMCNC: 32.5 GM/DL — SIGNIFICANT CHANGE UP (ref 32–36)
MCV RBC AUTO: 90.5 FL — SIGNIFICANT CHANGE UP (ref 80–100)
NRBC # BLD: 0 /100 WBCS — SIGNIFICANT CHANGE UP (ref 0–0)
PHOSPHATE SERPL-MCNC: 2.8 MG/DL — SIGNIFICANT CHANGE UP (ref 2.5–4.5)
PLATELET # BLD AUTO: 260 K/UL — SIGNIFICANT CHANGE UP (ref 150–400)
POTASSIUM SERPL-MCNC: 3.6 MMOL/L — SIGNIFICANT CHANGE UP (ref 3.5–5.3)
POTASSIUM SERPL-SCNC: 3.6 MMOL/L — SIGNIFICANT CHANGE UP (ref 3.5–5.3)
RBC # BLD: 4.21 M/UL — SIGNIFICANT CHANGE UP (ref 3.8–5.2)
RBC # FLD: 15.8 % — HIGH (ref 10.3–14.5)
SODIUM SERPL-SCNC: 142 MMOL/L — SIGNIFICANT CHANGE UP (ref 135–145)
WBC # BLD: 6.89 K/UL — SIGNIFICANT CHANGE UP (ref 3.8–10.5)
WBC # FLD AUTO: 6.89 K/UL — SIGNIFICANT CHANGE UP (ref 3.8–10.5)

## 2024-09-23 PROCEDURE — 99232 SBSQ HOSP IP/OBS MODERATE 35: CPT

## 2024-09-23 RX ADMIN — KETOROLAC TROMETHAMINE 15 MILLIGRAM(S): 10 TABLET, FILM COATED ORAL at 07:10

## 2024-09-23 RX ADMIN — MIRTAZAPINE 30 MILLIGRAM(S): 30 TABLET, FILM COATED ORAL at 11:15

## 2024-09-23 RX ADMIN — ENOXAPARIN SODIUM 40 MILLIGRAM(S): 150 INJECTION SUBCUTANEOUS at 21:50

## 2024-09-23 RX ADMIN — MORPHINE SULFATE 2 MILLIGRAM(S): 30 TABLET, FILM COATED, EXTENDED RELEASE ORAL at 10:42

## 2024-09-23 RX ADMIN — Medication 6.25 MILLIGRAM(S): at 06:38

## 2024-09-23 RX ADMIN — KETOROLAC TROMETHAMINE 15 MILLIGRAM(S): 10 TABLET, FILM COATED ORAL at 22:44

## 2024-09-23 RX ADMIN — MORPHINE SULFATE 2 MILLIGRAM(S): 30 TABLET, FILM COATED, EXTENDED RELEASE ORAL at 09:08

## 2024-09-23 RX ADMIN — Medication 30 MILLILITER(S): at 12:11

## 2024-09-23 RX ADMIN — Medication 6.25 MILLIGRAM(S): at 17:31

## 2024-09-23 RX ADMIN — Medication 0.4 MILLIGRAM(S): at 21:49

## 2024-09-23 RX ADMIN — Medication 325 MILLIGRAM(S): at 11:15

## 2024-09-23 RX ADMIN — Medication 17 GRAM(S): at 17:30

## 2024-09-23 RX ADMIN — KETOROLAC TROMETHAMINE 15 MILLIGRAM(S): 10 TABLET, FILM COATED ORAL at 06:39

## 2024-09-23 RX ADMIN — Medication 100 MILLIGRAM(S): at 06:39

## 2024-09-23 RX ADMIN — Medication 17 GRAM(S): at 06:39

## 2024-09-23 RX ADMIN — KETOROLAC TROMETHAMINE 15 MILLIGRAM(S): 10 TABLET, FILM COATED ORAL at 23:37

## 2024-09-23 RX ADMIN — GABAPENTIN 800 MILLIGRAM(S): 800 TABLET, FILM COATED ORAL at 14:28

## 2024-09-23 RX ADMIN — GABAPENTIN 800 MILLIGRAM(S): 800 TABLET, FILM COATED ORAL at 06:39

## 2024-09-23 RX ADMIN — Medication 1 TABLET(S): at 06:47

## 2024-09-23 RX ADMIN — GABAPENTIN 800 MILLIGRAM(S): 800 TABLET, FILM COATED ORAL at 21:50

## 2024-09-23 RX ADMIN — Medication 30 MILLIGRAM(S): at 11:15

## 2024-09-23 RX ADMIN — Medication 1 TABLET(S): at 17:31

## 2024-09-23 RX ADMIN — Medication 5 MILLIGRAM(S): at 06:39

## 2024-09-23 NOTE — DISCHARGE NOTE PROVIDER - ATTENDING DISCHARGE PHYSICAL EXAMINATION:
I, Isaak Contreras, am the last provider completing and signing this document after my resident or NP has started the document. I have personally seen and examined the patient. I have collaborated with and supervised the midlevel practitioner on the discharge service for the patient. I agree with everything as documented by the midlevel practitioner. I have reviewed and made amendments to the documentation where necessary.    I was present with the Resident during the key portions of the history and exam. I discussed the case with the Resident and agree with the findings and plan as documented in the Resident 's note, unless noted below.    My physical exam on day of discharge:  Alert, answering qs appropriately, following commands  no murmurs heard  breathing comfortably  abdomen NT/ND BS+

## 2024-09-23 NOTE — PROGRESS NOTE ADULT - ASSESSMENT
AP  59y F with pmh of nephrolithiasis, Raynauds, RA, fibromyalgia, GERD, HTN, HLD, sarcoidosis, asthma, brain aneurysm - coiling sx 2011, depression, vertigo, fibroids, s/p hysterectomy who presented with worsening back pain with radiation to the front, admitted for renal colic, UTI and pain control.   #Abdominal pain 2/2 Renal colic   #ecoli UTI  #b/l Nonobstructive nephrolithiasis   #Transaminitis -resolved  #Moderate stool burden   #RA   #HTN   #GERD   #Sarcoidosis   #Asthma   #Hypophosphatemia     -IVF hydration   -encouraged po intake   -c/w tamsulosin   -pain control   -bowel regimen   -f/u Ucx -ecoli, pending sensitivities  -c/w ceftriaxone  -trend cbc, fever curve  -c/w home BP meds gradually with hold parameters   -c/w other home meds for chronic conditions above  -monitor CMP   -replete phos, recheck in AM  -oobtc, ambulate as tolerated  -dvt ppx       
AP  59y F with pmh of nephrolithiasis, Raynauds, RA, fibromyalgia, GERD, HTN, HLD, sarcoidosis, asthma, brain aneurysm - coiling sx 2011, depression, vertigo, fibroids, s/p hysterectomy who presented with worsening back pain with radiation to the front, admitted for renal colic and pain control.   #Abdominal pain 2/2 Renal colic   #b/l Nonobstructive nephrolithiasis   #Transaminitis -resolved  #Moderate stool burden   #RA   #HTN   #GERD   #Sarcoidosis   #Asthma      -IVF hydration   -encouraged po intake   -c/w tamsulosin   -pain control   -bowel regimen   -f/u Ucx   -s/p ceftriaxone in ED, holding off further abx for now given no fever, leukocytosis   -trend cbc, fever curve  -c/w home BP meds gradually with hold parameters   -c/w other home meds for chronic conditions above  -monitor CMP   -oobtc, ambulate as tolerated  -dvt ppx     
59-year-old female with a past medical history of kidney stones, Brain aneurysm, rheumatoid arthritis, fibromyalgia, GERD, hypertension, HLD, sarcoidosis, asthma, that comes in for pain in the back. Patient to be admitted for intractable pain  9/23: urine culture prelim growing E. coli pending sensitivities

## 2024-09-23 NOTE — PROGRESS NOTE ADULT - PROBLEM SELECTOR PLAN 6
c/w home meds hx of RA on Sulfasalazine 500gm daily and leflunomide 20mg daily   -C/w Sulfasalazine 500mg daily   - Leflunomide unavailable-- pt may bring own med if possible

## 2024-09-23 NOTE — DISCHARGE NOTE PROVIDER - NSDCMRMEDTOKEN_GEN_ALL_CORE_FT
amLODIPine 5 mg oral tablet: 1 tab(s) orally once a day  carvedilol 6.25 mg oral tablet: 1 tab(s) orally 2 times a day  cyclobenzaprine 10 mg oral tablet: 1 tab(s) orally once a day as needed for spams  gabapentin 800 mg oral tablet: 1 tab(s) orally 3 times a day  leflunomide 20 mg oral tablet: 1 tab(s) orally once a day  mirtazapine 30 mg oral tablet: 1 tab(s) orally once a day (at bedtime)  sulfaSALAzine 500 mg oral delayed release tablet: 1 tab(s) orally once a day   amLODIPine 5 mg oral tablet: 1 tab(s) orally once a day  carvedilol 6.25 mg oral tablet: 1 tab(s) orally 2 times a day  cefuroxime 250 mg oral tablet: 1 tab(s) orally 2 times a day  cyclobenzaprine 10 mg oral tablet: 1 tab(s) orally once a day as needed for spams  DULoxetine 30 mg oral delayed release capsule: 1 cap(s) orally once a day  ferrous sulfate 325 mg (65 mg elemental iron) oral tablet: 1 tab(s) orally once a day  gabapentin 800 mg oral tablet: 1 tab(s) orally 3 times a day  mirtazapine 30 mg oral tablet: 1 tab(s) orally once a day (at bedtime)  senna leaf extract oral tablet: 1 tab(s) orally 2 times a day  sulfADIAZINE 500 mg oral tablet: 1 tab(s) orally once a day  sulfaSALAzine 500 mg oral delayed release tablet: 1 tab(s) orally once a day  tamsulosin 0.4 mg oral capsule: 1 cap(s) orally once a day (at bedtime)   amLODIPine 5 mg oral tablet: 1 tab(s) orally once a day  carvedilol 6.25 mg oral tablet: 1 tab(s) orally 2 times a day  cefuroxime 250 mg oral tablet: 1 tab(s) orally 2 times a day  cyclobenzaprine 10 mg oral tablet: 1 tab(s) orally once a day as needed for spams  DULoxetine 30 mg oral delayed release capsule: 1 cap(s) orally once a day  ferrous sulfate 325 mg (65 mg elemental iron) oral tablet: 1 tab(s) orally once a day  gabapentin 800 mg oral tablet: 1 tab(s) orally 3 times a day  mirtazapine 30 mg oral tablet: 1 tab(s) orally once a day (at bedtime)  senna leaf extract oral tablet: 1 tab(s) orally 2 times a day  sulfaSALAzine 500 mg oral delayed release tablet: 1 tab(s) orally once a day  tamsulosin 0.4 mg oral capsule: 1 cap(s) orally once a day (at bedtime)

## 2024-09-23 NOTE — DISCHARGE NOTE PROVIDER - NSDCCPCAREPLAN_GEN_ALL_CORE_FT
PRINCIPAL DISCHARGE DIAGNOSIS  Diagnosis: Renal colic, bilateral  Assessment and Plan of Treatment: you presented to ED with recurrent abdominal pain radiating to your back.  You were recently treated for similar symptoms with a course of Cefuroxime.  Prior CT scan showed non obstructing kidney stones, now CT scan showing bilateral non obstructing nephrolithiasis.  You were treated with intravenous fluids and intravenous fluids.  You were also treated with pain management.  Your symptoms have improved likely due to passing stone.  -Please continue hydration with plenty of water  -Follow up with your primary doctor within one week      SECONDARY DISCHARGE DIAGNOSES  Diagnosis: E. coli UTI  Assessment and Plan of Treatment: Your urinalysis was positive and your urine culture grew E. Coli.  You were treated with intravenous antibiotic Ceftriaxone..  -Please complete antibiotic course as prescribed  -Maintain adequate hydration at all times  -Follow up with your primary doctor within one week    Diagnosis: HTN (hypertension)  Assessment and Plan of Treatment: Your blood pressure is well controlled with current home anti hypertensives.  -Continue blood pressure medications as prior to hospitalization  -Low salt diet  -Activity as tolerated    Diagnosis: Anxiety and depression  Assessment and Plan of Treatment: Continue home psych medications Cymbalta, Remeron, Gabapentin  Follow up with your primary doctor       PRINCIPAL DISCHARGE DIAGNOSIS  Diagnosis: Renal colic, bilateral  Assessment and Plan of Treatment: you presented to ED with recurrent abdominal pain radiating to your back.  You were recently treated for similar symptoms with a course of Cefuroxime.  Prior CT scan showed non obstructing kidney stones, now CT scan showing bilateral non obstructing nephrolithiasis.  You were treated with intravenous fluids and intravenous fluids.  You were also treated with pain management.  Your symptoms have improved likely due to passing stone.  -Please continue hydration with plenty of water  -Follow up with your primary doctor within one week  continue Flomax  Follow up in 1 week with PCP and Urology for further medical management      SECONDARY DISCHARGE DIAGNOSES  Diagnosis: E. coli UTI  Assessment and Plan of Treatment: Your urinalysis was positive and your urine culture grew E. Coli.  You were treated with intravenous antibiotic Ceftriaxone..  -Please complete antibiotic course as prescribed  -Maintain adequate hydration at all times  -Follow up with your primary doctor within one week  Continue Ceftin 250mg twice a day until complete  Hold Leflunomide and follow up with PCP for further medical management on medication  Follow up wiht PCP and Urology in 1 week for further medical management.    Diagnosis: Anxiety and depression  Assessment and Plan of Treatment: Continue home psych medications Cymbalta, Remeron, Gabapentin  Follow up with your primary doctor      Diagnosis: HTN (hypertension)  Assessment and Plan of Treatment: Your blood pressure is well controlled with current home anti hypertensives.  -Continue blood pressure medications as prior to hospitalization  -Low salt diet  -Activity as tolerated     PRINCIPAL DISCHARGE DIAGNOSIS  Diagnosis: Renal colic, bilateral  Assessment and Plan of Treatment: you presented to ED with recurrent abdominal pain radiating to your back.  You were recently treated for similar symptoms with a course of Cefuroxime.  Prior CT scan showed non obstructing kidney stones, now CT scan showing bilateral non obstructing nephrolithiasis.  You were treated with intravenous fluids and intravenous fluids.  You were also treated with pain management.  Your symptoms have improved likely due to passing stone.  -Please continue hydration with plenty of water  -Follow up with your primary doctor within one week  continue Flomax  Follow up in 1 week with PCP and Urology for further medical management      SECONDARY DISCHARGE DIAGNOSES  Diagnosis: E. coli UTI  Assessment and Plan of Treatment: Your urinalysis was positive and your urine culture grew E. Coli. sensitive to ceftin and ceftriaxone.  You were treated with intravenous antibiotic Ceftriaxone..  -Please complete antibiotic course as prescribed  -Maintain adequate hydration at all times  -Follow up with your primary doctor within one week  Continue Ceftin 250mg twice a day until complete  Hold Leflunomide and follow up with PCP for further medical management on medication  Follow up wiht PCP and Urology in 1 week for further medical management.    Diagnosis: Anxiety and depression  Assessment and Plan of Treatment: Continue home psych medications Cymbalta, Remeron, Gabapentin  Follow up with your primary doctor      Diagnosis: HTN (hypertension)  Assessment and Plan of Treatment: Your blood pressure is well controlled with current home anti hypertensives.  -Continue blood pressure medications as prior to hospitalization  -Low salt diet  -Activity as tolerated

## 2024-09-23 NOTE — PROGRESS NOTE ADULT - NS ATTEND AMEND GEN_ALL_CORE FT
Patient seen and examined at bedside this morning. States her pain is mainly in lower back region. No further flank pain bilaterally. denies any fevers or chills.   Vitals, labs reviewed. Noted wbc 6, phos 2.8 Cr wnl.  PE- NAD, NC/AT, RRR, lungs CTA b/l, abd soft, no CVA ttp, no suprapubic ttp, no LE edema     AP  59y F with pmh of nephrolithiasis, Raynauds, RA, fibromyalgia, GERD, HTN, HLD, sarcoidosis, asthma, brain aneurysm - coiling sx 2011, depression, vertigo, fibroids, s/p hysterectomy who presented with worsening back pain with radiation to the front, admitted for renal colic, UTI and pain control.   #Abdominal pain 2/2 Renal colic   #Ecoli UTI  #b/l nonobstructive nephrolithiasis   #Transaminitis -resolved  #Moderate stool burden   #RA   #HTN   #GERD   #Sarcoidosis   #Asthma   #Hypophosphatemia     -encouraged po intake   -c/w tamsulosin   -pain control   -bowel regimen   -f/u Ucx -ecoli, pending sensitivities  -c/w ceftriaxone  -trend cbc, fever curve  -c/w home BP meds gradually with hold parameters   -c/w other home meds for chronic conditions above  -monitor CMP   -replete electrolytes as needed  -oobtc, ambulate as tolerated  -dvt ppx

## 2024-09-23 NOTE — DISCHARGE NOTE PROVIDER - NSDCFUADDAPPT_GEN_ALL_CORE_FT
APPTS ARE READY TO BE MADE: [ ] YES    Best Family or Patient Contact (if needed):    Additional Information about above appointments (if needed):    1:   2:   3:     Other comments or requests:

## 2024-09-23 NOTE — PROGRESS NOTE ADULT - PROBLEM SELECTOR PLAN 4
hx of HTN on Amlodipine and coreg at home  -C/w Amlodipine 5mg daily and Carvedilol 6.25mg BID  -C/w DASH diet

## 2024-09-23 NOTE — DISCHARGE NOTE PROVIDER - NSDCFUSCHEDAPPT_GEN_ALL_CORE_FT
[facility]  ORTHOSURG [facility]  Scheduled Appointment: 11/13/2024

## 2024-09-23 NOTE — PROGRESS NOTE ADULT - PROBLEM SELECTOR PLAN 3
-labs AT/ALT 40/70 with alk phos to 200 which is increased from 3 weeks ago  -could iso infection  -S/p IV fluids   -Trend cmp

## 2024-09-23 NOTE — PROGRESS NOTE ADULT - PROBLEM SELECTOR PLAN 1
-P/w 2-3 day hx of pain in the back that is radiating to the abdomen x 2 wks  -Prior CT 9/2 showing non-obstructing kidney stones   -CT 9/20 showing Bilateral nonobstructive nephrolithiasis. Moderate colonic stool burden.  -pain likely 2/2 passing kidney stones  -S/p high rate IV fluids   -C/w Ceftriaxone 1g daily  -C/w Flomax  -C/w morphine 2mg for severe, Toradol 15mg for moderate and Tylenol for mild pain PRN  -C/w bowel regimen Miralax and senna

## 2024-09-23 NOTE — PROGRESS NOTE ADULT - SUBJECTIVE AND OBJECTIVE BOX
Patient is a 59y old  Female who presents with a chief complaint of Back Pain, kidney stones (22 Sep 2024 13:02)      INTERVAL HPI/OVERNIGHT EVENTS: no new complaints    MEDICATIONS  (STANDING):  amLODIPine   Tablet 5 milliGRAM(s) Oral daily  carvedilol 6.25 milliGRAM(s) Oral every 12 hours  cefTRIAXone   IVPB 1000 milliGRAM(s) IV Intermittent every 24 hours  DULoxetine 30 milliGRAM(s) Oral daily  enoxaparin Injectable 40 milliGRAM(s) SubCutaneous every 24 hours  ferrous    sulfate 325 milliGRAM(s) Oral daily  gabapentin 800 milliGRAM(s) Oral three times a day  lactated ringers. 1000 milliLiter(s) (70 mL/Hr) IV Continuous <Continuous>  mirtazapine 30 milliGRAM(s) Oral daily  polyethylene glycol 3350 17 Gram(s) Oral two times a day  senna 1 Tablet(s) Oral two times a day  tamsulosin 0.4 milliGRAM(s) Oral at bedtime    MEDICATIONS  (PRN):  acetaminophen     Tablet .. 650 milliGRAM(s) Oral every 6 hours PRN Temp greater or equal to 38C (100.4F), Mild Pain (1 - 3)  aluminum hydroxide/magnesium hydroxide/simethicone Suspension 30 milliLiter(s) Oral every 4 hours PRN Dyspepsia  bisacodyl 5 milliGRAM(s) Oral every 12 hours PRN Constipation  ketorolac   Injectable 15 milliGRAM(s) IV Push every 6 hours PRN Moderate Pain (4 - 6)  morphine  - Injectable 2 milliGRAM(s) IV Push every 4 hours PRN Severe Pain (7 - 10)      __________________________________________________  REVIEW OF SYSTEMS:    CONSTITUTIONAL: No fever,   EYES: no acute visual disturbances  NECK: No pain or stiffness  RESPIRATORY: No cough; No shortness of breath  CARDIOVASCULAR: No chest pain, no palpitations  GASTROINTESTINAL: No pain. No nausea or vomiting; No diarrhea   NEUROLOGICAL: No headache or numbness, no tremors  MUSCULOSKELETAL: No joint pain, no muscle pain  GENITOURINARY: no dysuria, no frequency, no hesitancy  PSYCHIATRY: no depression , no anxiety  ALL OTHER  ROS negative      Vital Signs Last 24 Hrs  T(C): 36.5 (23 Sep 2024 04:35), Max: 36.9 (22 Sep 2024 20:45)  T(F): 97.7 (23 Sep 2024 04:35), Max: 98.4 (22 Sep 2024 20:45)  HR: 74 (23 Sep 2024 04:35) (74 - 82)  BP: 130/78 (23 Sep 2024 04:35) (110/72 - 130/78)  BP(mean): 78 (22 Sep 2024 20:45) (78 - 78)  RR: 17 (23 Sep 2024 04:35) (16 - 17)  SpO2: 98% (23 Sep 2024 04:35) (95% - 98%)    Parameters below as of 23 Sep 2024 04:35  Patient On (Oxygen Delivery Method): room air        ________________________________________________  PHYSICAL EXAM:  GENERAL: NAD  HEENT: Normocephalic;  conjunctivae and sclerae clear; moist mucous membranes;   NECK : supple  CHEST/LUNG: Clear to auscultation bilaterally with good air entry   HEART: S1 S2  regular; no murmurs, gallops or rubs  ABDOMEN: Soft, Nontender, Nondistended; Bowel sounds present  EXTREMITIES: no cyanosis; no edema; no calf tenderness  SKIN: warm and dry; no rash  NERVOUS SYSTEM:  Awake and alert; Oriented  to place, person and time ; no new deficits    _________________________________________________  LABS:                        12.4   6.89  )-----------( 260      ( 23 Sep 2024 07:15 )             38.1     09-23    142  |  112[H]  |  11  ----------------------------<  106[H]  3.6   |  23  |  0.56    Ca    8.9      23 Sep 2024 07:15  Phos  2.8     09-23  Mg     2.2     09-23    TPro  6.5  /  Alb  3.2[L]  /  TBili  0.2  /  DBili  x   /  AST  20  /  ALT  50  /  AlkPhos  216[H]  09-22      Urinalysis Basic - ( 23 Sep 2024 07:15 )    Color: x / Appearance: x / SG: x / pH: x  Gluc: 106 mg/dL / Ketone: x  / Bili: x / Urobili: x   Blood: x / Protein: x / Nitrite: x   Leuk Esterase: x / RBC: x / WBC x   Sq Epi: x / Non Sq Epi: x / Bacteria: x      CAPILLARY BLOOD GLUCOSE          RADIOLOGY & ADDITIONAL TESTS:    Imaging Personally Reviewed:  YES/NO    Consultant(s) Notes Reviewed:   YES/ No    Care Discussed with Consultants :     Plan of care was discussed with patient and /or primary care giver; all questions and concerns were addressed and care was aligned with patient's wishes.       Patient is a 59y old  Female who presents with a chief complaint of Back Pain, kidney stones (22 Sep 2024 13:02)      INTERVAL HPI/OVERNIGHT EVENTS: pt seen at bedside with no new complaints    MEDICATIONS  (STANDING):  amLODIPine   Tablet 5 milliGRAM(s) Oral daily  carvedilol 6.25 milliGRAM(s) Oral every 12 hours  cefTRIAXone   IVPB 1000 milliGRAM(s) IV Intermittent every 24 hours  DULoxetine 30 milliGRAM(s) Oral daily  enoxaparin Injectable 40 milliGRAM(s) SubCutaneous every 24 hours  ferrous    sulfate 325 milliGRAM(s) Oral daily  gabapentin 800 milliGRAM(s) Oral three times a day  lactated ringers. 1000 milliLiter(s) (70 mL/Hr) IV Continuous <Continuous>  mirtazapine 30 milliGRAM(s) Oral daily  polyethylene glycol 3350 17 Gram(s) Oral two times a day  senna 1 Tablet(s) Oral two times a day  tamsulosin 0.4 milliGRAM(s) Oral at bedtime    MEDICATIONS  (PRN):  acetaminophen     Tablet .. 650 milliGRAM(s) Oral every 6 hours PRN Temp greater or equal to 38C (100.4F), Mild Pain (1 - 3)  aluminum hydroxide/magnesium hydroxide/simethicone Suspension 30 milliLiter(s) Oral every 4 hours PRN Dyspepsia  bisacodyl 5 milliGRAM(s) Oral every 12 hours PRN Constipation  ketorolac   Injectable 15 milliGRAM(s) IV Push every 6 hours PRN Moderate Pain (4 - 6)  morphine  - Injectable 2 milliGRAM(s) IV Push every 4 hours PRN Severe Pain (7 - 10)  __________________________________________________  REVIEW OF SYSTEMS:    CONSTITUTIONAL: No fever,   EYES: no acute visual disturbances  NECK: No pain or stiffness  RESPIRATORY: No cough; No shortness of breath  CARDIOVASCULAR: No chest pain, no palpitations  GASTROINTESTINAL: No pain. No nausea or vomiting; No diarrhea   NEUROLOGICAL: No headache or numbness, no tremors  MUSCULOSKELETAL: No joint pain, no muscle pain  GENITOURINARY: no dysuria, no frequency, no hesitancy  PSYCHIATRY: no depression , no anxiety  ALL OTHER  ROS negative      Vital Signs Last 24 Hrs  T(C): 36.5 (23 Sep 2024 04:35), Max: 36.9 (22 Sep 2024 20:45)  T(F): 97.7 (23 Sep 2024 04:35), Max: 98.4 (22 Sep 2024 20:45)  HR: 74 (23 Sep 2024 04:35) (74 - 82)  BP: 130/78 (23 Sep 2024 04:35) (110/72 - 130/78)  BP(mean): 78 (22 Sep 2024 20:45) (78 - 78)  RR: 17 (23 Sep 2024 04:35) (16 - 17)  SpO2: 98% (23 Sep 2024 04:35) (95% - 98%)    Parameters below as of 23 Sep 2024 04:35  Patient On (Oxygen Delivery Method): room air    ________________________________________________  PHYSICAL EXAM:  GENERAL: NAD  HEENT: Normocephalic;  conjunctivae and sclerae clear; moist mucous membranes;   NECK : supple  CHEST/LUNG: Clear to auscultation bilaterally with good air entry   HEART: S1 S2  regular; no murmurs, gallops or rubs  ABDOMEN: Soft, Nontender, Nondistended; Bowel sounds present  EXTREMITIES: no cyanosis; no edema; no calf tenderness  SKIN: warm and dry; no rash  NERVOUS SYSTEM:  Awake and alert; Oriented  to place, person and time ; no new deficits    _________________________________________________  LABS:                        12.4   6.89  )-----------( 260      ( 23 Sep 2024 07:15 )             38.1     09-23    142  |  112[H]  |  11  ----------------------------<  106[H]  3.6   |  23  |  0.56    Ca    8.9      23 Sep 2024 07:15  Phos  2.8     09-23  Mg     2.2     09-23    TPro  6.5  /  Alb  3.2[L]  /  TBili  0.2  /  DBili  x   /  AST  20  /  ALT  50  /  AlkPhos  216[H]  09-22      Urinalysis Basic - ( 23 Sep 2024 07:15 )    Color: x / Appearance: x / SG: x / pH: x  Gluc: 106 mg/dL / Ketone: x  / Bili: x / Urobili: x   Blood: x / Protein: x / Nitrite: x   Leuk Esterase: x / RBC: x / WBC x   Sq Epi: x / Non Sq Epi: x / Bacteria: x      RADIOLOGY & ADDITIONAL TESTS:  < from: CT Abdomen and Pelvis w/ IV Cont (09.20.24 @ 15:38) >  ACC: [id number] EXAM:  CT ABDOMEN AND PELVIS IC   ORDERED BY: MALCOLM BUTT     PROCEDURE DATE:  09/20/2024          INTERPRETATION:  CLINICAL INFORMATION: Lower abdominal pain    COMPARISON: CT abdomen and pelvis 9/2/2024    CONTRAST/COMPLICATIONS:  IV Contrast: Omnipaque 350  90 cc administered   10 cc discarded  Oral Contrast: NONE  Complications: None reported at time of study completion    PROCEDURE:  CT of the Abdomen and Pelvis was performed.  Sagittal and coronal reformats were performed.    FINDINGS:  LOWER CHEST: Small hiatal hernia.    LIVER: Within normal limits.  BILE DUCTS: Normal caliber.  GALLBLADDER: Within normal limits.  SPLEEN: Within normal limits.  PANCREAS: Within normal limits.  ADRENALS: Within normal limits.  KIDNEYS/URETERS: Bilateral too small to characterize cortical   hypodensities. No hydronephrosis. Bilateral nonobstructive renal calculi   similar to prior    BLADDER: Within normal limits.  REPRODUCTIVE ORGANS: Hysterectomy.    BOWEL: No bowel obstruction or active inflammation. Moderate colonic   stool burden. Periampullary duodenal diverticulum. Appendix normal  PERITONEUM/RETROPERITONEUM: Within normal limits.  VESSELS: Nonaneurysmal.  LYMPH NODES: No lymphadenopathy.  ABDOMINAL WALL: Small fat-containing umbilical hernia.  BONES: Postoperative changes of the lumbar spine similar to prior.   nuerostimulator leads in the thoracic spinal canal reidentified.    IMPRESSION:  No acute inflammatory or obstructive pathology.    Bilateral nonobstructive nephrolithiasis    Moderate colonic stool burden.    --- End of Report ---    Imaging Personally Reviewed:  YES    Consultant(s) Notes Reviewed:   YES    Plan of care was discussed with patient and /or primary care giver; all questions and concerns were addressed and care was aligned with patient's wishes.       Patient is a 59y old  Female who presents with a chief complaint of Back Pain, kidney stones (22 Sep 2024 13:02)      INTERVAL HPI/OVERNIGHT EVENTS: pt seen at bedside with no new complaints reporting large BM today    MEDICATIONS  (STANDING):  amLODIPine   Tablet 5 milliGRAM(s) Oral daily  carvedilol 6.25 milliGRAM(s) Oral every 12 hours  cefTRIAXone   IVPB 1000 milliGRAM(s) IV Intermittent every 24 hours  DULoxetine 30 milliGRAM(s) Oral daily  enoxaparin Injectable 40 milliGRAM(s) SubCutaneous every 24 hours  ferrous    sulfate 325 milliGRAM(s) Oral daily  gabapentin 800 milliGRAM(s) Oral three times a day  lactated ringers. 1000 milliLiter(s) (70 mL/Hr) IV Continuous <Continuous>  mirtazapine 30 milliGRAM(s) Oral daily  polyethylene glycol 3350 17 Gram(s) Oral two times a day  senna 1 Tablet(s) Oral two times a day  tamsulosin 0.4 milliGRAM(s) Oral at bedtime    MEDICATIONS  (PRN):  acetaminophen     Tablet .. 650 milliGRAM(s) Oral every 6 hours PRN Temp greater or equal to 38C (100.4F), Mild Pain (1 - 3)  aluminum hydroxide/magnesium hydroxide/simethicone Suspension 30 milliLiter(s) Oral every 4 hours PRN Dyspepsia  bisacodyl 5 milliGRAM(s) Oral every 12 hours PRN Constipation  ketorolac   Injectable 15 milliGRAM(s) IV Push every 6 hours PRN Moderate Pain (4 - 6)  morphine  - Injectable 2 milliGRAM(s) IV Push every 4 hours PRN Severe Pain (7 - 10)  __________________________________________________  REVIEW OF SYSTEMS:    CONSTITUTIONAL: No fever,   EYES: no acute visual disturbances  NECK: No pain or stiffness  RESPIRATORY: No cough; No shortness of breath  CARDIOVASCULAR: No chest pain, no palpitations  GASTROINTESTINAL: No pain. No nausea or vomiting; No diarrhea   NEUROLOGICAL: No headache or numbness, no tremors  MUSCULOSKELETAL: No joint pain, no muscle pain  GENITOURINARY: no dysuria, no frequency, no hesitancy  PSYCHIATRY: no depression , no anxiety  ALL OTHER  ROS negative      Vital Signs Last 24 Hrs  T(C): 36.5 (23 Sep 2024 04:35), Max: 36.9 (22 Sep 2024 20:45)  T(F): 97.7 (23 Sep 2024 04:35), Max: 98.4 (22 Sep 2024 20:45)  HR: 74 (23 Sep 2024 04:35) (74 - 82)  BP: 130/78 (23 Sep 2024 04:35) (110/72 - 130/78)  BP(mean): 78 (22 Sep 2024 20:45) (78 - 78)  RR: 17 (23 Sep 2024 04:35) (16 - 17)  SpO2: 98% (23 Sep 2024 04:35) (95% - 98%)    Parameters below as of 23 Sep 2024 04:35  Patient On (Oxygen Delivery Method): room air    ________________________________________________  PHYSICAL EXAM:  GENERAL: NAD  HEENT: Normocephalic;  conjunctivae and sclerae clear; moist mucous membranes;   NECK : supple  CHEST/LUNG: Clear to auscultation bilaterally with good air entry   HEART: S1 S2  regular; no murmurs, gallops or rubs  ABDOMEN: Soft, Nontender, Nondistended; Bowel sounds present  EXTREMITIES: no cyanosis; no edema; no calf tenderness  SKIN: warm and dry; no rash  NERVOUS SYSTEM:  Awake and alert; Oriented  to place, person and time ; no new deficits    _________________________________________________  LABS:                        12.4   6.89  )-----------( 260      ( 23 Sep 2024 07:15 )             38.1     09-23    142  |  112[H]  |  11  ----------------------------<  106[H]  3.6   |  23  |  0.56    Ca    8.9      23 Sep 2024 07:15  Phos  2.8     09-23  Mg     2.2     09-23    TPro  6.5  /  Alb  3.2[L]  /  TBili  0.2  /  DBili  x   /  AST  20  /  ALT  50  /  AlkPhos  216[H]  09-22      Urinalysis Basic - ( 23 Sep 2024 07:15 )    Color: x / Appearance: x / SG: x / pH: x  Gluc: 106 mg/dL / Ketone: x  / Bili: x / Urobili: x   Blood: x / Protein: x / Nitrite: x   Leuk Esterase: x / RBC: x / WBC x   Sq Epi: x / Non Sq Epi: x / Bacteria: x      RADIOLOGY & ADDITIONAL TESTS:  < from: CT Abdomen and Pelvis w/ IV Cont (09.20.24 @ 15:38) >  ACC: [id number] EXAM:  CT ABDOMEN AND PELVIS IC   ORDERED BY: MALCOLM BUTT     PROCEDURE DATE:  09/20/2024          INTERPRETATION:  CLINICAL INFORMATION: Lower abdominal pain    COMPARISON: CT abdomen and pelvis 9/2/2024    CONTRAST/COMPLICATIONS:  IV Contrast: Omnipaque 350  90 cc administered   10 cc discarded  Oral Contrast: NONE  Complications: None reported at time of study completion    PROCEDURE:  CT of the Abdomen and Pelvis was performed.  Sagittal and coronal reformats were performed.    FINDINGS:  LOWER CHEST: Small hiatal hernia.    LIVER: Within normal limits.  BILE DUCTS: Normal caliber.  GALLBLADDER: Within normal limits.  SPLEEN: Within normal limits.  PANCREAS: Within normal limits.  ADRENALS: Within normal limits.  KIDNEYS/URETERS: Bilateral too small to characterize cortical   hypodensities. No hydronephrosis. Bilateral nonobstructive renal calculi   similar to prior    BLADDER: Within normal limits.  REPRODUCTIVE ORGANS: Hysterectomy.    BOWEL: No bowel obstruction or active inflammation. Moderate colonic   stool burden. Periampullary duodenal diverticulum. Appendix normal  PERITONEUM/RETROPERITONEUM: Within normal limits.  VESSELS: Nonaneurysmal.  LYMPH NODES: No lymphadenopathy.  ABDOMINAL WALL: Small fat-containing umbilical hernia.  BONES: Postoperative changes of the lumbar spine similar to prior.   nuerostimulator leads in the thoracic spinal canal reidentified.    IMPRESSION:  No acute inflammatory or obstructive pathology.    Bilateral nonobstructive nephrolithiasis    Moderate colonic stool burden.    --- End of Report ---    Imaging Personally Reviewed:  YES    Consultant(s) Notes Reviewed:   YES    Plan of care was discussed with patient and /or primary care giver; all questions and concerns were addressed and care was aligned with patient's wishes.

## 2024-09-23 NOTE — DISCHARGE NOTE PROVIDER - HOSPITAL COURSE
59-year-old female with a past medical history of kidney stones, Brain aneurysm, rheumatoid arthritis, fibromyalgia, GERD, hypertension, HLD, sarcoidosis, asthma, p/w recurrent back pain.  Pt. with recent presentation with back pain for which she was prescribed and completed a Cefuroxime course. completed. Prior CT revealed non obstructing kidney stones, now with CT showing Bilateral nonobstructive nephrolithiasis. Moderate colonic stool burden.  UA+, UCx grew E. Coli.  Pt. admitted for renal colic, UTI and pain control, treated with IV Ceftriaxone, IVF hydration, bowel regimen and pain management.      INCOMPLETE  59-year-old female with a past medical history of kidney stones, Brain aneurysm, rheumatoid arthritis, fibromyalgia, GERD, hypertension, HLD, sarcoidosis, asthma, p/w recurrent back pain.  Pt. with recent presentation with back pain for which she was prescribed and completed a Cefuroxime course. completed. Prior CT revealed non obstructing kidney stones, now with CT showing Bilateral nonobstructive nephrolithiasis. Moderate colonic stool burden.  UA+, UCx grew E. Coli sensitive to ceftin.  Pt. admitted for renal colic, UTI and pain control, treated with IV Ceftriaxone, IVF hydration, bowel regimen and pain management.      Please note that this a brief summary of hospital course please refer to daily progress notes and consult notes for full course and events. Patient seen and examined at bedside, discussed with medical attending. Patient medically cleared for discharge to home.

## 2024-09-23 NOTE — DISCHARGE NOTE PROVIDER - CARE PROVIDER_API CALL
Eugene Lama  Family Medicine  [address] [place] [id number]  Phone: [phone]  Fax: [phone]  Follow Up Time: 1 week

## 2024-09-24 ENCOUNTER — TRANSCRIPTION ENCOUNTER (OUTPATIENT)
Age: 59
End: 2024-09-24

## 2024-09-24 VITALS
TEMPERATURE: 98 F | HEART RATE: 89 BPM | SYSTOLIC BLOOD PRESSURE: 113 MMHG | RESPIRATION RATE: 16 BRPM | DIASTOLIC BLOOD PRESSURE: 77 MMHG | OXYGEN SATURATION: 95 %

## 2024-09-24 LAB
-  AMOXICILLIN/CLAVULANIC ACID: SIGNIFICANT CHANGE UP
-  AMPICILLIN/SULBACTAM: SIGNIFICANT CHANGE UP
-  AMPICILLIN: SIGNIFICANT CHANGE UP
-  AZTREONAM: SIGNIFICANT CHANGE UP
-  CEFAZOLIN: SIGNIFICANT CHANGE UP
-  CEFEPIME: SIGNIFICANT CHANGE UP
-  CEFOXITIN: SIGNIFICANT CHANGE UP
-  CEFTRIAXONE: SIGNIFICANT CHANGE UP
-  CEFUROXIME: SIGNIFICANT CHANGE UP
-  CIPROFLOXACIN: SIGNIFICANT CHANGE UP
-  ERTAPENEM: SIGNIFICANT CHANGE UP
-  GENTAMICIN: SIGNIFICANT CHANGE UP
-  IMIPENEM: SIGNIFICANT CHANGE UP
-  LEVOFLOXACIN: SIGNIFICANT CHANGE UP
-  MEROPENEM: SIGNIFICANT CHANGE UP
-  NITROFURANTOIN: SIGNIFICANT CHANGE UP
-  PIPERACILLIN/TAZOBACTAM: SIGNIFICANT CHANGE UP
-  TOBRAMYCIN: SIGNIFICANT CHANGE UP
-  TRIMETHOPRIM/SULFAMETHOXAZOLE: SIGNIFICANT CHANGE UP
ALBUMIN SERPL ELPH-MCNC: 3.3 G/DL — LOW (ref 3.5–5)
ALP SERPL-CCNC: 209 U/L — HIGH (ref 40–120)
ALT FLD-CCNC: 44 U/L DA — SIGNIFICANT CHANGE UP (ref 10–60)
ANION GAP SERPL CALC-SCNC: 8 MMOL/L — SIGNIFICANT CHANGE UP (ref 5–17)
AST SERPL-CCNC: 17 U/L — SIGNIFICANT CHANGE UP (ref 10–40)
BILIRUB SERPL-MCNC: 0.2 MG/DL — SIGNIFICANT CHANGE UP (ref 0.2–1.2)
BUN SERPL-MCNC: 15 MG/DL — SIGNIFICANT CHANGE UP (ref 7–18)
CALCIUM SERPL-MCNC: 9.2 MG/DL — SIGNIFICANT CHANGE UP (ref 8.4–10.5)
CHLORIDE SERPL-SCNC: 110 MMOL/L — HIGH (ref 96–108)
CO2 SERPL-SCNC: 23 MMOL/L — SIGNIFICANT CHANGE UP (ref 22–31)
CREAT SERPL-MCNC: 0.57 MG/DL — SIGNIFICANT CHANGE UP (ref 0.5–1.3)
CULTURE RESULTS: ABNORMAL
EGFR: 104 ML/MIN/1.73M2 — SIGNIFICANT CHANGE UP
GLUCOSE SERPL-MCNC: 98 MG/DL — SIGNIFICANT CHANGE UP (ref 70–99)
HCT VFR BLD CALC: 37.2 % — SIGNIFICANT CHANGE UP (ref 34.5–45)
HGB BLD-MCNC: 12 G/DL — SIGNIFICANT CHANGE UP (ref 11.5–15.5)
MAGNESIUM SERPL-MCNC: 2.3 MG/DL — SIGNIFICANT CHANGE UP (ref 1.6–2.6)
MCHC RBC-ENTMCNC: 29.4 PG — SIGNIFICANT CHANGE UP (ref 27–34)
MCHC RBC-ENTMCNC: 32.3 GM/DL — SIGNIFICANT CHANGE UP (ref 32–36)
MCV RBC AUTO: 91.2 FL — SIGNIFICANT CHANGE UP (ref 80–100)
METHOD TYPE: SIGNIFICANT CHANGE UP
NRBC # BLD: 0 /100 WBCS — SIGNIFICANT CHANGE UP (ref 0–0)
ORGANISM # SPEC MICROSCOPIC CNT: ABNORMAL
ORGANISM # SPEC MICROSCOPIC CNT: ABNORMAL
PLATELET # BLD AUTO: 260 K/UL — SIGNIFICANT CHANGE UP (ref 150–400)
POTASSIUM SERPL-MCNC: 3.7 MMOL/L — SIGNIFICANT CHANGE UP (ref 3.5–5.3)
POTASSIUM SERPL-SCNC: 3.7 MMOL/L — SIGNIFICANT CHANGE UP (ref 3.5–5.3)
PROT SERPL-MCNC: 7 G/DL — SIGNIFICANT CHANGE UP (ref 6–8.3)
RBC # BLD: 4.08 M/UL — SIGNIFICANT CHANGE UP (ref 3.8–5.2)
RBC # FLD: 15.7 % — HIGH (ref 10.3–14.5)
SODIUM SERPL-SCNC: 141 MMOL/L — SIGNIFICANT CHANGE UP (ref 135–145)
SPECIMEN SOURCE: SIGNIFICANT CHANGE UP
WBC # BLD: 7.44 K/UL — SIGNIFICANT CHANGE UP (ref 3.8–10.5)
WBC # FLD AUTO: 7.44 K/UL — SIGNIFICANT CHANGE UP (ref 3.8–10.5)

## 2024-09-24 PROCEDURE — 99239 HOSP IP/OBS DSCHRG MGMT >30: CPT

## 2024-09-24 RX ORDER — FERROUS SULFATE 325(65) MG
1 TABLET ORAL
Qty: 30 | Refills: 0
Start: 2024-09-24 | End: 2024-10-23

## 2024-09-24 RX ORDER — LEFLUNOMIDE 10 MG/1
1 TABLET ORAL
Refills: 0 | DISCHARGE

## 2024-09-24 RX ORDER — DULOXETINE HCL 20 MG
1 CAPSULE,DELAYED RELEASE (ENTERIC COATED) ORAL
Qty: 30 | Refills: 0
Start: 2024-09-24 | End: 2024-10-23

## 2024-09-24 RX ORDER — TAMSULOSIN HCL 0.4 MG
1 CAPSULE ORAL
Qty: 14 | Refills: 0
Start: 2024-09-24 | End: 2024-10-07

## 2024-09-24 RX ORDER — SENNOSIDES 8.6 MG
1 TABLET ORAL
Qty: 60 | Refills: 0
Start: 2024-09-24 | End: 2024-10-23

## 2024-09-24 RX ADMIN — Medication 6.25 MILLIGRAM(S): at 06:20

## 2024-09-24 RX ADMIN — GABAPENTIN 800 MILLIGRAM(S): 800 TABLET, FILM COATED ORAL at 13:47

## 2024-09-24 RX ADMIN — MORPHINE SULFATE 2 MILLIGRAM(S): 30 TABLET, FILM COATED, EXTENDED RELEASE ORAL at 13:29

## 2024-09-24 RX ADMIN — Medication 100 MILLIGRAM(S): at 06:14

## 2024-09-24 RX ADMIN — MORPHINE SULFATE 2 MILLIGRAM(S): 30 TABLET, FILM COATED, EXTENDED RELEASE ORAL at 12:33

## 2024-09-24 RX ADMIN — Medication 17 GRAM(S): at 06:14

## 2024-09-24 RX ADMIN — Medication 30 MILLILITER(S): at 12:33

## 2024-09-24 RX ADMIN — GABAPENTIN 800 MILLIGRAM(S): 800 TABLET, FILM COATED ORAL at 06:15

## 2024-09-24 RX ADMIN — Medication 500 MILLIGRAM(S): at 13:29

## 2024-09-24 RX ADMIN — Medication 30 MILLIGRAM(S): at 12:30

## 2024-09-24 RX ADMIN — Medication 325 MILLIGRAM(S): at 12:29

## 2024-09-24 RX ADMIN — MIRTAZAPINE 30 MILLIGRAM(S): 30 TABLET, FILM COATED ORAL at 12:29

## 2024-09-24 RX ADMIN — Medication 5 MILLIGRAM(S): at 06:20

## 2024-09-24 NOTE — DISCHARGE NOTE NURSING/CASE MANAGEMENT/SOCIAL WORK - PATIENT PORTAL LINK FT
You can access the FollowMyHealth Patient Portal offered by [facility] by registering at the following website: [email or web address]. By joining [facility]’s FollowMyHealth portal, you will also be able to view your health information using other applications (apps) compatible with our system.

## 2024-09-24 NOTE — DISCHARGE NOTE NURSING/CASE MANAGEMENT/SOCIAL WORK - NSDCPEFALRISK_GEN_ALL_CORE
For information on Fall & Injury Prevention, visit: [email or web address] OR  [email or web address] OR  https://www.cdc.gov/steadi/patient.html

## 2024-09-24 NOTE — DISCHARGE NOTE NURSING/CASE MANAGEMENT/SOCIAL WORK - NSDCVIVACCINE_GEN_ALL_CORE_FT
influenza, injectable, quadrivalent, preservative free; 15-Sep-2019 12:21; Mar Solis (RN); [employer]; [id number] (Exp. Date: 30-Jun-2020); IntraMuscular; Deltoid Left.; 0.5 milliLiter(s); VIS (VIS Published: 15-Aug-2019, VIS Presented: 15-Sep-2019);   rabies, intradermal injection; 12-Sep-2019 18:54; Epi Interiano (RN); [employer]; [id number] (Exp. Date: 12-Feb-2023); IntraMuscular; Deltoid Left.; 1 milliLiter(s); VIS (VIS Published: 12-Sep-2019, VIS Presented: 12-Sep-2019);   rabies, intradermal injection; 15-Sep-2019 12:58; Mar Solis (RN); [employer]; [id number] (Exp. Date: 15-Jul-2020); IntraMuscular; Deltoid Right.; 1 milliLiter(s); VIS (VIS Published: 15-Sep-2020, VIS Presented: 15-Sep-2019);   rabies, intradermal injection; 19-Sep-2019 12:35; Tiffani Olvera (RN); [employer]; [id number] (Exp. Date: 09-Jan-2022); IntraMuscular; Deltoid Right.; 1 milliLiter(s); VIS (VIS Published: 08-Jan-2018, VIS Presented: 19-Sep-2019);   rabies, intradermal injection; 26-Sep-2019 11:41; Irma De La Torre (RN); [employer]; [id number] (Exp. Date: 07-Jan-2022); IntraMuscular; Deltoid Left.; 1 milliLiter(s); VIS (VIS Published: 26-May-2018, VIS Presented: 26-Sep-2019);   Tdap; 12-Sep-2019 17:31; Epi Interiano (RN); [employer]; [id number] (Exp. Date: 25-Jul-2021); IntraMuscular; Deltoid Left.; 0.5 milliLiter(s); VIS (VIS Published: 09-May-2013, VIS Presented: 12-Sep-2019);

## 2024-10-11 ENCOUNTER — APPOINTMENT (OUTPATIENT)
Dept: ORTHOPEDIC SURGERY | Facility: CLINIC | Age: 59
End: 2024-10-11

## 2024-10-11 DIAGNOSIS — M54.50 LOW BACK PAIN, UNSPECIFIED: ICD-10-CM

## 2024-10-11 DIAGNOSIS — M54.16 RADICULOPATHY, LUMBAR REGION: ICD-10-CM

## 2024-10-11 PROCEDURE — 99214 OFFICE O/P EST MOD 30 MIN: CPT

## 2024-10-11 PROCEDURE — 72110 X-RAY EXAM L-2 SPINE 4/>VWS: CPT

## 2024-10-15 PROCEDURE — 87640 STAPH A DNA AMP PROBE: CPT

## 2024-10-15 PROCEDURE — 99285 EMERGENCY DEPT VISIT HI MDM: CPT | Mod: 25

## 2024-10-15 PROCEDURE — 36415 COLL VENOUS BLD VENIPUNCTURE: CPT

## 2024-10-15 PROCEDURE — 84100 ASSAY OF PHOSPHORUS: CPT

## 2024-10-15 PROCEDURE — 74177 CT ABD & PELVIS W/CONTRAST: CPT | Mod: MC

## 2024-10-15 PROCEDURE — 96374 THER/PROPH/DIAG INJ IV PUSH: CPT

## 2024-10-15 PROCEDURE — 96372 THER/PROPH/DIAG INJ SC/IM: CPT

## 2024-10-15 PROCEDURE — 87086 URINE CULTURE/COLONY COUNT: CPT

## 2024-10-15 PROCEDURE — 85025 COMPLETE CBC W/AUTO DIFF WBC: CPT

## 2024-10-15 PROCEDURE — 83605 ASSAY OF LACTIC ACID: CPT

## 2024-10-15 PROCEDURE — 80076 HEPATIC FUNCTION PANEL: CPT

## 2024-10-15 PROCEDURE — 80053 COMPREHEN METABOLIC PANEL: CPT

## 2024-10-15 PROCEDURE — 81001 URINALYSIS AUTO W/SCOPE: CPT

## 2024-10-15 PROCEDURE — 80048 BASIC METABOLIC PNL TOTAL CA: CPT

## 2024-10-15 PROCEDURE — 83735 ASSAY OF MAGNESIUM: CPT

## 2024-10-15 PROCEDURE — 83690 ASSAY OF LIPASE: CPT

## 2024-10-15 PROCEDURE — 96375 TX/PRO/DX INJ NEW DRUG ADDON: CPT

## 2024-10-15 PROCEDURE — 87186 SC STD MICRODIL/AGAR DIL: CPT

## 2024-10-15 PROCEDURE — 85027 COMPLETE CBC AUTOMATED: CPT

## 2024-10-15 PROCEDURE — 87641 MR-STAPH DNA AMP PROBE: CPT

## 2024-10-23 NOTE — ED ADULT NURSE NOTE - CAS TRG GENERAL AIRWAY, MLM
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  However has fatty liver  Patient is seen today for follow up Overall doing well s/p blood donation x 1 since last appt Labs reviewed analyzed and discussed Goal ferritin< 500 ng/mL and iron saturation below 45% Donation x 1 before next appt Plan donation Q6m long term Advised to be uptodate with age appropriate cancer screening  Family history of cancer He has a family history of ovarian cancer in his mother and breast cancer in his grandmother. Unaware if anyone had genetic testing Refer to genetics  Patient had multiple questions which were answered to satisfaction  Labs in 4-6 months.  CBC, CMP, iron studies, ferritin, GGT, AFP OV few days later Patent

## 2024-11-13 ENCOUNTER — APPOINTMENT (OUTPATIENT)
Dept: ORTHOPEDIC SURGERY | Facility: CLINIC | Age: 59
End: 2024-11-13
Payer: OTHER MISCELLANEOUS

## 2024-11-13 DIAGNOSIS — M54.50 LOW BACK PAIN, UNSPECIFIED: ICD-10-CM

## 2024-11-13 DIAGNOSIS — M54.16 RADICULOPATHY, LUMBAR REGION: ICD-10-CM

## 2024-11-13 PROCEDURE — 99214 OFFICE O/P EST MOD 30 MIN: CPT

## 2024-11-19 NOTE — PATIENT PROFILE ADULT - NSPROPASSIVESMOKEEXPOSURE_GEN_A_NUR
Problem: Potential for Falls  Goal: Patient will remain free of falls  Description: INTERVENTIONS:  - Educate patient/family on patient safety including physical limitations  - Instruct patient to call for assistance with activity   - Consult OT/PT to assist with strengthening/mobility   - Keep Call bell within reach  - Keep bed low and locked with side rails adjusted as appropriate  - Keep care items and personal belongings within reach  - Initiate and maintain comfort rounds  - Make Fall Risk Sign visible to staff  - Offer Toileting every 2 Hours, in advance of need  - Initiate/Maintain bed alarm  - Obtain necessary fall risk management equipment:   - Apply yellow socks and bracelet for high fall risk patients  - Consider moving patient to room near nurses station  Outcome: Progressing     Problem: PAIN - ADULT  Goal: Verbalizes/displays adequate comfort level or baseline comfort level  Description: Interventions:  - Encourage patient to monitor pain and request assistance  - Assess pain using appropriate pain scale  - Administer analgesics based on type and severity of pain and evaluate response  - Implement non-pharmacological measures as appropriate and evaluate response  - Consider cultural and social influences on pain and pain management  - Notify physician/advanced practitioner if interventions unsuccessful or patient reports new pain  Outcome: Progressing     Problem: SAFETY ADULT  Goal: Patient will remain free of falls  Description: INTERVENTIONS:  - Educate patient/family on patient safety including physical limitations  - Instruct patient to call for assistance with activity   - Consult OT/PT to assist with strengthening/mobility   - Keep Call bell within reach  - Keep bed low and locked with side rails adjusted as appropriate  - Keep care items and personal belongings within reach  - Initiate and maintain comfort rounds  - Make Fall Risk Sign visible to staff  - Offer Toileting every 2 Hours, in  advance of need  - Initiate/Maintain bed alarm  - Obtain necessary fall risk management equipment:   - Apply yellow socks and bracelet for high fall risk patients  - Consider moving patient to room near nurses station  Outcome: Progressing  Goal: Maintain or return to baseline ADL function  Description: INTERVENTIONS:  -  Assess patient's ability to carry out ADLs; assess patient's baseline for ADL function and identify physical deficits which impact ability to perform ADLs (bathing, care of mouth/teeth, toileting, grooming, dressing, etc.)  - Assess/evaluate cause of self-care deficits   - Assess range of motion  - Assess patient's mobility; develop plan if impaired  - Assess patient's need for assistive devices and provide as appropriate  - Encourage maximum independence but intervene and supervise when necessary  - Involve family in performance of ADLs  - Assess for home care needs following discharge   - Consider OT consult to assist with ADL evaluation and planning for discharge  - Provide patient education as appropriate  Outcome: Progressing  Goal: Maintains/Returns to pre admission functional level  Description: INTERVENTIONS:  - Perform AM-PAC 6 Click Basic Mobility/ Daily Activity assessment daily.  - Set and communicate daily mobility goal to care team and patient/family/caregiver.   - Collaborate with rehabilitation services on mobility goals if consulted  - Perform Range of Motion 3 times a day.  - Reposition patient every 3 hours.  - Dangle patient 3 times a day  - Stand patient 3 times a day  - Ambulate patient 3 times a day  - Out of bed to chair 3 times a day   - Out of bed for meals 3 times a day  - Out of bed for toileting  - Record patient progress and toleration of activity level   Outcome: Progressing     Problem: DISCHARGE PLANNING  Goal: Discharge to home or other facility with appropriate resources  Description: INTERVENTIONS:  - Identify barriers to discharge w/patient and caregiver  -  Arrange for needed discharge resources and transportation as appropriate  - Identify discharge learning needs (meds, wound care, etc.)  - Arrange for interpretive services to assist at discharge as needed  - Refer to Case Management Department for coordinating discharge planning if the patient needs post-hospital services based on physician/advanced practitioner order or complex needs related to functional status, cognitive ability, or social support system  Outcome: Progressing     Problem: Knowledge Deficit  Goal: Patient/family/caregiver demonstrates understanding of disease process, treatment plan, medications, and discharge instructions  Description: Complete learning assessment and assess knowledge base.  Interventions:  - Provide teaching at level of understanding  - Provide teaching via preferred learning methods  Outcome: Progressing     Problem: CARDIOVASCULAR - ADULT  Goal: Maintains optimal cardiac output and hemodynamic stability  Description: INTERVENTIONS:  - Monitor I/O, vital signs and rhythm  - Monitor for S/S and trends of decreased cardiac output  - Administer and titrate ordered vasoactive medications to optimize hemodynamic stability  - Assess quality of pulses, skin color and temperature  - Assess for signs of decreased coronary artery perfusion  - Instruct patient to report change in severity of symptoms  Outcome: Progressing  Goal: Absence of cardiac dysrhythmias or at baseline rhythm  Description: INTERVENTIONS:  - Continuous cardiac monitoring, vital signs, obtain 12 lead EKG if ordered  - Administer antiarrhythmic and heart rate control medications as ordered  - Monitor electrolytes and administer replacement therapy as ordered  Outcome: Progressing     Problem: RESPIRATORY - ADULT  Goal: Achieves optimal ventilation and oxygenation  Description: INTERVENTIONS:  - Assess for changes in respiratory status  - Assess for changes in mentation and behavior  - Position to facilitate oxygenation  and minimize respiratory effort  - Oxygen administered by appropriate delivery if ordered  - Initiate smoking cessation education as indicated  - Encourage broncho-pulmonary hygiene including cough, deep breathe, Incentive Spirometry  - Assess the need for suctioning and aspirate as needed  - Assess and instruct to report SOB or any respiratory difficulty  - Respiratory Therapy support as indicated  Outcome: Progressing     Problem: Prexisting or High Potential for Compromised Skin Integrity  Goal: Skin integrity is maintained or improved  Description: INTERVENTIONS:  - Identify patients at risk for skin breakdown  - Assess and monitor skin integrity  - Assess and monitor nutrition and hydration status  - Monitor labs   - Assess for incontinence   - Turn and reposition patient  - Assist with mobility/ambulation  - Relieve pressure over bony prominences  - Avoid friction and shearing  - Provide appropriate hygiene as needed including keeping skin clean and dry  - Evaluate need for skin moisturizer/barrier cream  - Collaborate with interdisciplinary team   - Patient/family teaching  - Consider wound care consult   Outcome: Progressing      No

## 2024-11-24 NOTE — BRIEF OPERATIVE NOTE - NSICDXBRIEFOPLAUNCH_GEN_ALL_CORE
<--- Click to Launch ICDx for PreOp, PostOp and Procedure [facility]

## 2024-12-03 NOTE — PRE-OP CHECKLIST - SPO2 (%)
97 Detail Level: Detailed Depth Of Biopsy: dermis Was A Bandage Applied: Yes Size Of Lesion In Cm: 0 Biopsy Type: H and E Biopsy Method: Dermablade Anesthesia Type: 1% lidocaine with epinephrine Anesthesia Volume In Cc: 0.5 Hemostasis: Drysol Wound Care: Petrolatum Dressing: bandage Destruction After The Procedure: No Type Of Destruction Used: Curettage Curettage Text: The wound bed was treated with curettage after the biopsy was performed. Cryotherapy Text: The wound bed was treated with cryotherapy after the biopsy was performed. Electrodesiccation Text: The wound bed was treated with electrodesiccation after the biopsy was performed. Electrodesiccation And Curettage Text: The wound bed was treated with electrodesiccation and curettage after the biopsy was performed. Silver Nitrate Text: The wound bed was treated with silver nitrate after the biopsy was performed. Lab: -7030 Path Notes (To The Dermatopathologist): Check margins Consent: Written consent was obtained and risks were reviewed including but not limited to scarring, infection, bleeding, scabbing, incomplete removal, nerve damage and allergy to anesthesia. Post-Care Instructions: I reviewed with the patient in detail post-care instructions. Patient is to keep the biopsy site dry overnight, and then apply bacitracin twice daily until healed. Patient may apply hydrogen peroxide soaks to remove any crusting. Notification Instructions: Patient will be notified of biopsy results. However, patient instructed to call the office if not contacted within 2 weeks. Billing Type: Third-Party Bill Information: Selecting Yes will display possible errors in your note based on the variables you have selected. This validation is only offered as a suggestion for you. PLEASE NOTE THAT THE VALIDATION TEXT WILL BE REMOVED WHEN YOU FINALIZE YOUR NOTE. IF YOU WANT TO FAX A PRELIMINARY NOTE YOU WILL NEED TO TOGGLE THIS TO 'NO' IF YOU DO NOT WANT IT IN YOUR FAXED NOTE.

## 2024-12-26 ENCOUNTER — EMERGENCY (EMERGENCY)
Facility: HOSPITAL | Age: 59
LOS: 1 days | Discharge: HOME | End: 2024-12-26
Attending: STUDENT IN AN ORGANIZED HEALTH CARE EDUCATION/TRAINING PROGRAM
Payer: COMMERCIAL

## 2024-12-26 VITALS
TEMPERATURE: 98 F | SYSTOLIC BLOOD PRESSURE: 125 MMHG | WEIGHT: 143.3 LBS | OXYGEN SATURATION: 97 % | RESPIRATION RATE: 17 BRPM | HEIGHT: 59 IN | HEART RATE: 99 BPM | DIASTOLIC BLOOD PRESSURE: 82 MMHG

## 2024-12-26 VITALS
TEMPERATURE: 98 F | RESPIRATION RATE: 16 BRPM | SYSTOLIC BLOOD PRESSURE: 117 MMHG | DIASTOLIC BLOOD PRESSURE: 79 MMHG | OXYGEN SATURATION: 96 % | HEART RATE: 84 BPM

## 2024-12-26 DIAGNOSIS — Z98.890 OTHER SPECIFIED POSTPROCEDURAL STATES: Chronic | ICD-10-CM

## 2024-12-26 DIAGNOSIS — Z90.710 ACQUIRED ABSENCE OF BOTH CERVIX AND UTERUS: Chronic | ICD-10-CM

## 2024-12-26 DIAGNOSIS — I67.1 CEREBRAL ANEURYSM, NONRUPTURED: Chronic | ICD-10-CM

## 2024-12-26 DIAGNOSIS — M54.5 LOW BACK PAIN: Chronic | ICD-10-CM

## 2024-12-26 LAB
ALBUMIN SERPL ELPH-MCNC: 3.7 G/DL — SIGNIFICANT CHANGE UP (ref 3.5–5)
ALP SERPL-CCNC: 193 U/L — HIGH (ref 40–120)
ALT FLD-CCNC: 32 U/L DA — SIGNIFICANT CHANGE UP (ref 10–60)
ANION GAP SERPL CALC-SCNC: 6 MMOL/L — SIGNIFICANT CHANGE UP (ref 5–17)
AST SERPL-CCNC: 15 U/L — SIGNIFICANT CHANGE UP (ref 10–40)
BASOPHILS # BLD AUTO: 0.03 K/UL — SIGNIFICANT CHANGE UP (ref 0–0.2)
BASOPHILS NFR BLD AUTO: 0.2 % — SIGNIFICANT CHANGE UP (ref 0–2)
BILIRUB SERPL-MCNC: 0.3 MG/DL — SIGNIFICANT CHANGE UP (ref 0.2–1.2)
BUN SERPL-MCNC: 22 MG/DL — HIGH (ref 7–18)
CALCIUM SERPL-MCNC: 9.1 MG/DL — SIGNIFICANT CHANGE UP (ref 8.4–10.5)
CHLORIDE SERPL-SCNC: 113 MMOL/L — HIGH (ref 96–108)
CO2 SERPL-SCNC: 23 MMOL/L — SIGNIFICANT CHANGE UP (ref 22–31)
CREAT SERPL-MCNC: 0.8 MG/DL — SIGNIFICANT CHANGE UP (ref 0.5–1.3)
EGFR: 84 ML/MIN/1.73M2 — SIGNIFICANT CHANGE UP
EOSINOPHIL # BLD AUTO: 0.08 K/UL — SIGNIFICANT CHANGE UP (ref 0–0.5)
EOSINOPHIL NFR BLD AUTO: 0.6 % — SIGNIFICANT CHANGE UP (ref 0–6)
FLUAV AG NPH QL: SIGNIFICANT CHANGE UP
FLUBV AG NPH QL: SIGNIFICANT CHANGE UP
GLUCOSE SERPL-MCNC: 112 MG/DL — HIGH (ref 70–99)
HCT VFR BLD CALC: 38.9 % — SIGNIFICANT CHANGE UP (ref 34.5–45)
HGB BLD-MCNC: 12.3 G/DL — SIGNIFICANT CHANGE UP (ref 11.5–15.5)
IMM GRANULOCYTES NFR BLD AUTO: 0.3 % — SIGNIFICANT CHANGE UP (ref 0–0.9)
LACTATE SERPL-SCNC: 1 MMOL/L — SIGNIFICANT CHANGE UP (ref 0.7–2)
LYMPHOCYTES # BLD AUTO: 24.6 % — SIGNIFICANT CHANGE UP (ref 13–44)
LYMPHOCYTES # BLD AUTO: 3.19 K/UL — SIGNIFICANT CHANGE UP (ref 1–3.3)
MCHC RBC-ENTMCNC: 30.3 PG — SIGNIFICANT CHANGE UP (ref 27–34)
MCHC RBC-ENTMCNC: 31.6 G/DL — LOW (ref 32–36)
MCV RBC AUTO: 95.8 FL — SIGNIFICANT CHANGE UP (ref 80–100)
MONOCYTES # BLD AUTO: 0.63 K/UL — SIGNIFICANT CHANGE UP (ref 0–0.9)
MONOCYTES NFR BLD AUTO: 4.9 % — SIGNIFICANT CHANGE UP (ref 2–14)
NEUTROPHILS # BLD AUTO: 8.98 K/UL — HIGH (ref 1.8–7.4)
NEUTROPHILS NFR BLD AUTO: 69.4 % — SIGNIFICANT CHANGE UP (ref 43–77)
NRBC # BLD: 0 /100 WBCS — SIGNIFICANT CHANGE UP (ref 0–0)
PLATELET # BLD AUTO: 342 K/UL — SIGNIFICANT CHANGE UP (ref 150–400)
POTASSIUM SERPL-MCNC: 3.5 MMOL/L — SIGNIFICANT CHANGE UP (ref 3.5–5.3)
POTASSIUM SERPL-SCNC: 3.5 MMOL/L — SIGNIFICANT CHANGE UP (ref 3.5–5.3)
PROT SERPL-MCNC: 7.7 G/DL — SIGNIFICANT CHANGE UP (ref 6–8.3)
RBC # BLD: 4.06 M/UL — SIGNIFICANT CHANGE UP (ref 3.8–5.2)
RBC # FLD: 13.2 % — SIGNIFICANT CHANGE UP (ref 10.3–14.5)
RSV RNA NPH QL NAA+NON-PROBE: SIGNIFICANT CHANGE UP
SARS-COV-2 RNA SPEC QL NAA+PROBE: SIGNIFICANT CHANGE UP
SODIUM SERPL-SCNC: 142 MMOL/L — SIGNIFICANT CHANGE UP (ref 135–145)
WBC # BLD: 12.95 K/UL — HIGH (ref 3.8–10.5)
WBC # FLD AUTO: 12.95 K/UL — HIGH (ref 3.8–10.5)

## 2024-12-26 PROCEDURE — 36415 COLL VENOUS BLD VENIPUNCTURE: CPT

## 2024-12-26 PROCEDURE — 87637 SARSCOV2&INF A&B&RSV AMP PRB: CPT

## 2024-12-26 PROCEDURE — 99284 EMERGENCY DEPT VISIT MOD MDM: CPT

## 2024-12-26 PROCEDURE — 80053 COMPREHEN METABOLIC PANEL: CPT

## 2024-12-26 PROCEDURE — 99283 EMERGENCY DEPT VISIT LOW MDM: CPT

## 2024-12-26 PROCEDURE — 83605 ASSAY OF LACTIC ACID: CPT

## 2024-12-26 PROCEDURE — 85025 COMPLETE CBC W/AUTO DIFF WBC: CPT

## 2024-12-26 RX ORDER — DICLOFENAC SODIUM 20 MG/G
2.25 SOLUTION TOPICAL
Qty: 1 | Refills: 0
Start: 2024-12-26 | End: 2025-01-24

## 2024-12-26 RX ORDER — IBUPROFEN 200 MG
1 TABLET ORAL
Qty: 42 | Refills: 0
Start: 2024-12-26 | End: 2025-01-08

## 2024-12-26 RX ORDER — IBUPROFEN 200 MG
600 TABLET ORAL ONCE
Refills: 0 | Status: COMPLETED | OUTPATIENT
Start: 2024-12-26 | End: 2024-12-26

## 2024-12-26 RX ORDER — GABAPENTIN 300 MG/1
600 CAPSULE ORAL ONCE
Refills: 0 | Status: COMPLETED | OUTPATIENT
Start: 2024-12-26 | End: 2024-12-26

## 2024-12-26 RX ADMIN — Medication 600 MILLIGRAM(S): at 18:38

## 2024-12-26 RX ADMIN — GABAPENTIN 600 MILLIGRAM(S): 300 CAPSULE ORAL at 17:35

## 2024-12-26 RX ADMIN — Medication 600 MILLIGRAM(S): at 17:36

## 2024-12-26 NOTE — ED PROVIDER NOTE - RAPID ASSESSMENT
Attending: Dr. Haskins- Patient was rapidly assessed via telemedicine encounter; a limited history was performed. The patient will be seen and further worked up in the main emergency department and their care will be completed by the main emergency department team. Receiving team will follow up on labs, analgesia, any clinical imaging, and perform reassessment and disposition of the patient as clinically indicated. All decisions regarding the progression of care will be made at their discretion. 58 y/o f with pmhx  back surgery on May 6th spinal fusion, HTN presents for pain in whole body. Pain began more than 1 month ago. took 3 APAP earlier today. No fevers over the last week.

## 2024-12-26 NOTE — ED PROVIDER NOTE - PATIENT PORTAL LINK FT
You can access the FollowMyHealth Patient Portal offered by [facility] by registering at the following website: [email or web address]. By joining [facility]’s FollowMyHealth portal, you will also be able to view your health information using other applications (apps) compatible with our system.

## 2024-12-26 NOTE — ED PROVIDER NOTE - NSDCPRINTRESULTS_ED_ALL_ED
Is the patient currently in the state of [place]? YES    Visit mode:VIDEO    If the visit is dropped, the patient can be reconnected by: VIDEO VISIT: Text to cell phone: [phone]    Will anyone else be joining the visit? NO      How would you like to obtain your AVS? MyChart    Are changes needed to the allergy or medication list? NO - Pt says MyChart is all up to date    Reason for visit: JOLEEN ALBA   Patient requests all Lab, Cardiology, and Radiology Results on their Discharge Instructions

## 2024-12-26 NOTE — ED PROVIDER NOTE - OBJECTIVE STATEMENT
58 y/o f with pmhx  back surgery on May 6th spinal fusion, HTN presents for pain in whole body. Pain began more than 1 month ago. took 3 APAP earlier today. No fevers over the last week. 59 F presents reporting diffuse body pain for 6 weeks. Patient states she has a history of fibromyalgia, HTN, reports taking Gabapentin, Duloxetine, Tylenol with mild relief. Also,  States she took 800mg of Gabapentin, and generally takes ti 2x a day. States she has been home resting and has [facility] for ADL assistance but reports she was unable to walk more than short distances due to pain. At bedside reports pain in both legs in quadricep muscles. Denies trauma or fevers.

## 2024-12-26 NOTE — ED ADULT NURSE NOTE - NS ED NURSE LEVEL OF CONSCIOUSNESS AFFECT
OB follow up     Claudia Piña is a 37 y.o.  32w3d being seen today for her obstetrical visit.  Patient reports no bleeding, no contractions and no leaking. Fetal movement: normal. Prenatal care complicated by advanced maternal age, prior  section x3 and anemia. She takes iron daily. A  was used throughout the visit. Prenatal care complicated also by history of a DVT during pregnancy. She takes aspirin and Lovenox daily. She has no shortness of breath and no chest pain. She denies any lower extremity swelling.    Review of Systems  No bleeding, No cramping/contractions     /76   Wt 92.1 kg (203 lb)   LMP 2020 (Exact Date)   BMI 39.34 kg/m²     FHT: present BPM   Uterine Size: 32 cm       Assessment/Plan:    1) 37 y.o.  -pregnancy at 32w3d    2)   Encounter Diagnoses   Name Primary?   • Multigravida of advanced maternal age in third trimester Yes   • Previous  section x 3, plan RLTCS    • Other iron deficiency anemia    • Uses [place]er as primary spoken language    Continue aspirin, Lovenox, stool softeners and iron. Plan repeat low-transverse  section at 39 weeks. Will transition off Lovenox to heparin at 36.    3) Reviewed this stage of pregnancy  4) Problem list updated     Return in about 2 weeks (around 2021) for OB Tummy.      Tomer Valdivia MD    2021  14:50 EST  
Calm

## 2024-12-26 NOTE — ED PROVIDER NOTE - NSFOLLOWUPINSTRUCTIONS_ED_ALL_ED_FT
You have been evaluated in the Emergency Department today for fibromyalgia pain. Your evaluation did not find evidence of medical conditions requiring emergent intervention at this time.    We recommend you take 600mg ibuprofen every 6 hours or tylenol 650mg every 6 hours as needed for pain. If needed, you can alternate these medications so that you take one medication every 3 hours. For instance, at noon take ibuprofen, then at 3pm take tylenol, then at 6pm take ibuprofen.    Please schedule an appointment for follow up with your primary care physician this week.    Return to the Emergency Department if you experience worsening pain, numbness, tingling, change of color in your toes, or any other concerning symptoms.    Thank you for choosing us for your care.

## 2024-12-26 NOTE — ED PROVIDER NOTE - CLINICAL SUMMARY MEDICAL DECISION MAKING FREE TEXT BOX
No evidence of acute emergency. Patient ambulatory.  Instructed follow-up with PCP for further pain management.

## 2025-01-15 NOTE — ED ADULT NURSE NOTE - NSFALLUNIVINTERV_ED_ALL_ED
Prior to immunization administration, verified patients identity using patient s name and date of birth. Please see Immunization Activity for additional information.     Is the patient's temperature normal (100.5 or less)? Yes     Patient MEETS CRITERIA. PROCEED with vaccine administration.          1/15/2025   COVID   Have you had myocarditis or pericarditis (inflammation of or around the heart muscle) after getting a COVID-19 vaccine? No   Have you had a serious reaction to a COVID vaccine or something in a COVID vaccine, like polyethylene glycol (PEG) or polysorbate? No   Have you had multisystem inflammatory syndrome from COVID-19 in the past 90 days? No   Have you received a bone marrow transplant within the previous 3 months? No         Patient MEETS CRITERIA. PROCEED with vaccine administration.        Patient instructed to remain in clinic for 15 minutes afterwards, and to report any adverse reactions.      Link to Ancillary Visit Immunization Standing Orders SmartSet     Screening performed by Lilian Douglas on 1/15/2025 at 9:36 AM.          
Bed/Stretcher in lowest position, wheels locked, appropriate side rails in place/Call bell, personal items and telephone in reach/Instruct patient to call for assistance before getting out of bed/chair/stretcher/Non-slip footwear applied when patient is off stretcher/[place] to call system/Physically safe environment - no spills, clutter or unnecessary equipment/Purposeful proactive rounding/Room/bathroom lighting operational, light cord in reach

## 2025-01-30 NOTE — CONSULT NOTE ADULT - CONSULT REQUESTED DATE/TIME
13-Sep-2019 No. CRISTY screening performed.  STOP BANG Legend: 0-2 = LOW Risk; 3-4 = INTERMEDIATE Risk; 5-8 = HIGH Risk

## 2025-06-27 ENCOUNTER — EMERGENCY (EMERGENCY)
Facility: HOSPITAL | Age: 60
LOS: 1 days | End: 2025-06-27
Attending: STUDENT IN AN ORGANIZED HEALTH CARE EDUCATION/TRAINING PROGRAM
Payer: COMMERCIAL

## 2025-06-27 VITALS
DIASTOLIC BLOOD PRESSURE: 76 MMHG | TEMPERATURE: 98 F | HEART RATE: 91 BPM | RESPIRATION RATE: 18 BRPM | HEIGHT: 59 IN | SYSTOLIC BLOOD PRESSURE: 117 MMHG | OXYGEN SATURATION: 96 % | WEIGHT: 143.08 LBS

## 2025-06-27 VITALS
DIASTOLIC BLOOD PRESSURE: 80 MMHG | OXYGEN SATURATION: 97 % | RESPIRATION RATE: 16 BRPM | TEMPERATURE: 98 F | SYSTOLIC BLOOD PRESSURE: 113 MMHG | HEART RATE: 86 BPM

## 2025-06-27 DIAGNOSIS — M54.5 LOW BACK PAIN: Chronic | ICD-10-CM

## 2025-06-27 DIAGNOSIS — Z90.710 ACQUIRED ABSENCE OF BOTH CERVIX AND UTERUS: Chronic | ICD-10-CM

## 2025-06-27 DIAGNOSIS — Z98.890 OTHER SPECIFIED POSTPROCEDURAL STATES: Chronic | ICD-10-CM

## 2025-06-27 DIAGNOSIS — I67.1 CEREBRAL ANEURYSM, NONRUPTURED: Chronic | ICD-10-CM

## 2025-06-27 LAB
ALBUMIN SERPL ELPH-MCNC: 3.3 G/DL — LOW (ref 3.5–5)
ALP SERPL-CCNC: 167 U/L — HIGH (ref 40–120)
ALT FLD-CCNC: 23 U/L DA — SIGNIFICANT CHANGE UP (ref 10–60)
ANION GAP SERPL CALC-SCNC: 5 MMOL/L — SIGNIFICANT CHANGE UP (ref 5–17)
AST SERPL-CCNC: 16 U/L — SIGNIFICANT CHANGE UP (ref 10–40)
BASOPHILS # BLD AUTO: 0.03 K/UL — SIGNIFICANT CHANGE UP (ref 0–0.2)
BASOPHILS NFR BLD AUTO: 0.3 % — SIGNIFICANT CHANGE UP (ref 0–2)
BILIRUB SERPL-MCNC: 0.3 MG/DL — SIGNIFICANT CHANGE UP (ref 0.2–1.2)
BUN SERPL-MCNC: 20 MG/DL — HIGH (ref 7–18)
CALCIUM SERPL-MCNC: 9 MG/DL — SIGNIFICANT CHANGE UP (ref 8.4–10.5)
CHLORIDE SERPL-SCNC: 109 MMOL/L — HIGH (ref 96–108)
CO2 SERPL-SCNC: 25 MMOL/L — SIGNIFICANT CHANGE UP (ref 22–31)
CREAT SERPL-MCNC: 0.67 MG/DL — SIGNIFICANT CHANGE UP (ref 0.5–1.3)
EGFR: 99 ML/MIN/1.73M2 — SIGNIFICANT CHANGE UP
EGFR: 99 ML/MIN/1.73M2 — SIGNIFICANT CHANGE UP
EOSINOPHIL # BLD AUTO: 0.08 K/UL — SIGNIFICANT CHANGE UP (ref 0–0.5)
EOSINOPHIL NFR BLD AUTO: 0.7 % — SIGNIFICANT CHANGE UP (ref 0–6)
GLUCOSE SERPL-MCNC: 112 MG/DL — HIGH (ref 70–99)
HCT VFR BLD CALC: 37.8 % — SIGNIFICANT CHANGE UP (ref 34.5–45)
HGB BLD-MCNC: 12.4 G/DL — SIGNIFICANT CHANGE UP (ref 11.5–15.5)
IMM GRANULOCYTES NFR BLD AUTO: 0.3 % — SIGNIFICANT CHANGE UP (ref 0–0.9)
LIDOCAIN IGE QN: 44 U/L — SIGNIFICANT CHANGE UP (ref 13–75)
LYMPHOCYTES # BLD AUTO: 2.47 K/UL — SIGNIFICANT CHANGE UP (ref 1–3.3)
LYMPHOCYTES # BLD AUTO: 21.6 % — SIGNIFICANT CHANGE UP (ref 13–44)
MCHC RBC-ENTMCNC: 31.2 PG — SIGNIFICANT CHANGE UP (ref 27–34)
MCHC RBC-ENTMCNC: 32.8 G/DL — SIGNIFICANT CHANGE UP (ref 32–36)
MCV RBC AUTO: 95.2 FL — SIGNIFICANT CHANGE UP (ref 80–100)
MONOCYTES # BLD AUTO: 0.7 K/UL — SIGNIFICANT CHANGE UP (ref 0–0.9)
MONOCYTES NFR BLD AUTO: 6.1 % — SIGNIFICANT CHANGE UP (ref 2–14)
NEUTROPHILS # BLD AUTO: 8.14 K/UL — HIGH (ref 1.8–7.4)
NEUTROPHILS NFR BLD AUTO: 71 % — SIGNIFICANT CHANGE UP (ref 43–77)
NRBC BLD AUTO-RTO: 0 /100 WBCS — SIGNIFICANT CHANGE UP (ref 0–0)
PLATELET # BLD AUTO: 320 K/UL — SIGNIFICANT CHANGE UP (ref 150–400)
POTASSIUM SERPL-MCNC: 3.5 MMOL/L — SIGNIFICANT CHANGE UP (ref 3.5–5.3)
POTASSIUM SERPL-SCNC: 3.5 MMOL/L — SIGNIFICANT CHANGE UP (ref 3.5–5.3)
PROT SERPL-MCNC: 7.3 G/DL — SIGNIFICANT CHANGE UP (ref 6–8.3)
RBC # BLD: 3.97 M/UL — SIGNIFICANT CHANGE UP (ref 3.8–5.2)
RBC # FLD: 12.9 % — SIGNIFICANT CHANGE UP (ref 10.3–14.5)
SODIUM SERPL-SCNC: 139 MMOL/L — SIGNIFICANT CHANGE UP (ref 135–145)
TROPONIN I, HIGH SENSITIVITY RESULT: <3 NG/L — SIGNIFICANT CHANGE UP
WBC # BLD: 11.46 K/UL — HIGH (ref 3.8–10.5)
WBC # FLD AUTO: 11.46 K/UL — HIGH (ref 3.8–10.5)

## 2025-06-27 PROCEDURE — 85025 COMPLETE CBC W/AUTO DIFF WBC: CPT

## 2025-06-27 PROCEDURE — 96374 THER/PROPH/DIAG INJ IV PUSH: CPT

## 2025-06-27 PROCEDURE — 74176 CT ABD & PELVIS W/O CONTRAST: CPT | Mod: 26

## 2025-06-27 PROCEDURE — 71250 CT THORAX DX C-: CPT

## 2025-06-27 PROCEDURE — 99285 EMERGENCY DEPT VISIT HI MDM: CPT | Mod: 25

## 2025-06-27 PROCEDURE — 99285 EMERGENCY DEPT VISIT HI MDM: CPT

## 2025-06-27 PROCEDURE — 74176 CT ABD & PELVIS W/O CONTRAST: CPT

## 2025-06-27 PROCEDURE — 71250 CT THORAX DX C-: CPT | Mod: 26

## 2025-06-27 PROCEDURE — 93010 ELECTROCARDIOGRAM REPORT: CPT

## 2025-06-27 PROCEDURE — 83690 ASSAY OF LIPASE: CPT

## 2025-06-27 PROCEDURE — 36415 COLL VENOUS BLD VENIPUNCTURE: CPT

## 2025-06-27 PROCEDURE — 84484 ASSAY OF TROPONIN QUANT: CPT

## 2025-06-27 PROCEDURE — 93005 ELECTROCARDIOGRAM TRACING: CPT

## 2025-06-27 PROCEDURE — 80053 COMPREHEN METABOLIC PANEL: CPT

## 2025-06-27 RX ORDER — CYCLOBENZAPRINE HYDROCHLORIDE 15 MG/1
5 CAPSULE, EXTENDED RELEASE ORAL ONCE
Refills: 0 | Status: COMPLETED | OUTPATIENT
Start: 2025-06-27 | End: 2025-06-27

## 2025-06-27 RX ORDER — ACETAMINOPHEN 500 MG/5ML
650 LIQUID (ML) ORAL ONCE
Refills: 0 | Status: COMPLETED | OUTPATIENT
Start: 2025-06-27 | End: 2025-06-27

## 2025-06-27 RX ORDER — LIDOCAINE HYDROCHLORIDE 20 MG/ML
1 JELLY TOPICAL ONCE
Refills: 0 | Status: COMPLETED | OUTPATIENT
Start: 2025-06-27 | End: 2025-06-27

## 2025-06-27 RX ORDER — KETOROLAC TROMETHAMINE 30 MG/ML
15 INJECTION, SOLUTION INTRAMUSCULAR; INTRAVENOUS ONCE
Refills: 0 | Status: DISCONTINUED | OUTPATIENT
Start: 2025-06-27 | End: 2025-06-27

## 2025-06-27 RX ADMIN — KETOROLAC TROMETHAMINE 15 MILLIGRAM(S): 30 INJECTION, SOLUTION INTRAMUSCULAR; INTRAVENOUS at 14:49

## 2025-06-27 RX ADMIN — CYCLOBENZAPRINE HYDROCHLORIDE 5 MILLIGRAM(S): 15 CAPSULE, EXTENDED RELEASE ORAL at 12:31

## 2025-06-27 RX ADMIN — LIDOCAINE HYDROCHLORIDE 1 PATCH: 20 JELLY TOPICAL at 12:31

## 2025-06-27 RX ADMIN — Medication 650 MILLIGRAM(S): at 12:31

## 2025-06-27 NOTE — ED PROVIDER NOTE - PROGRESS NOTE DETAILS
Labs imaging within normal limits.  Pain much improved.  Ambulatory well-appearing.  Ready for DC. Lanny Leyva MD.

## 2025-06-27 NOTE — ED PROVIDER NOTE - OBJECTIVE STATEMENT
Patient is a 60-year-old female with past medical history HTN, HLD, nephrolithiasis, brain aneurysm s/p coiling, rheumatoid arthritis, fibromyalgia, GERD, hypertension, hyperlipidemia, sarcoidosis, asthma, SHELIA  presented with left-sided rib pain s/p fall 2 days ago.  patient states she had a mechanical trip and fall in the shower 2 days ago, landing on her left side.  Patient has had dull intermittent left rib pain since then.  Denies head strike, LOC, syncope, blood thinners.  Vital signs stable, no focal neurological deficits, GCS 15, following commands, moving all extremities equally.  Denies any other past medical history.

## 2025-06-27 NOTE — ED PROVIDER NOTE - PHYSICAL EXAMINATION
Gen: no acute distress  Head: normocephalic, atraumatic  Lung: CTAB, no respiratory distress, no wheezing, rales, rhonchi  CV: normal s1/s2, rrr,   Abd: soft, non-tender, non-distended.   Left upper rib tender to palpation no step-offs no clicking no obvious deficits or deformities  MSK: full range of motion in all 4 extremities  Neuro: No focal neurologic deficits

## 2025-06-27 NOTE — ED PROVIDER NOTE - CLINICAL SUMMARY MEDICAL DECISION MAKING FREE TEXT BOX
Patient is a 60-year-old female with past medical history HTN, HLD, nephrolithiasis, brain aneurysm s/p coiling, rheumatoid arthritis, fibromyalgia, GERD, hypertension, hyperlipidemia, sarcoidosis, asthma, SHELIA  presented with left-sided rib pain s/p fall 2 days ago.  patient states she had a mechanical trip and fall in the shower 2 days ago, landing on her left side. Vital signs stable, no focal neurological deficits, GCS 15, following commands, moving all extremities equally.    -  will treat pain, EKG troponin to assess for ACS or dysrhythmia complex cardiac condition, lipase rule out pancreatitis, CT chest abdomen pelvis to evaluate for internal organ injury versus rib fractures versus pneumothorax hemothorax, reassess.

## 2025-06-27 NOTE — ED PROVIDER NOTE - PATIENT PORTAL LINK FT
You can access the FollowMyHealth Patient Portal offered by [facility] by registering at the following website: [email or web address]. By joining [facility]’s FollowMyHealth portal, you will also be able to view your health information using other applications (apps) compatible with our system.

## 2025-06-27 NOTE — ED ADULT NURSE NOTE - NS ED NOTE  TALK SOMEONE YN
Patient and mother arrived to appt at 8:50am and signed into kiosk, appointment time was 8:30.  Patient was called previously x 2 at 8:35 for appt to see if patient was coming, line was busy.  Patient mother was asked for ID and insurance and patient began speaking about the fact that her daughter needs paperwork from the school filled out to go back to school.  Patient mother spoke about the fact that patient was having a runny nose and that was why she was sent home from school. Provider spoke with mother stating that patients with those symptoms may need to isolate for 10 days, and that patient would need to be fully evaluated. Mother stated that the patient had allergies.  Provider stated that patient would be fully evaluated and patient attempted to argue with provider. Also provider told patient mother that patient would be seen a few minutes to 15 minutes after 9 due to another patient being scheduled at that time to prevent patient coming on time from being seen late, but that the registration process could be completed at the current moment and again asked patient mother for id and insurance card. Mother stated that she was outside in the parking lot at 8:29, and that she was attempting to contact the number for 20 minutes before finally coming in. Provider stated that patient would be seen a few minutes after the 9 am patient and with patient will be attended to at the moment because she is already being registered, she would be seen soon after. Patient mother proceeded to raise voice at provider and provider advised patient mother that provider is unable to argue with the patient. Patient mother asked how long her visit would take once she was in, provider stated usually about 30 mins.  Patient mother stated that she would be back and left the facility.   No

## (undated) DEVICE — KIT MAGELLAN MAR0MAX

## (undated) DEVICE — DRAPE C ARM 41X74"

## (undated) DEVICE — MIDAS REX MR8 MATCH HEAD FLUTED LG BORE 3MM X 14CM

## (undated) DEVICE — GLV 6.5 PROTEXIS (WHITE)

## (undated) DEVICE — SYR LUER LOK 20CC

## (undated) DEVICE — SUT MONOCRYL 3-0 18" PS-1

## (undated) DEVICE — MIDAS REX MR8 BALL FLUTED SM BORE 5MM X 10CM

## (undated) DEVICE — DRAPE INSTRUMENT POUCH 6.75" X 11"

## (undated) DEVICE — STAPLER SKIN PROXIMATE

## (undated) DEVICE — SUT VICRYL 0 18" CT-1 UNDYED (POP-OFF)

## (undated) DEVICE — DRAPE C ARM UNIVERSAL

## (undated) DEVICE — MARKING PEN W RULER

## (undated) DEVICE — PROBE RHYTHMLINK BALL TIP 100MM 2.5

## (undated) DEVICE — DRAPE 3/4 SHEET 52X76"

## (undated) DEVICE — GOWN XL

## (undated) DEVICE — Device

## (undated) DEVICE — DRAPE C ARM C-ARMOUR

## (undated) DEVICE — SPONGE SURGICAL STRIP 1/4 X 6"

## (undated) DEVICE — ELCTR AQUAMANTYS BIPOLAR SEALER 6.0

## (undated) DEVICE — PACK SPINE

## (undated) DEVICE — NDL SPINAL 18G X 3.5" (PINK)

## (undated) DEVICE — WARMING BLANKET UPPER ADULT

## (undated) DEVICE — ARTERIOCYTE MAGELLAN MAR0MAX CONVENIENCE QUCK DRAW

## (undated) DEVICE — VENODYNE/SCD SLEEVE CALF MEDIUM

## (undated) DEVICE — PACK CYSTO

## (undated) DEVICE — SYR LUER LOK 10CC

## (undated) DEVICE — COVER BACK TABLE STRL 80/110IN 10/CA

## (undated) DEVICE — NDL HYPO SAFE 22G X 1.5" (BLACK)

## (undated) DEVICE — DRAPE SURGICAL #1010

## (undated) DEVICE — SOL IRR BAG NS 0.9% 3000ML

## (undated) DEVICE — SUT VICRYL PLUS 2-0 18" CT-2 (POP-OFF)

## (undated) DEVICE — SOL IRR POUR H2O 500ML

## (undated) DEVICE — DRAPE 1/2 SHEET 40X57"

## (undated) DEVICE — SOL IRR POUR NS 0.9% 500ML

## (undated) DEVICE — NDL CHRONOS BMA 11GA

## (undated) DEVICE — FOLEY TRAY 16FR LF URINE METER SURESTEP

## (undated) DEVICE — STRYKER BONE MILL BLADE FINE 3.2MM

## (undated) DEVICE — DRSG TEGADERM 4X4.75"

## (undated) DEVICE — MIDAS REX MR8 MATCH HEAD FLUTED SM BORE 2.2MM X 10CM

## (undated) DEVICE — SPONGE SURGICAL STRIP 1/2 X 6"

## (undated) DEVICE — MIDAS REX MR8 METAL CUTTER 3MM X 9CM